# Patient Record
Sex: FEMALE | Race: WHITE | Employment: OTHER | URBAN - METROPOLITAN AREA
[De-identification: names, ages, dates, MRNs, and addresses within clinical notes are randomized per-mention and may not be internally consistent; named-entity substitution may affect disease eponyms.]

---

## 2017-01-19 DIAGNOSIS — I10 ESSENTIAL HYPERTENSION: ICD-10-CM

## 2017-01-19 DIAGNOSIS — I25.10 CORONARY ARTERY DISEASE INVOLVING NATIVE CORONARY ARTERY OF NATIVE HEART WITHOUT ANGINA PECTORIS: ICD-10-CM

## 2017-01-19 DIAGNOSIS — E03.4 HYPOTHYROIDISM DUE TO ACQUIRED ATROPHY OF THYROID: ICD-10-CM

## 2017-01-19 DIAGNOSIS — E78.00 PURE HYPERCHOLESTEROLEMIA: ICD-10-CM

## 2017-01-19 DIAGNOSIS — E11.9 TYPE 2 DIABETES MELLITUS WITHOUT COMPLICATION, WITHOUT LONG-TERM CURRENT USE OF INSULIN (HCC): ICD-10-CM

## 2017-01-20 RX ORDER — ATORVASTATIN CALCIUM 20 MG/1
20 TABLET, FILM COATED ORAL DAILY
Qty: 90 TABLET | Refills: 1 | Status: SHIPPED | OUTPATIENT
Start: 2017-01-20 | End: 2017-09-05 | Stop reason: SDUPTHER

## 2017-01-20 RX ORDER — METOPROLOL TARTRATE 50 MG/1
TABLET, FILM COATED ORAL
Qty: 180 TABLET | Refills: 1 | Status: SHIPPED | OUTPATIENT
Start: 2017-01-20 | End: 2017-05-08 | Stop reason: SDUPTHER

## 2017-01-20 RX ORDER — CLOPIDOGREL BISULFATE 75 MG/1
75 TABLET ORAL DAILY
Qty: 90 TABLET | Refills: 1 | Status: SHIPPED | OUTPATIENT
Start: 2017-01-20 | End: 2017-08-24 | Stop reason: SDUPTHER

## 2017-01-20 RX ORDER — HYDROCHLOROTHIAZIDE 25 MG/1
25 TABLET ORAL DAILY
Qty: 90 TABLET | Refills: 1 | Status: SHIPPED | OUTPATIENT
Start: 2017-01-20 | End: 2017-09-05 | Stop reason: SDUPTHER

## 2017-01-20 RX ORDER — METFORMIN HYDROCHLORIDE 500 MG/1
TABLET, EXTENDED RELEASE ORAL
Qty: 360 TABLET | Refills: 1 | Status: SHIPPED | OUTPATIENT
Start: 2017-01-20 | End: 2017-09-05 | Stop reason: SDUPTHER

## 2017-01-20 RX ORDER — AMLODIPINE BESYLATE 10 MG/1
10 TABLET ORAL DAILY
Qty: 90 TABLET | Refills: 1 | Status: SHIPPED | OUTPATIENT
Start: 2017-01-20 | End: 2017-08-14 | Stop reason: SDUPTHER

## 2017-01-25 RX ORDER — GLIMEPIRIDE 4 MG/1
4 TABLET ORAL DAILY
Qty: 90 TABLET | Refills: 1 | Status: SHIPPED | OUTPATIENT
Start: 2017-01-25 | End: 2017-05-08 | Stop reason: SDUPTHER

## 2017-02-02 ENCOUNTER — TELEPHONE (OUTPATIENT)
Dept: FAMILY MEDICINE CLINIC | Age: 72
End: 2017-02-02

## 2017-02-07 DIAGNOSIS — E03.4 HYPOTHYROIDISM DUE TO ACQUIRED ATROPHY OF THYROID: ICD-10-CM

## 2017-02-07 DIAGNOSIS — E11.42 DIABETIC PERIPHERAL NEUROPATHY (HCC): ICD-10-CM

## 2017-02-07 DIAGNOSIS — E78.00 PURE HYPERCHOLESTEROLEMIA: ICD-10-CM

## 2017-02-07 DIAGNOSIS — I15.9 SECONDARY HYPERTENSION: ICD-10-CM

## 2017-02-07 LAB
ALBUMIN SERPL-MCNC: 4.3 G/DL
ALP BLD-CCNC: 64 U/L
ALT SERPL-CCNC: 26 U/L
AST SERPL-CCNC: 22 U/L
BILIRUB SERPL-MCNC: 0.5 MG/DL (ref 0.1–1.4)
BUN BLDV-MCNC: 18 MG/DL
CALCIUM SERPL-MCNC: 9.2 MG/DL
CHLORIDE BLD-SCNC: 95 MMOL/L
CHOLESTEROL, TOTAL: 179 MG/DL
CHOLESTEROL/HDL RATIO: NORMAL
CO2: 23 MMOL/L
CREAT SERPL-MCNC: 0.88 MG/DL
GFR CALCULATED: NORMAL
GLUCOSE BLD-MCNC: 200 MG/DL
HCT VFR BLD CALC: 42.4 % (ref 36–46)
HDLC SERPL-MCNC: 52 MG/DL (ref 35–70)
HEMOGLOBIN: 14.1 G/DL (ref 12–16)
LDL CHOLESTEROL CALCULATED: 74 MG/DL (ref 0–160)
PLATELET # BLD: 303 K/ΜL
POTASSIUM SERPL-SCNC: 4.3 MMOL/L
SODIUM BLD-SCNC: 140 MMOL/L
T4 FREE: 1.35
TOTAL PROTEIN: 6.4
TRIGL SERPL-MCNC: 266 MG/DL
TSH SERPL DL<=0.05 MIU/L-ACNC: 4.64 UIU/ML
VLDLC SERPL CALC-MCNC: 53 MG/DL
WBC # BLD: 6.1 10^3/ML

## 2017-02-08 ENCOUNTER — OFFICE VISIT (OUTPATIENT)
Dept: FAMILY MEDICINE CLINIC | Age: 72
End: 2017-02-08

## 2017-02-08 VITALS
HEART RATE: 68 BPM | SYSTOLIC BLOOD PRESSURE: 114 MMHG | DIASTOLIC BLOOD PRESSURE: 66 MMHG | WEIGHT: 255.13 LBS | HEIGHT: 68 IN | RESPIRATION RATE: 14 BRPM | BODY MASS INDEX: 38.66 KG/M2

## 2017-02-08 DIAGNOSIS — E11.9 TYPE 2 DIABETES MELLITUS WITHOUT COMPLICATION, WITHOUT LONG-TERM CURRENT USE OF INSULIN (HCC): Primary | ICD-10-CM

## 2017-02-08 DIAGNOSIS — E11.42 DIABETIC POLYNEUROPATHY ASSOCIATED WITH TYPE 2 DIABETES MELLITUS (HCC): ICD-10-CM

## 2017-02-08 DIAGNOSIS — I71.9 AORTIC ANEURYSM OF UNSPECIFIED SITE WITHOUT MENTION OF RUPTURE: ICD-10-CM

## 2017-02-08 DIAGNOSIS — E78.00 PURE HYPERCHOLESTEROLEMIA: ICD-10-CM

## 2017-02-08 DIAGNOSIS — I15.9 SECONDARY HYPERTENSION: ICD-10-CM

## 2017-02-08 DIAGNOSIS — E03.4 HYPOTHYROIDISM DUE TO ACQUIRED ATROPHY OF THYROID: ICD-10-CM

## 2017-02-08 LAB
GLUCOSE BLD-MCNC: 203 MG/DL
HBA1C MFR BLD: 7.1 %

## 2017-02-08 PROCEDURE — 99213 OFFICE O/P EST LOW 20 MIN: CPT | Performed by: FAMILY MEDICINE

## 2017-02-08 PROCEDURE — 82962 GLUCOSE BLOOD TEST: CPT | Performed by: FAMILY MEDICINE

## 2017-02-08 PROCEDURE — 83036 HEMOGLOBIN GLYCOSYLATED A1C: CPT | Performed by: FAMILY MEDICINE

## 2017-02-08 RX ORDER — GABAPENTIN 300 MG/1
CAPSULE ORAL
Qty: 90 CAPSULE | Refills: 0 | Status: SHIPPED | OUTPATIENT
Start: 2017-02-08 | End: 2017-05-19 | Stop reason: SDUPTHER

## 2017-02-08 ASSESSMENT — ENCOUNTER SYMPTOMS
ORTHOPNEA: 0
CONSTIPATION: 0
NAUSEA: 0
ABDOMINAL PAIN: 0
SORE THROAT: 0
CHEST TIGHTNESS: 0
SHORTNESS OF BREATH: 0
COUGH: 0
EYE PAIN: 0
WHEEZING: 0

## 2017-02-08 ASSESSMENT — PATIENT HEALTH QUESTIONNAIRE - PHQ9
SUM OF ALL RESPONSES TO PHQ QUESTIONS 1-9: 0
SUM OF ALL RESPONSES TO PHQ9 QUESTIONS 1 & 2: 0
2. FEELING DOWN, DEPRESSED OR HOPELESS: 0
1. LITTLE INTEREST OR PLEASURE IN DOING THINGS: 0

## 2017-05-08 DIAGNOSIS — I10 ESSENTIAL HYPERTENSION: ICD-10-CM

## 2017-05-08 DIAGNOSIS — I25.10 CORONARY ARTERY DISEASE INVOLVING NATIVE CORONARY ARTERY OF NATIVE HEART WITHOUT ANGINA PECTORIS: ICD-10-CM

## 2017-05-09 RX ORDER — GLIMEPIRIDE 4 MG/1
4 TABLET ORAL DAILY
Qty: 90 TABLET | Refills: 1 | Status: SHIPPED | OUTPATIENT
Start: 2017-05-09 | End: 2018-03-22 | Stop reason: SDUPTHER

## 2017-05-09 RX ORDER — METOPROLOL TARTRATE 50 MG/1
TABLET, FILM COATED ORAL
Qty: 180 TABLET | Refills: 1 | Status: SHIPPED | OUTPATIENT
Start: 2017-05-09 | End: 2017-10-19 | Stop reason: DRUGHIGH

## 2017-05-10 ENCOUNTER — OFFICE VISIT (OUTPATIENT)
Dept: FAMILY MEDICINE CLINIC | Age: 72
End: 2017-05-10

## 2017-05-10 VITALS
HEART RATE: 60 BPM | WEIGHT: 253.38 LBS | DIASTOLIC BLOOD PRESSURE: 64 MMHG | SYSTOLIC BLOOD PRESSURE: 130 MMHG | HEIGHT: 68 IN | RESPIRATION RATE: 12 BRPM | BODY MASS INDEX: 38.4 KG/M2

## 2017-05-10 DIAGNOSIS — E11.9 TYPE 2 DIABETES MELLITUS WITHOUT COMPLICATION, WITHOUT LONG-TERM CURRENT USE OF INSULIN (HCC): Primary | ICD-10-CM

## 2017-05-10 DIAGNOSIS — I15.9 SECONDARY HYPERTENSION: ICD-10-CM

## 2017-05-10 DIAGNOSIS — E11.42 DIABETIC PERIPHERAL NEUROPATHY (HCC): ICD-10-CM

## 2017-05-10 DIAGNOSIS — I25.10 CORONARY ARTERY DISEASE INVOLVING NATIVE CORONARY ARTERY OF NATIVE HEART WITHOUT ANGINA PECTORIS: ICD-10-CM

## 2017-05-10 DIAGNOSIS — E78.00 PURE HYPERCHOLESTEROLEMIA: ICD-10-CM

## 2017-05-10 DIAGNOSIS — Z12.39 BREAST SCREENING: ICD-10-CM

## 2017-05-10 LAB
GLUCOSE BLD-MCNC: 187 MG/DL
HBA1C MFR BLD: 7.4 %

## 2017-05-10 PROCEDURE — 83036 HEMOGLOBIN GLYCOSYLATED A1C: CPT | Performed by: FAMILY MEDICINE

## 2017-05-10 PROCEDURE — 99213 OFFICE O/P EST LOW 20 MIN: CPT | Performed by: FAMILY MEDICINE

## 2017-05-10 PROCEDURE — 82962 GLUCOSE BLOOD TEST: CPT | Performed by: FAMILY MEDICINE

## 2017-05-10 ASSESSMENT — ENCOUNTER SYMPTOMS
NAUSEA: 0
ORTHOPNEA: 0
WHEEZING: 0
SORE THROAT: 0
ABDOMINAL PAIN: 0
CHEST TIGHTNESS: 0
SHORTNESS OF BREATH: 0
COUGH: 0
CONSTIPATION: 0
EYE PAIN: 0

## 2017-05-19 DIAGNOSIS — E11.42 DIABETIC PERIPHERAL NEUROPATHY (HCC): ICD-10-CM

## 2017-05-23 RX ORDER — GABAPENTIN 300 MG/1
CAPSULE ORAL
Qty: 90 CAPSULE | Refills: 0 | Status: SHIPPED | OUTPATIENT
Start: 2017-05-23 | End: 2017-08-30 | Stop reason: SDUPTHER

## 2017-07-28 DIAGNOSIS — E03.4 HYPOTHYROIDISM DUE TO ACQUIRED ATROPHY OF THYROID: ICD-10-CM

## 2017-08-01 RX ORDER — LEVOTHYROXINE SODIUM 0.15 MG/1
TABLET ORAL
Qty: 90 TABLET | Refills: 0 | Status: SHIPPED | OUTPATIENT
Start: 2017-08-01 | End: 2017-10-26 | Stop reason: SDUPTHER

## 2017-08-06 DIAGNOSIS — I10 ESSENTIAL HYPERTENSION: ICD-10-CM

## 2017-08-07 RX ORDER — RAMIPRIL 10 MG/1
CAPSULE ORAL
Qty: 180 CAPSULE | Refills: 0 | Status: SHIPPED | OUTPATIENT
Start: 2017-08-07 | End: 2017-11-08 | Stop reason: SDUPTHER

## 2017-08-14 DIAGNOSIS — I10 ESSENTIAL HYPERTENSION: ICD-10-CM

## 2017-08-15 ENCOUNTER — OFFICE VISIT (OUTPATIENT)
Dept: FAMILY MEDICINE CLINIC | Age: 72
End: 2017-08-15

## 2017-08-15 VITALS
DIASTOLIC BLOOD PRESSURE: 72 MMHG | HEART RATE: 82 BPM | BODY MASS INDEX: 38.25 KG/M2 | RESPIRATION RATE: 14 BRPM | SYSTOLIC BLOOD PRESSURE: 128 MMHG | WEIGHT: 252.38 LBS | HEIGHT: 68 IN

## 2017-08-15 DIAGNOSIS — S83.422A SPRAIN OF LATERAL COLLATERAL LIGAMENT OF LEFT KNEE, INITIAL ENCOUNTER: ICD-10-CM

## 2017-08-15 DIAGNOSIS — E11.42 DIABETIC PERIPHERAL NEUROPATHY (HCC): ICD-10-CM

## 2017-08-15 DIAGNOSIS — E11.9 TYPE 2 DIABETES MELLITUS WITHOUT COMPLICATION, WITHOUT LONG-TERM CURRENT USE OF INSULIN (HCC): Primary | ICD-10-CM

## 2017-08-15 DIAGNOSIS — E78.00 PURE HYPERCHOLESTEROLEMIA: ICD-10-CM

## 2017-08-15 DIAGNOSIS — I15.9 SECONDARY HYPERTENSION: ICD-10-CM

## 2017-08-15 LAB
GLUCOSE BLD-MCNC: 176 MG/DL
HBA1C MFR BLD: 7.8 %

## 2017-08-15 PROCEDURE — 99213 OFFICE O/P EST LOW 20 MIN: CPT | Performed by: FAMILY MEDICINE

## 2017-08-15 PROCEDURE — 82962 GLUCOSE BLOOD TEST: CPT | Performed by: FAMILY MEDICINE

## 2017-08-15 PROCEDURE — 83036 HEMOGLOBIN GLYCOSYLATED A1C: CPT | Performed by: FAMILY MEDICINE

## 2017-08-15 RX ORDER — AMLODIPINE BESYLATE 10 MG/1
10 TABLET ORAL DAILY
Qty: 90 TABLET | Refills: 1 | Status: SHIPPED | OUTPATIENT
Start: 2017-08-15 | End: 2018-03-22 | Stop reason: SDUPTHER

## 2017-08-15 ASSESSMENT — ENCOUNTER SYMPTOMS
EYE PAIN: 0
ORTHOPNEA: 0
CHEST TIGHTNESS: 0
SHORTNESS OF BREATH: 0
WHEEZING: 0
COUGH: 0
CONSTIPATION: 0
NAUSEA: 0
ABDOMINAL PAIN: 0
SORE THROAT: 0

## 2017-08-15 ASSESSMENT — PATIENT HEALTH QUESTIONNAIRE - PHQ9
2. FEELING DOWN, DEPRESSED OR HOPELESS: 0
SUM OF ALL RESPONSES TO PHQ9 QUESTIONS 1 & 2: 0
SUM OF ALL RESPONSES TO PHQ QUESTIONS 1-9: 0
1. LITTLE INTEREST OR PLEASURE IN DOING THINGS: 0

## 2017-08-24 ENCOUNTER — HOSPITAL ENCOUNTER (OUTPATIENT)
Dept: WOMENS IMAGING | Age: 72
Discharge: HOME | End: 2017-08-24
Payer: MEDICARE

## 2017-08-24 DIAGNOSIS — Z12.39 BREAST SCREENING: ICD-10-CM

## 2017-08-24 DIAGNOSIS — I25.10 CORONARY ARTERY DISEASE INVOLVING NATIVE CORONARY ARTERY OF NATIVE HEART WITHOUT ANGINA PECTORIS: ICD-10-CM

## 2017-08-24 PROCEDURE — G0202 SCR MAMMO BI INCL CAD: HCPCS

## 2017-08-25 RX ORDER — CLOPIDOGREL BISULFATE 75 MG/1
TABLET ORAL
Qty: 90 TABLET | Refills: 1 | Status: SHIPPED | OUTPATIENT
Start: 2017-08-25 | End: 2018-02-11 | Stop reason: SDUPTHER

## 2017-08-30 DIAGNOSIS — E11.42 DIABETIC PERIPHERAL NEUROPATHY (HCC): ICD-10-CM

## 2017-09-01 RX ORDER — GABAPENTIN 300 MG/1
CAPSULE ORAL
Qty: 90 CAPSULE | Refills: 1 | Status: SHIPPED | OUTPATIENT
Start: 2017-09-01 | End: 2018-03-14 | Stop reason: SDUPTHER

## 2017-09-05 DIAGNOSIS — I10 ESSENTIAL HYPERTENSION: ICD-10-CM

## 2017-09-05 DIAGNOSIS — E11.9 TYPE 2 DIABETES MELLITUS WITHOUT COMPLICATION, WITHOUT LONG-TERM CURRENT USE OF INSULIN (HCC): ICD-10-CM

## 2017-09-05 DIAGNOSIS — E03.4 HYPOTHYROIDISM DUE TO ACQUIRED ATROPHY OF THYROID: ICD-10-CM

## 2017-09-05 DIAGNOSIS — E78.00 PURE HYPERCHOLESTEROLEMIA: ICD-10-CM

## 2017-09-06 RX ORDER — HYDROCHLOROTHIAZIDE 25 MG/1
25 TABLET ORAL DAILY
Qty: 90 TABLET | Refills: 1 | Status: SHIPPED | OUTPATIENT
Start: 2017-09-06 | End: 2018-02-11 | Stop reason: SDUPTHER

## 2017-09-06 RX ORDER — ATORVASTATIN CALCIUM 20 MG/1
20 TABLET, FILM COATED ORAL DAILY
Qty: 90 TABLET | Refills: 1 | Status: SHIPPED | OUTPATIENT
Start: 2017-09-06 | End: 2018-02-11 | Stop reason: SDUPTHER

## 2017-09-06 RX ORDER — METFORMIN HYDROCHLORIDE 500 MG/1
TABLET, EXTENDED RELEASE ORAL
Qty: 360 TABLET | Refills: 1 | Status: ON HOLD | OUTPATIENT
Start: 2017-09-06 | End: 2017-10-22

## 2017-10-19 ENCOUNTER — OFFICE VISIT (OUTPATIENT)
Dept: FAMILY MEDICINE CLINIC | Age: 72
End: 2017-10-19

## 2017-10-19 ENCOUNTER — HOSPITAL ENCOUNTER (INPATIENT)
Age: 72
LOS: 2 days | Discharge: HOME | DRG: 242 | End: 2017-10-22
Attending: EMERGENCY MEDICINE | Admitting: FAMILY MEDICINE
Payer: MEDICARE

## 2017-10-19 ENCOUNTER — APPOINTMENT (OUTPATIENT)
Dept: CT IMAGING | Age: 72
DRG: 242 | End: 2017-10-19
Payer: MEDICARE

## 2017-10-19 ENCOUNTER — APPOINTMENT (OUTPATIENT)
Dept: GENERAL RADIOLOGY | Age: 72
DRG: 242 | End: 2017-10-19
Payer: MEDICARE

## 2017-10-19 VITALS
RESPIRATION RATE: 14 BRPM | SYSTOLIC BLOOD PRESSURE: 110 MMHG | HEART RATE: 48 BPM | DIASTOLIC BLOOD PRESSURE: 70 MMHG | WEIGHT: 254 LBS | HEIGHT: 68 IN | BODY MASS INDEX: 38.49 KG/M2

## 2017-10-19 DIAGNOSIS — E11.9 TYPE 2 DIABETES MELLITUS WITHOUT COMPLICATION, WITHOUT LONG-TERM CURRENT USE OF INSULIN (HCC): ICD-10-CM

## 2017-10-19 DIAGNOSIS — R00.1 BRADYCARDIA: Primary | ICD-10-CM

## 2017-10-19 DIAGNOSIS — I10 ESSENTIAL HYPERTENSION: ICD-10-CM

## 2017-10-19 DIAGNOSIS — I25.10 CORONARY ARTERY DISEASE INVOLVING NATIVE CORONARY ARTERY OF NATIVE HEART WITHOUT ANGINA PECTORIS: ICD-10-CM

## 2017-10-19 DIAGNOSIS — R06.09 DYSPNEA ON EXERTION: Primary | ICD-10-CM

## 2017-10-19 DIAGNOSIS — R00.1 BRADYCARDIA: ICD-10-CM

## 2017-10-19 DIAGNOSIS — I15.9 SECONDARY HYPERTENSION: ICD-10-CM

## 2017-10-19 LAB
ANION GAP SERPL CALCULATED.3IONS-SCNC: 18 MEQ/L (ref 8–16)
ANISOCYTOSIS: ABNORMAL
BASOPHILS # BLD: 1.2 %
BASOPHILS ABSOLUTE: 0.1 THOU/MM3 (ref 0–0.1)
BUN BLDV-MCNC: 25 MG/DL (ref 7–22)
CALCIUM SERPL-MCNC: 9.3 MG/DL (ref 8.5–10.5)
CHLORIDE BLD-SCNC: 102 MEQ/L (ref 98–111)
CO2: 24 MEQ/L (ref 23–33)
CREAT SERPL-MCNC: 1.1 MG/DL (ref 0.4–1.2)
EOSINOPHIL # BLD: 3.6 %
EOSINOPHILS ABSOLUTE: 0.3 THOU/MM3 (ref 0–0.4)
GFR SERPL CREATININE-BSD FRML MDRD: 49 ML/MIN/1.73M2
GLUCOSE BLD-MCNC: 140 MG/DL (ref 70–108)
HCT VFR BLD CALC: 40 % (ref 37–47)
HEMOGLOBIN: 13.6 GM/DL (ref 12–16)
HGB, POC: 14.2
LYMPHOCYTES # BLD: 20.9 %
LYMPHOCYTES ABSOLUTE: 2 THOU/MM3 (ref 1–4.8)
MCH RBC QN AUTO: 29.3 PG (ref 27–31)
MCHC RBC AUTO-ENTMCNC: 33.9 GM/DL (ref 33–37)
MCV RBC AUTO: 86.4 FL (ref 81–99)
MONOCYTES # BLD: 7.7 %
MONOCYTES ABSOLUTE: 0.7 THOU/MM3 (ref 0.4–1.3)
NUCLEATED RED BLOOD CELLS: 0 /100 WBC
OSMOLALITY CALCULATION: 293.5 MOSMOL/KG (ref 275–300)
PDW BLD-RTO: 14.7 % (ref 11.5–14.5)
PLATELET # BLD: 261 THOU/MM3 (ref 130–400)
PMV BLD AUTO: 9.3 MCM (ref 7.4–10.4)
POTASSIUM SERPL-SCNC: 4.5 MEQ/L (ref 3.5–5.2)
RBC # BLD: 4.64 MILL/MM3 (ref 4.2–5.4)
RBC # BLD: NORMAL 10*6/UL
SEG NEUTROPHILS: 66.6 %
SEGMENTED NEUTROPHILS ABSOLUTE COUNT: 6.3 THOU/MM3 (ref 1.8–7.7)
SODIUM BLD-SCNC: 144 MEQ/L (ref 135–145)
TROPONIN T: < 0.01 NG/ML
WBC # BLD: 9.4 THOU/MM3 (ref 4.8–10.8)

## 2017-10-19 PROCEDURE — B3201ZZ COMPUTERIZED TOMOGRAPHY (CT SCAN) OF THORACIC AORTA USING LOW OSMOLAR CONTRAST: ICD-10-PCS | Performed by: RADIOLOGY

## 2017-10-19 PROCEDURE — 36415 COLL VENOUS BLD VENIPUNCTURE: CPT

## 2017-10-19 PROCEDURE — 6360000004 HC RX CONTRAST MEDICATION: Performed by: EMERGENCY MEDICINE

## 2017-10-19 PROCEDURE — 71010 XR CHEST PORTABLE: CPT

## 2017-10-19 PROCEDURE — 71275 CT ANGIOGRAPHY CHEST: CPT

## 2017-10-19 PROCEDURE — B32T1ZZ COMPUTERIZED TOMOGRAPHY (CT SCAN) OF LEFT PULMONARY ARTERY USING LOW OSMOLAR CONTRAST: ICD-10-PCS | Performed by: RADIOLOGY

## 2017-10-19 PROCEDURE — B32S1ZZ COMPUTERIZED TOMOGRAPHY (CT SCAN) OF RIGHT PULMONARY ARTERY USING LOW OSMOLAR CONTRAST: ICD-10-PCS | Performed by: RADIOLOGY

## 2017-10-19 PROCEDURE — 85025 COMPLETE CBC W/AUTO DIFF WBC: CPT

## 2017-10-19 PROCEDURE — 85018 HEMOGLOBIN: CPT | Performed by: FAMILY MEDICINE

## 2017-10-19 PROCEDURE — 93000 ELECTROCARDIOGRAM COMPLETE: CPT | Performed by: FAMILY MEDICINE

## 2017-10-19 PROCEDURE — 80048 BASIC METABOLIC PNL TOTAL CA: CPT

## 2017-10-19 PROCEDURE — 84484 ASSAY OF TROPONIN QUANT: CPT

## 2017-10-19 PROCEDURE — 99285 EMERGENCY DEPT VISIT HI MDM: CPT

## 2017-10-19 PROCEDURE — 99214 OFFICE O/P EST MOD 30 MIN: CPT | Performed by: FAMILY MEDICINE

## 2017-10-19 RX ORDER — METOPROLOL TARTRATE 50 MG/1
TABLET, FILM COATED ORAL
Qty: 180 TABLET | Refills: 1 | Status: SHIPPED | OUTPATIENT
Start: 2017-10-19 | End: 2017-11-22 | Stop reason: ALTCHOICE

## 2017-10-19 RX ADMIN — IOPAMIDOL 80 ML: 755 INJECTION, SOLUTION INTRAVENOUS at 23:32

## 2017-10-19 ASSESSMENT — ENCOUNTER SYMPTOMS
HOARSE VOICE: 0
SHORTNESS OF BREATH: 1
ABDOMINAL PAIN: 0
EYE PAIN: 0
SORE THROAT: 0
WHEEZING: 0
NAUSEA: 0
CONSTIPATION: 0
SINUS PRESSURE: 1
COUGH: 1

## 2017-10-19 NOTE — PROGRESS NOTES
24 Hr Holter Monitor @ [address] [place] [address] on Tuesday 10-24-17 at 9:45am. Patient to arrive at 9:15am to second floor heart center. Eliezer Avina informed at appt today.

## 2017-10-19 NOTE — PROGRESS NOTES
ARTHROSCOPY  11/01; 3/02    right and left knee    KNEE ARTHROSCOPY Bilateral     bilat    THYROIDECTOMY, PARTIAL  1980's     Social History     Social History    Marital status:      Spouse name: N/A    Number of children: N/A    Years of education: N/A     Occupational History    Not on file. Social History Main Topics    Smoking status: Never Smoker    Smokeless tobacco: Never Used    Alcohol use No    Drug use: No    Sexual activity: No     Other Topics Concern    Not on file     Social History Narrative    No narrative on file     Family History   Problem Relation Age of Onset    Heart Disease Mother 80     bacterial infection at valve    Heart Disease Father     Heart Attack Father     Heart Disease Brother      CABG     Review of Systems   Constitutional: Negative for appetite change, fatigue and fever. HENT: Positive for sinus pressure. Negative for congestion, ear pain, hoarse voice, postnasal drip and sore throat. Eyes: Negative for pain and visual disturbance. Respiratory: Positive for cough and shortness of breath. Negative for wheezing. Cardiovascular: Negative for chest pain, palpitations and leg swelling. Gastrointestinal: Negative for abdominal pain, constipation and nausea. Genitourinary: Negative for dysuria and frequency. Musculoskeletal: Negative for arthralgias, joint swelling, neck pain and neck stiffness. Skin: Negative for rash. Neurological: Negative for dizziness, weakness, numbness and headaches. Hematological: Negative for adenopathy. Does not bruise/bleed easily. Psychiatric/Behavioral: Negative for behavioral problems and sleep disturbance. The patient is not nervous/anxious. /70 (Site: Right Arm, Position: Sitting, Cuff Size: Medium Adult)   Pulse (!) 48   Resp 14   Ht 5' 8\" (1.727 m)   Wt 254 lb (115.2 kg)   Breastfeeding?  No   BMI 38.62 kg/m²   Objective:   Physical Exam   Constitutional: She is oriented to person, place, and time. She appears well-developed and well-nourished. HENT:   Head: Normocephalic and atraumatic. Right Ear: External ear normal.   Left Ear: External ear normal.   Nose: Nose normal.   Mouth/Throat: Oropharynx is clear and moist.   Eyes: Conjunctivae and EOM are normal. Pupils are equal, round, and reactive to light. No scleral icterus. Neck: Normal range of motion. Neck supple. No JVD present. No thyromegaly present. Cardiovascular: Normal rate, regular rhythm, normal heart sounds and intact distal pulses. Pulmonary/Chest: Effort normal and breath sounds normal. She has no wheezes. She has no rales. Abdominal: Soft. Bowel sounds are normal. She exhibits no distension and no mass. There is no tenderness. Musculoskeletal: Normal range of motion. She exhibits no tenderness. Lymphadenopathy:     She has no cervical adenopathy. Neurological: She is alert and oriented to person, place, and time. She has normal reflexes. No cranial nerve deficit. Skin: Skin is warm and dry. No rash noted. Psychiatric: She has a normal mood and affect. Nursing note and vitals reviewed. ekg with  Sinus  Bradycardia no ST segment change or  qrs and  No  Pr  Changes   Assessment:      1. Dyspnea on exertion  EKG 12 Lead    POCT hemoglobin    CBC with Differential    TSH with Reflex    Basic Metabolic Panel   2. Secondary hypertension     3. Bradycardia  Holter Monitor 24 Hour   4. Essential hypertension  metoprolol tartrate (LOPRESSOR) 50 MG tablet   5.  Coronary artery disease involving native coronary artery of native heart without angina pectoris  metoprolol tartrate (LOPRESSOR) 50 MG tablet         Plan:      Current Outpatient Prescriptions   Medication Sig Dispense Refill    metoprolol tartrate (LOPRESSOR) 50 MG tablet TAKE 1/2 TABLET BY MOUTH TWICE DAILY 180 tablet 1    atorvastatin (LIPITOR) 20 MG tablet Take 1 tablet by mouth daily 90 tablet 1    metFORMIN (GLUCOPHAGE XR) 500 MG extended and decrease Lopressor to 25 mg b.i.d.  Get  Holter and  lab and see back in 5 days  If  Chest pain   To  Er  Or  call

## 2017-10-20 ENCOUNTER — APPOINTMENT (OUTPATIENT)
Dept: INTERVENTIONAL RADIOLOGY/VASCULAR | Age: 72
DRG: 242 | End: 2017-10-20
Payer: MEDICARE

## 2017-10-20 LAB
EKG ATRIAL RATE: 44 BPM
EKG P AXIS: 0 DEGREES
EKG P-R INTERVAL: 180 MS
EKG Q-T INTERVAL: 556 MS
EKG QRS DURATION: 104 MS
EKG QTC CALCULATION (BAZETT): 475 MS
EKG R AXIS: 19 DEGREES
EKG T AXIS: 62 DEGREES
EKG VENTRICULAR RATE: 44 BPM
GLUCOSE BLD-MCNC: 153 MG/DL (ref 70–108)
GLUCOSE BLD-MCNC: 246 MG/DL (ref 70–108)
LV EF: 63 %
LVEF MODALITY: NORMAL
PRO-BNP: 2151 PG/ML (ref 0–900)
TROPONIN T: < 0.01 NG/ML
TSH SERPL DL<=0.05 MIU/L-ACNC: 2.12 UIU/ML (ref 0.4–4.2)

## 2017-10-20 PROCEDURE — 6360000002 HC RX W HCPCS: Performed by: INTERNAL MEDICINE

## 2017-10-20 PROCEDURE — 93306 TTE W/DOPPLER COMPLETE: CPT

## 2017-10-20 PROCEDURE — 6370000000 HC RX 637 (ALT 250 FOR IP): Performed by: INTERNAL MEDICINE

## 2017-10-20 PROCEDURE — 82948 REAGENT STRIP/BLOOD GLUCOSE: CPT

## 2017-10-20 PROCEDURE — 1200000003 HC TELEMETRY R&B

## 2017-10-20 PROCEDURE — 6370000000 HC RX 637 (ALT 250 FOR IP): Performed by: FAMILY MEDICINE

## 2017-10-20 PROCEDURE — 93005 ELECTROCARDIOGRAM TRACING: CPT

## 2017-10-20 PROCEDURE — 2580000003 HC RX 258: Performed by: FAMILY MEDICINE

## 2017-10-20 PROCEDURE — 83880 ASSAY OF NATRIURETIC PEPTIDE: CPT

## 2017-10-20 PROCEDURE — 84484 ASSAY OF TROPONIN QUANT: CPT

## 2017-10-20 PROCEDURE — 99221 1ST HOSP IP/OBS SF/LOW 40: CPT | Performed by: INTERNAL MEDICINE

## 2017-10-20 PROCEDURE — 93880 EXTRACRANIAL BILAT STUDY: CPT

## 2017-10-20 PROCEDURE — 36415 COLL VENOUS BLD VENIPUNCTURE: CPT

## 2017-10-20 PROCEDURE — 2500000003 HC RX 250 WO HCPCS: Performed by: FAMILY MEDICINE

## 2017-10-20 PROCEDURE — 84443 ASSAY THYROID STIM HORMONE: CPT

## 2017-10-20 RX ORDER — SODIUM CHLORIDE 0.9 % (FLUSH) 0.9 %
10 SYRINGE (ML) INJECTION EVERY 12 HOURS SCHEDULED
Status: DISCONTINUED | OUTPATIENT
Start: 2017-10-20 | End: 2017-10-21 | Stop reason: SDUPTHER

## 2017-10-20 RX ORDER — RAMIPRIL 10 MG/1
10 CAPSULE ORAL DAILY
Status: DISCONTINUED | OUTPATIENT
Start: 2017-10-20 | End: 2017-10-20

## 2017-10-20 RX ORDER — POTASSIUM CHLORIDE 20MEQ/15ML
40 LIQUID (ML) ORAL PRN
Status: DISCONTINUED | OUTPATIENT
Start: 2017-10-20 | End: 2017-10-22 | Stop reason: HOSPADM

## 2017-10-20 RX ORDER — SODIUM CHLORIDE 0.9 % (FLUSH) 0.9 %
10 SYRINGE (ML) INJECTION PRN
Status: DISCONTINUED | OUTPATIENT
Start: 2017-10-20 | End: 2017-10-21 | Stop reason: SDUPTHER

## 2017-10-20 RX ORDER — RAMIPRIL 10 MG/1
10 CAPSULE ORAL ONCE
Status: COMPLETED | OUTPATIENT
Start: 2017-10-20 | End: 2017-10-20

## 2017-10-20 RX ORDER — HYDRALAZINE HYDROCHLORIDE 20 MG/ML
10 INJECTION INTRAMUSCULAR; INTRAVENOUS ONCE
Status: COMPLETED | OUTPATIENT
Start: 2017-10-20 | End: 2017-10-20

## 2017-10-20 RX ORDER — GABAPENTIN 300 MG/1
300 CAPSULE ORAL NIGHTLY
Status: DISCONTINUED | OUTPATIENT
Start: 2017-10-20 | End: 2017-10-22 | Stop reason: HOSPADM

## 2017-10-20 RX ORDER — ONDANSETRON 2 MG/ML
4 INJECTION INTRAMUSCULAR; INTRAVENOUS EVERY 6 HOURS PRN
Status: DISCONTINUED | OUTPATIENT
Start: 2017-10-20 | End: 2017-10-21

## 2017-10-20 RX ORDER — NICOTINE POLACRILEX 4 MG
15 LOZENGE BUCCAL PRN
Status: DISCONTINUED | OUTPATIENT
Start: 2017-10-20 | End: 2017-10-22 | Stop reason: HOSPADM

## 2017-10-20 RX ORDER — RAMIPRIL 10 MG/1
20 CAPSULE ORAL DAILY
Status: DISCONTINUED | OUTPATIENT
Start: 2017-10-21 | End: 2017-10-22 | Stop reason: HOSPADM

## 2017-10-20 RX ORDER — LEVOTHYROXINE SODIUM 0.12 MG/1
125 TABLET ORAL DAILY
Status: DISCONTINUED | OUTPATIENT
Start: 2017-10-20 | End: 2017-10-22 | Stop reason: HOSPADM

## 2017-10-20 RX ORDER — BUMETANIDE 0.25 MG/ML
1 INJECTION, SOLUTION INTRAMUSCULAR; INTRAVENOUS ONCE
Status: COMPLETED | OUTPATIENT
Start: 2017-10-20 | End: 2017-10-20

## 2017-10-20 RX ORDER — POTASSIUM CHLORIDE 20 MEQ/1
40 TABLET, EXTENDED RELEASE ORAL PRN
Status: DISCONTINUED | OUTPATIENT
Start: 2017-10-20 | End: 2017-10-22 | Stop reason: HOSPADM

## 2017-10-20 RX ORDER — AMLODIPINE BESYLATE 10 MG/1
10 TABLET ORAL DAILY
Status: DISCONTINUED | OUTPATIENT
Start: 2017-10-20 | End: 2017-10-22 | Stop reason: HOSPADM

## 2017-10-20 RX ORDER — DEXTROSE MONOHYDRATE 50 MG/ML
100 INJECTION, SOLUTION INTRAVENOUS PRN
Status: DISCONTINUED | OUTPATIENT
Start: 2017-10-20 | End: 2017-10-21

## 2017-10-20 RX ORDER — GLIMEPIRIDE 4 MG/1
4 TABLET ORAL DAILY
Status: DISCONTINUED | OUTPATIENT
Start: 2017-10-20 | End: 2017-10-22 | Stop reason: HOSPADM

## 2017-10-20 RX ORDER — HYDRALAZINE HYDROCHLORIDE 25 MG/1
25 TABLET, FILM COATED ORAL 3 TIMES DAILY
Status: DISCONTINUED | OUTPATIENT
Start: 2017-10-20 | End: 2017-10-22 | Stop reason: HOSPADM

## 2017-10-20 RX ORDER — ASPIRIN 325 MG
325 TABLET ORAL DAILY
Status: DISCONTINUED | OUTPATIENT
Start: 2017-10-20 | End: 2017-10-22 | Stop reason: HOSPADM

## 2017-10-20 RX ORDER — POTASSIUM CHLORIDE 7.45 MG/ML
10 INJECTION INTRAVENOUS PRN
Status: DISCONTINUED | OUTPATIENT
Start: 2017-10-20 | End: 2017-10-22 | Stop reason: HOSPADM

## 2017-10-20 RX ORDER — CLOPIDOGREL BISULFATE 75 MG/1
75 TABLET ORAL DAILY
Status: DISCONTINUED | OUTPATIENT
Start: 2017-10-20 | End: 2017-10-22 | Stop reason: HOSPADM

## 2017-10-20 RX ORDER — ACETAMINOPHEN 325 MG/1
650 TABLET ORAL EVERY 4 HOURS PRN
Status: DISCONTINUED | OUTPATIENT
Start: 2017-10-20 | End: 2017-10-21

## 2017-10-20 RX ORDER — HYDROCHLOROTHIAZIDE 25 MG/1
25 TABLET ORAL DAILY
Status: DISCONTINUED | OUTPATIENT
Start: 2017-10-20 | End: 2017-10-22 | Stop reason: HOSPADM

## 2017-10-20 RX ORDER — SODIUM CHLORIDE 9 MG/ML
INJECTION, SOLUTION INTRAVENOUS CONTINUOUS
Status: DISCONTINUED | OUTPATIENT
Start: 2017-10-20 | End: 2017-10-20

## 2017-10-20 RX ORDER — DEXTROSE MONOHYDRATE 25 G/50ML
12.5 INJECTION, SOLUTION INTRAVENOUS PRN
Status: DISCONTINUED | OUTPATIENT
Start: 2017-10-20 | End: 2017-10-22 | Stop reason: HOSPADM

## 2017-10-20 RX ORDER — ATORVASTATIN CALCIUM 20 MG/1
20 TABLET, FILM COATED ORAL NIGHTLY
Status: DISCONTINUED | OUTPATIENT
Start: 2017-10-20 | End: 2017-10-22 | Stop reason: HOSPADM

## 2017-10-20 RX ADMIN — BUMETANIDE 1 MG: 0.25 INJECTION, SOLUTION INTRAMUSCULAR; INTRAVENOUS at 10:32

## 2017-10-20 RX ADMIN — RAMIPRIL 10 MG: 10 CAPSULE ORAL at 08:27

## 2017-10-20 RX ADMIN — SODIUM CHLORIDE: 9 INJECTION, SOLUTION INTRAVENOUS at 03:52

## 2017-10-20 RX ADMIN — HYDRALAZINE HYDROCHLORIDE 10 MG: 20 INJECTION INTRAMUSCULAR; INTRAVENOUS at 19:53

## 2017-10-20 RX ADMIN — RAMIPRIL 10 MG: 10 CAPSULE ORAL at 03:52

## 2017-10-20 RX ADMIN — Medication 10 ML: at 19:58

## 2017-10-20 RX ADMIN — LEVOTHYROXINE SODIUM 125 MCG: 125 TABLET ORAL at 05:51

## 2017-10-20 RX ADMIN — Medication 2 UNITS: at 19:47

## 2017-10-20 RX ADMIN — GABAPENTIN 300 MG: 300 CAPSULE ORAL at 20:53

## 2017-10-20 RX ADMIN — HYDRALAZINE HYDROCHLORIDE 25 MG: 25 TABLET, FILM COATED ORAL at 20:53

## 2017-10-20 RX ADMIN — CLOPIDOGREL 75 MG: 75 TABLET, FILM COATED ORAL at 08:27

## 2017-10-20 RX ADMIN — ASPIRIN 325 MG: 325 TABLET, COATED ORAL at 08:27

## 2017-10-20 RX ADMIN — HYDRALAZINE HYDROCHLORIDE 25 MG: 25 TABLET, FILM COATED ORAL at 14:41

## 2017-10-20 RX ADMIN — AMLODIPINE BESYLATE 10 MG: 10 TABLET ORAL at 08:27

## 2017-10-20 RX ADMIN — GLIMEPIRIDE 4 MG: 4 TABLET ORAL at 17:40

## 2017-10-20 RX ADMIN — ATORVASTATIN CALCIUM 20 MG: 20 TABLET, FILM COATED ORAL at 19:45

## 2017-10-20 RX ADMIN — HYDROCHLOROTHIAZIDE 25 MG: 25 TABLET ORAL at 08:27

## 2017-10-20 NOTE — ED PROVIDER NOTES
Refills: 1    Associated Diagnoses: Essential hypertension      Coenzyme Q10 (CO Q 10) 100 MG CAPS Take 1 tablet by mouth daily      ramipril (ALTACE) 10 MG capsule TAKE ONE CAPSULE BY MOUTH TWICE DAILY  Qty: 180 capsule, Refills: 0    Associated Diagnoses: Essential hypertension      levothyroxine (SYNTHROID) 150 MCG tablet TAKE ONE TABLET BY MOUTH ONCE DAILY  Qty: 90 tablet, Refills: 0    Associated Diagnoses: Hypothyroidism due to acquired atrophy of thyroid      glimepiride (AMARYL) 4 MG tablet Take 1 tablet by mouth daily  Qty: 90 tablet, Refills: 1      Glucose Blood (JOSE E BREEZE 2 TEST) DISK Use one strip to check glucose twice daily, Dx: E11.9  Qty: 100 each, Refills: 5      aspirin 325 MG tablet Take 325 mg by mouth daily. Ascorbic Acid (VITAMIN C) 500 MG tablet Take 500 mg by mouth daily. Flaxseed, Linseed, (FLAX SEED OIL) 1000 MG CAPS Take  by mouth. ALLERGIES     is allergic to adhesive tape. FAMILY HISTORY     indicated that her mother is . She indicated that her father is . She indicated that her brother is alive. family history includes Heart Attack in her father; Heart Disease in her brother and father; Heart Disease (age of onset: 80) in her mother. SOCIAL HISTORY      reports that she has never smoked. She has never used smokeless tobacco. She reports that she does not drink alcohol or use drugs. PHYSICAL EXAM     INITIAL VITALS:  height is 5' 8\" (1.727 m) and weight is 252 lb 14.4 oz (114.7 kg). Her oral temperature is 97.3 °F (36.3 °C). Her blood pressure is 187/79 (abnormal) and her pulse is 42 (abnormal). Her respiration is 20 and oxygen saturation is 96%. Physical Exam   Constitutional:  well-developed and well-nourished. HENT: Head: Normocephalic, atraumatic, Bilateral external ears normal, Oropharynx mosit, No oral exudates, Nose normal.   Eyes: PERRL, EOMI, Conjunctiva normal, No discharge.  No scleral icterus  Neck: Normal range Abnormal; Notable for the following:     Pro-BNP 2151.0 (*)     All other components within normal limits   TROPONIN   OSMOLALITY   TSH WITHOUT REFLEX   POCT GLUCOSE       EMERGENCY DEPARTMENT COURSE:   Vitals:    Vitals:    10/20/17 0430 10/20/17 0816 10/20/17 1032 10/20/17 1206   BP: (!) 184/77 (!) 185/83 (!) 184/53 (!) 187/79   Pulse: (!) 44 (!) 42 (!) 43 (!) 42   Resp: 18 16 20   Temp: 97.5 °F (36.4 °C) 96.6 °F (35.9 °C)  97.3 °F (36.3 °C)   TempSrc: Oral Axillary  Oral   SpO2: 93% 92%  96%   Weight:       Height:             CRITICAL CARE:       CONSULTS:  None    PROCEDURES:  None    FINAL IMPRESSION      1. Bradycardia          DISPOSITION/PLAN   AdmittedPatient presenting with complaint of lightheadedness, shortness of breath. Patient has markedly bradycardia, pressure stable. Known history of aortic aneurysm, we'll evaluate. Aortic aneurysm stable on CTA. Patient has persistent bradycardia, blood pressure stable. Patient admitted to Dr. Marissa Guzman.       PATIENT REFERRED TO:  Orion Nunez MD  [address]  [phone]            DISCHARGE MEDICATIONS:  Current Discharge Medication List          (Please note that portions of this note were completed with a voice recognition program.  Efforts were made to edit the dictations but occasionally words are mis-transcribed.)    Paxton Salcedo, DO              Paxton Salcedo, DO  10/20/17 [address] [facility], DO  10/20/17 [facility]0

## 2017-10-20 NOTE — CONSULTS
metoprolol tartrate (LOPRESSOR) 50 MG tablet TAKE 1/2 TABLET BY MOUTH TWICE DAILY 10/19/17  Yes Tracee Lynn MD   atorvastatin (LIPITOR) 20 MG tablet Take 1 tablet by mouth daily 9/6/17  Yes Tracee Lynn MD   metFORMIN (GLUCOPHAGE XR) 500 MG extended release tablet 2  Tabs  At  Breakfast and at the evening  meal 9/6/17  Yes Tracee Lynn MD   hydrochlorothiazide (HYDRODIURIL) 25 MG tablet Take 1 tablet by mouth daily 9/6/17  Yes Tracee Lynn MD   gabapentin (NEURONTIN) 300 MG capsule TAKE ONE CAPSULE BY MOUTH ONCE DAILY IN THE EVENING 9/1/17  Yes Tracee Lynn MD   clopidogrel (PLAVIX) 75 MG tablet TAKE 1 TABLET DAILY 8/25/17  Yes Tracee Lynn MD   amLODIPine (NORVASC) 10 MG tablet Take 1 tablet by mouth daily 8/15/17  Yes Tracee Lynn MD   Coenzyme Q10 (CO Q 10) 100 MG CAPS Take 1 tablet by mouth daily   Yes Historical Provider, MD   ramipril (ALTACE) 10 MG capsule TAKE ONE CAPSULE BY MOUTH TWICE DAILY 8/7/17  Yes Claudio Degroot MD   levothyroxine (SYNTHROID) 150 MCG tablet TAKE ONE TABLET BY MOUTH ONCE DAILY 8/1/17  Yes Tracee Lynn MD   glimepiride (AMARYL) 4 MG tablet Take 1 tablet by mouth daily 5/9/17  Yes Tracee Lynn MD   Glucose Blood (JOSE E BREEZE 2 TEST) DISK Use one strip to check glucose twice daily, Dx: E11.9 2/21/17  Yes Tracee Lynn MD   aspirin 325 MG tablet Take 325 mg by mouth daily. Yes Historical Provider, MD   Ascorbic Acid (VITAMIN C) 500 MG tablet Take 500 mg by mouth daily. Yes Historical Provider, MD   Flaxseed, Linseed, (FLAX SEED OIL) 1000 MG CAPS Take  by mouth.      Yes Historical Provider, MD       Current Facility-Administered Medications   Medication Dose Route Frequency Provider Last Rate Last Dose    amLODIPine (NORVASC) tablet 10 mg  10 mg Oral Daily Claudio Degroot MD   10 mg at 10/20/17 0827    aspirin tablet 325 mg  325 mg Oral Daily Claudio Degroot MD   325 mg at 10/20/17 0827    atorvastatin Lab Results   Component Value Date    TSH 2.120 10/20/2017     BNP: No results found for: BNP    EKG: High degree AVB vs Mobitz type II 2nd degree AVB    Telemetry: 3rd degree AVB    Echo:      Left ventricle:  Size was normal.  Systolic function was normal. Ejection fraction was estimated in the range of 55 % to 65 %. There were no regional wall motion abnormalities. Left atrium:  The atrium was mildly dilated. Aortic valve: There was mild regurgitation. Prepared and signed by    Jocelin Torrez MD  Signed 19-Oct-2015 14:03:06    Stress test: 2015  -  The study was negative for ischemia. Lesleigh Brianne was image artifact, without diagnostic evidence for perfusion abnormality. -  Left ventricular systolic function was normal.  -  Clinical correlation is recommended. Assessment/Plan:  Shortness of breath/dizziness/Bradycardia  DM  HTN  HLD  Hypothyroidism      Telemetry  Get 2D Echocardiogram  Rule out MI with serial enzymes  Last dose of BB was yesterday morning  Continue Aspirin, Amlodipine  Hold any AV luisa blocking agents  Monitor electrolytes closely  Keep K>4, Mg>2  Will add hydralazine 25mg po TID for high BP  Will get EP evaluation for Pacemaker    Please do note hesitate to contact me for any further questions. Thank you for the opportunity to be involved in this patient's care.     Code Status: Prior    Electronically signed by Yesenia Cedillo MD on 10/20/2017 at 12:31 PM

## 2017-10-20 NOTE — CARE COORDINATION
10/20/17, 9:54 AM      Ian Hensley       Admitted from: ER, patient presented with shortness of breath and bradycardia. 10/19/2017/ 2132 Hospital day: 0   Location: [facility] Reason for admit: Bradycardia [R00.1] Status: Inpt. Admit order signed?: no, note on chart. PMH:  has a past medical history of Aortic aneurysm ([facility] [address] Arthritis; CAD (coronary artery disease); Colon polyps; Diabetic peripheral neuropathy ([facility] [address] Elbow fracture; Hyperlipidemia; Hypertension; Hypothyroidism; and Type II or unspecified type diabetes mellitus without mention of complication, not stated as uncontrolled. Procedure: N/A  Pertinent abnormal Imaging:CTA of chest and chest x-ray. Medications:  Scheduled Meds:   amLODIPine  10 mg Oral Daily    aspirin  325 mg Oral Daily    atorvastatin  20 mg Oral Nightly    clopidogrel  75 mg Oral Daily    gabapentin  300 mg Oral Nightly    glimepiride  4 mg Oral Daily    hydrochlorothiazide  25 mg Oral Daily    levothyroxine  125 mcg Oral Daily    [START ON 10/21/2017] ramipril  20 mg Oral Daily    bumetanide  1 mg Intravenous Once     Continuous Infusions:   sodium chloride 20 mL/hr at 10/20/17 0746      Pertinent Info/Orders/Treatment Plan:  Cardiology consult, IV Bumex x 1 dose, carotid ultrasound,   Diet: DIET CARB CONTROL;   DVT Prophylaxis: SCD's ordered  Smoking status:  reports that she has never smoked.  She has never used smokeless tobacco.   Influenza Vaccination Screening Completed: yes  Pneumonia Vaccination Screening Completed: yes  Core measures: VTE  PCP: Zeus Knight MD  Readmission:   No  Risk Score: 9.5  Discharge Planning  Current Residence:  Private Residence  Living Arrangements:  Spouse/Significant Other   Support Systems:  Spouse/Significant Other, Children  Current Services PTA:     Potential Assistance Needed:  N/A  Potential Assistance Purchasing Medications:  No  Does patient want to participate in local refill/ meds to beds program?  N/A  Type of Home Care Services:  None  Patient expects to be discharged to:  Home with   Expected Discharge date:  10/27/17  Follow Up Appointment: Best Day/ Time:    Discharge Plan: ***     Evaluation: {Yes/No-Ex:[id number]}

## 2017-10-20 NOTE — ED NOTES
Pt in bed resting A&O x 3, Resps easy. No concerns voiced at this time. Pt updated on plan of care. Side rails up x 2 call light in reach. Dr notified.        Maritza Guy RN  10/20/17 1080

## 2017-10-20 NOTE — H&P
(10%) oral solution 40 mEq, 40 mEq, Oral, PRN **OR** potassium chloride 10 mEq/100 mL IVPB (Peripheral Line), 10 mEq, Intravenous, PRN, Jaxson Muller MD    insulin lispro (HUMALOG) injection vial 0-6 Units, 0-6 Units, Subcutaneous, TID WC, Jaxson Muller MD    [START ON 10/21/2017] insulin lispro (HUMALOG) injection vial 0-3 Units, 0-3 Units, Subcutaneous, Nightly, Jaxson Muller MD    glucose (GLUTOSE) 40 % oral gel 15 g, 15 g, Oral, PRN, Jaxson Muller MD    dextrose 50 % solution 12.5 g, 12.5 g, Intravenous, PRN, Jaxson Muller MD    glucagon (rDNA) injection 1 mg, 1 mg, Intramuscular, PRN, Jaxson Muller MD    dextrose 5 % solution, 100 mL/hr, Intravenous, PRN, Jaxson Muller MD    hydrALAZINE (APRESOLINE) injection 10 mg, 10 mg, Intravenous, Once, Cathy Cruz MD  Prior to Admission medications    Medication Sig Start Date End Date Taking?  Authorizing Provider   metoprolol tartrate (LOPRESSOR) 50 MG tablet TAKE 1/2 TABLET BY MOUTH TWICE DAILY 10/19/17  Yes Jaxson Muller MD   atorvastatin (LIPITOR) 20 MG tablet Take 1 tablet by mouth daily 9/6/17  Yes Jaxson Muller MD   metFORMIN (GLUCOPHAGE XR) 500 MG extended release tablet 2  Tabs  At  Breakfast and at the evening  meal 9/6/17  Yes Jaxson Muller MD   hydrochlorothiazide (HYDRODIURIL) 25 MG tablet Take 1 tablet by mouth daily 9/6/17  Yes Jaxson Muller MD   gabapentin (NEURONTIN) 300 MG capsule TAKE ONE CAPSULE BY MOUTH ONCE DAILY IN THE EVENING 9/1/17  Yes Jaxson Muller MD   clopidogrel (PLAVIX) 75 MG tablet TAKE 1 TABLET DAILY 8/25/17  Yes Jaxson Muller MD   amLODIPine (NORVASC) 10 MG tablet Take 1 tablet by mouth daily 8/15/17  Yes Jaxson Muller MD   Coenzyme Q10 (CO Q 10) 100 MG CAPS Take 1 tablet by mouth daily   Yes Historical Provider, MD   ramipril (ALTACE) 10 MG capsule TAKE ONE CAPSULE BY MOUTH TWICE DAILY 8/7/17  Yes Jairo Dobbs MD   levothyroxine (SYNTHROID) 150 MCG tablet TAKE ONE TABLET BY MOUTH ONCE DAILY 8/1/17  Yes Danisha Murry MD   glimepiride (AMARYL) 4 MG tablet Take 1 tablet by mouth daily 5/9/17  Yes Danisha Murry MD   Glucose Blood (JOSE E BREEZE 2 TEST) DISK Use one strip to check glucose twice daily, Dx: E11.9 2/21/17  Yes Danisha Murry MD   aspirin 325 MG tablet Take 325 mg by mouth daily. Yes Historical Provider, MD   Ascorbic Acid (VITAMIN C) 500 MG tablet Take 500 mg by mouth daily. Yes Historical Provider, MD   Flaxseed, Linseed, (FLAX SEED OIL) 1000 MG CAPS Take  by mouth. Yes Historical Provider, MD     Additional information:       VITAL SIGNS   Vitals:    10/20/17 1800   BP: (!) 181/77   Pulse: 50   Resp:    Temp:    SpO2:        PHYSICAL:   Heart:  [x]Regular rate and rhythm  []Other:    Lungs:  [x]Clear    []Other:    Abdomen: [x]Soft    []Other:    Mental Status: [x]Alert & Oriented  []Other:      PLANNED PROCEDURE   []EARL  [x]Pacemaker/ICD []Cardioversion  []Cath  []PCI   []LOOP RECORDER INSERTION OR REMOVAL  []Peripheral angiography      SEDATION  Planned agent:[x]Midazolam []Meperidine [x]Sublimaze []Morphine  []Diazepam  []Other:     ASA Classification:  []1 []2 [x]3 []4 []5  Class 1: A normal healthy patient  Class 2: Pt with mild to moderate systemic disease  Class 3: Severe systemic disease or disturbance  Class 4: Severe systemic disorders that are already life threatening. Class 5: Moribund pt with little chances of survival, for more than 24 hours. Mallampati I Airway Classification:   []1 []2 [x]3 []4    [x]Pre-procedure diagnostic studies complete and results available. Comment:    [x]Previous sedation/anesthesia experiences assessed. Comment:    [x]The patient is an appropriate candidate to undergo the planned procedure sedation and anesthesia.  (Refer to nursing sedation/analgesia documentation record)  [x]Formulation and discussion of sedation/procedure plan, risks, and expectations with patient and/or responsible adult completed. [x]Patient examined immediately prior to the procedure. (Refer to nursing sedation/analgesia documentation record)  All the risks and limitations of pacemaker implantation and management were explained in details to the patient and family. Bleeding, immediate and long-term infection, cardiac artrhythmias, cardiac perforation, pneumothorax, vein thrombosis and possible occlusion, chronic pain and discomfort, lead dislodgement, and even death, were explained among other potential risks. The patient and family expressed full understanding and agreed to proceed.     Rylee Walters  Electronically signed 10/20/2017 at 6:56 PM

## 2017-10-21 ENCOUNTER — APPOINTMENT (OUTPATIENT)
Dept: CARDIAC CATH/INVASIVE PROCEDURES | Age: 72
DRG: 242 | End: 2017-10-21
Payer: MEDICARE

## 2017-10-21 ENCOUNTER — APPOINTMENT (OUTPATIENT)
Dept: GENERAL RADIOLOGY | Age: 72
DRG: 242 | End: 2017-10-21
Payer: MEDICARE

## 2017-10-21 PROBLEM — Z95.0 S/P CARDIAC PACEMAKER PROCEDURE: Status: ACTIVE | Noted: 2017-10-21

## 2017-10-21 PROBLEM — I49.5 SICK SINUS SYNDROME (HCC): Status: ACTIVE | Noted: 2017-10-21

## 2017-10-21 PROBLEM — I44.39 HIGH-GRADE ATRIOVENTRICULAR BLOCK: Status: ACTIVE | Noted: 2017-10-21

## 2017-10-21 LAB
ABO: NORMAL
ALBUMIN SERPL-MCNC: 3.5 G/DL (ref 3.5–5.1)
ALP BLD-CCNC: 52 U/L (ref 38–126)
ALT SERPL-CCNC: 19 U/L (ref 11–66)
ANION GAP SERPL CALCULATED.3IONS-SCNC: 16 MEQ/L (ref 8–16)
ANISOCYTOSIS: ABNORMAL
ANTIBODY SCREEN: NORMAL
APTT: 28.4 SECONDS (ref 22–38)
AST SERPL-CCNC: 15 U/L (ref 5–40)
BASOPHILS # BLD: 1.8 %
BASOPHILS ABSOLUTE: 0.1 THOU/MM3 (ref 0–0.1)
BILIRUB SERPL-MCNC: 0.8 MG/DL (ref 0.3–1.2)
BUN BLDV-MCNC: 19 MG/DL (ref 7–22)
CALCIUM SERPL-MCNC: 8.5 MG/DL (ref 8.5–10.5)
CHLORIDE BLD-SCNC: 100 MEQ/L (ref 98–111)
CO2: 25 MEQ/L (ref 23–33)
CREAT SERPL-MCNC: 0.9 MG/DL (ref 0.4–1.2)
EKG ATRIAL RATE: 43 BPM
EKG P AXIS: 17 DEGREES
EKG P-R INTERVAL: 192 MS
EKG Q-T INTERVAL: 586 MS
EKG QRS DURATION: 108 MS
EKG QTC CALCULATION (BAZETT): 495 MS
EKG R AXIS: 23 DEGREES
EKG T AXIS: 51 DEGREES
EKG VENTRICULAR RATE: 43 BPM
EOSINOPHIL # BLD: 6.4 %
EOSINOPHILS ABSOLUTE: 0.5 THOU/MM3 (ref 0–0.4)
GFR SERPL CREATININE-BSD FRML MDRD: 62 ML/MIN/1.73M2
GLUCOSE BLD-MCNC: 165 MG/DL (ref 70–108)
GLUCOSE BLD-MCNC: 166 MG/DL (ref 70–108)
GLUCOSE BLD-MCNC: 176 MG/DL (ref 70–108)
GLUCOSE BLD-MCNC: 186 MG/DL (ref 70–108)
GLUCOSE BLD-MCNC: 217 MG/DL (ref 70–108)
HCT VFR BLD CALC: 38.8 % (ref 37–47)
HEMOGLOBIN: 13.1 GM/DL (ref 12–16)
INR BLD: 1 (ref 0.85–1.13)
LYMPHOCYTES # BLD: 20.5 %
LYMPHOCYTES ABSOLUTE: 1.6 THOU/MM3 (ref 1–4.8)
MCH RBC QN AUTO: 29.2 PG (ref 27–31)
MCHC RBC AUTO-ENTMCNC: 33.8 GM/DL (ref 33–37)
MCV RBC AUTO: 86.4 FL (ref 81–99)
MONOCYTES # BLD: 10.1 %
MONOCYTES ABSOLUTE: 0.8 THOU/MM3 (ref 0.4–1.3)
NUCLEATED RED BLOOD CELLS: 0 /100 WBC
PDW BLD-RTO: 14.7 % (ref 11.5–14.5)
PLATELET # BLD: 235 THOU/MM3 (ref 130–400)
PMV BLD AUTO: 9.7 MCM (ref 7.4–10.4)
POTASSIUM SERPL-SCNC: 4 MEQ/L (ref 3.5–5.2)
RBC # BLD: 4.49 MILL/MM3 (ref 4.2–5.4)
RBC # BLD: NORMAL 10*6/UL
RH FACTOR: NORMAL
SEG NEUTROPHILS: 61.2 %
SEGMENTED NEUTROPHILS ABSOLUTE COUNT: 4.9 THOU/MM3 (ref 1.8–7.7)
SODIUM BLD-SCNC: 141 MEQ/L (ref 135–145)
TOTAL PROTEIN: 6.1 G/DL (ref 6.1–8)
WBC # BLD: 8 THOU/MM3 (ref 4.8–10.8)

## 2017-10-21 PROCEDURE — 6370000000 HC RX 637 (ALT 250 FOR IP): Performed by: FAMILY MEDICINE

## 2017-10-21 PROCEDURE — 85025 COMPLETE CBC W/AUTO DIFF WBC: CPT

## 2017-10-21 PROCEDURE — 85730 THROMBOPLASTIN TIME PARTIAL: CPT

## 2017-10-21 PROCEDURE — 1200000003 HC TELEMETRY R&B

## 2017-10-21 PROCEDURE — C1898 LEAD, PMKR, OTHER THAN TRANS: HCPCS

## 2017-10-21 PROCEDURE — 2720000010 HC SURG SUPPLY STERILE

## 2017-10-21 PROCEDURE — 2580000003 HC RX 258: Performed by: INTERNAL MEDICINE

## 2017-10-21 PROCEDURE — 36415 COLL VENOUS BLD VENIPUNCTURE: CPT

## 2017-10-21 PROCEDURE — 86850 RBC ANTIBODY SCREEN: CPT

## 2017-10-21 PROCEDURE — 2500000003 HC RX 250 WO HCPCS

## 2017-10-21 PROCEDURE — 02HK3JZ INSERTION OF PACEMAKER LEAD INTO RIGHT VENTRICLE, PERCUTANEOUS APPROACH: ICD-10-PCS | Performed by: INTERNAL MEDICINE

## 2017-10-21 PROCEDURE — 86901 BLOOD TYPING SEROLOGIC RH(D): CPT

## 2017-10-21 PROCEDURE — 86900 BLOOD TYPING SEROLOGIC ABO: CPT

## 2017-10-21 PROCEDURE — 71010 XR CHEST PORTABLE: CPT

## 2017-10-21 PROCEDURE — 6370000000 HC RX 637 (ALT 250 FOR IP): Performed by: INTERNAL MEDICINE

## 2017-10-21 PROCEDURE — C1894 INTRO/SHEATH, NON-LASER: HCPCS

## 2017-10-21 PROCEDURE — 82948 REAGENT STRIP/BLOOD GLUCOSE: CPT

## 2017-10-21 PROCEDURE — 6360000002 HC RX W HCPCS: Performed by: INTERNAL MEDICINE

## 2017-10-21 PROCEDURE — 0JH606Z INSERTION OF PACEMAKER, DUAL CHAMBER INTO CHEST SUBCUTANEOUS TISSUE AND FASCIA, OPEN APPROACH: ICD-10-PCS | Performed by: INTERNAL MEDICINE

## 2017-10-21 PROCEDURE — 93005 ELECTROCARDIOGRAM TRACING: CPT

## 2017-10-21 PROCEDURE — 33208 INSRT HEART PM ATRIAL & VENT: CPT | Performed by: INTERNAL MEDICINE

## 2017-10-21 PROCEDURE — 80053 COMPREHEN METABOLIC PANEL: CPT

## 2017-10-21 PROCEDURE — 6370000000 HC RX 637 (ALT 250 FOR IP)

## 2017-10-21 PROCEDURE — 85610 PROTHROMBIN TIME: CPT

## 2017-10-21 PROCEDURE — A6219 GAUZE <= 16 SQ IN W/BORDER: HCPCS

## 2017-10-21 PROCEDURE — C1785 PMKR, DUAL, RATE-RESP: HCPCS

## 2017-10-21 PROCEDURE — 6360000002 HC RX W HCPCS

## 2017-10-21 PROCEDURE — 02H63JZ INSERTION OF PACEMAKER LEAD INTO RIGHT ATRIUM, PERCUTANEOUS APPROACH: ICD-10-PCS | Performed by: INTERNAL MEDICINE

## 2017-10-21 PROCEDURE — A4565 SLINGS: HCPCS

## 2017-10-21 RX ORDER — ACETAMINOPHEN 325 MG/1
650 TABLET ORAL EVERY 4 HOURS PRN
Status: DISCONTINUED | OUTPATIENT
Start: 2017-10-21 | End: 2017-10-22 | Stop reason: HOSPADM

## 2017-10-21 RX ORDER — SODIUM CHLORIDE 0.9 % (FLUSH) 0.9 %
10 SYRINGE (ML) INJECTION PRN
Status: DISCONTINUED | OUTPATIENT
Start: 2017-10-21 | End: 2017-10-22 | Stop reason: HOSPADM

## 2017-10-21 RX ORDER — BACITRACIN 50000 [USP'U]/1
50000 INJECTION, POWDER, LYOPHILIZED, FOR SOLUTION INTRAMUSCULAR ONCE
Status: CANCELLED | OUTPATIENT
Start: 2017-10-21 | End: 2017-10-21

## 2017-10-21 RX ORDER — SODIUM CHLORIDE 0.9 % (FLUSH) 0.9 %
10 SYRINGE (ML) INJECTION EVERY 12 HOURS SCHEDULED
Status: DISCONTINUED | OUTPATIENT
Start: 2017-10-21 | End: 2017-10-21

## 2017-10-21 RX ORDER — DOCUSATE SODIUM 100 MG/1
100 CAPSULE, LIQUID FILLED ORAL 2 TIMES DAILY
Status: DISCONTINUED | OUTPATIENT
Start: 2017-10-21 | End: 2017-10-22 | Stop reason: HOSPADM

## 2017-10-21 RX ORDER — SODIUM CHLORIDE 0.9 % (FLUSH) 0.9 %
10 SYRINGE (ML) INJECTION EVERY 12 HOURS SCHEDULED
Status: DISCONTINUED | OUTPATIENT
Start: 2017-10-21 | End: 2017-10-22 | Stop reason: HOSPADM

## 2017-10-21 RX ORDER — ONDANSETRON 2 MG/ML
4 INJECTION INTRAMUSCULAR; INTRAVENOUS EVERY 6 HOURS PRN
Status: DISCONTINUED | OUTPATIENT
Start: 2017-10-21 | End: 2017-10-22 | Stop reason: HOSPADM

## 2017-10-21 RX ORDER — CHLORHEXIDINE GLUCONATE 4 G/100ML
SOLUTION TOPICAL ONCE
Status: COMPLETED | OUTPATIENT
Start: 2017-10-21 | End: 2017-10-21

## 2017-10-21 RX ORDER — BACITRACIN 50000 [USP'U]/1
50000 INJECTION, POWDER, LYOPHILIZED, FOR SOLUTION INTRAMUSCULAR ONCE
Status: DISCONTINUED | OUTPATIENT
Start: 2017-10-21 | End: 2017-10-21

## 2017-10-21 RX ORDER — SODIUM CHLORIDE 0.9 % (FLUSH) 0.9 %
10 SYRINGE (ML) INJECTION PRN
Status: DISCONTINUED | OUTPATIENT
Start: 2017-10-21 | End: 2017-10-21

## 2017-10-21 RX ORDER — CEPHALEXIN 500 MG/1
500 CAPSULE ORAL EVERY 8 HOURS SCHEDULED
Status: DISCONTINUED | OUTPATIENT
Start: 2017-10-22 | End: 2017-10-22 | Stop reason: HOSPADM

## 2017-10-21 RX ORDER — SODIUM CHLORIDE 9 MG/ML
INJECTION, SOLUTION INTRAVENOUS CONTINUOUS
Status: DISCONTINUED | OUTPATIENT
Start: 2017-10-21 | End: 2017-10-21

## 2017-10-21 RX ADMIN — AMLODIPINE BESYLATE 10 MG: 10 TABLET ORAL at 09:00

## 2017-10-21 RX ADMIN — GLIMEPIRIDE 4 MG: 4 TABLET ORAL at 09:00

## 2017-10-21 RX ADMIN — INSULIN LISPRO 1 UNITS: 100 INJECTION, SOLUTION INTRAVENOUS; SUBCUTANEOUS at 20:52

## 2017-10-21 RX ADMIN — Medication 10 ML: at 20:53

## 2017-10-21 RX ADMIN — CLOPIDOGREL 75 MG: 75 TABLET, FILM COATED ORAL at 09:00

## 2017-10-21 RX ADMIN — ASPIRIN 325 MG: 325 TABLET, COATED ORAL at 09:00

## 2017-10-21 RX ADMIN — SODIUM CHLORIDE: 9 INJECTION, SOLUTION INTRAVENOUS at 00:59

## 2017-10-21 RX ADMIN — DOCUSATE SODIUM 100 MG: 100 CAPSULE ORAL at 20:52

## 2017-10-21 RX ADMIN — LEVOTHYROXINE SODIUM 125 MCG: 125 TABLET ORAL at 06:24

## 2017-10-21 RX ADMIN — CHLORHEXIDINE GLUCONATE: 4 SOLUTION TOPICAL at 06:00

## 2017-10-21 RX ADMIN — Medication 1 UNITS: at 13:43

## 2017-10-21 RX ADMIN — ATORVASTATIN CALCIUM 20 MG: 20 TABLET, FILM COATED ORAL at 20:52

## 2017-10-21 RX ADMIN — Medication 1 UNITS: at 18:12

## 2017-10-21 RX ADMIN — CEFAZOLIN SODIUM 2 G: 2 SOLUTION INTRAVENOUS at 18:42

## 2017-10-21 RX ADMIN — HYDROCHLOROTHIAZIDE 25 MG: 25 TABLET ORAL at 09:00

## 2017-10-21 RX ADMIN — HYDRALAZINE HYDROCHLORIDE 25 MG: 25 TABLET, FILM COATED ORAL at 20:52

## 2017-10-21 RX ADMIN — HYDRALAZINE HYDROCHLORIDE 25 MG: 25 TABLET, FILM COATED ORAL at 09:00

## 2017-10-21 RX ADMIN — HYDRALAZINE HYDROCHLORIDE 25 MG: 25 TABLET, FILM COATED ORAL at 13:43

## 2017-10-21 RX ADMIN — RAMIPRIL 20 MG: 10 CAPSULE ORAL at 09:00

## 2017-10-21 RX ADMIN — SODIUM CHLORIDE: 9 INJECTION, SOLUTION INTRAVENOUS at 08:59

## 2017-10-21 RX ADMIN — GABAPENTIN 300 MG: 300 CAPSULE ORAL at 20:52

## 2017-10-21 NOTE — PLAN OF CARE
Problem: Falls - Risk of  Goal: Absence of falls  Outcome: Ongoing  Bed alarm on, call light in reach, hourly rounds completed. No falls this shift. Problem: Discharge Planning:  Goal: Participates in care planning  Participates in care planning  Outcome: Ongoing  Patient actively participates in care planning  Goal: Discharged to appropriate level of care  Discharged to appropriate level of care  Outcome: Ongoing  Plans to be discharged home with family. Problem: Serum Glucose Level - Abnormal:  Goal: Ability to maintain appropriate glucose levels will improve to within specified parameters  Ability to maintain appropriate glucose levels will improve to within specified parameters  Outcome: Ongoing  Increased blood sugars, covered by SSI    Problem: Tissue Perfusion - Cardiopulmonary, Altered:  Goal: Absence of angina  Absence of angina  Outcome: Ongoing  No angina this shift. Goal: Hemodynamic stability will improve  Hemodynamic stability will improve  Outcome: Ongoing  Vitals:    10/20/17 1545 10/20/17 1800 10/20/17 2015 10/20/17 2045   BP: (!) 172/74 (!) 181/77 (!) 196/83 (!) 180/60   Pulse: (!) 47 50 52    Resp: 18  18    Temp: 97.7 °F (36.5 °C)  98.2 °F (36.8 °C)    TempSrc: Oral  Oral    SpO2: 93%  95%    Weight:       Height:       Bradycardia    Comments: Care plan reviewed with patient. Patient verbalizes understanding of the plan of care and contributes to goal setting.

## 2017-10-22 ENCOUNTER — TELEPHONE (OUTPATIENT)
Dept: CARDIOLOGY CLINIC | Age: 72
End: 2017-10-22

## 2017-10-22 VITALS
RESPIRATION RATE: 18 BRPM | TEMPERATURE: 97.8 F | WEIGHT: 250.3 LBS | BODY MASS INDEX: 37.93 KG/M2 | HEIGHT: 68 IN | DIASTOLIC BLOOD PRESSURE: 78 MMHG | SYSTOLIC BLOOD PRESSURE: 135 MMHG | HEART RATE: 95 BPM | OXYGEN SATURATION: 95 %

## 2017-10-22 LAB
GLUCOSE BLD-MCNC: 172 MG/DL (ref 70–108)
GLUCOSE BLD-MCNC: 177 MG/DL (ref 70–108)

## 2017-10-22 PROCEDURE — 90686 IIV4 VACC NO PRSV 0.5 ML IM: CPT | Performed by: FAMILY MEDICINE

## 2017-10-22 PROCEDURE — G0008 ADMIN INFLUENZA VIRUS VAC: HCPCS | Performed by: FAMILY MEDICINE

## 2017-10-22 PROCEDURE — 6370000000 HC RX 637 (ALT 250 FOR IP): Performed by: FAMILY MEDICINE

## 2017-10-22 PROCEDURE — 82948 REAGENT STRIP/BLOOD GLUCOSE: CPT

## 2017-10-22 PROCEDURE — 6360000002 HC RX W HCPCS: Performed by: INTERNAL MEDICINE

## 2017-10-22 PROCEDURE — 6370000000 HC RX 637 (ALT 250 FOR IP): Performed by: INTERNAL MEDICINE

## 2017-10-22 PROCEDURE — 6360000002 HC RX W HCPCS: Performed by: FAMILY MEDICINE

## 2017-10-22 PROCEDURE — 2580000003 HC RX 258: Performed by: INTERNAL MEDICINE

## 2017-10-22 RX ORDER — ACETAMINOPHEN 325 MG/1
650 TABLET ORAL EVERY 4 HOURS PRN
Qty: 120 TABLET | Refills: 3 | COMMUNITY
Start: 2017-10-22 | End: 2019-04-08 | Stop reason: ALTCHOICE

## 2017-10-22 RX ORDER — CEPHALEXIN 500 MG/1
500 CAPSULE ORAL 3 TIMES DAILY
Qty: 21 CAPSULE | Refills: 0 | Status: SHIPPED | OUTPATIENT
Start: 2017-10-22 | End: 2017-10-22

## 2017-10-22 RX ORDER — METFORMIN HYDROCHLORIDE 500 MG/1
TABLET, EXTENDED RELEASE ORAL
Qty: 360 TABLET | Refills: 1 | Status: SHIPPED | OUTPATIENT
Start: 2017-10-22 | End: 2018-03-22

## 2017-10-22 RX ORDER — CEPHALEXIN 500 MG/1
500 CAPSULE ORAL 3 TIMES DAILY
Qty: 15 CAPSULE | Refills: 0 | OUTPATIENT
Start: 2017-10-22 | End: 2017-10-27

## 2017-10-22 RX ORDER — HYDRALAZINE HYDROCHLORIDE 25 MG/1
25 TABLET, FILM COATED ORAL 3 TIMES DAILY
Qty: 90 TABLET | Refills: 1 | Status: SHIPPED | OUTPATIENT
Start: 2017-10-22 | End: 2017-10-25 | Stop reason: ALTCHOICE

## 2017-10-22 RX ADMIN — HYDROCHLOROTHIAZIDE 25 MG: 25 TABLET ORAL at 08:25

## 2017-10-22 RX ADMIN — Medication 1 UNITS: at 13:31

## 2017-10-22 RX ADMIN — AMLODIPINE BESYLATE 10 MG: 10 TABLET ORAL at 08:25

## 2017-10-22 RX ADMIN — Medication 1 UNITS: at 08:28

## 2017-10-22 RX ADMIN — GLIMEPIRIDE 4 MG: 4 TABLET ORAL at 08:25

## 2017-10-22 RX ADMIN — HYDRALAZINE HYDROCHLORIDE 25 MG: 25 TABLET, FILM COATED ORAL at 13:31

## 2017-10-22 RX ADMIN — LEVOTHYROXINE SODIUM 125 MCG: 125 TABLET ORAL at 06:34

## 2017-10-22 RX ADMIN — HYDRALAZINE HYDROCHLORIDE 25 MG: 25 TABLET, FILM COATED ORAL at 08:25

## 2017-10-22 RX ADMIN — CLOPIDOGREL 75 MG: 75 TABLET, FILM COATED ORAL at 08:25

## 2017-10-22 RX ADMIN — Medication 10 ML: at 08:25

## 2017-10-22 RX ADMIN — ASPIRIN 325 MG: 325 TABLET, COATED ORAL at 08:25

## 2017-10-22 RX ADMIN — INFLUENZA A VIRUS A/SINGAPORE/GP1908/2015 IVR-180A (H1N1) ANTIGEN (PROPIOLACTONE INACTIVATED), INFLUENZA A VIRUS A/HONG KONG/4801/2014 X-263B (H3N2) ANTIGEN (PROPIOLACTONE INACTIVATED), INFLUENZA B VIRUS B/BRISBANE/46/2015 ANTIGEN (PROPIOLACTONE INACTIVATED), AND INFLUENZA B VIRUS B/PHUKET/3073/2013 BVR-1B ANTIGEN (PROPIOLACTONE INACTIVATED) 0.5 ML: 15; 15; 15; 15 INJECTION, SUSPENSION INTRAMUSCULAR at 08:26

## 2017-10-22 RX ADMIN — RAMIPRIL 20 MG: 10 CAPSULE ORAL at 08:25

## 2017-10-22 RX ADMIN — CEPHALEXIN 500 MG: 500 CAPSULE ORAL at 13:31

## 2017-10-22 RX ADMIN — CEFAZOLIN SODIUM 2 G: 2 SOLUTION INTRAVENOUS at 03:34

## 2017-10-22 NOTE — DISCHARGE INSTR - DIET
 Good nutrition is important when healing from an illness, injury, or surgery. Follow any nutrition recommendations given to you during your hospital stay.  If you were given an oral nutrition supplement while in the hospital, continue to take this supplement at home. You can take it with meals, in-between meals, and/or before bedtime. These supplements can be purchased at most local grocery stores, pharmacies, and chain super-stores.  If you have any questions about your diet or nutrition, call the hospital and ask for the dietitian. Healthy Eating habits help your heart health. Below are general guidlelines for heart healthy choices:    Eat fruits and vegetables. They are loaded with vitamins, minerals and fiber. Eat a variety every day. .   Eat a variety of whole grain products every day. They contain a lot of fiber and nutrients. Examples of whole grains include oats, whole wheat bread, and brown rice. Eat fish at least 2 times each week. Oily fish, which contain omega-3 fatty acids, are best for your heart. These fish include tuna, salmon, mackerel, lake trout, herring, and sardines. Limit saturated fat and cholesterol. To limit saturated fats and cholesterol, try to choose the following foods:   Lean meats and meat alternatives (chicken, fish, turkey, beans, and nuts)  Nonfat and low-fat dairy products (skim or 1% milk, low fat yogurt)  Unsaturated fats, like canola and olive oils instead of saturated fats, such as butter  Read food labels and limit the amount of trans fat you eat. Trans fat raises cholesterol. It is found in many processed foods made with shortening or with partially hydrogenated vegetable oils. These foods include cookies, crackers, chips, and many snack foods. Choose snacks with \"0\" grams of Trans fat. Choose healthy fats. Unsaturated fats, such as olive, canola and peanut oils, and nuts are part of a healthy diet.  But all fats are high in calories, so limit your serving sizes. Limit salt/sodium. Choose and prepare foods with little or no salt. Use pepper or Mrs. Dash for added flavor. Limit high sodium foods like canned soups, processed meats and cheeses, and salty snack foods. Limit beverages and food with added sugars (such as regular pop, sweetened iced tea, desserts and sweets). These foods are loaded with calories but provide very little nutrients. If you drink alcohol, drink in moderation. Limit alcohol intake to 2 drinks a day for men and 1 drink a day for women. Check with your Doctor first.      [place] are being placed on a diabetic carb counting diet. Eating healthy is the first step in controlling diabetes    Here's how to get started. ... Eat 3 meals a day. Eat your meals at the same time each day and do not skip meals. Eat about the same amount of food each day. Limit sugar and sweets. Eat less candy, desserts, pastries and jelly. Limit intake of regular pop, sugary beverages and fruit juice. Drink sugar free beverages such as diet pop, water, Crystal Light, and unsweetened tea instead. Use Equal or Sweet-n-Low in place of sugar. Lose weight if you are overweight. Even a small amount of weight loss may help improve your blood sugar control. To help lose weight, reduce your portion sizes. Control your intake of carbohydrates. Carbohydrate is the main  nutrient that affects blood sugar levels. All the carbohydrate you eat is turned  into sugar by your body. Therefore, it is important to control  the amount  of carbohydrate that you eat a day. You should eat about 60-75  grams of  carbohydrate at each meal.      Common sources of carbohydrates:     Eat more fiber. Fiber can help slow down the rise in blood sugar following a meal.  To get more fiber in your diet, eat at least 5 servings of fruits and vegetables a day, choose whole grain bread/cereal and eat more beans or legumes. Reduce your intake of high fat foods. Cutting back on your intake of high fat food can help reduce body weight and cholesterol levels. Reduce intake of fried food, mosqueda, sausage, luncheon meat, gravy, sour cream, cheese, egg yolks and margarine/butter. Limit your intake of alcohol. Drink alcohol only with permission of your doctor. Never drink alcohol on an empty stomach. Be more active. Regular exercise is an important part of your diabetes care as exercise can help lower your blood sugar levels. The type and amount of exercise that is right for you should be discussed with your doctor.

## 2017-10-22 NOTE — TELEPHONE ENCOUNTER
.Transition of Care visit scheduled. 10/25/2017  Patient is being discharged to home    [facility] re 10/30/17 if possible.

## 2017-10-22 NOTE — PROGRESS NOTES
6:55 PM Dr. Gerhardt Gunner in to round to see patient. Patient will have pacer placed tomorrow at 9:30am. Ok to give lovenox this evening.
7:25 AM Message left with Dr. Leeann Meléndez (on call for Dr. Saran Mackey) for consult. Waiting for response. 12:14 PM Dr. Leeann Meléndez at bedside. Notified him of patient's blood pressure elevation.
Assisted patient to and from bathroom. Problem focused assessment completed. Patient's blood pressure remains elevated, hydralazine administered. No other changes noted at this time. Patient denies any pain.
Discharge teaching and instructions for diagnosis/procedure of bradycardia completed with patient using teachback method. AVS reviewed. Printed prescriptions given to patient. Patient voiced understanding regarding prescriptions, follow up appointments, and care of self at home.  Discharged in a wheelchair to  home with support per family
Family Medicine Progress Notes/Coverage    Today's Date: 10/21/17  [id number]  Medical Record # [id number]  Account # [de-identified]      Ms. Zelaya admitted on 10/19/2017        Subjective / Interval History :     Still with sinus bradycardia  No chest pain no SOB  Reviewed notes, consults, laboratory and radiology results,    Objective:       Physical Exam:  Patient Vitals for the past 24 hrs:   BP Temp Temp src Pulse Resp SpO2 Weight   10/21/17 0850 (!) 191/80 97.7 °F (36.5 °C) Oral (!) 44 16 94 % -   10/21/17 0530 (!) 172/78 98.3 °F (36.8 °C) Oral (!) 45 16 93 % 250 lb 11.2 oz (113.7 kg)   10/20/17 2045 (!) 180/60 - - - - - -   10/20/17 2015 (!) 196/83 98.2 °F (36.8 °C) Oral 52 18 95 % -   10/20/17 1800 (!) 181/77 - - 50 - - -   10/20/17 1545 (!) 172/74 97.7 °F (36.5 °C) Oral (!) 47 18 93 % -   10/20/17 1206 (!) 187/79 97.3 °F (36.3 °C) Oral (!) 42 20 96 % -   10/20/17 1032 (!) 184/53 - - (!) 43 - - -       General Appearance:  Alert, cooperative, no distress, appears stated age   [de-identified]:  Neck:      Chest/Lungs:  Heart: CTA  Bradycardic with murmur   Abdomen:  Back: Soft non tender   Extremities:  Neurological Exam: No edema  WNL       Assessment:     Active Hospital Problems    Diagnosis Date Noted    High-grade atrioventricular block [I44.39] 10/21/2017     Priority: High    Sick sinus syndrome ([address] [I49.5] 10/21/2017     Priority: High    Diabetic peripheral neuropathy (HCC) [E11.42]      Priority: Medium    Hypothyroidism [E03.9]      Priority: Medium    Type 2 diabetes mellitus without complication (HCC) [O07.4]      Priority: Medium    Hypertension [I10]      Priority: Medium    Hyperlipidemia [E78.5]      Priority: Medium    CAD (coronary artery disease) [I25.10]      Priority: Medium       Plan:     Discussed plans with the nursing staff  Pacer maker placement
May discharge home in a.m. After device check for new implant. May shower in 7 days. Report bleeding, redness, swelling, or persistent pain. Follow up in the office in one week. Keep pressure dressing for 2 days, and sterile dressing (Medipore) until shower date. Take Keflex for 5 days.     Aurora Gr MD, [facility] [place]  10/21/2017  1:14 PM
Patient is lying in bed, semi-ward's and eating dinner. Family is at bedside. Call light is in reach. Patient voices no complaints at present.
Patient is lying in bed, semi-ward's. Patient is alert and oriented X3. Speech is clear and appropriate. PERRL. Mucous membranes pink and moist. Skin is pale, warm and dry. Skin turgor and capillary refill <3 seconds. Purposeful movement with good range of motion of all extremities. Patient reports chronic numbness in feet. Hand grasp is equal and strong. Pedal push and pull equal and strong. Radial and pedal pulses present and strong. Heart sounds are strong and regular and heart rhythm is bradycardic. +1 edema noted in lower extremities. Lungs are clear, diminished in all lobes. Respiratory pattern is regular, unlabored with normal depth of respiration. Chest expansion is symmetrical. Abdomen soft, rounded and non-tender. Bowel sounds present in all four quadrants. Patient voices no complaints at present.
Reported off to primary nurse Laura Ratliff RN. Informed her that patient is lying on her left side with eyes closed and call light in reach.
See  History  And  Physical      IMPRESSION     BRADYCARDIA  ashd  S/p  Stent      htn     hyperlipidemia     chf diastolic  Acute      niddm      PLAN   Monitor and stop  Lopressor and  See if  Correct  .  bumex  For  bnp  Elevation and ask  Cardio to see
Priority: Medium    Hypertension [I10]      Priority: Medium    Hyperlipidemia [E78.5]      Priority: Medium    CAD (coronary artery disease) [I25.10]      Priority: Medium       Plan:     Discussed plans with the nursing staff  Discharge today if okay with Dr Inderjit Bourgeois    Medications, Laboratories and Imaging results:    Scheduled Meds:   sodium chloride flush  10 mL Intravenous 2 times per day    docusate sodium  100 mg Oral BID    cephALEXin  500 mg Oral 3 times per day    amLODIPine  10 mg Oral Daily    aspirin  325 mg Oral Daily    atorvastatin  20 mg Oral Nightly    clopidogrel  75 mg Oral Daily    gabapentin  300 mg Oral Nightly    glimepiride  4 mg Oral Daily    hydrochlorothiazide  25 mg Oral Daily    levothyroxine  125 mcg Oral Daily    ramipril  20 mg Oral Daily    hydrALAZINE  25 mg Oral TID    insulin lispro  0-6 Units Subcutaneous TID WC    insulin lispro  0-3 Units Subcutaneous Nightly     Continuous Infusions:   PRN Meds:sodium chloride flush, acetaminophen, ondansetron, magnesium hydroxide, potassium chloride **OR** potassium chloride **OR** potassium chloride, glucose, dextrose, glucagon (rDNA)    Imaging:    Lab Review :    Lab Results   Component Value Date    WBC 8.0 10/21/2017    HGB 13.1 10/21/2017    HCT 38.8 10/21/2017    MCV 86.4 10/21/2017     10/21/2017     Lab Results   Component Value Date    CREATININE 0.9 10/21/2017    BUN 19 10/21/2017     10/21/2017    K 4.0 10/21/2017     10/21/2017    CO2 25 10/21/2017     No results found for: CKTOTAL, CKMB, CKMBINDEX, TROPONINI  Lab Results   Component Value Date    ALT 19 10/21/2017    AST 15 10/21/2017    ALKPHOS 52 10/21/2017    BILITOT 0.8 10/21/2017     No results found for: LIPASE      Electronically signed by Mecca Tamez MD on 10/22/17 at 10:05 Shivam Pulido M.D.

## 2017-10-22 NOTE — PLAN OF CARE
Problem: Falls - Risk of  Goal: Absence of falls  Outcome: Ongoing  Call light in reach, no falls this shift. Problem: Discharge Planning:  Goal: Participates in care planning  Participates in care planning   Outcome: Ongoing  Patient and family actively participate in care planning. Goal: Discharged to appropriate level of care  Discharged to appropriate level of care   Outcome: Ongoing  Plans home with family. Problem: Serum Glucose Level - Abnormal:  Goal: Ability to maintain appropriate glucose levels will improve to within specified parameters  Ability to maintain appropriate glucose levels will improve to within specified parameters   Outcome: Ongoing  BS high this evening, coverage with SSI. Accu check AC/HS    Problem: Tissue Perfusion - Cardiopulmonary, Altered:  Goal: Absence of angina  Absence of angina   Outcome: Ongoing  No angina at this time. Goal: Hemodynamic stability will improve  Hemodynamic stability will improve   Outcome: Ongoing  Vitals:    10/21/17 1430 10/21/17 1511 10/21/17 1615 10/21/17 2044   BP: 123/61 (!) 112/57 (!) 157/74 (!) 151/70   Pulse: 90 92 84 80   Resp:  18  18   Temp:  97.6 °F (36.4 °C)  97.9 °F (36.6 °C)   TempSrc:  Oral  Oral   SpO2: 95% 95% 93% 95%   Weight:       Height:         VSS    Comments: Care plan reviewed with patient. Patient verbalizes understanding of the plan of care and contributes to goal setting.

## 2017-10-22 NOTE — TELEPHONE ENCOUNTER
Correction:  10/25/17 appt with Jose Armendariz is for 1 year f/u    [address] f/u needs to be scheduled with Cezar Pena 10/31/17 if possible.   Current appt 2/28/17 and added to wait list.  hosp f/u d/c [facility] 10/22/17, pacemaker placed, risk 9.5

## 2017-10-23 NOTE — PROCEDURES
This sheath was used to introduce the right  ventricular lead. Once the lead was positioned in the right  ventricular apex and once adequate thresholds were obtained, the lead  was anchored in place using 2 stitches of 0 silk. The other wire was  used to introduce another 7-Faroese sheath. This sheath was used to  introduce the atrial lead. The lead was positioned in the right  atrial appendage and once adequate thresholds were obtained, lead was  anchored in place using 2 stitches of 0 silk. The pocket was then irrigated abundantly with antibiotics and saline  solution and hemostasis was excellent. The leads were connected to  the pulse generator and the latter was anchored in the pocket using 1  stitch of 0 silk. Following positioning of the leads, the patient was asked to take deep  breaths and to cough forcefully in order to ensure stability of the  leads. Also, I verified good current of injury after positioning the  leads in order to ensure stability of the lead. All counts were accurate. The skin was closed in 3 layers using 4-0  Vicryl and Steri-Strips were then used to close the skin. The device used in this setting was a Medtronic device, model number  [id number], serial number [id number]. The atrial lead was a Medtronic lead, model number [id number], serial  number [id number]. The ventricular lead was a Medtronic lead, model number [id number],  serial number [id number]. The P-waves were measured at 2.8 mV, pacing impedance was 644 ohms,  and pacing threshold was 1.2 volts at 0.5 msec. The R-waves were measured at 14 mV, pacing impedance was 896 ohms, and  pacing threshold was 0.5 volts at 0.5 msec. The device was programmed in a DD mode, with lower rate of 50 and  upper tracking rate of 130 beats per minute. The skin was closed in 3 layers used using 2-0 Vicryl and Steri-Strips  were applied at the end.     The patient tolerated procedure well and was transferred to room in  stable condition. CONCLUSION:  Successful dual-chamber pacemaker implantation using  Medtronic device as described above. Lidia Baron M.D.    D: 10/22/2017 23:13:57       T: 10/23/2017 1:22:03     [email or web address]  Job#: [id number]     Doc#: [id number]    CC:    Primary Care Physician. Dr. Patrick Eddy

## 2017-10-24 ENCOUNTER — OFFICE VISIT (OUTPATIENT)
Dept: FAMILY MEDICINE CLINIC | Age: 72
End: 2017-10-24

## 2017-10-24 VITALS
SYSTOLIC BLOOD PRESSURE: 112 MMHG | BODY MASS INDEX: 37.81 KG/M2 | HEIGHT: 68 IN | WEIGHT: 249.5 LBS | HEART RATE: 80 BPM | RESPIRATION RATE: 16 BRPM | DIASTOLIC BLOOD PRESSURE: 66 MMHG

## 2017-10-24 DIAGNOSIS — I15.9 SECONDARY HYPERTENSION: ICD-10-CM

## 2017-10-24 DIAGNOSIS — I25.10 CORONARY ARTERY DISEASE INVOLVING NATIVE CORONARY ARTERY OF NATIVE HEART WITHOUT ANGINA PECTORIS: ICD-10-CM

## 2017-10-24 DIAGNOSIS — I44.39 HIGH-GRADE ATRIOVENTRICULAR BLOCK: ICD-10-CM

## 2017-10-24 DIAGNOSIS — R91.1 PULMONARY NODULE, RIGHT: ICD-10-CM

## 2017-10-24 DIAGNOSIS — I49.5 SICK SINUS SYNDROME (HCC): Primary | ICD-10-CM

## 2017-10-24 PROCEDURE — 99496 TRANSJ CARE MGMT HIGH F2F 7D: CPT | Performed by: FAMILY MEDICINE

## 2017-10-24 ASSESSMENT — ENCOUNTER SYMPTOMS
EYE PAIN: 0
NAUSEA: 0
CONSTIPATION: 0
SORE THROAT: 0
COUGH: 0
SHORTNESS OF BREATH: 0
CHEST TIGHTNESS: 0
WHEEZING: 0
ABDOMINAL PAIN: 0

## 2017-10-24 NOTE — PROGRESS NOTES
PLACEMENT  2005    baki    HEEL SPUR SURGERY Bilateral     bilat with hardware    HYSTERECTOMY  1984    KNEE ARTHROSCOPY  11/01; 3/02    right and left knee    KNEE ARTHROSCOPY Bilateral     bilat    PACEMAKER PLACEMENT  10/2017    THYROIDECTOMY, PARTIAL  1980's       Family History   Problem Relation Age of Onset    Heart Disease Mother 80     bacterial infection at valve    Heart Disease Father     Heart Attack Father     Heart Disease Brother      CABG       /66 (Site: Right Arm, Position: Sitting, Cuff Size: Large Adult)   Pulse 80   Resp 16   Ht 5' 8\" (1.727 m)   Wt 249 lb 8 oz (113.2 kg)   BMI 37.94 kg/m²   Wt Readings from Last 3 Encounters:   10/24/17 249 lb 8 oz (113.2 kg)   10/22/17 250 lb 4.8 oz (113.5 kg)   10/19/17 254 lb (115.2 kg)       Review of Systems:     Review of Systems   Constitutional: Negative for appetite change, fatigue and fever. HENT: Negative for congestion, ear pain, postnasal drip and sore throat. Eyes: Negative for pain and visual disturbance. Respiratory: Negative for cough, chest tightness, shortness of breath and wheezing. Cardiovascular: Negative for chest pain, palpitations and leg swelling. Gastrointestinal: Negative for abdominal pain, constipation and nausea. Genitourinary: Negative for dysuria and frequency. Musculoskeletal: Negative for arthralgias, joint swelling, neck pain and neck stiffness. Skin: Negative for rash. Neurological: Negative for dizziness, weakness, numbness and headaches. Hematological: Negative for adenopathy. Does not bruise/bleed easily. Psychiatric/Behavioral: Negative for behavioral problems and sleep disturbance. The patient is not nervous/anxious. /66 (Site: Right Arm, Position: Sitting, Cuff Size: Large Adult)   Pulse 80   Resp 16   Ht 5' 8\" (1.727 m)   Wt 249 lb 8 oz (113.2 kg)   BMI 37.94 kg/m²     Exam:   Physical Exam   Constitutional: She is oriented to person, place, and time.  She appears well-developed and well-nourished. HENT:   Head: Normocephalic and atraumatic. Right Ear: External ear normal.   Left Ear: External ear normal.   Nose: Nose normal.   Mouth/Throat: Oropharynx is clear and moist.   Eyes: Conjunctivae and EOM are normal. Pupils are equal, round, and reactive to light. No scleral icterus. Neck: Normal range of motion. Neck supple. No JVD present. No thyromegaly present. Cardiovascular: Normal rate, regular rhythm, normal heart sounds and intact distal pulses. Pulmonary/Chest: Effort normal and breath sounds normal. She has no wheezes. She has no rales. Abdominal: Soft. Bowel sounds are normal. She exhibits no distension and no mass. There is no tenderness. Musculoskeletal: Normal range of motion. She exhibits no tenderness. Lymphadenopathy:     She has no cervical adenopathy. Neurological: She is alert and oriented to person, place, and time. She has normal reflexes. No cranial nerve deficit. Skin: Skin is warm and dry. No rash noted. Psychiatric: She has a normal mood and affect. Nursing note and vitals reviewed. Assessment:     .  1. Sick sinus syndrome (HCC)     2. Pulmonary nodule, right     3. High-grade atrioventricular block     4. Secondary hypertension     5. Coronary artery disease involving native coronary artery of native heart without angina pectoris         Plan:     Diagnostic Tests performed in hospital:  Cath and pacer  Requested/reviewed: Yes    Disease Education:  Coronary Artery Disease    Home Health Care :  None    Discussed with other providers:   cardio     Orders Placed:    none  Medications Prescribed:    ?  Resume  Lopressor and  Decrease the  hydralazine     No orders of the defined types were placed in this encounter.     Current Outpatient Prescriptions   Medication Sig Dispense Refill    acetaminophen (TYLENOL) 325 MG tablet Take 2 tablets by mouth every 4 hours as needed for Pain 120 tablet 3    hydrALAZINE (APRESOLINE) 25 MG tablet Take 1 tablet by mouth 3 times daily 90 tablet 1    metFORMIN (GLUCOPHAGE XR) 500 MG extended release tablet Resume 10/23/17 2  Tabs  At  Breakfast and at the evening  meal 360 tablet 1    cephALEXin (KEFLEX) 500 MG capsule Take 1 capsule by mouth 3 times daily for 5 days 15 capsule 0    atorvastatin (LIPITOR) 20 MG tablet Take 1 tablet by mouth daily 90 tablet 1    hydrochlorothiazide (HYDRODIURIL) 25 MG tablet Take 1 tablet by mouth daily 90 tablet 1    gabapentin (NEURONTIN) 300 MG capsule TAKE ONE CAPSULE BY MOUTH ONCE DAILY IN THE EVENING 90 capsule 1    clopidogrel (PLAVIX) 75 MG tablet TAKE 1 TABLET DAILY 90 tablet 1    amLODIPine (NORVASC) 10 MG tablet Take 1 tablet by mouth daily 90 tablet 1    Coenzyme Q10 (CO Q 10) 100 MG CAPS Take 1 tablet by mouth daily      ramipril (ALTACE) 10 MG capsule TAKE ONE CAPSULE BY MOUTH TWICE DAILY 180 capsule 0    levothyroxine (SYNTHROID) 150 MCG tablet TAKE ONE TABLET BY MOUTH ONCE DAILY 90 tablet 0    glimepiride (AMARYL) 4 MG tablet Take 1 tablet by mouth daily 90 tablet 1    Glucose Blood (JOSE E BREEZE 2 TEST) DISK Use one strip to check glucose twice daily, Dx: E11.9 100 each 5    aspirin 325 MG tablet Take 325 mg by mouth daily.  Ascorbic Acid (VITAMIN C) 500 MG tablet Take 500 mg by mouth daily.  Flaxseed, Linseed, (FLAX SEED OIL) 1000 MG CAPS Take  by mouth.  metoprolol tartrate (LOPRESSOR) 50 MG tablet TAKE 1/2 TABLET BY MOUTH TWICE DAILY 180 tablet 1     No current facility-administered medications for this visit. No Follow-up on file.

## 2017-10-25 ENCOUNTER — OFFICE VISIT (OUTPATIENT)
Dept: CARDIOLOGY CLINIC | Age: 72
End: 2017-10-25
Payer: MEDICARE

## 2017-10-25 VITALS
WEIGHT: 252.1 LBS | DIASTOLIC BLOOD PRESSURE: 77 MMHG | SYSTOLIC BLOOD PRESSURE: 139 MMHG | HEART RATE: 95 BPM | BODY MASS INDEX: 38.21 KG/M2 | HEIGHT: 68 IN

## 2017-10-25 DIAGNOSIS — I10 ESSENTIAL HYPERTENSION: Primary | ICD-10-CM

## 2017-10-25 DIAGNOSIS — E78.01 FAMILIAL HYPERCHOLESTEROLEMIA: ICD-10-CM

## 2017-10-25 DIAGNOSIS — I25.10 CORONARY ARTERY DISEASE INVOLVING NATIVE CORONARY ARTERY OF NATIVE HEART WITHOUT ANGINA PECTORIS: ICD-10-CM

## 2017-10-25 PROCEDURE — 3017F COLORECTAL CA SCREEN DOC REV: CPT | Performed by: NUCLEAR MEDICINE

## 2017-10-25 PROCEDURE — G8400 PT W/DXA NO RESULTS DOC: HCPCS | Performed by: NUCLEAR MEDICINE

## 2017-10-25 PROCEDURE — 1036F TOBACCO NON-USER: CPT | Performed by: NUCLEAR MEDICINE

## 2017-10-25 PROCEDURE — 4040F PNEUMOC VAC/ADMIN/RCVD: CPT | Performed by: NUCLEAR MEDICINE

## 2017-10-25 PROCEDURE — 1090F PRES/ABSN URINE INCON ASSESS: CPT | Performed by: NUCLEAR MEDICINE

## 2017-10-25 PROCEDURE — G8598 ASA/ANTIPLAT THER USED: HCPCS | Performed by: NUCLEAR MEDICINE

## 2017-10-25 PROCEDURE — 1111F DSCHRG MED/CURRENT MED MERGE: CPT | Performed by: NUCLEAR MEDICINE

## 2017-10-25 PROCEDURE — G8417 CALC BMI ABV UP PARAM F/U: HCPCS | Performed by: NUCLEAR MEDICINE

## 2017-10-25 PROCEDURE — 3014F SCREEN MAMMO DOC REV: CPT | Performed by: NUCLEAR MEDICINE

## 2017-10-25 PROCEDURE — G8427 DOCREV CUR MEDS BY ELIG CLIN: HCPCS | Performed by: NUCLEAR MEDICINE

## 2017-10-25 PROCEDURE — G8484 FLU IMMUNIZE NO ADMIN: HCPCS | Performed by: NUCLEAR MEDICINE

## 2017-10-25 PROCEDURE — 99213 OFFICE O/P EST LOW 20 MIN: CPT | Performed by: NUCLEAR MEDICINE

## 2017-10-25 PROCEDURE — 1123F ACP DISCUSS/DSCN MKR DOCD: CPT | Performed by: NUCLEAR MEDICINE

## 2017-10-25 RX ORDER — METOPROLOL TARTRATE 50 MG/1
50 TABLET, FILM COATED ORAL 2 TIMES DAILY
Qty: 180 TABLET | Refills: 3 | Status: SHIPPED | OUTPATIENT
Start: 2017-10-25 | End: 2018-03-22 | Stop reason: SDUPTHER

## 2017-10-25 NOTE — PROGRESS NOTES
 Heart Attack Father     Heart Disease Brother      CABG     Social History   Substance Use Topics    Smoking status: Never Smoker    Smokeless tobacco: Never Used    Alcohol use No      Current Outpatient Prescriptions   Medication Sig Dispense Refill    acetaminophen (TYLENOL) 325 MG tablet Take 2 tablets by mouth every 4 hours as needed for Pain 120 tablet 3    hydrALAZINE (APRESOLINE) 25 MG tablet Take 1 tablet by mouth 3 times daily 90 tablet 1    metFORMIN (GLUCOPHAGE XR) 500 MG extended release tablet Resume 10/23/17 2  Tabs  At  Breakfast and at the evening  meal 360 tablet 1    cephALEXin (KEFLEX) 500 MG capsule Take 1 capsule by mouth 3 times daily for 5 days 15 capsule 0    metoprolol tartrate (LOPRESSOR) 50 MG tablet TAKE 1/2 TABLET BY MOUTH TWICE DAILY 180 tablet 1    atorvastatin (LIPITOR) 20 MG tablet Take 1 tablet by mouth daily 90 tablet 1    hydrochlorothiazide (HYDRODIURIL) 25 MG tablet Take 1 tablet by mouth daily 90 tablet 1    gabapentin (NEURONTIN) 300 MG capsule TAKE ONE CAPSULE BY MOUTH ONCE DAILY IN THE EVENING 90 capsule 1    clopidogrel (PLAVIX) 75 MG tablet TAKE 1 TABLET DAILY 90 tablet 1    amLODIPine (NORVASC) 10 MG tablet Take 1 tablet by mouth daily 90 tablet 1    Coenzyme Q10 (CO Q 10) 100 MG CAPS Take 1 tablet by mouth daily      ramipril (ALTACE) 10 MG capsule TAKE ONE CAPSULE BY MOUTH TWICE DAILY 180 capsule 0    levothyroxine (SYNTHROID) 150 MCG tablet TAKE ONE TABLET BY MOUTH ONCE DAILY 90 tablet 0    glimepiride (AMARYL) 4 MG tablet Take 1 tablet by mouth daily 90 tablet 1    Glucose Blood (JOSE E BREEZE 2 TEST) DISK Use one strip to check glucose twice daily, Dx: E11.9 100 each 5    aspirin 325 MG tablet Take 325 mg by mouth daily.  Ascorbic Acid (VITAMIN C) 500 MG tablet Take 500 mg by mouth daily.  Flaxseed, Linseed, (FLAX SEED OIL) 1000 MG CAPS Take  by mouth. No current facility-administered medications for this visit. contact me regarding any further issues related to the patient care    Orders Placed:  No orders of the defined types were placed in this encounter. Medications Prescribed:  No orders of the defined types were placed in this encounter. Discussed use, benefit, and side effects of prescribed medications. All patient questions answered. Pt voiced understanding. Instructed to continue current medications, diet and exercise. Continue risk factor modification and medical management. Patient agreed with treatment plan. Follow up as directed.     Electronically signed by Jose Luis Gallardo MD on 10/25/2017 at 12:38 PM

## 2017-10-25 NOTE — PROGRESS NOTES
Pt here for a 1 yr fu, recently had a pacemaker put in by Lois Larios    Denies chest pain, dizziness, edema, and palpitations    C/o still having sob, but not as much as she did

## 2017-10-26 DIAGNOSIS — E03.4 HYPOTHYROIDISM DUE TO ACQUIRED ATROPHY OF THYROID: ICD-10-CM

## 2017-10-27 NOTE — TELEPHONE ENCOUNTER
Date of last visit:  10/24/2017  Date of next visit:  11/22/2017    Requested Prescriptions     Pending Prescriptions Disp Refills    levothyroxine (SYNTHROID) 150 MCG tablet [Pharmacy Med Name: LEVOTHYROXIN 150MCG TAB] 90 tablet 0     Sig: TAKE ONE TABLET BY MOUTH ONCE DAILY

## 2017-10-28 RX ORDER — LEVOTHYROXINE SODIUM 0.15 MG/1
TABLET ORAL
Qty: 90 TABLET | Refills: 0 | Status: SHIPPED | OUTPATIENT
Start: 2017-10-28 | End: 2018-03-22 | Stop reason: SDUPTHER

## 2017-10-30 ENCOUNTER — TELEPHONE (OUTPATIENT)
Dept: CARDIOLOGY CLINIC | Age: 72
End: 2017-10-30

## 2017-10-30 ENCOUNTER — NURSE ONLY (OUTPATIENT)
Dept: CARDIOLOGY CLINIC | Age: 72
End: 2017-10-30
Payer: MEDICARE

## 2017-10-30 DIAGNOSIS — Z95.0 S/P CARDIAC PACEMAKER PROCEDURE: Primary | ICD-10-CM

## 2017-10-30 DIAGNOSIS — E03.4 HYPOTHYROIDISM DUE TO ACQUIRED ATROPHY OF THYROID: ICD-10-CM

## 2017-10-30 PROCEDURE — 93280 PM DEVICE PROGR EVAL DUAL: CPT | Performed by: INTERNAL MEDICINE

## 2017-10-30 NOTE — PROGRESS NOTES
DR Edis Melchor PT  MEDTRONIC DUAL PACEMAKER INITIAL CHECK IN OFFICE  BATTERY 3.06 VOLTS REMAINING ON DEVICE  INDU 2.83  A PACED 2.1%  V PACED 93.6%  PWAVES 3.3  RV WAVES >20  ATRIAL IMPEDENCEE 437  RV IMPEDENCE 551  ATRIAL THRESHOLD 1.75 @ 0.4  VENT THRESHOLD 0.75 @ 0.40  DDDR   PT OFFERS NO C/O  REVIEWED HOME MONITOR C PT. DOWNLOAD SCHEDULE GIVEN ALONG C POST CARE INSTRUCTIONS. PACER INSERTION SITE WITH STERI STRIPS IN PLACE. NO DRAINAGE, NO ERYTHEMA OR EDEMA.    PHOTO OF SITE LOCATED UNDER THE MEDIA TAB   PT SCHEDULED IN 3 MTHS FOR CHRONIC VALUES

## 2017-10-30 NOTE — TELEPHONE ENCOUNTER
Discussed PAD questionnaire with patient   Has c/o pain in her knees with walking   Declined CATA screening

## 2017-10-31 RX ORDER — LEVOTHYROXINE SODIUM 0.15 MG/1
TABLET ORAL
Qty: 90 TABLET | Refills: 1 | Status: SHIPPED | OUTPATIENT
Start: 2017-10-31 | End: 2018-01-25 | Stop reason: SDUPTHER

## 2017-11-07 ENCOUNTER — TELEPHONE (OUTPATIENT)
Dept: FAMILY MEDICINE CLINIC | Age: 72
End: 2017-11-07

## 2017-11-08 DIAGNOSIS — I10 ESSENTIAL HYPERTENSION: ICD-10-CM

## 2017-11-08 NOTE — TELEPHONE ENCOUNTER
Date of last visit:  10/24/2017  Date of next visit:  11/22/2017    Requested Prescriptions     Pending Prescriptions Disp Refills    ramipril (ALTACE) 10 MG capsule [Pharmacy Med Name: RAMIPRIL 10MG       CAP] 180 capsule 0     Sig: TAKE ONE CAPSULE BY MOUTH TWICE DAILY

## 2017-11-09 RX ORDER — RAMIPRIL 10 MG/1
CAPSULE ORAL
Qty: 180 CAPSULE | Refills: 1 | Status: SHIPPED | OUTPATIENT
Start: 2017-11-09 | End: 2018-03-22 | Stop reason: SDUPTHER

## 2017-11-22 ENCOUNTER — OFFICE VISIT (OUTPATIENT)
Dept: FAMILY MEDICINE CLINIC | Age: 72
End: 2017-11-22

## 2017-11-22 VITALS
HEIGHT: 68 IN | DIASTOLIC BLOOD PRESSURE: 74 MMHG | SYSTOLIC BLOOD PRESSURE: 128 MMHG | RESPIRATION RATE: 14 BRPM | HEART RATE: 80 BPM | BODY MASS INDEX: 38.04 KG/M2 | WEIGHT: 251 LBS

## 2017-11-22 DIAGNOSIS — E11.9 TYPE 2 DIABETES MELLITUS WITHOUT COMPLICATION, WITHOUT LONG-TERM CURRENT USE OF INSULIN (HCC): Primary | ICD-10-CM

## 2017-11-22 DIAGNOSIS — I49.5 SICK SINUS SYNDROME (HCC): ICD-10-CM

## 2017-11-22 DIAGNOSIS — I15.9 SECONDARY HYPERTENSION: ICD-10-CM

## 2017-11-22 DIAGNOSIS — E78.00 PURE HYPERCHOLESTEROLEMIA: ICD-10-CM

## 2017-11-22 LAB
GLUCOSE BLD-MCNC: 192 MG/DL
HBA1C MFR BLD: 6.8 %

## 2017-11-22 PROCEDURE — 82962 GLUCOSE BLOOD TEST: CPT | Performed by: FAMILY MEDICINE

## 2017-11-22 PROCEDURE — 83036 HEMOGLOBIN GLYCOSYLATED A1C: CPT | Performed by: FAMILY MEDICINE

## 2017-11-22 PROCEDURE — 99213 OFFICE O/P EST LOW 20 MIN: CPT | Performed by: FAMILY MEDICINE

## 2017-11-22 ASSESSMENT — ENCOUNTER SYMPTOMS
SHORTNESS OF BREATH: 0
NAUSEA: 0
SORE THROAT: 0
CONSTIPATION: 0
ABDOMINAL PAIN: 0
EYE PAIN: 0
COUGH: 0
CHEST TIGHTNESS: 0
WHEEZING: 0

## 2017-11-22 NOTE — PROGRESS NOTES
Subjective:      Patient ID: Anabella Medrano is a 81 y.o. female. Bradycardia         Sick  Sinus  Syndrome  pacer      Diabetes   She presents for her follow-up diabetic visit. She has type 2 diabetes mellitus. Her disease course has been stable. There are no hypoglycemic associated symptoms. Pertinent negatives for hypoglycemia include no dizziness, headaches or nervousness/anxiousness. Pertinent negatives for diabetes include no chest pain, no fatigue and no weakness. There are no hypoglycemic complications. Symptoms are stable. There are no diabetic complications. She is compliant with treatment all of the time. Hypertension   Pertinent negatives include no chest pain, headaches, neck pain, palpitations or shortness of breath. Past Medical History:   Diagnosis Date    Aortic aneurysm (HCC)     4.5 cm aorta    Arthritis     CAD (coronary artery disease)     Colon polyps 3/01    Diabetic peripheral neuropathy ([address] 2014      feet    Elbow fracture     Hyperlipidemia     Hypertension     Hypothyroidism     Pulmonary nodule, right 10/2017      repeat  ct of  chest  4-2018    S/P cardiac pacemaker procedure: 10/21/2017: Medtronic Dual Chamber. 10/21/2017    10/21/2017: Medtronic Dual Chamber. Dr. Army Rodas Type II or unspecified type diabetes mellitus without mention of complication, not stated as uncontrolled      Review of Systems   Constitutional: Negative for appetite change, fatigue and fever. HENT: Negative for congestion, ear pain, postnasal drip and sore throat. Eyes: Negative for pain and visual disturbance. Respiratory: Negative for cough, chest tightness, shortness of breath and wheezing. Cardiovascular: Negative for chest pain, palpitations and leg swelling. Gastrointestinal: Negative for abdominal pain, constipation and nausea. Genitourinary: Negative for dysuria and frequency. Musculoskeletal: Negative for arthralgias, joint swelling, neck pain and neck stiffness. Range    Hemoglobin A1C 6.8 (A) %     Jose Muro received counseling on the following healthy behaviors: nutrition and exercise    Patient given educational materials on Diabetes    I have instructed Jose Muro to complete a self tracking handout on Blood Sugars  and instructed them to bring it with them to her next appointment. Discussed use, benefit, and side effects of prescribed medications. Barriers to medication compliance addressed. All patient questions answered. Pt voiced understanding.       see in  4 mths

## 2017-12-08 ENCOUNTER — TELEPHONE (OUTPATIENT)
Dept: FAMILY MEDICINE CLINIC | Age: 72
End: 2017-12-08

## 2018-01-25 ENCOUNTER — OFFICE VISIT (OUTPATIENT)
Dept: CARDIOLOGY CLINIC | Age: 73
End: 2018-01-25
Payer: MEDICARE

## 2018-01-25 VITALS
HEART RATE: 76 BPM | HEIGHT: 68 IN | SYSTOLIC BLOOD PRESSURE: 118 MMHG | WEIGHT: 251.6 LBS | BODY MASS INDEX: 38.13 KG/M2 | DIASTOLIC BLOOD PRESSURE: 64 MMHG

## 2018-01-25 DIAGNOSIS — Z95.0 PACEMAKER: ICD-10-CM

## 2018-01-25 DIAGNOSIS — E78.01 FAMILIAL HYPERCHOLESTEROLEMIA: ICD-10-CM

## 2018-01-25 DIAGNOSIS — I10 ESSENTIAL HYPERTENSION: ICD-10-CM

## 2018-01-25 DIAGNOSIS — I25.10 CORONARY ARTERY DISEASE INVOLVING NATIVE CORONARY ARTERY OF NATIVE HEART WITHOUT ANGINA PECTORIS: Primary | ICD-10-CM

## 2018-01-25 PROCEDURE — G8400 PT W/DXA NO RESULTS DOC: HCPCS | Performed by: NUCLEAR MEDICINE

## 2018-01-25 PROCEDURE — G8427 DOCREV CUR MEDS BY ELIG CLIN: HCPCS | Performed by: NUCLEAR MEDICINE

## 2018-01-25 PROCEDURE — 3014F SCREEN MAMMO DOC REV: CPT | Performed by: NUCLEAR MEDICINE

## 2018-01-25 PROCEDURE — 1036F TOBACCO NON-USER: CPT | Performed by: NUCLEAR MEDICINE

## 2018-01-25 PROCEDURE — G8598 ASA/ANTIPLAT THER USED: HCPCS | Performed by: NUCLEAR MEDICINE

## 2018-01-25 PROCEDURE — 4040F PNEUMOC VAC/ADMIN/RCVD: CPT | Performed by: NUCLEAR MEDICINE

## 2018-01-25 PROCEDURE — 1123F ACP DISCUSS/DSCN MKR DOCD: CPT | Performed by: NUCLEAR MEDICINE

## 2018-01-25 PROCEDURE — G8417 CALC BMI ABV UP PARAM F/U: HCPCS | Performed by: NUCLEAR MEDICINE

## 2018-01-25 PROCEDURE — 99213 OFFICE O/P EST LOW 20 MIN: CPT | Performed by: NUCLEAR MEDICINE

## 2018-01-25 PROCEDURE — 1090F PRES/ABSN URINE INCON ASSESS: CPT | Performed by: NUCLEAR MEDICINE

## 2018-01-25 PROCEDURE — 3017F COLORECTAL CA SCREEN DOC REV: CPT | Performed by: NUCLEAR MEDICINE

## 2018-01-25 PROCEDURE — G8484 FLU IMMUNIZE NO ADMIN: HCPCS | Performed by: NUCLEAR MEDICINE

## 2018-01-25 NOTE — PROGRESS NOTES
Patient here for 3 month follow up. Had Dual-chamber pacemaker implantation on 10-21-17. Patient denies SOB. Chest pain. Palpitations. SHAYLA. Patient wants to know what her limitations are due to pacemaker?

## 2018-01-25 NOTE — PROGRESS NOTES
[facility] PROFESSIONAL SERVS  [facility]  [address]  [address] [facility] [address]  Dept: [phone]  Dept Fax: [phone]  Loc: [phone]    Visit Date: 2018    Alison Barragan is a 81 y.o. female who presents today for:  Chief Complaint   Patient presents with    3 Month Follow-Up    Coronary Artery Disease    Hypertension    Hyperlipidemia     Had a pacer last year   Has known stent to LAD  BP is much better  No chest pain  some knee issues  Cardiac seems stable   No ER visits      HPI:  HPI  Past Medical History:   Diagnosis Date    Aortic aneurysm ([facility] [address]     4.5 cm aorta    Arthritis     CAD (coronary artery disease)     Colon polyps 3/01    Diabetic peripheral neuropathy ([facility] [address] 2014      feet    Elbow fracture     Hyperlipidemia     Hypertension     Hypothyroidism     Pulmonary nodule, right 10/2017      repeat  ct of  chest      S/P cardiac pacemaker procedure: 10/21/2017: Medtronic Dual Chamber. 10/21/2017    10/21/2017: Medtronic Dual Chamber.  Dr. Oswaldo Acevedo Type II or unspecified type diabetes mellitus without mention of complication, not stated as uncontrolled       Past Surgical History:   Procedure Laterality Date    BREAST BIOPSY Right 2016     benign   abrahan    BREAST SURGERY       SECTION  over 30 years ago     COLONOSCOPY  2012    colon polyps   [place]    CORONARY ANGIOPLASTY      baki    CORONARY ANGIOPLASTY WITH STENT PLACEMENT      baki    HEEL SPUR SURGERY Bilateral     bilat with hardware    HYSTERECTOMY  1984    KNEE ARTHROSCOPY  ; 3/02    right and left knee    KNEE ARTHROSCOPY Bilateral     bilat    PACEMAKER PLACEMENT  10/2017    THYROIDECTOMY, PARTIAL  1980's     Family History   Problem Relation Age of Onset    Heart Disease Mother 80     bacterial infection at valve    Heart Disease Father     Heart Attack Father     Heart Disease Brother      CABG     Social History   Substance Use Topics    Smoking status: Never Smoker    Smokeless tobacco: Never Used    Alcohol use No      Current Outpatient Prescriptions   Medication Sig Dispense Refill    ramipril (ALTACE) 10 MG capsule TAKE ONE CAPSULE BY MOUTH TWICE DAILY 180 capsule 1    levothyroxine (SYNTHROID) 150 MCG tablet TAKE ONE TABLET BY MOUTH ONCE DAILY 90 tablet 0    metoprolol tartrate (LOPRESSOR) 50 MG tablet Take 1 tablet by mouth 2 times daily 180 tablet 3    acetaminophen (TYLENOL) 325 MG tablet Take 2 tablets by mouth every 4 hours as needed for Pain 120 tablet 3    metFORMIN (GLUCOPHAGE XR) 500 MG extended release tablet Resume 10/23/17 2  Tabs  At  Breakfast and at the evening  meal 360 tablet 1    atorvastatin (LIPITOR) 20 MG tablet Take 1 tablet by mouth daily 90 tablet 1    hydrochlorothiazide (HYDRODIURIL) 25 MG tablet Take 1 tablet by mouth daily 90 tablet 1    gabapentin (NEURONTIN) 300 MG capsule TAKE ONE CAPSULE BY MOUTH ONCE DAILY IN THE EVENING 90 capsule 1    clopidogrel (PLAVIX) 75 MG tablet TAKE 1 TABLET DAILY 90 tablet 1    amLODIPine (NORVASC) 10 MG tablet Take 1 tablet by mouth daily 90 tablet 1    Coenzyme Q10 (CO Q 10) 100 MG CAPS Take 1 tablet by mouth daily      glimepiride (AMARYL) 4 MG tablet Take 1 tablet by mouth daily 90 tablet 1    Glucose Blood (JOSE E BREEZE 2 TEST) DISK Use one strip to check glucose twice daily, Dx: E11.9 100 each 5    aspirin 325 MG tablet Take 325 mg by mouth daily.  Ascorbic Acid (VITAMIN C) 500 MG tablet Take 500 mg by mouth daily.  Flaxseed, Linseed, (FLAX SEED OIL) 1000 MG CAPS Take  by mouth. No current facility-administered medications for this visit.       Allergies   Allergen Reactions    Adhesive Tape Rash     Health Maintenance   Topic Date Due    Zostavax vaccine  02/11/2007    DEXA (modify frequency per FRAX score)  02/11/2012    Diabetic retinal exam  03/12/2016    Diabetic foot exam  09/30/2016    Diabetic microalbuminuria test  08/05/2017

## 2018-02-01 ENCOUNTER — NURSE ONLY (OUTPATIENT)
Dept: CARDIOLOGY CLINIC | Age: 73
End: 2018-02-01
Payer: MEDICARE

## 2018-02-01 DIAGNOSIS — Z95.0 S/P CARDIAC PACEMAKER PROCEDURE: Primary | ICD-10-CM

## 2018-02-01 PROCEDURE — 93288 INTERROG EVL PM/LDLS PM IP: CPT | Performed by: INTERNAL MEDICINE

## 2018-02-01 NOTE — PROGRESS NOTES
Alex pt   Chronic values   At imped 380   14.3% atrial paced 98.6% vent paced, she's dependent today  P waves 1.6  No R waves today  At threshold 0.75 @ 04  Amplitude decreased to 2 for chronic values   RV 0.75 @ 0.4  Amplitude decreased to 2.25 chronic values     Given new carelink schedule.   Denies any questions

## 2018-02-11 DIAGNOSIS — E11.9 TYPE 2 DIABETES MELLITUS WITHOUT COMPLICATION, WITHOUT LONG-TERM CURRENT USE OF INSULIN (HCC): ICD-10-CM

## 2018-02-11 DIAGNOSIS — I10 ESSENTIAL HYPERTENSION: ICD-10-CM

## 2018-02-11 DIAGNOSIS — I25.10 CORONARY ARTERY DISEASE INVOLVING NATIVE CORONARY ARTERY OF NATIVE HEART WITHOUT ANGINA PECTORIS: ICD-10-CM

## 2018-02-11 DIAGNOSIS — E03.4 HYPOTHYROIDISM DUE TO ACQUIRED ATROPHY OF THYROID: ICD-10-CM

## 2018-02-11 DIAGNOSIS — E78.00 PURE HYPERCHOLESTEROLEMIA: ICD-10-CM

## 2018-02-13 RX ORDER — HYDROCHLOROTHIAZIDE 25 MG/1
TABLET ORAL
Qty: 90 TABLET | Refills: 1 | Status: SHIPPED | OUTPATIENT
Start: 2018-02-13 | End: 2018-08-21 | Stop reason: SDUPTHER

## 2018-02-13 RX ORDER — CLOPIDOGREL BISULFATE 75 MG/1
TABLET ORAL
Qty: 90 TABLET | Refills: 1 | Status: SHIPPED | OUTPATIENT
Start: 2018-02-13 | End: 2018-08-19 | Stop reason: SDUPTHER

## 2018-02-13 RX ORDER — ATORVASTATIN CALCIUM 20 MG/1
TABLET, FILM COATED ORAL
Qty: 90 TABLET | Refills: 1 | Status: SHIPPED | OUTPATIENT
Start: 2018-02-13 | End: 2018-08-21 | Stop reason: SDUPTHER

## 2018-02-13 RX ORDER — METFORMIN HYDROCHLORIDE 500 MG/1
TABLET, EXTENDED RELEASE ORAL
Qty: 360 TABLET | Refills: 1 | Status: SHIPPED | OUTPATIENT
Start: 2018-02-13 | End: 2018-08-21 | Stop reason: SDUPTHER

## 2018-03-14 DIAGNOSIS — E11.42 DIABETIC PERIPHERAL NEUROPATHY (HCC): ICD-10-CM

## 2018-03-16 RX ORDER — GABAPENTIN 300 MG/1
CAPSULE ORAL
Qty: 90 CAPSULE | Refills: 1 | Status: SHIPPED | OUTPATIENT
Start: 2018-03-16 | End: 2018-09-19 | Stop reason: SDUPTHER

## 2018-03-22 ENCOUNTER — OFFICE VISIT (OUTPATIENT)
Dept: FAMILY MEDICINE CLINIC | Age: 73
End: 2018-03-22

## 2018-03-22 VITALS
HEIGHT: 68 IN | RESPIRATION RATE: 14 BRPM | DIASTOLIC BLOOD PRESSURE: 64 MMHG | WEIGHT: 251.38 LBS | SYSTOLIC BLOOD PRESSURE: 122 MMHG | HEART RATE: 84 BPM | BODY MASS INDEX: 38.1 KG/M2

## 2018-03-22 DIAGNOSIS — E11.42 DIABETIC PERIPHERAL NEUROPATHY (HCC): ICD-10-CM

## 2018-03-22 DIAGNOSIS — E03.4 HYPOTHYROIDISM DUE TO ACQUIRED ATROPHY OF THYROID: ICD-10-CM

## 2018-03-22 DIAGNOSIS — E78.00 PURE HYPERCHOLESTEROLEMIA: ICD-10-CM

## 2018-03-22 DIAGNOSIS — I10 ESSENTIAL HYPERTENSION: ICD-10-CM

## 2018-03-22 DIAGNOSIS — I71.20 THORACIC AORTIC ANEURYSM WITHOUT RUPTURE: ICD-10-CM

## 2018-03-22 DIAGNOSIS — E11.9 TYPE 2 DIABETES MELLITUS WITHOUT COMPLICATION, WITHOUT LONG-TERM CURRENT USE OF INSULIN (HCC): Primary | ICD-10-CM

## 2018-03-22 LAB
GLUCOSE BLD-MCNC: 264 MG/DL
HBA1C MFR BLD: 7.5 %

## 2018-03-22 PROCEDURE — 83036 HEMOGLOBIN GLYCOSYLATED A1C: CPT | Performed by: FAMILY MEDICINE

## 2018-03-22 PROCEDURE — 99213 OFFICE O/P EST LOW 20 MIN: CPT | Performed by: FAMILY MEDICINE

## 2018-03-22 PROCEDURE — 82962 GLUCOSE BLOOD TEST: CPT | Performed by: FAMILY MEDICINE

## 2018-03-22 RX ORDER — METOPROLOL TARTRATE 50 MG/1
50 TABLET, FILM COATED ORAL 2 TIMES DAILY
Qty: 180 TABLET | Refills: 1 | Status: SHIPPED | OUTPATIENT
Start: 2018-03-22 | End: 2018-10-02 | Stop reason: SDUPTHER

## 2018-03-22 RX ORDER — GLIMEPIRIDE 4 MG/1
4 TABLET ORAL DAILY
Qty: 90 TABLET | Refills: 1 | Status: SHIPPED | OUTPATIENT
Start: 2018-03-22 | End: 2018-09-22 | Stop reason: SDUPTHER

## 2018-03-22 RX ORDER — RAMIPRIL 10 MG/1
CAPSULE ORAL
Qty: 180 CAPSULE | Refills: 1 | Status: SHIPPED | OUTPATIENT
Start: 2018-03-22 | End: 2018-08-17 | Stop reason: SDUPTHER

## 2018-03-22 RX ORDER — LEVOTHYROXINE SODIUM 0.15 MG/1
TABLET ORAL
Qty: 90 TABLET | Refills: 1 | Status: SHIPPED | OUTPATIENT
Start: 2018-03-22 | End: 2018-08-06 | Stop reason: SDUPTHER

## 2018-03-22 RX ORDER — AMLODIPINE BESYLATE 10 MG/1
10 TABLET ORAL DAILY
Qty: 90 TABLET | Refills: 1 | Status: SHIPPED | OUTPATIENT
Start: 2018-03-22 | End: 2018-10-02 | Stop reason: SDUPTHER

## 2018-03-22 ASSESSMENT — ENCOUNTER SYMPTOMS
SORE THROAT: 0
EYE PAIN: 0
SHORTNESS OF BREATH: 0
ABDOMINAL PAIN: 0
ORTHOPNEA: 0
NAUSEA: 0
CHEST TIGHTNESS: 0
CONSTIPATION: 0
WHEEZING: 0
COUGH: 0

## 2018-03-22 NOTE — PROGRESS NOTES
unspecified type diabetes mellitus without mention of complication, not stated as uncontrolled      Review of Systems   Constitutional: Negative for appetite change, fatigue and fever. HENT: Negative for congestion, ear pain, postnasal drip and sore throat. Eyes: Negative for pain and visual disturbance. Respiratory: Negative for cough, chest tightness, shortness of breath and wheezing. Cardiovascular: Negative for chest pain, palpitations, orthopnea and leg swelling. Gastrointestinal: Negative for abdominal pain, constipation and nausea. Genitourinary: Negative for dysuria and frequency. Musculoskeletal: Negative for arthralgias, joint swelling, neck pain and neck stiffness. Skin: Negative for rash. Neurological: Negative for dizziness, weakness, numbness and headaches. Hematological: Negative for adenopathy. Does not bruise/bleed easily. Psychiatric/Behavioral: Negative for behavioral problems and sleep disturbance. The patient is not nervous/anxious. /64 (Site: Right Arm, Position: Sitting, Cuff Size: Medium Adult)   Pulse 84   Resp 14   Ht 5' 8\" (1.727 m)   Wt 251 lb 6 oz (114 kg)   Breastfeeding? No   BMI 38.22 kg/m²   Objective:   Physical Exam   Constitutional: She is oriented to person, place, and time. She appears well-developed and well-nourished. HENT:   Head: Normocephalic and atraumatic. Right Ear: External ear normal.   Left Ear: External ear normal.   Nose: Nose normal.   Mouth/Throat: Oropharynx is clear and moist.   Eyes: Conjunctivae and EOM are normal. Pupils are equal, round, and reactive to light. No scleral icterus. Neck: Normal range of motion. Neck supple. No JVD present. No thyromegaly present. Cardiovascular: Normal rate, regular rhythm, normal heart sounds and intact distal pulses. Pulmonary/Chest: Effort normal and breath sounds normal. She has no wheezes. She has no rales. Abdominal: Soft.  Bowel sounds are normal. She exhibits no distension and no mass. There is no tenderness. Musculoskeletal: Normal range of motion. She exhibits no tenderness. Lymphadenopathy:     She has no cervical adenopathy. Neurological: She is alert and oriented to person, place, and time. She has normal reflexes. No cranial nerve deficit. Skin: Skin is warm and dry. No rash noted. Psychiatric: She has a normal mood and affect. Nursing note and vitals reviewed. Assessment:      1. Type 2 diabetes mellitus without complication, without long-term current use of insulin ([facility])  POCT Glucose    POCT glycosylated hemoglobin (Hb A1C)    glimepiride (AMARYL) 4 MG tablet   2. Essential hypertension  ramipril (ALTACE) 10 MG capsule    metoprolol tartrate (LOPRESSOR) 50 MG tablet    amLODIPine (NORVASC) 10 MG tablet   3. Hypothyroidism due to acquired atrophy of thyroid  levothyroxine (SYNTHROID) 150 MCG tablet    T4, Free    TSH without Reflex   4. Pure hypercholesterolemia  Lipid Panel   5. Thoracic aortic aneurysm without rupture ([facility] [address]     6.  Diabetic peripheral neuropathy (HCC)           Plan:      Current Outpatient Prescriptions   Medication Sig Dispense Refill    JOSE E BREEZE 2 TEST DISK USE ONE STRIP TWICE DAILY 100 each 5    ramipril (ALTACE) 10 MG capsule TAKE ONE CAPSULE BY MOUTH TWICE DAILY 180 capsule 1    levothyroxine (SYNTHROID) 150 MCG tablet TAKE ONE TABLET BY MOUTH ONCE DAILY 90 tablet 1    metoprolol tartrate (LOPRESSOR) 50 MG tablet Take 1 tablet by mouth 2 times daily 180 tablet 1    amLODIPine (NORVASC) 10 MG tablet Take 1 tablet by mouth daily 90 tablet 1    glimepiride (AMARYL) 4 MG tablet Take 1 tablet by mouth daily 90 tablet 1    gabapentin (NEURONTIN) 300 MG capsule TAKE ONE CAPSULE BY MOUTH IN THE EVENING 90 capsule 1    atorvastatin (LIPITOR) 20 MG tablet TAKE 1 TABLET DAILY 90 tablet 1    hydrochlorothiazide (HYDRODIURIL) 25 MG tablet TAKE 1 TABLET DAILY 90 tablet 1    metFORMIN (GLUCOPHAGE-XR) 500 MG extended release

## 2018-05-07 ENCOUNTER — PROCEDURE VISIT (OUTPATIENT)
Dept: CARDIOLOGY CLINIC | Age: 73
End: 2018-05-07
Payer: MEDICARE

## 2018-05-07 DIAGNOSIS — Z95.0 S/P CARDIAC PACEMAKER PROCEDURE: Primary | ICD-10-CM

## 2018-05-07 PROCEDURE — 93294 REM INTERROG EVL PM/LDLS PM: CPT | Performed by: INTERNAL MEDICINE

## 2018-05-07 PROCEDURE — 93296 REM INTERROG EVL PM/IDS: CPT | Performed by: INTERNAL MEDICINE

## 2018-06-05 LAB
CHOLESTEROL, TOTAL: NORMAL MG/DL
CHOLESTEROL/HDL RATIO: NORMAL
HDLC SERPL-MCNC: NORMAL MG/DL (ref 35–70)
LDL CHOLESTEROL CALCULATED: 70 MG/DL (ref 0–160)
TRIGL SERPL-MCNC: NORMAL MG/DL
VLDLC SERPL CALC-MCNC: NORMAL MG/DL

## 2018-06-06 DIAGNOSIS — E78.00 PURE HYPERCHOLESTEROLEMIA: ICD-10-CM

## 2018-06-06 DIAGNOSIS — E03.4 HYPOTHYROIDISM DUE TO ACQUIRED ATROPHY OF THYROID: ICD-10-CM

## 2018-06-11 ENCOUNTER — TELEPHONE (OUTPATIENT)
Dept: FAMILY MEDICINE CLINIC | Age: 73
End: 2018-06-11

## 2018-06-27 ENCOUNTER — OFFICE VISIT (OUTPATIENT)
Dept: FAMILY MEDICINE CLINIC | Age: 73
End: 2018-06-27

## 2018-06-27 VITALS
WEIGHT: 244.13 LBS | SYSTOLIC BLOOD PRESSURE: 120 MMHG | RESPIRATION RATE: 14 BRPM | HEART RATE: 64 BPM | BODY MASS INDEX: 37 KG/M2 | DIASTOLIC BLOOD PRESSURE: 72 MMHG | HEIGHT: 68 IN

## 2018-06-27 DIAGNOSIS — I15.9 SECONDARY HYPERTENSION: ICD-10-CM

## 2018-06-27 DIAGNOSIS — E11.9 TYPE 2 DIABETES MELLITUS WITHOUT COMPLICATION, WITHOUT LONG-TERM CURRENT USE OF INSULIN (HCC): Primary | ICD-10-CM

## 2018-06-27 DIAGNOSIS — E78.00 PURE HYPERCHOLESTEROLEMIA: ICD-10-CM

## 2018-06-27 DIAGNOSIS — I49.5 SICK SINUS SYNDROME (HCC): ICD-10-CM

## 2018-06-27 DIAGNOSIS — R91.1 PULMONARY NODULE, RIGHT: ICD-10-CM

## 2018-06-27 DIAGNOSIS — E11.42 DIABETIC PERIPHERAL NEUROPATHY (HCC): ICD-10-CM

## 2018-06-27 DIAGNOSIS — E03.4 HYPOTHYROIDISM DUE TO ACQUIRED ATROPHY OF THYROID: ICD-10-CM

## 2018-06-27 DIAGNOSIS — I71.20 THORACIC AORTIC ANEURYSM WITHOUT RUPTURE: ICD-10-CM

## 2018-06-27 LAB
CREATININE URINE POCT: ABNORMAL
GLUCOSE BLD-MCNC: 221 MG/DL
HBA1C MFR BLD: 7.7 %
MICROALBUMIN/CREAT 24H UR: 50 MG/G{CREAT}
MICROALBUMIN/CREAT UR-RTO: ABNORMAL

## 2018-06-27 PROCEDURE — 99213 OFFICE O/P EST LOW 20 MIN: CPT | Performed by: FAMILY MEDICINE

## 2018-06-27 PROCEDURE — 82962 GLUCOSE BLOOD TEST: CPT | Performed by: FAMILY MEDICINE

## 2018-06-27 PROCEDURE — 83036 HEMOGLOBIN GLYCOSYLATED A1C: CPT | Performed by: FAMILY MEDICINE

## 2018-06-27 PROCEDURE — 82044 UR ALBUMIN SEMIQUANTITATIVE: CPT | Performed by: FAMILY MEDICINE

## 2018-06-27 RX ORDER — AMOXICILLIN 500 MG
1 CAPSULE ORAL DAILY
COMMUNITY
End: 2019-01-25

## 2018-06-27 ASSESSMENT — ENCOUNTER SYMPTOMS
CHEST TIGHTNESS: 0
ABDOMINAL PAIN: 0
NAUSEA: 0
COUGH: 0
CONSTIPATION: 0
SHORTNESS OF BREATH: 0
WHEEZING: 0
SORE THROAT: 0
EYE PAIN: 0

## 2018-07-11 ENCOUNTER — HOSPITAL ENCOUNTER (OUTPATIENT)
Dept: CT IMAGING | Age: 73
Discharge: HOME | End: 2018-07-11
Payer: MEDICARE

## 2018-07-11 ENCOUNTER — HOSPITAL ENCOUNTER (OUTPATIENT)
Age: 73
Discharge: HOME | End: 2018-07-11
Payer: MEDICARE

## 2018-07-11 DIAGNOSIS — R91.1 PULMONARY NODULE, RIGHT: ICD-10-CM

## 2018-07-11 DIAGNOSIS — I15.9 SECONDARY HYPERTENSION: ICD-10-CM

## 2018-07-11 LAB
BUN BLDV-MCNC: 21 MG/DL (ref 7–22)
POC CREATININE WHOLE BLOOD: 1 MG/DL (ref 0.5–1.2)

## 2018-07-11 PROCEDURE — 6360000004 HC RX CONTRAST MEDICATION: Performed by: FAMILY MEDICINE

## 2018-07-11 PROCEDURE — 82565 ASSAY OF CREATININE: CPT

## 2018-07-11 PROCEDURE — 71260 CT THORAX DX C+: CPT

## 2018-07-11 PROCEDURE — 36415 COLL VENOUS BLD VENIPUNCTURE: CPT

## 2018-07-11 PROCEDURE — 84520 ASSAY OF UREA NITROGEN: CPT

## 2018-07-11 RX ADMIN — IOPAMIDOL 85 ML: 755 INJECTION, SOLUTION INTRAVENOUS at 10:24

## 2018-07-12 ENCOUNTER — TELEPHONE (OUTPATIENT)
Dept: FAMILY MEDICINE CLINIC | Age: 73
End: 2018-07-12

## 2018-08-06 ENCOUNTER — PROCEDURE VISIT (OUTPATIENT)
Dept: CARDIOLOGY CLINIC | Age: 73
End: 2018-08-06
Payer: MEDICARE

## 2018-08-06 DIAGNOSIS — Z95.0 S/P CARDIAC PACEMAKER PROCEDURE: Primary | ICD-10-CM

## 2018-08-06 DIAGNOSIS — E03.4 HYPOTHYROIDISM DUE TO ACQUIRED ATROPHY OF THYROID: ICD-10-CM

## 2018-08-06 PROCEDURE — 93296 REM INTERROG EVL PM/IDS: CPT | Performed by: INTERNAL MEDICINE

## 2018-08-06 PROCEDURE — 93294 REM INTERROG EVL PM/LDLS PM: CPT | Performed by: INTERNAL MEDICINE

## 2018-08-07 RX ORDER — LEVOTHYROXINE SODIUM 0.15 MG/1
TABLET ORAL
Qty: 90 TABLET | Refills: 1 | Status: SHIPPED | OUTPATIENT
Start: 2018-08-07 | End: 2019-02-11 | Stop reason: SDUPTHER

## 2018-08-17 DIAGNOSIS — I10 ESSENTIAL HYPERTENSION: ICD-10-CM

## 2018-08-17 NOTE — TELEPHONE ENCOUNTER
Pt called needing the Ramipril 10 mg one capsule twice daily, 90 days.   Benedict Noble it is less expensive to get it at [facility] [place]

## 2018-08-18 RX ORDER — RAMIPRIL 10 MG/1
CAPSULE ORAL
Qty: 180 CAPSULE | Refills: 1 | Status: SHIPPED | OUTPATIENT
Start: 2018-08-18 | End: 2019-02-21 | Stop reason: SDUPTHER

## 2018-08-19 DIAGNOSIS — I25.10 CORONARY ARTERY DISEASE INVOLVING NATIVE CORONARY ARTERY OF NATIVE HEART WITHOUT ANGINA PECTORIS: ICD-10-CM

## 2018-08-21 DIAGNOSIS — E03.4 HYPOTHYROIDISM DUE TO ACQUIRED ATROPHY OF THYROID: ICD-10-CM

## 2018-08-21 DIAGNOSIS — I10 ESSENTIAL HYPERTENSION: ICD-10-CM

## 2018-08-21 DIAGNOSIS — E78.00 PURE HYPERCHOLESTEROLEMIA: ICD-10-CM

## 2018-08-21 DIAGNOSIS — E11.9 TYPE 2 DIABETES MELLITUS WITHOUT COMPLICATION, WITHOUT LONG-TERM CURRENT USE OF INSULIN (HCC): ICD-10-CM

## 2018-08-21 RX ORDER — CLOPIDOGREL BISULFATE 75 MG/1
TABLET ORAL
Qty: 90 TABLET | Refills: 1 | Status: ON HOLD | OUTPATIENT
Start: 2018-08-21 | End: 2018-10-13 | Stop reason: HOSPADM

## 2018-08-21 NOTE — TELEPHONE ENCOUNTER
Date of last visit:  6/27/2018  Date of next visit:  10/2/2018    Requested Prescriptions     Pending Prescriptions Disp Refills    metFORMIN (GLUCOPHAGE-XR) 500 MG extended release tablet [Pharmacy Med Name: METFORMIN ER TAB 500MG GP] 360 tablet 1     Sig: TAKE 2 TABLETS AT [facility] AT THE EVENING MEAL    atorvastatin (LIPITOR) 20 MG tablet [Pharmacy Med Name: ATORVASTATIN TAB 20MG] 90 tablet 1     Sig: TAKE 1 TABLET DAILY    hydrochlorothiazide (HYDRODIURIL) 25 MG tablet [Pharmacy Med Name: HYDROCHLOROT TAB 25MG] 90 tablet 1     Sig: TAKE 1 TABLET DAILY

## 2018-08-22 RX ORDER — ATORVASTATIN CALCIUM 20 MG/1
TABLET, FILM COATED ORAL
Qty: 90 TABLET | Refills: 1 | Status: SHIPPED | OUTPATIENT
Start: 2018-08-22 | End: 2019-02-06 | Stop reason: SDUPTHER

## 2018-08-22 RX ORDER — METFORMIN HYDROCHLORIDE 500 MG/1
TABLET, EXTENDED RELEASE ORAL
Qty: 360 TABLET | Refills: 1 | Status: SHIPPED | OUTPATIENT
Start: 2018-08-22 | End: 2019-02-06 | Stop reason: SDUPTHER

## 2018-08-22 RX ORDER — HYDROCHLOROTHIAZIDE 25 MG/1
TABLET ORAL
Qty: 90 TABLET | Refills: 1 | Status: ON HOLD | OUTPATIENT
Start: 2018-08-22 | End: 2018-10-13 | Stop reason: HOSPADM

## 2018-09-19 DIAGNOSIS — E11.42 DIABETIC PERIPHERAL NEUROPATHY (HCC): ICD-10-CM

## 2018-09-20 RX ORDER — GABAPENTIN 300 MG/1
CAPSULE ORAL
Qty: 90 CAPSULE | Refills: 1 | Status: SHIPPED | OUTPATIENT
Start: 2018-09-20 | End: 2019-03-25 | Stop reason: SDUPTHER

## 2018-09-22 DIAGNOSIS — E11.9 TYPE 2 DIABETES MELLITUS WITHOUT COMPLICATION, WITHOUT LONG-TERM CURRENT USE OF INSULIN (HCC): ICD-10-CM

## 2018-09-25 RX ORDER — GLIMEPIRIDE 4 MG/1
TABLET ORAL
Qty: 90 TABLET | Refills: 1 | Status: SHIPPED | OUTPATIENT
Start: 2018-09-25 | End: 2019-03-18 | Stop reason: SDUPTHER

## 2018-10-02 ENCOUNTER — CARE COORDINATION (OUTPATIENT)
Dept: CASE MANAGEMENT | Age: 73
End: 2018-10-02

## 2018-10-02 ENCOUNTER — OFFICE VISIT (OUTPATIENT)
Dept: FAMILY MEDICINE CLINIC | Age: 73
End: 2018-10-02

## 2018-10-02 VITALS
HEIGHT: 68 IN | SYSTOLIC BLOOD PRESSURE: 122 MMHG | RESPIRATION RATE: 14 BRPM | BODY MASS INDEX: 37.28 KG/M2 | HEART RATE: 84 BPM | WEIGHT: 246 LBS | DIASTOLIC BLOOD PRESSURE: 60 MMHG

## 2018-10-02 DIAGNOSIS — E03.4 HYPOTHYROIDISM DUE TO ACQUIRED ATROPHY OF THYROID: ICD-10-CM

## 2018-10-02 DIAGNOSIS — E11.42 DIABETIC PERIPHERAL NEUROPATHY (HCC): ICD-10-CM

## 2018-10-02 DIAGNOSIS — I10 ESSENTIAL HYPERTENSION: ICD-10-CM

## 2018-10-02 DIAGNOSIS — I25.10 CORONARY ARTERY DISEASE INVOLVING NATIVE CORONARY ARTERY OF NATIVE HEART WITHOUT ANGINA PECTORIS: ICD-10-CM

## 2018-10-02 DIAGNOSIS — I49.5 SICK SINUS SYNDROME (HCC): ICD-10-CM

## 2018-10-02 DIAGNOSIS — Z23 NEED FOR INFLUENZA VACCINATION: ICD-10-CM

## 2018-10-02 DIAGNOSIS — E11.9 TYPE 2 DIABETES MELLITUS WITHOUT COMPLICATION, WITHOUT LONG-TERM CURRENT USE OF INSULIN (HCC): Primary | ICD-10-CM

## 2018-10-02 DIAGNOSIS — Z12.39 BREAST SCREENING: ICD-10-CM

## 2018-10-02 DIAGNOSIS — E78.00 PURE HYPERCHOLESTEROLEMIA: ICD-10-CM

## 2018-10-02 LAB
GLUCOSE BLD-MCNC: 208 MG/DL
HBA1C MFR BLD: 7.5 %

## 2018-10-02 PROCEDURE — 1101F PT FALLS ASSESS-DOCD LE1/YR: CPT | Performed by: FAMILY MEDICINE

## 2018-10-02 PROCEDURE — 99213 OFFICE O/P EST LOW 20 MIN: CPT | Performed by: FAMILY MEDICINE

## 2018-10-02 PROCEDURE — 83036 HEMOGLOBIN GLYCOSYLATED A1C: CPT | Performed by: FAMILY MEDICINE

## 2018-10-02 PROCEDURE — 82962 GLUCOSE BLOOD TEST: CPT | Performed by: FAMILY MEDICINE

## 2018-10-02 PROCEDURE — G0008 ADMIN INFLUENZA VIRUS VAC: HCPCS | Performed by: FAMILY MEDICINE

## 2018-10-02 PROCEDURE — 90688 IIV4 VACCINE SPLT 0.5 ML IM: CPT | Performed by: FAMILY MEDICINE

## 2018-10-02 RX ORDER — AMLODIPINE BESYLATE 10 MG/1
10 TABLET ORAL DAILY
Qty: 90 TABLET | Refills: 1 | Status: SHIPPED | OUTPATIENT
Start: 2018-10-02 | End: 2019-03-21 | Stop reason: SDUPTHER

## 2018-10-02 RX ORDER — METOPROLOL TARTRATE 50 MG/1
50 TABLET, FILM COATED ORAL 2 TIMES DAILY
Qty: 180 TABLET | Refills: 1 | Status: SHIPPED | OUTPATIENT
Start: 2018-10-02 | End: 2019-06-18 | Stop reason: SDUPTHER

## 2018-10-02 ASSESSMENT — ENCOUNTER SYMPTOMS
SORE THROAT: 0
ABDOMINAL PAIN: 0
CHEST TIGHTNESS: 0
COUGH: 0
CONSTIPATION: 0
WHEEZING: 0
EYE PAIN: 0
ORTHOPNEA: 0
SHORTNESS OF BREATH: 0
NAUSEA: 0

## 2018-10-02 ASSESSMENT — PATIENT HEALTH QUESTIONNAIRE - PHQ9
SUM OF ALL RESPONSES TO PHQ9 QUESTIONS 1 & 2: 0
SUM OF ALL RESPONSES TO PHQ QUESTIONS 1-9: 0
1. LITTLE INTEREST OR PLEASURE IN DOING THINGS: 0
2. FEELING DOWN, DEPRESSED OR HOPELESS: 0
SUM OF ALL RESPONSES TO PHQ QUESTIONS 1-9: 0

## 2018-10-02 NOTE — PROGRESS NOTES
Immunizations     Name Date Dose Route    Influenza, Estrella Guerra, 6 mo and older, IM (Flulaval) 10/2/2018 0.5 mL Intramuscular    Site: Deltoid- Left    Lot: [id number]    NDC: 62119-409-43

## 2018-10-08 ENCOUNTER — HOSPITAL ENCOUNTER (INPATIENT)
Age: 73
LOS: 5 days | Discharge: HOME | DRG: 246 | End: 2018-10-13
Attending: EMERGENCY MEDICINE | Admitting: FAMILY MEDICINE
Payer: MEDICARE

## 2018-10-08 ENCOUNTER — APPOINTMENT (OUTPATIENT)
Dept: GENERAL RADIOLOGY | Age: 73
DRG: 246 | End: 2018-10-08
Payer: MEDICARE

## 2018-10-08 ENCOUNTER — OFFICE VISIT (OUTPATIENT)
Dept: FAMILY MEDICINE CLINIC | Age: 73
End: 2018-10-08

## 2018-10-08 VITALS
RESPIRATION RATE: 18 BRPM | HEART RATE: 68 BPM | WEIGHT: 253 LBS | HEIGHT: 68 IN | SYSTOLIC BLOOD PRESSURE: 138 MMHG | BODY MASS INDEX: 38.34 KG/M2 | DIASTOLIC BLOOD PRESSURE: 70 MMHG

## 2018-10-08 DIAGNOSIS — R07.89 ATYPICAL CHEST PAIN: ICD-10-CM

## 2018-10-08 DIAGNOSIS — R06.02 SHORTNESS OF BREATH: ICD-10-CM

## 2018-10-08 DIAGNOSIS — I50.9 CONGESTIVE HEART FAILURE, UNSPECIFIED HF CHRONICITY, UNSPECIFIED HEART FAILURE TYPE (HCC): Primary | ICD-10-CM

## 2018-10-08 DIAGNOSIS — I10 HYPERTENSION, UNSPECIFIED TYPE: ICD-10-CM

## 2018-10-08 DIAGNOSIS — E03.4 HYPOTHYROIDISM DUE TO ACQUIRED ATROPHY OF THYROID: ICD-10-CM

## 2018-10-08 PROBLEM — I50.31 ACUTE DIASTOLIC CHF (CONGESTIVE HEART FAILURE) (HCC): Status: ACTIVE | Noted: 2018-10-08

## 2018-10-08 LAB
ANION GAP SERPL CALCULATED.3IONS-SCNC: 15 MEQ/L (ref 8–16)
BASOPHILS # BLD: 0.8 %
BASOPHILS ABSOLUTE: 0.1 THOU/MM3 (ref 0–0.1)
BUN BLDV-MCNC: 13 MG/DL (ref 7–22)
CALCIUM SERPL-MCNC: 9.3 MG/DL (ref 8.5–10.5)
CHLORIDE BLD-SCNC: 98 MEQ/L (ref 98–111)
CO2: 22 MEQ/L (ref 23–33)
CREAT SERPL-MCNC: 1 MG/DL (ref 0.4–1.2)
EKG ATRIAL RATE: 68 BPM
EKG Q-T INTERVAL: 460 MS
EKG QRS DURATION: 168 MS
EKG QTC CALCULATION (BAZETT): 506 MS
EKG R AXIS: 38 DEGREES
EKG T AXIS: 47 DEGREES
EKG VENTRICULAR RATE: 73 BPM
EOSINOPHIL # BLD: 4 %
EOSINOPHILS ABSOLUTE: 0.3 THOU/MM3 (ref 0–0.4)
ERYTHROCYTE [DISTWIDTH] IN BLOOD BY AUTOMATED COUNT: 14.2 % (ref 11.5–14.5)
ERYTHROCYTE [DISTWIDTH] IN BLOOD BY AUTOMATED COUNT: 44 FL (ref 35–45)
GFR SERPL CREATININE-BSD FRML MDRD: 55 ML/MIN/1.73M2
GLUCOSE BLD-MCNC: 128 MG/DL (ref 70–108)
GLUCOSE BLD-MCNC: 181 MG/DL (ref 70–108)
HCT VFR BLD CALC: 40.8 % (ref 37–47)
HEMOGLOBIN: 13.5 GM/DL (ref 12–16)
IMMATURE GRANS (ABS): 0.03 THOU/MM3 (ref 0–0.07)
IMMATURE GRANULOCYTES: 0.4 %
LYMPHOCYTES # BLD: 17.5 %
LYMPHOCYTES ABSOLUTE: 1.5 THOU/MM3 (ref 1–4.8)
MCH RBC QN AUTO: 28.2 PG (ref 26–33)
MCHC RBC AUTO-ENTMCNC: 33.1 GM/DL (ref 32.2–35.5)
MCV RBC AUTO: 85.4 FL (ref 81–99)
MONOCYTES # BLD: 7.1 %
MONOCYTES ABSOLUTE: 0.6 THOU/MM3 (ref 0.4–1.3)
NUCLEATED RED BLOOD CELLS: 0 /100 WBC
OSMOLALITY CALCULATION: 274.8 MOSMOL/KG (ref 275–300)
PLATELET # BLD: 328 THOU/MM3 (ref 130–400)
PMV BLD AUTO: 10.4 FL (ref 9.4–12.4)
POTASSIUM SERPL-SCNC: 4.5 MEQ/L (ref 3.5–5.2)
PRO-BNP: 1211 PG/ML (ref 0–900)
RBC # BLD: 4.78 MILL/MM3 (ref 4.2–5.4)
SEG NEUTROPHILS: 70.2 %
SEGMENTED NEUTROPHILS ABSOLUTE COUNT: 5.9 THOU/MM3 (ref 1.8–7.7)
SODIUM BLD-SCNC: 135 MEQ/L (ref 135–145)
TROPONIN T: < 0.01 NG/ML
TROPONIN T: < 0.01 NG/ML
TSH SERPL DL<=0.05 MIU/L-ACNC: 0.81 UIU/ML (ref 0.4–4.2)
WBC # BLD: 8.4 THOU/MM3 (ref 4.8–10.8)

## 2018-10-08 PROCEDURE — 1101F PT FALLS ASSESS-DOCD LE1/YR: CPT | Performed by: FAMILY MEDICINE

## 2018-10-08 PROCEDURE — 80048 BASIC METABOLIC PNL TOTAL CA: CPT

## 2018-10-08 PROCEDURE — 2709999900 HC NON-CHARGEABLE SUPPLY

## 2018-10-08 PROCEDURE — 6360000002 HC RX W HCPCS: Performed by: FAMILY MEDICINE

## 2018-10-08 PROCEDURE — 71045 X-RAY EXAM CHEST 1 VIEW: CPT

## 2018-10-08 PROCEDURE — 96374 THER/PROPH/DIAG INJ IV PUSH: CPT

## 2018-10-08 PROCEDURE — 84443 ASSAY THYROID STIM HORMONE: CPT

## 2018-10-08 PROCEDURE — 2140000000 HC CCU INTERMEDIATE R&B

## 2018-10-08 PROCEDURE — 85025 COMPLETE CBC W/AUTO DIFF WBC: CPT

## 2018-10-08 PROCEDURE — 6360000002 HC RX W HCPCS: Performed by: EMERGENCY MEDICINE

## 2018-10-08 PROCEDURE — 99214 OFFICE O/P EST MOD 30 MIN: CPT | Performed by: FAMILY MEDICINE

## 2018-10-08 PROCEDURE — 83880 ASSAY OF NATRIURETIC PEPTIDE: CPT

## 2018-10-08 PROCEDURE — 1200000003 HC TELEMETRY R&B

## 2018-10-08 PROCEDURE — 36415 COLL VENOUS BLD VENIPUNCTURE: CPT

## 2018-10-08 PROCEDURE — 99285 EMERGENCY DEPT VISIT HI MDM: CPT

## 2018-10-08 PROCEDURE — 2580000003 HC RX 258: Performed by: FAMILY MEDICINE

## 2018-10-08 PROCEDURE — 93000 ELECTROCARDIOGRAM COMPLETE: CPT | Performed by: FAMILY MEDICINE

## 2018-10-08 PROCEDURE — 6370000000 HC RX 637 (ALT 250 FOR IP): Performed by: FAMILY MEDICINE

## 2018-10-08 PROCEDURE — 82948 REAGENT STRIP/BLOOD GLUCOSE: CPT

## 2018-10-08 PROCEDURE — 84484 ASSAY OF TROPONIN QUANT: CPT

## 2018-10-08 PROCEDURE — 93005 ELECTROCARDIOGRAM TRACING: CPT | Performed by: EMERGENCY MEDICINE

## 2018-10-08 RX ORDER — CLOPIDOGREL BISULFATE 75 MG/1
75 TABLET ORAL DAILY
Status: DISCONTINUED | OUTPATIENT
Start: 2018-10-09 | End: 2018-10-12

## 2018-10-08 RX ORDER — GABAPENTIN 300 MG/1
300 CAPSULE ORAL NIGHTLY
Status: DISCONTINUED | OUTPATIENT
Start: 2018-10-08 | End: 2018-10-13 | Stop reason: HOSPADM

## 2018-10-08 RX ORDER — ONDANSETRON 2 MG/ML
4 INJECTION INTRAMUSCULAR; INTRAVENOUS EVERY 6 HOURS PRN
Status: DISCONTINUED | OUTPATIENT
Start: 2018-10-08 | End: 2018-10-13 | Stop reason: HOSPADM

## 2018-10-08 RX ORDER — POTASSIUM CHLORIDE 7.45 MG/ML
10 INJECTION INTRAVENOUS PRN
Status: DISCONTINUED | OUTPATIENT
Start: 2018-10-08 | End: 2018-10-13 | Stop reason: HOSPADM

## 2018-10-08 RX ORDER — RAMIPRIL 10 MG/1
10 CAPSULE ORAL 2 TIMES DAILY
Status: DISCONTINUED | OUTPATIENT
Start: 2018-10-08 | End: 2018-10-13 | Stop reason: HOSPADM

## 2018-10-08 RX ORDER — METFORMIN HYDROCHLORIDE 500 MG/1
500 TABLET, EXTENDED RELEASE ORAL 2 TIMES DAILY WITH MEALS
Status: DISCONTINUED | OUTPATIENT
Start: 2018-10-08 | End: 2018-10-08

## 2018-10-08 RX ORDER — METOPROLOL TARTRATE 50 MG/1
50 TABLET, FILM COATED ORAL 2 TIMES DAILY
Status: DISCONTINUED | OUTPATIENT
Start: 2018-10-08 | End: 2018-10-13 | Stop reason: HOSPADM

## 2018-10-08 RX ORDER — DEXTROSE MONOHYDRATE 25 G/50ML
12.5 INJECTION, SOLUTION INTRAVENOUS PRN
Status: DISCONTINUED | OUTPATIENT
Start: 2018-10-08 | End: 2018-10-13 | Stop reason: HOSPADM

## 2018-10-08 RX ORDER — POTASSIUM CHLORIDE 20 MEQ/1
40 TABLET, EXTENDED RELEASE ORAL PRN
Status: DISCONTINUED | OUTPATIENT
Start: 2018-10-08 | End: 2018-10-13 | Stop reason: HOSPADM

## 2018-10-08 RX ORDER — ACETAMINOPHEN 325 MG/1
650 TABLET ORAL EVERY 4 HOURS PRN
Status: DISCONTINUED | OUTPATIENT
Start: 2018-10-08 | End: 2018-10-13 | Stop reason: HOSPADM

## 2018-10-08 RX ORDER — POTASSIUM CHLORIDE 20MEQ/15ML
40 LIQUID (ML) ORAL PRN
Status: DISCONTINUED | OUTPATIENT
Start: 2018-10-08 | End: 2018-10-13 | Stop reason: HOSPADM

## 2018-10-08 RX ORDER — AMLODIPINE BESYLATE 10 MG/1
10 TABLET ORAL DAILY
Status: DISCONTINUED | OUTPATIENT
Start: 2018-10-09 | End: 2018-10-13 | Stop reason: HOSPADM

## 2018-10-08 RX ORDER — FUROSEMIDE 10 MG/ML
40 INJECTION INTRAMUSCULAR; INTRAVENOUS 2 TIMES DAILY
Status: DISCONTINUED | OUTPATIENT
Start: 2018-10-08 | End: 2018-10-09

## 2018-10-08 RX ORDER — DEXTROSE MONOHYDRATE 50 MG/ML
100 INJECTION, SOLUTION INTRAVENOUS PRN
Status: DISCONTINUED | OUTPATIENT
Start: 2018-10-08 | End: 2018-10-13 | Stop reason: HOSPADM

## 2018-10-08 RX ORDER — SODIUM CHLORIDE 0.9 % (FLUSH) 0.9 %
10 SYRINGE (ML) INJECTION PRN
Status: DISCONTINUED | OUTPATIENT
Start: 2018-10-08 | End: 2018-10-13 | Stop reason: HOSPADM

## 2018-10-08 RX ORDER — NICOTINE POLACRILEX 4 MG
15 LOZENGE BUCCAL PRN
Status: DISCONTINUED | OUTPATIENT
Start: 2018-10-08 | End: 2018-10-13 | Stop reason: HOSPADM

## 2018-10-08 RX ORDER — LEVOTHYROXINE SODIUM 0.15 MG/1
150 TABLET ORAL DAILY
Status: DISCONTINUED | OUTPATIENT
Start: 2018-10-09 | End: 2018-10-13 | Stop reason: HOSPADM

## 2018-10-08 RX ORDER — SODIUM CHLORIDE 0.9 % (FLUSH) 0.9 %
10 SYRINGE (ML) INJECTION EVERY 12 HOURS SCHEDULED
Status: DISCONTINUED | OUTPATIENT
Start: 2018-10-08 | End: 2018-10-13 | Stop reason: HOSPADM

## 2018-10-08 RX ORDER — ATORVASTATIN CALCIUM 20 MG/1
20 TABLET, FILM COATED ORAL NIGHTLY
Status: DISCONTINUED | OUTPATIENT
Start: 2018-10-08 | End: 2018-10-13 | Stop reason: HOSPADM

## 2018-10-08 RX ORDER — FUROSEMIDE 10 MG/ML
40 INJECTION INTRAMUSCULAR; INTRAVENOUS ONCE
Status: COMPLETED | OUTPATIENT
Start: 2018-10-08 | End: 2018-10-08

## 2018-10-08 RX ORDER — ASPIRIN 325 MG
325 TABLET ORAL DAILY
Status: DISCONTINUED | OUTPATIENT
Start: 2018-10-09 | End: 2018-10-13 | Stop reason: HOSPADM

## 2018-10-08 RX ORDER — GLIMEPIRIDE 4 MG/1
4 TABLET ORAL
Status: DISCONTINUED | OUTPATIENT
Start: 2018-10-09 | End: 2018-10-13 | Stop reason: HOSPADM

## 2018-10-08 RX ADMIN — Medication 10 ML: at 20:26

## 2018-10-08 RX ADMIN — ENOXAPARIN SODIUM 40 MG: 40 INJECTION SUBCUTANEOUS at 17:52

## 2018-10-08 RX ADMIN — FUROSEMIDE 40 MG: 10 INJECTION, SOLUTION INTRAMUSCULAR; INTRAVENOUS at 13:36

## 2018-10-08 RX ADMIN — FUROSEMIDE 40 MG: 10 INJECTION, SOLUTION INTRAMUSCULAR; INTRAVENOUS at 17:52

## 2018-10-08 RX ADMIN — RAMIPRIL 10 MG: 10 CAPSULE ORAL at 20:26

## 2018-10-08 RX ADMIN — ATORVASTATIN CALCIUM 20 MG: 20 TABLET, FILM COATED ORAL at 20:26

## 2018-10-08 RX ADMIN — GABAPENTIN 300 MG: 300 CAPSULE ORAL at 20:26

## 2018-10-08 RX ADMIN — METOPROLOL TARTRATE 50 MG: 50 TABLET, FILM COATED ORAL at 20:26

## 2018-10-08 RX ADMIN — METFORMIN HYDROCHLORIDE 500 MG: 500 TABLET, EXTENDED RELEASE ORAL at 17:52

## 2018-10-08 ASSESSMENT — ENCOUNTER SYMPTOMS
WHEEZING: 0
SHORTNESS OF BREATH: 1
CHEST TIGHTNESS: 1
COUGH: 1

## 2018-10-08 ASSESSMENT — PAIN SCALES - GENERAL
PAINLEVEL_OUTOF10: 0
PAINLEVEL_OUTOF10: 0

## 2018-10-08 NOTE — PROGRESS NOTES
Visit Date: 10/8/2018    Juan Singer is a 72 y.o. female who presents today for:  Chief Complaint   Patient presents with    Shortness of Breath    Edema       HPI:     HPI    has a current medication list which includes the following prescription(s): metoprolol tartrate, amlodipine, glimepiride, gabapentin, metformin, atorvastatin, hydrochlorothiazide, clopidogrel, ramipril, levothyroxine, fish oil, glucose blood, acetaminophen, aspirin, and vitamin c, and the following Facility-Administered Medications: acetaminophen, amlodipine, aspirin, atorvastatin, clopidogrel, gabapentin, glimepiride, levothyroxine, metformin, metoprolol tartrate, ramipril, sodium chloride flush, sodium chloride flush, magnesium hydroxide, ondansetron, enoxaparin, potassium chloride **OR** potassium chloride **OR** potassium chloride, magnesium sulfate 1 g in dextrose 5 % 100 mL IVPB, and furosemide. Allergies   Allergen Reactions    Adhesive Tape Rash       Social History   Substance Use Topics    Smoking status: Never Smoker    Smokeless tobacco: Never Used    Alcohol use No       Past Medical History:   Diagnosis Date    Aortic aneurysm (HCC)     4.5 cm aorta    Arthritis     CAD (coronary artery disease)     CHF NYHA class II, unspecified failure chronicity, unspecified type ([facility] [address] 10/8/2018    Colon polyps 3/01    Diabetic peripheral neuropathy ([facility] [address] 2014      feet    Elbow fracture     Hyperlipidemia     Hypertension     Hypothyroidism     Pulmonary nodule, right 10/2017      repeat  ct of  chest      S/P cardiac pacemaker procedure: 10/21/2017: Medtronic Dual Chamber. 10/21/2017    10/21/2017: Medtronic Dual Chamber.  Dr. Roula Modi Type II or unspecified type diabetes mellitus without mention of complication, not stated as uncontrolled        Past Surgical History:   Procedure Laterality Date    BREAST BIOPSY Right      benign   abrahan    BREAST SURGERY       SECTION  over 30 years ago    [facility] but she did not want to do that, she wanted her  to take her to ER and she was advised to go straight from here.

## 2018-10-09 ENCOUNTER — APPOINTMENT (OUTPATIENT)
Dept: GENERAL RADIOLOGY | Age: 73
DRG: 246 | End: 2018-10-09
Payer: MEDICARE

## 2018-10-09 LAB
ALBUMIN SERPL-MCNC: 3.8 G/DL (ref 3.5–5.1)
ALP BLD-CCNC: 56 U/L (ref 38–126)
ALT SERPL-CCNC: 15 U/L (ref 11–66)
ANION GAP SERPL CALCULATED.3IONS-SCNC: 14 MEQ/L (ref 8–16)
AST SERPL-CCNC: 13 U/L (ref 5–40)
BASOPHILS # BLD: 1 %
BASOPHILS ABSOLUTE: 0.1 THOU/MM3 (ref 0–0.1)
BILIRUB SERPL-MCNC: 0.6 MG/DL (ref 0.3–1.2)
BILIRUBIN DIRECT: < 0.2 MG/DL (ref 0–0.3)
BUN BLDV-MCNC: 15 MG/DL (ref 7–22)
CALCIUM SERPL-MCNC: 8.8 MG/DL (ref 8.5–10.5)
CHLORIDE BLD-SCNC: 100 MEQ/L (ref 98–111)
CO2: 28 MEQ/L (ref 23–33)
CREAT SERPL-MCNC: 1.1 MG/DL (ref 0.4–1.2)
EOSINOPHIL # BLD: 4.4 %
EOSINOPHILS ABSOLUTE: 0.3 THOU/MM3 (ref 0–0.4)
ERYTHROCYTE [DISTWIDTH] IN BLOOD BY AUTOMATED COUNT: 14.1 % (ref 11.5–14.5)
ERYTHROCYTE [DISTWIDTH] IN BLOOD BY AUTOMATED COUNT: 43.7 FL (ref 35–45)
GFR SERPL CREATININE-BSD FRML MDRD: 49 ML/MIN/1.73M2
GLUCOSE BLD-MCNC: 112 MG/DL (ref 70–108)
GLUCOSE BLD-MCNC: 146 MG/DL (ref 70–108)
GLUCOSE BLD-MCNC: 166 MG/DL (ref 70–108)
GLUCOSE BLD-MCNC: 199 MG/DL (ref 70–108)
GLUCOSE BLD-MCNC: 206 MG/DL (ref 70–108)
HCT VFR BLD CALC: 36.7 % (ref 37–47)
HEMOGLOBIN: 12.1 GM/DL (ref 12–16)
IMMATURE GRANS (ABS): 0.02 THOU/MM3 (ref 0–0.07)
IMMATURE GRANULOCYTES: 0.3 %
LV EF: 60 %
LVEF MODALITY: NORMAL
LYMPHOCYTES # BLD: 24.8 %
LYMPHOCYTES ABSOLUTE: 1.7 THOU/MM3 (ref 1–4.8)
MAGNESIUM: 1.3 MG/DL (ref 1.6–2.4)
MCH RBC QN AUTO: 28.6 PG (ref 26–33)
MCHC RBC AUTO-ENTMCNC: 33 GM/DL (ref 32.2–35.5)
MCV RBC AUTO: 86.8 FL (ref 81–99)
MONOCYTES # BLD: 10 %
MONOCYTES ABSOLUTE: 0.7 THOU/MM3 (ref 0.4–1.3)
NUCLEATED RED BLOOD CELLS: 0 /100 WBC
PLATELET # BLD: 263 THOU/MM3 (ref 130–400)
PMV BLD AUTO: 11 FL (ref 9.4–12.4)
POTASSIUM REFLEX MAGNESIUM: 4.3 MEQ/L (ref 3.5–5.2)
RBC # BLD: 4.23 MILL/MM3 (ref 4.2–5.4)
SEG NEUTROPHILS: 59.5 %
SEGMENTED NEUTROPHILS ABSOLUTE COUNT: 4.2 THOU/MM3 (ref 1.8–7.7)
SODIUM BLD-SCNC: 142 MEQ/L (ref 135–145)
TOTAL PROTEIN: 6.1 G/DL (ref 6.1–8)
TROPONIN T: < 0.01 NG/ML
WBC # BLD: 7 THOU/MM3 (ref 4.8–10.8)

## 2018-10-09 PROCEDURE — 36415 COLL VENOUS BLD VENIPUNCTURE: CPT

## 2018-10-09 PROCEDURE — 2709999900 HC NON-CHARGEABLE SUPPLY

## 2018-10-09 PROCEDURE — 71046 X-RAY EXAM CHEST 2 VIEWS: CPT

## 2018-10-09 PROCEDURE — 2140000000 HC CCU INTERMEDIATE R&B

## 2018-10-09 PROCEDURE — 1200000003 HC TELEMETRY R&B

## 2018-10-09 PROCEDURE — 6360000002 HC RX W HCPCS: Performed by: FAMILY MEDICINE

## 2018-10-09 PROCEDURE — 82948 REAGENT STRIP/BLOOD GLUCOSE: CPT

## 2018-10-09 PROCEDURE — 6360000002 HC RX W HCPCS

## 2018-10-09 PROCEDURE — 93306 TTE W/DOPPLER COMPLETE: CPT

## 2018-10-09 PROCEDURE — 80048 BASIC METABOLIC PNL TOTAL CA: CPT

## 2018-10-09 PROCEDURE — 83735 ASSAY OF MAGNESIUM: CPT

## 2018-10-09 PROCEDURE — 99222 1ST HOSP IP/OBS MODERATE 55: CPT | Performed by: INTERNAL MEDICINE

## 2018-10-09 PROCEDURE — 85025 COMPLETE CBC W/AUTO DIFF WBC: CPT

## 2018-10-09 PROCEDURE — 6370000000 HC RX 637 (ALT 250 FOR IP): Performed by: FAMILY MEDICINE

## 2018-10-09 PROCEDURE — 80076 HEPATIC FUNCTION PANEL: CPT

## 2018-10-09 PROCEDURE — 2580000003 HC RX 258: Performed by: FAMILY MEDICINE

## 2018-10-09 RX ORDER — MAGNESIUM SULFATE IN WATER 40 MG/ML
4 INJECTION, SOLUTION INTRAVENOUS ONCE
Status: COMPLETED | OUTPATIENT
Start: 2018-10-09 | End: 2018-10-09

## 2018-10-09 RX ORDER — FUROSEMIDE 10 MG/ML
20 INJECTION INTRAMUSCULAR; INTRAVENOUS DAILY
Status: DISCONTINUED | OUTPATIENT
Start: 2018-10-09 | End: 2018-10-13 | Stop reason: HOSPADM

## 2018-10-09 RX ORDER — FUROSEMIDE 10 MG/ML
40 INJECTION INTRAMUSCULAR; INTRAVENOUS DAILY
Status: DISCONTINUED | OUTPATIENT
Start: 2018-10-09 | End: 2018-10-09

## 2018-10-09 RX ADMIN — Medication 10 ML: at 20:23

## 2018-10-09 RX ADMIN — FUROSEMIDE 20 MG: 10 INJECTION, SOLUTION INTRAMUSCULAR; INTRAVENOUS at 09:12

## 2018-10-09 RX ADMIN — METOPROLOL TARTRATE 50 MG: 50 TABLET, FILM COATED ORAL at 09:12

## 2018-10-09 RX ADMIN — ATORVASTATIN CALCIUM 20 MG: 20 TABLET, FILM COATED ORAL at 20:24

## 2018-10-09 RX ADMIN — Medication 1 UNITS: at 11:52

## 2018-10-09 RX ADMIN — METOPROLOL TARTRATE 50 MG: 50 TABLET, FILM COATED ORAL at 20:24

## 2018-10-09 RX ADMIN — RAMIPRIL 10 MG: 10 CAPSULE ORAL at 20:24

## 2018-10-09 RX ADMIN — ASPIRIN 325 MG: 325 TABLET ORAL at 09:12

## 2018-10-09 RX ADMIN — Medication 1 UNITS: at 17:18

## 2018-10-09 RX ADMIN — Medication 10 ML: at 09:15

## 2018-10-09 RX ADMIN — Medication 2 UNITS: at 09:11

## 2018-10-09 RX ADMIN — MAGNESIUM SULFATE IN WATER 4 G: 40 INJECTION, SOLUTION INTRAVENOUS at 09:11

## 2018-10-09 RX ADMIN — CLOPIDOGREL BISULFATE 75 MG: 75 TABLET ORAL at 09:12

## 2018-10-09 RX ADMIN — LEVOTHYROXINE SODIUM 150 MCG: 150 TABLET ORAL at 04:58

## 2018-10-09 RX ADMIN — GABAPENTIN 300 MG: 300 CAPSULE ORAL at 20:24

## 2018-10-09 RX ADMIN — RAMIPRIL 10 MG: 10 CAPSULE ORAL at 12:18

## 2018-10-09 RX ADMIN — ENOXAPARIN SODIUM 40 MG: 40 INJECTION SUBCUTANEOUS at 17:18

## 2018-10-09 RX ADMIN — GLIMEPIRIDE 4 MG: 4 TABLET ORAL at 09:12

## 2018-10-09 RX ADMIN — AMLODIPINE BESYLATE 10 MG: 10 TABLET ORAL at 09:12

## 2018-10-09 ASSESSMENT — PAIN SCALES - GENERAL
PAINLEVEL_OUTOF10: 0

## 2018-10-09 ASSESSMENT — ENCOUNTER SYMPTOMS: SHORTNESS OF BREATH: 0

## 2018-10-09 NOTE — PLAN OF CARE
[employer] management tool for CHF Diagnosis given to the patient as an education reference. Patient verbalizes understanding that the care team will be referencing this tool throughout their hospital stay and again on discharge. Nurse notified of the reference tool being received by the patient.

## 2018-10-10 LAB
ANION GAP SERPL CALCULATED.3IONS-SCNC: 16 MEQ/L (ref 8–16)
BASOPHILS # BLD: 0.8 %
BASOPHILS ABSOLUTE: 0.1 THOU/MM3 (ref 0–0.1)
BUN BLDV-MCNC: 14 MG/DL (ref 7–22)
CALCIUM SERPL-MCNC: 8.7 MG/DL (ref 8.5–10.5)
CHLORIDE BLD-SCNC: 99 MEQ/L (ref 98–111)
CO2: 27 MEQ/L (ref 23–33)
CREAT SERPL-MCNC: 1 MG/DL (ref 0.4–1.2)
EOSINOPHIL # BLD: 4.7 %
EOSINOPHILS ABSOLUTE: 0.3 THOU/MM3 (ref 0–0.4)
ERYTHROCYTE [DISTWIDTH] IN BLOOD BY AUTOMATED COUNT: 14 % (ref 11.5–14.5)
ERYTHROCYTE [DISTWIDTH] IN BLOOD BY AUTOMATED COUNT: 43 FL (ref 35–45)
GFR SERPL CREATININE-BSD FRML MDRD: 55 ML/MIN/1.73M2
GLUCOSE BLD-MCNC: 167 MG/DL (ref 70–108)
GLUCOSE BLD-MCNC: 176 MG/DL (ref 70–108)
GLUCOSE BLD-MCNC: 205 MG/DL (ref 70–108)
HCT VFR BLD CALC: 37.1 % (ref 37–47)
HEMOGLOBIN: 12.4 GM/DL (ref 12–16)
IMMATURE GRANS (ABS): 0.02 THOU/MM3 (ref 0–0.07)
IMMATURE GRANULOCYTES: 0.3 %
LYMPHOCYTES # BLD: 22.6 %
LYMPHOCYTES ABSOLUTE: 1.6 THOU/MM3 (ref 1–4.8)
MAGNESIUM: 2 MG/DL (ref 1.6–2.4)
MCH RBC QN AUTO: 28.4 PG (ref 26–33)
MCHC RBC AUTO-ENTMCNC: 33.4 GM/DL (ref 32.2–35.5)
MCV RBC AUTO: 85.1 FL (ref 81–99)
MONOCYTES # BLD: 10.2 %
MONOCYTES ABSOLUTE: 0.7 THOU/MM3 (ref 0.4–1.3)
NUCLEATED RED BLOOD CELLS: 0 /100 WBC
PLATELET # BLD: 271 THOU/MM3 (ref 130–400)
PMV BLD AUTO: 10.5 FL (ref 9.4–12.4)
POTASSIUM SERPL-SCNC: 3.9 MEQ/L (ref 3.5–5.2)
RBC # BLD: 4.36 MILL/MM3 (ref 4.2–5.4)
SEG NEUTROPHILS: 61.4 %
SEGMENTED NEUTROPHILS ABSOLUTE COUNT: 4.4 THOU/MM3 (ref 1.8–7.7)
SODIUM BLD-SCNC: 142 MEQ/L (ref 135–145)
WBC # BLD: 7.2 THOU/MM3 (ref 4.8–10.8)

## 2018-10-10 PROCEDURE — 99232 SBSQ HOSP IP/OBS MODERATE 35: CPT | Performed by: NURSE PRACTITIONER

## 2018-10-10 PROCEDURE — 85025 COMPLETE CBC W/AUTO DIFF WBC: CPT

## 2018-10-10 PROCEDURE — 82948 REAGENT STRIP/BLOOD GLUCOSE: CPT

## 2018-10-10 PROCEDURE — 80048 BASIC METABOLIC PNL TOTAL CA: CPT

## 2018-10-10 PROCEDURE — 2140000000 HC CCU INTERMEDIATE R&B

## 2018-10-10 PROCEDURE — 2580000003 HC RX 258: Performed by: FAMILY MEDICINE

## 2018-10-10 PROCEDURE — 83735 ASSAY OF MAGNESIUM: CPT

## 2018-10-10 PROCEDURE — 6360000002 HC RX W HCPCS: Performed by: FAMILY MEDICINE

## 2018-10-10 PROCEDURE — 6370000000 HC RX 637 (ALT 250 FOR IP): Performed by: FAMILY MEDICINE

## 2018-10-10 PROCEDURE — 1200000003 HC TELEMETRY R&B

## 2018-10-10 PROCEDURE — 36415 COLL VENOUS BLD VENIPUNCTURE: CPT

## 2018-10-10 RX ADMIN — METOPROLOL TARTRATE 50 MG: 50 TABLET, FILM COATED ORAL at 09:51

## 2018-10-10 RX ADMIN — RAMIPRIL 10 MG: 10 CAPSULE ORAL at 20:41

## 2018-10-10 RX ADMIN — GABAPENTIN 300 MG: 300 CAPSULE ORAL at 20:42

## 2018-10-10 RX ADMIN — GLIMEPIRIDE 4 MG: 4 TABLET ORAL at 20:52

## 2018-10-10 RX ADMIN — ASPIRIN 325 MG: 325 TABLET ORAL at 09:51

## 2018-10-10 RX ADMIN — LEVOTHYROXINE SODIUM 150 MCG: 150 TABLET ORAL at 06:43

## 2018-10-10 RX ADMIN — CLOPIDOGREL BISULFATE 75 MG: 75 TABLET ORAL at 09:51

## 2018-10-10 RX ADMIN — Medication 10 ML: at 20:42

## 2018-10-10 RX ADMIN — FUROSEMIDE 20 MG: 10 INJECTION, SOLUTION INTRAMUSCULAR; INTRAVENOUS at 10:15

## 2018-10-10 RX ADMIN — ATORVASTATIN CALCIUM 20 MG: 20 TABLET, FILM COATED ORAL at 20:42

## 2018-10-10 RX ADMIN — METOPROLOL TARTRATE 50 MG: 50 TABLET, FILM COATED ORAL at 20:42

## 2018-10-10 RX ADMIN — RAMIPRIL 10 MG: 10 CAPSULE ORAL at 09:50

## 2018-10-10 RX ADMIN — Medication 10 ML: at 10:17

## 2018-10-10 RX ADMIN — Medication 1 UNITS: at 16:22

## 2018-10-10 RX ADMIN — ENOXAPARIN SODIUM 40 MG: 40 INJECTION SUBCUTANEOUS at 16:34

## 2018-10-10 RX ADMIN — AMLODIPINE BESYLATE 10 MG: 10 TABLET ORAL at 09:50

## 2018-10-10 ASSESSMENT — PAIN SCALES - GENERAL
PAINLEVEL_OUTOF10: 0

## 2018-10-10 NOTE — PROGRESS NOTES
Subcutaneous Q24H    insulin lispro  0-6 Units Subcutaneous TID WC    insulin lispro  0-3 Units Subcutaneous Nightly      dextrose         acetaminophen 650 mg Q4H PRN   sodium chloride flush 10 mL PRN   magnesium hydroxide 30 mL Daily PRN   ondansetron 4 mg Q6H PRN   potassium chloride 40 mEq PRN   Or     potassium chloride 40 mEq PRN   Or     potassium chloride 10 mEq PRN   magnesium sulfate 1 g PRN   glucose 15 g PRN   dextrose 12.5 g PRN   glucagon (rDNA) 1 mg PRN   dextrose 100 mL/hr PRN       Diagnostics:  EK-OCT-2018 11:30:56 [facility] ROUTINE RETRIEVAL  Ventricular-paced rhythm  Abnormal ECG  When compared with ECG of 21-OCT-2017 07:26,  Previous ECG has undetermined rhythm, needs review  Confirmed by ENZO Diamond ([id number]) on 10/9/2018 9:14:41 AM    Echo:    Electronically signed by Ira Frank MD (Interpreting   physician) on 10/09/2018 at 04:03 PM   ----------------------------------------------------------------      Findings      Mitral Valve   The mitral valve structure was normal with normal leaflet separation.   DOPPLER: The transmitral velocity was within the normal range with no   evidence for mitral stenosis. There was no evidence of mitral   regurgitation.      Aortic Valve   The aortic valve was trileaflet with normal thickness and cuspal   separation. DOPPLER: Transaortic velocity was within the normal range with   no evidence of aortic stenosis. There was mild aortic regurgitation.      Tricuspid Valve   The tricuspid valve structure was normal with normal leaflet separation.   DOPPLER: There was no evidence of tricuspid stenosis. There was no   evidence of tricuspid regurgitation.      Pulmonic Valve   The pulmonic valve leaflets exhibited normal thickness, no calcification,   and normal cuspal separation.  DOPPLER: The transpulmonic velocity was   within the normal range with no evidence for regurgitation.      Left Atrium   Left atrial size was mildly followed by 20% mid stenosis and 20-30% stenosis of the PDA of the RCA. At the end of the procedure, the sheath was taken out and hemostasis was  achieved by manual compression. FINAL DIAGNOSES:  1. Patent left main coronary artery. 2. Diffuse 80-90% stenosis at distal apical left anterior descending  which is slightly worse than before, but was present on a cath  three years ago. 3. 40% stenosis of the first obtuse marginal and with widely patent  stent of the first obtuse marginal.  4. Non-obstructive disease of the right coronary artery. 5. Normal left ventricular function with left ventricular hypertrophy. RECOMMENDATION:  1. I recommended continuing aggressive risk factor modification and  medical management. 2. The patient would be considered moderate to high risk for surgery. She is at increased risk of MI in case there is interruption of  the aspirin at the current time. 3. The patient needs monitored very carefully in the perioperative  period. Mio Montaño M.D.  D: 12/22/2009       Lab Data:    Cardiac Enzymes:  No results for input(s): CKTOTAL, CKMB, CKMBINDEX, TROPONINI in the last 72 hours.     CBC:   Lab Results   Component Value Date    WBC 7.2 10/10/2018    RBC 4.36 10/10/2018    HGB 12.4 10/10/2018    HCT 37.1 10/10/2018     10/10/2018       CMP:  Lab Results   Component Value Date     10/10/2018    K 3.9 10/10/2018    K 4.3 10/09/2018    CL 99 10/10/2018    CO2 27 10/10/2018    BUN 14 10/10/2018    CREATININE 1.0 10/10/2018    LABGLOM 55 10/10/2018    GLUCOSE 167 10/10/2018    CALCIUM 8.7 10/10/2018       Hepatic Function Panel:  Lab Results   Component Value Date    ALKPHOS 56 10/09/2018    ALT 15 10/09/2018    AST 13 10/09/2018    PROT 6.1 10/09/2018    BILITOT 0.6 10/09/2018    BILIDIR <0.2 10/09/2018    LABALBU 3.8 10/09/2018       Magnesium:    Lab Results   Component Value Date    MG 2.0 10/10/2018       PT/INR:    Lab Results   Component Value Date    INR 1.00

## 2018-10-11 ENCOUNTER — APPOINTMENT (OUTPATIENT)
Dept: NON INVASIVE DIAGNOSTICS | Age: 73
DRG: 246 | End: 2018-10-11
Payer: MEDICARE

## 2018-10-11 LAB
CHOLESTEROL, TOTAL: 138 MG/DL (ref 100–199)
GLUCOSE BLD-MCNC: 121 MG/DL (ref 70–108)
GLUCOSE BLD-MCNC: 156 MG/DL (ref 70–108)
GLUCOSE BLD-MCNC: 199 MG/DL (ref 70–108)
GLUCOSE BLD-MCNC: 259 MG/DL (ref 70–108)
HDLC SERPL-MCNC: 37 MG/DL
LDL CHOLESTEROL CALCULATED: 53 MG/DL
TRIGL SERPL-MCNC: 239 MG/DL (ref 0–199)

## 2018-10-11 PROCEDURE — 99232 SBSQ HOSP IP/OBS MODERATE 35: CPT | Performed by: PHYSICIAN ASSISTANT

## 2018-10-11 PROCEDURE — 6370000000 HC RX 637 (ALT 250 FOR IP): Performed by: FAMILY MEDICINE

## 2018-10-11 PROCEDURE — 82948 REAGENT STRIP/BLOOD GLUCOSE: CPT

## 2018-10-11 PROCEDURE — 36415 COLL VENOUS BLD VENIPUNCTURE: CPT

## 2018-10-11 PROCEDURE — 2580000003 HC RX 258: Performed by: FAMILY MEDICINE

## 2018-10-11 PROCEDURE — 80061 LIPID PANEL: CPT

## 2018-10-11 PROCEDURE — 2140000000 HC CCU INTERMEDIATE R&B

## 2018-10-11 PROCEDURE — 93017 CV STRESS TEST TRACING ONLY: CPT | Performed by: INTERNAL MEDICINE

## 2018-10-11 PROCEDURE — 93005 ELECTROCARDIOGRAM TRACING: CPT | Performed by: PHYSICIAN ASSISTANT

## 2018-10-11 PROCEDURE — 3430000000 HC RX DIAGNOSTIC RADIOPHARMACEUTICAL: Performed by: FAMILY MEDICINE

## 2018-10-11 PROCEDURE — 78452 HT MUSCLE IMAGE SPECT MULT: CPT

## 2018-10-11 PROCEDURE — 6360000002 HC RX W HCPCS: Performed by: FAMILY MEDICINE

## 2018-10-11 PROCEDURE — 2709999900 HC NON-CHARGEABLE SUPPLY

## 2018-10-11 PROCEDURE — 6360000002 HC RX W HCPCS: Performed by: INTERNAL MEDICINE

## 2018-10-11 PROCEDURE — 6360000002 HC RX W HCPCS

## 2018-10-11 PROCEDURE — A9500 TC99M SESTAMIBI: HCPCS | Performed by: FAMILY MEDICINE

## 2018-10-11 RX ADMIN — Medication 10 ML: at 20:40

## 2018-10-11 RX ADMIN — RAMIPRIL 10 MG: 10 CAPSULE ORAL at 10:36

## 2018-10-11 RX ADMIN — GLIMEPIRIDE 4 MG: 4 TABLET ORAL at 20:39

## 2018-10-11 RX ADMIN — LEVOTHYROXINE SODIUM 150 MCG: 150 TABLET ORAL at 05:59

## 2018-10-11 RX ADMIN — ENOXAPARIN SODIUM 105 MG: 120 INJECTION SUBCUTANEOUS at 17:10

## 2018-10-11 RX ADMIN — ASPIRIN 325 MG: 325 TABLET ORAL at 10:36

## 2018-10-11 RX ADMIN — Medication 3 UNITS: at 12:11

## 2018-10-11 RX ADMIN — Medication 10 ML: at 10:42

## 2018-10-11 RX ADMIN — CLOPIDOGREL BISULFATE 75 MG: 75 TABLET ORAL at 10:36

## 2018-10-11 RX ADMIN — RAMIPRIL 10 MG: 10 CAPSULE ORAL at 20:39

## 2018-10-11 RX ADMIN — METOPROLOL TARTRATE 50 MG: 50 TABLET, FILM COATED ORAL at 20:39

## 2018-10-11 RX ADMIN — ATORVASTATIN CALCIUM 20 MG: 20 TABLET, FILM COATED ORAL at 20:39

## 2018-10-11 RX ADMIN — AMLODIPINE BESYLATE 10 MG: 10 TABLET ORAL at 10:36

## 2018-10-11 RX ADMIN — Medication 33.6 MILLICURIE: at 08:10

## 2018-10-11 RX ADMIN — FUROSEMIDE 20 MG: 10 INJECTION, SOLUTION INTRAMUSCULAR; INTRAVENOUS at 10:36

## 2018-10-11 RX ADMIN — METOPROLOL TARTRATE 50 MG: 50 TABLET, FILM COATED ORAL at 10:36

## 2018-10-11 RX ADMIN — Medication 10.5 MILLICURIE: at 07:18

## 2018-10-11 RX ADMIN — GABAPENTIN 300 MG: 300 CAPSULE ORAL at 20:39

## 2018-10-11 NOTE — PLAN OF CARE
Problem: Falls - Risk of:  Goal: Will remain free from falls  Will remain free from falls   Outcome: Met This Shift  Patient up per self, gait steady. Goal: Absence of physical injury  Absence of physical injury   Outcome: Met This Shift      Problem: SAFETY  Goal: Free from accidental physical injury  Outcome: Ongoing  Patient up per self, gait steady. Problem: DAILY CARE  Goal: Daily care needs are met  Outcome: Met This Shift      Problem: SKIN INTEGRITY  Goal: Skin integrity is maintained or improved  Outcome: Met This Shift      Problem: KNOWLEDGE DEFICIT  Goal: Patient/S.O. demonstrates understanding of disease process, treatment plan, medications, and discharge instructions. Outcome: Met This Shift      Problem: DISCHARGE BARRIERS  Goal: Patient's continuum of care needs are met  Outcome: Met This Shift  Continue discharge planning. Cardiac cath tomorrow morning. Problem: OXYGENATION/RESPIRATORY FUNCTION  Goal: Patient will maintain patent airway  Outcome: Met This Shift    Goal: Patient will achieve/maintain normal respiratory rate/effort  Respiratory rate and effort will be within normal limits for the patient   Outcome: Met This Shift  Patient denies shortness of breath. Problem: HEMODYNAMIC STATUS  Goal: Patient has stable vital signs and fluid balance  Outcome: Ongoing  Continue to assess for leg edema. Problem: FLUID AND ELECTROLYTE IMBALANCE  Goal: Fluid and electrolyte balance are achieved/maintained  Outcome: Ongoing  Continue to assess labs. Comments: Care plan reviewed with patient. Patient  verbalize understanding of the plan of care and contribute to goal setting.

## 2018-10-11 NOTE — PROGRESS NOTES
Nutrition Education    Type and Reason for Visit: Initial, Consult (Pt. request)     Suggest add low sodium diet to current diet orders. CHF pt. Patient stated she had questions re: carb controlled diet. She wasn't sure how many carbs she should eat. Pt. Dx: CHF with HX of DM. She mentions her blood sugars at home tend to be 170-200 before breakfast, then they improve to ~119 before dinner at home. Pt. Mentions she snacks on chips, or other cab containing snacks at night. She is vague re: Portion sizes. I encouraged pt. To consume 60 grams CHO at meals & to write down what she eats especially at night to help improve blood sugars. I suspect she eats too much carb at bedtime which is affecting morning blood sugars. I reviewed carb controlled low sodium diet with her & provided handouts. Pt. declines me changing diet orders to low sodium carb controlled here. \" My CHF is better\". Advised pt. To stop eating processed lunch meat sandwiches. She mentions has been eating quite a bit of processed foods at home. Suggested limiting sodium intake to 500 milligrams or less per meal.    · Verbally reviewed following information with pt. On carb controlled low sodium diet. Suggested limit to 60 grams CHO/meal.   · Written educational materials provided. · Contact name and number provided. · Refer to Patient Education activity for more details.     Electronically signed by Louise Holly RD, LD on 10/11/18 at 10:39 AM    Contact Number: [phone]

## 2018-10-11 NOTE — PROGRESS NOTES
material, loss of pulse/vascular compromise requiring surgery, aneurysm/pseudoaneurysm formation, possible loss of a limb/hand/leg, death. Alternatives to and omission of the suggested procedure were discussed. The patient had no further questions and wished to proceed; the consent form was signed. Electronically signed by Khadra Rice PA-C on 10/11/2018 at 2:21 PM    Pacer interrogation done - discussed with [facility] - new paroxysmal afib for past 6 months, pt currently in afib  High chadsvasc score  Will start lovenox 1mg/kg sq bid  Hold tomorrow morning dose for LHC at 9am  Will need longterm [address]. Pt agrees for [address] and wants xarelto or eliquis and not coumadin.   She understands risk of bleed and accepts risk to reduce risk of embolic phenomenon  This was discussed with dr Eddie Rocha and pt and they agree with above plan

## 2018-10-11 NOTE — PROGRESS NOTES
Oral PRN Jagruti Garcia MD        Or    potassium chloride 10 mEq/100 mL IVPB (Peripheral Line)  10 mEq Intravenous PRN Jagruti Garcia MD        magnesium sulfate 1 g in dextrose 5 % 100 mL IVPB  1 g Intravenous PRN Jagruti Garcia MD        insulin lispro (HUMALOG) injection vial 0-6 Units  0-6 Units Subcutaneous TID WC Jagruti Garcia MD   3 Units at 10/11/18 1211    insulin lispro (HUMALOG) injection vial 0-3 Units  0-3 Units Subcutaneous Nightly Jagruti Garcia MD        glucose (GLUTOSE) 40 % oral gel 15 g  15 g Oral PRN Jagruti Garcia MD        dextrose 50 % solution 12.5 g  12.5 g Intravenous PRN Jagruti Garcia MD        glucagon (rDNA) injection 1 mg  1 mg Intramuscular PRN Jagruti Garcia MD        dextrose 5 % solution  100 mL/hr Intravenous PRN Jagruti Garcia MD         Allergies   Allergen Reactions    Adhesive Tape Rash     Active Problems:    CHF NYHA class II, unspecified failure chronicity, unspecified type (HCC)    Acute diastolic CHF (congestive heart failure) ([facility])  Resolved Problems:    * No resolved hospital problems. *    Blood pressure 121/70, pulse 62, temperature 97.5 °F (36.4 °C), temperature source Oral, resp. rate 16, height 5' 8\" (1.727 m), weight 240 lb 12.8 oz (109.2 kg), SpO2 92 %, not currently breastfeeding. Subjective:  Symptoms:  Stable. Diet:  Adequate intake. Activity level: Returning to normal.    Pain:  She reports no pain. Objective:  General Appearance:  Comfortable. Vital signs: (most recent): Blood pressure 121/70, pulse 62, temperature 97.5 °F (36.4 °C), temperature source Oral, resp. rate 16, height 5' 8\" (1.727 m), weight 240 lb 12.8 oz (109.2 kg), SpO2 92 %, not currently breastfeeding. Vital signs are normal.    Output: Producing urine. HEENT: Normal HEENT exam.    Lungs:  Normal effort and normal respiratory rate. Breath sounds clear to auscultation. Heart: Normal rate. Regular rhythm.   S1

## 2018-10-12 ENCOUNTER — APPOINTMENT (OUTPATIENT)
Dept: CARDIAC CATH/INVASIVE PROCEDURES | Age: 73
DRG: 246 | End: 2018-10-12
Payer: MEDICARE

## 2018-10-12 LAB
ABO: NORMAL
ACTIVATED CLOTTING TIME: 241 SECONDS (ref 1–150)
ANION GAP SERPL CALCULATED.3IONS-SCNC: 15 MEQ/L (ref 8–16)
ANTIBODY SCREEN: NORMAL
APTT: 37.8 SECONDS (ref 22–38)
BUN BLDV-MCNC: 14 MG/DL (ref 7–22)
CALCIUM SERPL-MCNC: 8.9 MG/DL (ref 8.5–10.5)
CHLORIDE BLD-SCNC: 100 MEQ/L (ref 98–111)
CO2: 26 MEQ/L (ref 23–33)
CREAT SERPL-MCNC: 0.8 MG/DL (ref 0.4–1.2)
EKG ATRIAL RATE: 67 BPM
EKG Q-T INTERVAL: 482 MS
EKG QRS DURATION: 178 MS
EKG QTC CALCULATION (BAZETT): 509 MS
EKG R AXIS: 35 DEGREES
EKG T AXIS: 74 DEGREES
EKG VENTRICULAR RATE: 67 BPM
ERYTHROCYTE [DISTWIDTH] IN BLOOD BY AUTOMATED COUNT: 14.1 % (ref 11.5–14.5)
ERYTHROCYTE [DISTWIDTH] IN BLOOD BY AUTOMATED COUNT: 44.4 FL (ref 35–45)
GFR SERPL CREATININE-BSD FRML MDRD: 71 ML/MIN/1.73M2
GLUCOSE BLD-MCNC: 155 MG/DL (ref 70–108)
GLUCOSE BLD-MCNC: 157 MG/DL (ref 70–108)
GLUCOSE BLD-MCNC: 175 MG/DL (ref 70–108)
GLUCOSE BLD-MCNC: 181 MG/DL (ref 70–108)
HCT VFR BLD CALC: 39.9 % (ref 37–47)
HEMOGLOBIN: 12.9 GM/DL (ref 12–16)
INR BLD: 1.1 (ref 0.85–1.13)
MCH RBC QN AUTO: 28.2 PG (ref 26–33)
MCHC RBC AUTO-ENTMCNC: 32.3 GM/DL (ref 32.2–35.5)
MCV RBC AUTO: 87.3 FL (ref 81–99)
PLATELET # BLD: 278 THOU/MM3 (ref 130–400)
PMV BLD AUTO: 10.7 FL (ref 9.4–12.4)
POTASSIUM REFLEX MAGNESIUM: 4 MEQ/L (ref 3.5–5.2)
PRO-BNP: 510.7 PG/ML (ref 0–900)
RBC # BLD: 4.57 MILL/MM3 (ref 4.2–5.4)
RH FACTOR: NORMAL
SODIUM BLD-SCNC: 141 MEQ/L (ref 135–145)
WBC # BLD: 7.4 THOU/MM3 (ref 4.8–10.8)

## 2018-10-12 PROCEDURE — 2140000000 HC CCU INTERMEDIATE R&B

## 2018-10-12 PROCEDURE — 027034Z DILATION OF CORONARY ARTERY, ONE ARTERY WITH DRUG-ELUTING INTRALUMINAL DEVICE, PERCUTANEOUS APPROACH: ICD-10-PCS | Performed by: INTERNAL MEDICINE

## 2018-10-12 PROCEDURE — 80048 BASIC METABOLIC PNL TOTAL CA: CPT

## 2018-10-12 PROCEDURE — 6360000002 HC RX W HCPCS

## 2018-10-12 PROCEDURE — 2580000003 HC RX 258: Performed by: PHYSICIAN ASSISTANT

## 2018-10-12 PROCEDURE — 93458 L HRT ARTERY/VENTRICLE ANGIO: CPT | Performed by: INTERNAL MEDICINE

## 2018-10-12 PROCEDURE — C1887 CATHETER, GUIDING: HCPCS

## 2018-10-12 PROCEDURE — 86901 BLOOD TYPING SEROLOGIC RH(D): CPT

## 2018-10-12 PROCEDURE — 85347 COAGULATION TIME ACTIVATED: CPT

## 2018-10-12 PROCEDURE — C1769 GUIDE WIRE: HCPCS

## 2018-10-12 PROCEDURE — C1894 INTRO/SHEATH, NON-LASER: HCPCS

## 2018-10-12 PROCEDURE — 2709999900 HC NON-CHARGEABLE SUPPLY

## 2018-10-12 PROCEDURE — 86850 RBC ANTIBODY SCREEN: CPT

## 2018-10-12 PROCEDURE — C1760 CLOSURE DEV, VASC: HCPCS

## 2018-10-12 PROCEDURE — 6370000000 HC RX 637 (ALT 250 FOR IP): Performed by: NURSE PRACTITIONER

## 2018-10-12 PROCEDURE — 6360000004 HC RX CONTRAST MEDICATION: Performed by: INTERNAL MEDICINE

## 2018-10-12 PROCEDURE — 85730 THROMBOPLASTIN TIME PARTIAL: CPT

## 2018-10-12 PROCEDURE — C1874 STENT, COATED/COV W/DEL SYS: HCPCS

## 2018-10-12 PROCEDURE — 6370000000 HC RX 637 (ALT 250 FOR IP): Performed by: FAMILY MEDICINE

## 2018-10-12 PROCEDURE — 6360000002 HC RX W HCPCS: Performed by: FAMILY MEDICINE

## 2018-10-12 PROCEDURE — 94760 N-INVAS EAR/PLS OXIMETRY 1: CPT

## 2018-10-12 PROCEDURE — C1725 CATH, TRANSLUMIN NON-LASER: HCPCS

## 2018-10-12 PROCEDURE — 92928 PRQ TCAT PLMT NTRAC ST 1 LES: CPT | Performed by: INTERNAL MEDICINE

## 2018-10-12 PROCEDURE — 36415 COLL VENOUS BLD VENIPUNCTURE: CPT

## 2018-10-12 PROCEDURE — B2111ZZ FLUOROSCOPY OF MULTIPLE CORONARY ARTERIES USING LOW OSMOLAR CONTRAST: ICD-10-PCS | Performed by: INTERNAL MEDICINE

## 2018-10-12 PROCEDURE — 86900 BLOOD TYPING SEROLOGIC ABO: CPT

## 2018-10-12 PROCEDURE — 6370000000 HC RX 637 (ALT 250 FOR IP)

## 2018-10-12 PROCEDURE — 83880 ASSAY OF NATRIURETIC PEPTIDE: CPT

## 2018-10-12 PROCEDURE — 85610 PROTHROMBIN TIME: CPT

## 2018-10-12 PROCEDURE — 2500000003 HC RX 250 WO HCPCS

## 2018-10-12 PROCEDURE — 6370000000 HC RX 637 (ALT 250 FOR IP): Performed by: INTERNAL MEDICINE

## 2018-10-12 PROCEDURE — 2580000003 HC RX 258: Performed by: FAMILY MEDICINE

## 2018-10-12 PROCEDURE — 85027 COMPLETE CBC AUTOMATED: CPT

## 2018-10-12 PROCEDURE — 93458 L HRT ARTERY/VENTRICLE ANGIO: CPT

## 2018-10-12 PROCEDURE — C9600 PERC DRUG-EL COR STENT SING: HCPCS

## 2018-10-12 PROCEDURE — 82948 REAGENT STRIP/BLOOD GLUCOSE: CPT

## 2018-10-12 RX ORDER — SODIUM CHLORIDE 0.9 % (FLUSH) 0.9 %
10 SYRINGE (ML) INJECTION EVERY 12 HOURS SCHEDULED
Status: DISCONTINUED | OUTPATIENT
Start: 2018-10-12 | End: 2018-10-12 | Stop reason: ALTCHOICE

## 2018-10-12 RX ORDER — SODIUM CHLORIDE 9 MG/ML
INJECTION, SOLUTION INTRAVENOUS CONTINUOUS
Status: DISCONTINUED | OUTPATIENT
Start: 2018-10-12 | End: 2018-10-13 | Stop reason: HOSPADM

## 2018-10-12 RX ORDER — PANTOPRAZOLE SODIUM 40 MG/1
40 TABLET, DELAYED RELEASE ORAL
Status: DISCONTINUED | OUTPATIENT
Start: 2018-10-12 | End: 2018-10-13 | Stop reason: HOSPADM

## 2018-10-12 RX ORDER — SODIUM CHLORIDE 0.9 % (FLUSH) 0.9 %
10 SYRINGE (ML) INJECTION PRN
Status: DISCONTINUED | OUTPATIENT
Start: 2018-10-12 | End: 2018-10-12 | Stop reason: ALTCHOICE

## 2018-10-12 RX ORDER — WARFARIN SODIUM 2.5 MG/1
2.5 TABLET ORAL
Status: DISCONTINUED | OUTPATIENT
Start: 2018-10-12 | End: 2018-10-12

## 2018-10-12 RX ADMIN — ASPIRIN 325 MG: 325 TABLET ORAL at 08:54

## 2018-10-12 RX ADMIN — Medication 10 ML: at 20:17

## 2018-10-12 RX ADMIN — GLIMEPIRIDE 4 MG: 4 TABLET ORAL at 20:25

## 2018-10-12 RX ADMIN — APIXABAN 5 MG: 5 TABLET, FILM COATED ORAL at 20:23

## 2018-10-12 RX ADMIN — IOPAMIDOL 130 ML: 755 INJECTION, SOLUTION INTRAVENOUS at 11:05

## 2018-10-12 RX ADMIN — LEVOTHYROXINE SODIUM 150 MCG: 150 TABLET ORAL at 05:00

## 2018-10-12 RX ADMIN — RAMIPRIL 10 MG: 10 CAPSULE ORAL at 20:17

## 2018-10-12 RX ADMIN — GABAPENTIN 300 MG: 300 CAPSULE ORAL at 20:17

## 2018-10-12 RX ADMIN — Medication 10 ML: at 05:12

## 2018-10-12 RX ADMIN — CLOPIDOGREL BISULFATE 75 MG: 75 TABLET ORAL at 08:54

## 2018-10-12 RX ADMIN — METOPROLOL TARTRATE 50 MG: 50 TABLET, FILM COATED ORAL at 20:17

## 2018-10-12 RX ADMIN — PANTOPRAZOLE SODIUM 40 MG: 40 TABLET, DELAYED RELEASE ORAL at 17:45

## 2018-10-12 RX ADMIN — TICAGRELOR 90 MG: 90 TABLET ORAL at 22:48

## 2018-10-12 RX ADMIN — ATORVASTATIN CALCIUM 20 MG: 20 TABLET, FILM COATED ORAL at 20:17

## 2018-10-12 RX ADMIN — AMLODIPINE BESYLATE 10 MG: 10 TABLET ORAL at 14:35

## 2018-10-12 RX ADMIN — INSULIN LISPRO 1 UNITS: 100 INJECTION, SOLUTION INTRAVENOUS; SUBCUTANEOUS at 20:21

## 2018-10-12 RX ADMIN — SODIUM CHLORIDE: 9 INJECTION, SOLUTION INTRAVENOUS at 05:12

## 2018-10-12 RX ADMIN — METOPROLOL TARTRATE 50 MG: 50 TABLET, FILM COATED ORAL at 08:54

## 2018-10-12 RX ADMIN — FUROSEMIDE 20 MG: 10 INJECTION, SOLUTION INTRAMUSCULAR; INTRAVENOUS at 14:35

## 2018-10-12 RX ADMIN — Medication 10 ML: at 09:00

## 2018-10-12 RX ADMIN — RAMIPRIL 10 MG: 10 CAPSULE ORAL at 14:35

## 2018-10-12 RX ADMIN — ACETAMINOPHEN 650 MG: 325 TABLET ORAL at 14:35

## 2018-10-12 ASSESSMENT — PAIN SCALES - GENERAL
PAINLEVEL_OUTOF10: 3
PAINLEVEL_OUTOF10: 0

## 2018-10-12 NOTE — PROGRESS NOTES
and normal respiratory rate. Breath sounds clear to auscultation. Heart: Normal rate. Regular rhythm. S1 normal and S2 normal.  No murmur. Abdomen: Abdomen is soft. Bowel sounds are normal.   There is no abdominal tenderness. Extremities: Normal range of motion. There is no venous stasis or local swelling. Neurological: Patient is alert and oriented to person, place and time. Normal strength. Patient has normal muscle tone and normal coordination. Skin:  Warm and dry. Assessment:    Condition: In stable condition. Improving. (Atherosclerotic heart disease. Undergoing cardiac catheter stress test was abnormal    Paroxymal atrial fibrillation. Pacemaker interpretation noted underlying atrial  Fib    Diastolic congestive heart failure clearing    Non-insulin-dependent diabetes stable    Hypercholesterol stable    Peripheral neuropathy no change). Plan:   Out of bed and up to chair. Consults: cardiology. Advance diet as tolerated. Administer medications as ordered. (Cardiac catheterization today. Pending results we'll need to decide on appropriate anticoagulants for not only her heart disease but underlying atrial fibrillation    Congestive heart failure has cleared will go home on just Lasix 20 mg a day. Pending catheter results. If stent placed will probably stay until a.m. If heart catheter is negative and medical treatment possibly home today).        Gita tSuart MD  10/12/2018

## 2018-10-12 NOTE — PRE SEDATION
questions and wished to proceed; the consent form was signed. MEDICAL HISTORY  [x]ASHD/ANGINA/MI/CHF   [x]Hypertension  []Diabetes  [x]Hyperlipidemia  []Smoking  []Family Hx of ASHD  [x]Additional information:       has a past medical history of Aortic aneurysm ([facility] [address] Arthritis; CAD (coronary artery disease); CHF NYHA class II, unspecified failure chronicity, unspecified type ([facility] [address] Colon polyps; Diabetic peripheral neuropathy ([facility] [address] Elbow fracture; Hyperlipidemia; Hypertension; Hypothyroidism; Pulmonary nodule, right; S/P cardiac pacemaker procedure: 10/21/2017: Medtronic Dual Chamber.; and Type II or unspecified type diabetes mellitus without mention of complication, not stated as uncontrolled. SURGICAL HISTORY   has a past surgical history that includes Thyroidectomy, partial (); Coronary angioplasty with stent (); Coronary angioplasty ();  section (over 30 years ago ); Knee arthroscopy (; 3/02); Hysterectomy (); Heel spur surgery (Bilateral); Knee arthroscopy (Bilateral); Colonoscopy (); Breast biopsy (Right, 2016); Breast surgery; and pacemaker placement (10/2017).   Additional information:       ALLERGIES   Allergies as of 10/08/2018 - Review Complete 10/08/2018   Allergen Reaction Noted    Adhesive tape Rash 2016     Additional information:       MEDICATIONS   Aspirin  [x] 81 mg  [] 325 mg  [] None  Antiplatelet drug therapy use last 5 days  []No [x]Yes  Coumadin Use Last 5 Days [x]No []Yes  Other anticoagulant use last 5 days  [x]No []Yes    Current Facility-Administered Medications:     0.9 % sodium chloride infusion, , Intravenous, Continuous, Sneha Chi PA-C, Last Rate: 75 mL/hr at 10/12/18 0008    furosemide (LASIX) injection 20 mg, 20 mg, Intravenous, Daily, Nemo Gonzalez MD, 20 mg at 10/11/18 1036    acetaminophen (TYLENOL) tablet 650 mg, 650 mg, Oral, Q4H PRN, Nemo Gonzalez MD    amLODIPine (NORVASC) tablet 10 mg, 10 mg, Oral, Daily, Moriah Oneil MD, 10 mg at 10/11/18 1036    aspirin tablet 325 mg, 325 mg, Oral, Daily, Moriah Oneil MD, 325 mg at 10/12/18 0854    atorvastatin (LIPITOR) tablet 20 mg, 20 mg, Oral, Nightly, Moriah Oneil MD, 20 mg at 10/11/18 2039    clopidogrel (PLAVIX) tablet 75 mg, 75 mg, Oral, Daily, Moriah Oneil MD, 75 mg at 10/12/18 0854    gabapentin (NEURONTIN) capsule 300 mg, 300 mg, Oral, Nightly, Moriah Oneil MD, 300 mg at 10/11/18 2039    glimepiride (AMARYL) tablet 4 mg, 4 mg, Oral, Daily with breakfast, Moriah Oneil MD, 4 mg at 10/11/18 2039    levothyroxine (SYNTHROID) tablet 150 mcg, 150 mcg, Oral, Daily, Moriah Oneil MD, 150 mcg at 10/12/18 0500    metoprolol tartrate (LOPRESSOR) tablet 50 mg, 50 mg, Oral, BID, Moriah Oneil MD, 50 mg at 10/12/18 0854    ramipril (ALTACE) capsule 10 mg, 10 mg, Oral, BID, Moriah Oneil MD, 10 mg at 10/11/18 2039    sodium chloride flush 0.9 % injection 10 mL, 10 mL, Intravenous, 2 times per day, Moriah Oneil MD, 10 mL at 10/12/18 0900    sodium chloride flush 0.9 % injection 10 mL, 10 mL, Intravenous, PRN, Moriah Oneil MD    magnesium hydroxide (MILK OF MAGNESIA) 400 MG/5ML suspension 30 mL, 30 mL, Oral, Daily PRN, Moriah Oneil MD    ondansetron [facility]) injection 4 mg, 4 mg, Intravenous, Q6H PRN, Moriah Oneil MD    potassium chloride (KLOR-CON M) extended release tablet 40 mEq, 40 mEq, Oral, PRN **OR** potassium chloride 20 MEQ/15ML (10%) oral solution 40 mEq, 40 mEq, Oral, PRN **OR** potassium chloride 10 mEq/100 mL IVPB (Peripheral Line), 10 mEq, Intravenous, PRN, Moriah Oneil MD    magnesium sulfate 1 g in dextrose 5 % 100 mL IVPB, 1 g, Intravenous, PRN, Moriah Oneil MD    insulin lispro (HUMALOG) injection vial 0-6 Units, 0-6 Units, Subcutaneous, TID WC, Moriah Oneil MD, 3 Units at 10/11/18 1211    insulin lispro (HUMALOG) injection vial 0-3 Units,

## 2018-10-13 VITALS
HEART RATE: 66 BPM | TEMPERATURE: 97.7 F | BODY MASS INDEX: 36.49 KG/M2 | DIASTOLIC BLOOD PRESSURE: 60 MMHG | OXYGEN SATURATION: 96 % | RESPIRATION RATE: 18 BRPM | WEIGHT: 240.8 LBS | HEIGHT: 68 IN | SYSTOLIC BLOOD PRESSURE: 124 MMHG

## 2018-10-13 LAB
GLUCOSE BLD-MCNC: 173 MG/DL (ref 70–108)
GLUCOSE BLD-MCNC: 290 MG/DL (ref 70–108)

## 2018-10-13 PROCEDURE — 2580000003 HC RX 258: Performed by: FAMILY MEDICINE

## 2018-10-13 PROCEDURE — 6370000000 HC RX 637 (ALT 250 FOR IP): Performed by: FAMILY MEDICINE

## 2018-10-13 PROCEDURE — 6370000000 HC RX 637 (ALT 250 FOR IP): Performed by: NURSE PRACTITIONER

## 2018-10-13 PROCEDURE — 6360000002 HC RX W HCPCS: Performed by: FAMILY MEDICINE

## 2018-10-13 PROCEDURE — 99231 SBSQ HOSP IP/OBS SF/LOW 25: CPT | Performed by: NURSE PRACTITIONER

## 2018-10-13 PROCEDURE — 82948 REAGENT STRIP/BLOOD GLUCOSE: CPT

## 2018-10-13 PROCEDURE — 6370000000 HC RX 637 (ALT 250 FOR IP): Performed by: INTERNAL MEDICINE

## 2018-10-13 RX ORDER — PANTOPRAZOLE SODIUM 40 MG/1
40 TABLET, DELAYED RELEASE ORAL
Qty: 30 TABLET | Refills: 3 | Status: SHIPPED | OUTPATIENT
Start: 2018-10-14 | End: 2019-02-05 | Stop reason: ALTCHOICE

## 2018-10-13 RX ORDER — FUROSEMIDE 20 MG/1
20 TABLET ORAL DAILY
Qty: 60 TABLET | Refills: 3 | Status: SHIPPED | OUTPATIENT
Start: 2018-10-13 | End: 2019-04-08 | Stop reason: SDUPTHER

## 2018-10-13 RX ORDER — ASPIRIN 81 MG/1
81 TABLET ORAL DAILY
Qty: 30 TABLET | Refills: 0 | COMMUNITY
Start: 2018-10-13 | End: 2018-10-13

## 2018-10-13 RX ORDER — NITROGLYCERIN 0.4 MG/1
0.4 TABLET SUBLINGUAL EVERY 5 MIN PRN
Qty: 25 TABLET | Refills: 1 | Status: SHIPPED | OUTPATIENT
Start: 2018-10-13

## 2018-10-13 RX ORDER — ASPIRIN 81 MG/1
81 TABLET ORAL DAILY
Qty: 30 TABLET | Refills: 0 | Status: SHIPPED | OUTPATIENT
Start: 2018-10-13 | End: 2019-01-25

## 2018-10-13 RX ORDER — CLOPIDOGREL BISULFATE 75 MG/1
75 TABLET ORAL DAILY
Qty: 30 TABLET | Refills: 3 | Status: ON HOLD
Start: 2018-10-13 | End: 2019-02-08

## 2018-10-13 RX ADMIN — LEVOTHYROXINE SODIUM 150 MCG: 150 TABLET ORAL at 07:08

## 2018-10-13 RX ADMIN — RAMIPRIL 10 MG: 10 CAPSULE ORAL at 09:26

## 2018-10-13 RX ADMIN — METOPROLOL TARTRATE 50 MG: 50 TABLET, FILM COATED ORAL at 09:26

## 2018-10-13 RX ADMIN — Medication 10 ML: at 09:27

## 2018-10-13 RX ADMIN — APIXABAN 5 MG: 5 TABLET, FILM COATED ORAL at 09:26

## 2018-10-13 RX ADMIN — FUROSEMIDE 20 MG: 10 INJECTION, SOLUTION INTRAMUSCULAR; INTRAVENOUS at 09:26

## 2018-10-13 RX ADMIN — TICAGRELOR 90 MG: 90 TABLET ORAL at 09:26

## 2018-10-13 RX ADMIN — PANTOPRAZOLE SODIUM 40 MG: 40 TABLET, DELAYED RELEASE ORAL at 07:14

## 2018-10-13 RX ADMIN — ASPIRIN 325 MG: 325 TABLET ORAL at 09:26

## 2018-10-13 RX ADMIN — Medication 1 UNITS: at 09:27

## 2018-10-13 RX ADMIN — AMLODIPINE BESYLATE 10 MG: 10 TABLET ORAL at 10:20

## 2018-10-13 ASSESSMENT — PAIN SCALES - GENERAL
PAINLEVEL_OUTOF10: 0
PAINLEVEL_OUTOF10: 0

## 2018-10-13 NOTE — PLAN OF CARE
Problem: Falls - Risk of:  Goal: Will remain free from falls  Will remain free from falls   Outcome: Ongoing  Assessment & interventions provided throughout shift. Bed locked & in low position, call light in reach, side-rails up x2, non-slip socks on when ambulating, bed alarm on, reminded patient to use call light to call for assistance. Goal: Absence of physical injury  Absence of physical injury   Outcome: Ongoing  Assessment & interventions provided throughout shift. Bed locked & in low position, call light in reach, side-rails up x2, non-slip socks on when ambulating, bed alarm on, reminded patient to use call light to call for assistance. Problem: SAFETY  Goal: Free from accidental physical injury  Outcome: Ongoing  Assessment & interventions provided throughout shift. Bed locked & in low position, call light in reach, side-rails up x2, non-slip socks on when ambulating, bed alarm on, reminded patient to use call light to call for assistance. Problem: DAILY CARE  Goal: Daily care needs are met  Outcome: Ongoing  Patient able to complete ADLs. Assistance provided as needed. Problem: SKIN INTEGRITY  Goal: Skin integrity is maintained or improved  Outcome: Ongoing  No open wounds noted. Right groin cath site is bruised but displays no hematoma, and no bleeding. Problem: KNOWLEDGE DEFICIT  Goal: Patient/S.O. demonstrates understanding of disease process, treatment plan, medications, and discharge instructions. Outcome: Ongoing  She has the cath folder that day shift gave her and we discussed right groin cath site precautions. Problem: DISCHARGE BARRIERS  Goal: Patient's continuum of care needs are met  Outcome: Ongoing  She is from home. Problem: OXYGENATION/RESPIRATORY FUNCTION  Goal: Patient will maintain patent airway  Outcome: Ongoing  Lung sounds, pulse ox, and breathing monitored throughout shift.   Patient able to demonstrate cough & deep breathing exercises to

## 2018-10-15 ENCOUNTER — TELEPHONE (OUTPATIENT)
Dept: FAMILY MEDICINE CLINIC | Age: 73
End: 2018-10-15

## 2018-10-15 ENCOUNTER — TELEPHONE (OUTPATIENT)
Dept: CARDIOLOGY CLINIC | Age: 73
End: 2018-10-15

## 2018-10-15 ENCOUNTER — TELEPHONE (OUTPATIENT)
Dept: PHARMACY | Facility: CLINIC | Age: 73
End: 2018-10-15

## 2018-10-15 DIAGNOSIS — I50.31 ACUTE DIASTOLIC CHF (CONGESTIVE HEART FAILURE) (HCC): Primary | ICD-10-CM

## 2018-10-15 PROCEDURE — 1111F DSCHRG MED/CURRENT MED MERGE: CPT

## 2018-10-15 NOTE — TELEPHONE ENCOUNTER
Patient called and had a stent on 10/12/2018, patient is on Plavix and Eliquis. Eliquis is new for patient due to A-Fib. She has been having nose bleeds. Advice?

## 2018-10-16 ASSESSMENT — ENCOUNTER SYMPTOMS
CONSTIPATION: 0
EYES NEGATIVE: 1
NAUSEA: 0
ABDOMINAL PAIN: 0
DIARRHEA: 0
VOMITING: 0

## 2018-10-16 NOTE — PROCEDURES
[address] [place] [address]                           CARDIAC CATHETERIZATION    PATIENT NAME: Andrae Christianson                     :         1945  MED REC NO:   [id number]                           ROOM:  ACCOUNT NO:   [de-identified]                           ADMIT DATE:  10/08/2018  PROVIDER:     Reba Latham MD    DATE OF PROCEDURE:  10/12/2018    INDICATION FOR PROCEDURE:  CCS class III/IV anginal equivalent  symptoms; dyspnea on exertion; heart failure presentation; positive  stress test.    PROCEDURE:  After informed consent was obtained from the patient, she  was brought to the cardiac catheterization laboratory and prepped in  sterile fashion. Right femoral artery was chosen as the primary point  of access. Preprocedure time-out was completed. After infiltration  of the right inguinal region with 2% lidocaine using micropuncture,  modified Seldinger technique and fluoroscopic guidance, I was able to  insert a 5-Libyan sheath into the right femoral artery. I then  performed diagnostic angiography using a 5-Libyan JR-4, 5-Libyan JL-4  catheters. CORONARY ANGIOGRAM:    LEFT MAIN:  Left main is large without any significant obstructive  disease. LAD:  The LAD has a proximal 40% stenosis followed by diffuse luminal  irregularities throughout the length of the distal LAD beyond the  diagonal branch. It is heavily diseased with a subtotal occlusion  distally. There is a large diagonal branch without any significant  obstructive disease. LCX:  The left circumflex has a proximal 90% stenosis prior to a large  patent stent in the OM branch. The AV groove has no significant  obstructive disease. It gives rise to a small OM2 branch. RCA:  The RCA has distal severe PLV and PDA disease. These vessels  are extremely small, approximately 2 mm in diameter. Diffusely  diseased. The RCA is dominant.   Distal RCA has

## 2018-10-18 ENCOUNTER — OFFICE VISIT (OUTPATIENT)
Dept: FAMILY MEDICINE CLINIC | Age: 73
End: 2018-10-18

## 2018-10-18 VITALS
SYSTOLIC BLOOD PRESSURE: 120 MMHG | WEIGHT: 241.6 LBS | HEART RATE: 80 BPM | HEIGHT: 68 IN | RESPIRATION RATE: 16 BRPM | DIASTOLIC BLOOD PRESSURE: 70 MMHG | BODY MASS INDEX: 36.62 KG/M2

## 2018-10-18 DIAGNOSIS — I25.10 CORONARY ARTERY DISEASE INVOLVING NATIVE CORONARY ARTERY OF NATIVE HEART WITHOUT ANGINA PECTORIS: ICD-10-CM

## 2018-10-18 DIAGNOSIS — E78.00 PURE HYPERCHOLESTEROLEMIA: ICD-10-CM

## 2018-10-18 DIAGNOSIS — I50.31 ACUTE DIASTOLIC CHF (CONGESTIVE HEART FAILURE) (HCC): Primary | ICD-10-CM

## 2018-10-18 DIAGNOSIS — I10 HYPERTENSION, UNSPECIFIED TYPE: ICD-10-CM

## 2018-10-18 DIAGNOSIS — E11.9 TYPE 2 DIABETES MELLITUS WITHOUT COMPLICATION, WITHOUT LONG-TERM CURRENT USE OF INSULIN (HCC): ICD-10-CM

## 2018-10-18 PROCEDURE — 99496 TRANSJ CARE MGMT HIGH F2F 7D: CPT | Performed by: FAMILY MEDICINE

## 2018-10-18 PROCEDURE — 1111F DSCHRG MED/CURRENT MED MERGE: CPT | Performed by: FAMILY MEDICINE

## 2018-10-18 ASSESSMENT — ENCOUNTER SYMPTOMS
CHEST TIGHTNESS: 0
NAUSEA: 0
CONSTIPATION: 0
WHEEZING: 0
COUGH: 0
EYE PAIN: 0
SORE THROAT: 0
SHORTNESS OF BREATH: 0
ABDOMINAL PAIN: 0

## 2018-11-01 ENCOUNTER — OFFICE VISIT (OUTPATIENT)
Dept: CARDIOLOGY CLINIC | Age: 73
End: 2018-11-01
Payer: MEDICARE

## 2018-11-01 VITALS
HEART RATE: 88 BPM | SYSTOLIC BLOOD PRESSURE: 110 MMHG | BODY MASS INDEX: 36.29 KG/M2 | WEIGHT: 245 LBS | HEIGHT: 69 IN | DIASTOLIC BLOOD PRESSURE: 68 MMHG

## 2018-11-01 DIAGNOSIS — I10 ESSENTIAL HYPERTENSION: ICD-10-CM

## 2018-11-01 DIAGNOSIS — Z95.0 CARDIAC PACEMAKER IN SITU: ICD-10-CM

## 2018-11-01 DIAGNOSIS — Z95.5 S/P DRUG ELUTING CORONARY STENT PLACEMENT: Primary | ICD-10-CM

## 2018-11-01 DIAGNOSIS — I25.10 CORONARY ARTERY DISEASE INVOLVING NATIVE CORONARY ARTERY OF NATIVE HEART WITHOUT ANGINA PECTORIS: ICD-10-CM

## 2018-11-01 DIAGNOSIS — I71.21 ASCENDING AORTIC ANEURYSM: ICD-10-CM

## 2018-11-01 DIAGNOSIS — E78.01 FAMILIAL HYPERCHOLESTEROLEMIA: ICD-10-CM

## 2018-11-01 DIAGNOSIS — I50.32 CHRONIC DIASTOLIC CHF (CONGESTIVE HEART FAILURE) (HCC): ICD-10-CM

## 2018-11-01 DIAGNOSIS — Z95.0 S/P CARDIAC PACEMAKER PROCEDURE: ICD-10-CM

## 2018-11-01 PROCEDURE — 1123F ACP DISCUSS/DSCN MKR DOCD: CPT | Performed by: NURSE PRACTITIONER

## 2018-11-01 PROCEDURE — G8482 FLU IMMUNIZE ORDER/ADMIN: HCPCS | Performed by: NURSE PRACTITIONER

## 2018-11-01 PROCEDURE — 1101F PT FALLS ASSESS-DOCD LE1/YR: CPT | Performed by: NURSE PRACTITIONER

## 2018-11-01 PROCEDURE — 1036F TOBACCO NON-USER: CPT | Performed by: NURSE PRACTITIONER

## 2018-11-01 PROCEDURE — G8598 ASA/ANTIPLAT THER USED: HCPCS | Performed by: NURSE PRACTITIONER

## 2018-11-01 PROCEDURE — G8427 DOCREV CUR MEDS BY ELIG CLIN: HCPCS | Performed by: NURSE PRACTITIONER

## 2018-11-01 PROCEDURE — 1090F PRES/ABSN URINE INCON ASSESS: CPT | Performed by: NURSE PRACTITIONER

## 2018-11-01 PROCEDURE — 1111F DSCHRG MED/CURRENT MED MERGE: CPT | Performed by: NURSE PRACTITIONER

## 2018-11-01 PROCEDURE — 3017F COLORECTAL CA SCREEN DOC REV: CPT | Performed by: NURSE PRACTITIONER

## 2018-11-01 PROCEDURE — G8400 PT W/DXA NO RESULTS DOC: HCPCS | Performed by: NURSE PRACTITIONER

## 2018-11-01 PROCEDURE — G8417 CALC BMI ABV UP PARAM F/U: HCPCS | Performed by: NURSE PRACTITIONER

## 2018-11-01 PROCEDURE — 99213 OFFICE O/P EST LOW 20 MIN: CPT | Performed by: NURSE PRACTITIONER

## 2018-11-01 PROCEDURE — 4040F PNEUMOC VAC/ADMIN/RCVD: CPT | Performed by: NURSE PRACTITIONER

## 2018-11-06 ENCOUNTER — PROCEDURE VISIT (OUTPATIENT)
Dept: CARDIOLOGY CLINIC | Age: 73
End: 2018-11-06

## 2018-11-06 DIAGNOSIS — Z95.0 S/P CARDIAC PACEMAKER PROCEDURE: Primary | ICD-10-CM

## 2018-11-16 NOTE — DISCHARGE SUMMARY
being  present. ILLNESSES:  Non-insulin-dependent diabetes, diabetic peripheral  neuropathy, pacemaker secondary to bradycardia, atherosclerotic heart  disease, colon polyps, history of prior 4.5 cm aortic aneurysm in the  past, hypertension, hyperlipidemia, hypothyroidism, remote pulmonary  right nodule that has been benign. PHYSICAL EXAMINATION:  Notes:  VITAL SIGNS:  Temperature was 98.7, pulse 54, respiratory rate 16, blood  pressure 136/63, pulse ox 95%. HEENT:  Within normal limits. NECK:  Supple without adenopathy or increased thyroid. LUNGS:  Clear, but decreased in the bases, lower one third lung field  being present. No distinct rales being present at this time. CARDIAC:  Regular rate and rhythm. Normal S1 and S2.  Grade 1/6  systolic murmur. No carotid bruits. Peripheral pulses are 2+. ABDOMEN:  Soft. Positive bowel sounds. Nontender. No  hepatosplenomegaly. No masses. EXTREMITIES:  Full range of motion. No suprapubic tenderness. NEURO:  Alert and oriented x3. Gross motor and sensory being present. HOSPITAL COURSE:  The patient was admitted. Her sodium was 135,  potassium 4.5, chloride 98, CO2 22, BUN 13, creatinine 1.0, BNP was  elevated at 1211, hemoglobin was 13.5, hematocrit was 40.8. Chest x-ray  noted fullness with the hilum being present, congestive heart failure. EKG had a paced rhythm. No abnormalities being present. Because of the  above, the patient was started with some IV diuresis being present. Echocardiogram was obtained, which noted left ventricle normal size. Ejection fraction 60% with no abnormalities being present. She has had  appropriate diuresis being present. Cardiology saw the patient in  consultation on 10/09/2018. They felt that the patient was overall  improved with one of the repeat echo. The patient did show steady  improvement; however, the patient still had some intermittent shortness  of breath being present.   It was felt that the

## 2018-12-18 ENCOUNTER — OFFICE VISIT (OUTPATIENT)
Dept: FAMILY MEDICINE CLINIC | Age: 73
End: 2018-12-18

## 2018-12-18 VITALS
WEIGHT: 247.13 LBS | RESPIRATION RATE: 14 BRPM | HEART RATE: 84 BPM | SYSTOLIC BLOOD PRESSURE: 116 MMHG | HEIGHT: 69 IN | BODY MASS INDEX: 36.6 KG/M2 | DIASTOLIC BLOOD PRESSURE: 74 MMHG

## 2018-12-18 DIAGNOSIS — I10 HYPERTENSION, UNSPECIFIED TYPE: ICD-10-CM

## 2018-12-18 DIAGNOSIS — I50.31 ACUTE DIASTOLIC CHF (CONGESTIVE HEART FAILURE) (HCC): ICD-10-CM

## 2018-12-18 DIAGNOSIS — E11.9 TYPE 2 DIABETES MELLITUS WITHOUT COMPLICATION, WITHOUT LONG-TERM CURRENT USE OF INSULIN (HCC): ICD-10-CM

## 2018-12-18 DIAGNOSIS — I50.32 CHRONIC DIASTOLIC CONGESTIVE HEART FAILURE (HCC): Primary | ICD-10-CM

## 2018-12-18 DIAGNOSIS — E78.00 PURE HYPERCHOLESTEROLEMIA: ICD-10-CM

## 2018-12-18 DIAGNOSIS — Z95.0 S/P CARDIAC PACEMAKER PROCEDURE: ICD-10-CM

## 2018-12-18 DIAGNOSIS — E11.42 DIABETIC PERIPHERAL NEUROPATHY (HCC): ICD-10-CM

## 2018-12-18 PROCEDURE — 99213 OFFICE O/P EST LOW 20 MIN: CPT | Performed by: FAMILY MEDICINE

## 2018-12-18 PROCEDURE — 1101F PT FALLS ASSESS-DOCD LE1/YR: CPT | Performed by: FAMILY MEDICINE

## 2018-12-18 ASSESSMENT — ENCOUNTER SYMPTOMS
EYE PAIN: 0
SORE THROAT: 0
CONSTIPATION: 0
NAUSEA: 0
COUGH: 0
SHORTNESS OF BREATH: 0
ABDOMINAL PAIN: 0
CHEST TIGHTNESS: 0
WHEEZING: 0

## 2018-12-18 NOTE — PROGRESS NOTES
ct of  chest  4-2018    S/P cardiac pacemaker procedure: 10/21/2017: Medtronic Dual Chamber. 10/21/2017    10/21/2017: Medtronic Dual Chamber. Dr. Tab Avitia Type II or unspecified type diabetes mellitus without mention of complication, not stated as uncontrolled      Review of Systems   Constitutional: Negative for appetite change, fatigue and fever. HENT: Negative for congestion, ear pain, postnasal drip and sore throat. Eyes: Negative for pain and visual disturbance. Respiratory: Negative for cough, chest tightness, shortness of breath and wheezing. Cardiovascular: Negative for chest pain, palpitations and leg swelling. Gastrointestinal: Negative for abdominal pain, constipation and nausea. Genitourinary: Negative for dysuria and frequency. Musculoskeletal: Negative for arthralgias, joint swelling, neck pain and neck stiffness. Skin: Negative for rash. Neurological: Negative for dizziness, weakness, numbness and headaches. Hematological: Negative for adenopathy. Does not bruise/bleed easily. Psychiatric/Behavioral: Negative for behavioral problems and sleep disturbance. The patient is not nervous/anxious. /74 (Site: Right Upper Arm, Position: Sitting, Cuff Size: Medium Adult)   Pulse 84   Resp 14   Ht 5' 9\" (1.753 m)   Wt 247 lb 2 oz (112.1 kg)   Breastfeeding? No   BMI 36.49 kg/m²   Objective:   Physical Exam   Constitutional: She is oriented to person, place, and time. She appears well-developed and well-nourished. HENT:   Head: Normocephalic and atraumatic. Right Ear: External ear normal.   Left Ear: External ear normal.   Nose: Nose normal.   Mouth/Throat: Oropharynx is clear and moist.   Eyes: Pupils are equal, round, and reactive to light. Conjunctivae and EOM are normal. No scleral icterus. Neck: Normal range of motion. Neck supple. No JVD present. No thyromegaly present.    Cardiovascular: Normal rate, regular rhythm, normal heart sounds and intact distal TAKE 1 TABLET DAILY 90 tablet 1    gabapentin (NEURONTIN) 300 MG capsule TAKE ONE CAPSULE BY MOUTH IN THE EVENING. 90 capsule 1    metFORMIN (GLUCOPHAGE-XR) 500 MG extended release tablet TAKE 2 TABLETS AT [facility] AT THE EVENING MEAL 360 tablet 1    atorvastatin (LIPITOR) 20 MG tablet TAKE 1 TABLET DAILY 90 tablet 1    ramipril (ALTACE) 10 MG capsule TAKE ONE CAPSULE BY MOUTH TWICE DAILY 180 capsule 1    levothyroxine (SYNTHROID) 150 MCG tablet TAKE ONE TABLET BY MOUTH ONCE DAILY 90 tablet 1    Omega-3 Fatty Acids (FISH OIL) 1200 MG CAPS Take 1 capsule by mouth daily      Glucose Blood (JOSE E BREEZE 2 TEST) DISK USE ONE STRIP TWICE DAILY, DX: E11.9 200 each 5    acetaminophen (TYLENOL) 325 MG tablet Take 2 tablets by mouth every 4 hours as needed for Pain 120 tablet 3    Ascorbic Acid (VITAMIN C) 500 MG tablet Take 1,000 mg by mouth daily        No current facility-administered medications for this visit. No orders of the defined types were placed in this encounter. No results found for this visit on 12/18/18. Friday josh received counseling on the following healthy behaviors: nutrition and exercise    Patient given educational materials on Diabetes and Hyperlipidemia    I have instructed Friday josh to complete a self tracking handout on Blood Sugars  and Blood Pressures  and instructed them to bring it with them to her next appointment. Discussed use, benefit, and side effects of prescribed medications. Barriers to medication compliance addressed. All patient questions answered. Pt voiced understanding.           stable and  See  Cardio  In   Jan and see in  feb  Margarita Cruz MD

## 2018-12-19 ENCOUNTER — TELEPHONE (OUTPATIENT)
Dept: FAMILY MEDICINE CLINIC | Age: 73
End: 2018-12-19

## 2018-12-19 DIAGNOSIS — E83.42 HYPOMAGNESEMIA: Primary | ICD-10-CM

## 2018-12-19 NOTE — TELEPHONE ENCOUNTER
Chris Doe informed by Phone.  Lab order faxed to [facility] [address] per request

## 2018-12-28 ENCOUNTER — HOSPITAL ENCOUNTER (OUTPATIENT)
Dept: WOMENS IMAGING | Age: 73
Discharge: HOME | End: 2018-12-28
Payer: MEDICARE

## 2018-12-28 DIAGNOSIS — Z12.39 BREAST SCREENING: ICD-10-CM

## 2018-12-28 PROCEDURE — 77067 SCR MAMMO BI INCL CAD: CPT

## 2019-01-25 ENCOUNTER — OFFICE VISIT (OUTPATIENT)
Dept: CARDIOLOGY CLINIC | Age: 74
End: 2019-01-25
Payer: MEDICARE

## 2019-01-25 ENCOUNTER — NURSE ONLY (OUTPATIENT)
Dept: CARDIOLOGY CLINIC | Age: 74
End: 2019-01-25
Payer: MEDICARE

## 2019-01-25 VITALS
BODY MASS INDEX: 37.89 KG/M2 | HEART RATE: 67 BPM | WEIGHT: 250 LBS | HEIGHT: 68 IN | DIASTOLIC BLOOD PRESSURE: 78 MMHG | SYSTOLIC BLOOD PRESSURE: 138 MMHG

## 2019-01-25 DIAGNOSIS — I48.20 ATRIAL FIBRILLATION, CHRONIC (HCC): ICD-10-CM

## 2019-01-25 DIAGNOSIS — I49.5 SICK SINUS SYNDROME (HCC): ICD-10-CM

## 2019-01-25 DIAGNOSIS — R93.1 ABNORMAL ECHOCARDIOGRAM: ICD-10-CM

## 2019-01-25 DIAGNOSIS — I10 HYPERTENSION, UNSPECIFIED TYPE: ICD-10-CM

## 2019-01-25 DIAGNOSIS — I25.10 CORONARY ARTERY DISEASE INVOLVING NATIVE CORONARY ARTERY OF NATIVE HEART WITHOUT ANGINA PECTORIS: ICD-10-CM

## 2019-01-25 DIAGNOSIS — I48.20 ATRIAL FIBRILLATION, CHRONIC (HCC): Primary | ICD-10-CM

## 2019-01-25 DIAGNOSIS — Z95.0 S/P CARDIAC PACEMAKER PROCEDURE: Primary | ICD-10-CM

## 2019-01-25 PROCEDURE — G8400 PT W/DXA NO RESULTS DOC: HCPCS | Performed by: NUCLEAR MEDICINE

## 2019-01-25 PROCEDURE — 3017F COLORECTAL CA SCREEN DOC REV: CPT | Performed by: NUCLEAR MEDICINE

## 2019-01-25 PROCEDURE — G8598 ASA/ANTIPLAT THER USED: HCPCS | Performed by: NUCLEAR MEDICINE

## 2019-01-25 PROCEDURE — G8482 FLU IMMUNIZE ORDER/ADMIN: HCPCS | Performed by: NUCLEAR MEDICINE

## 2019-01-25 PROCEDURE — 1036F TOBACCO NON-USER: CPT | Performed by: NUCLEAR MEDICINE

## 2019-01-25 PROCEDURE — 4040F PNEUMOC VAC/ADMIN/RCVD: CPT | Performed by: NUCLEAR MEDICINE

## 2019-01-25 PROCEDURE — G8428 CUR MEDS NOT DOCUMENT: HCPCS | Performed by: NUCLEAR MEDICINE

## 2019-01-25 PROCEDURE — 1123F ACP DISCUSS/DSCN MKR DOCD: CPT | Performed by: NUCLEAR MEDICINE

## 2019-01-25 PROCEDURE — G8417 CALC BMI ABV UP PARAM F/U: HCPCS | Performed by: NUCLEAR MEDICINE

## 2019-01-25 PROCEDURE — 93000 ELECTROCARDIOGRAM COMPLETE: CPT | Performed by: NUCLEAR MEDICINE

## 2019-01-25 PROCEDURE — 93280 PM DEVICE PROGR EVAL DUAL: CPT | Performed by: INTERNAL MEDICINE

## 2019-01-25 PROCEDURE — 99214 OFFICE O/P EST MOD 30 MIN: CPT | Performed by: NUCLEAR MEDICINE

## 2019-01-25 PROCEDURE — 1101F PT FALLS ASSESS-DOCD LE1/YR: CPT | Performed by: NUCLEAR MEDICINE

## 2019-01-25 PROCEDURE — 1090F PRES/ABSN URINE INCON ASSESS: CPT | Performed by: NUCLEAR MEDICINE

## 2019-01-25 RX ORDER — AMIODARONE HYDROCHLORIDE 200 MG/1
TABLET ORAL
Qty: 35 TABLET | Refills: 11 | Status: SHIPPED | OUTPATIENT
Start: 2019-01-25 | End: 2019-04-08 | Stop reason: SDUPTHER

## 2019-02-06 DIAGNOSIS — E78.00 PURE HYPERCHOLESTEROLEMIA: ICD-10-CM

## 2019-02-06 DIAGNOSIS — I25.10 CORONARY ARTERY DISEASE INVOLVING NATIVE CORONARY ARTERY OF NATIVE HEART WITHOUT ANGINA PECTORIS: ICD-10-CM

## 2019-02-06 DIAGNOSIS — E11.9 TYPE 2 DIABETES MELLITUS WITHOUT COMPLICATION, WITHOUT LONG-TERM CURRENT USE OF INSULIN (HCC): ICD-10-CM

## 2019-02-07 RX ORDER — METFORMIN HYDROCHLORIDE 500 MG/1
TABLET, EXTENDED RELEASE ORAL
Qty: 360 TABLET | Refills: 1 | Status: SHIPPED | OUTPATIENT
Start: 2019-02-07 | End: 2019-08-05 | Stop reason: SDUPTHER

## 2019-02-07 RX ORDER — CLOPIDOGREL BISULFATE 75 MG/1
TABLET ORAL
Qty: 90 TABLET | Refills: 1 | Status: SHIPPED | OUTPATIENT
Start: 2019-02-07 | End: 2019-08-05 | Stop reason: SDUPTHER

## 2019-02-07 RX ORDER — ATORVASTATIN CALCIUM 20 MG/1
TABLET, FILM COATED ORAL
Qty: 90 TABLET | Refills: 1 | Status: SHIPPED | OUTPATIENT
Start: 2019-02-07 | End: 2019-08-05 | Stop reason: SDUPTHER

## 2019-02-08 ENCOUNTER — HOSPITAL ENCOUNTER (OUTPATIENT)
Dept: INPATIENT UNIT | Age: 74
Discharge: HOME | End: 2019-02-08
Attending: NUCLEAR MEDICINE | Admitting: NUCLEAR MEDICINE
Payer: MEDICARE

## 2019-02-08 ENCOUNTER — APPOINTMENT (OUTPATIENT)
Dept: CARDIAC CATH/INVASIVE PROCEDURES | Age: 74
End: 2019-02-08
Attending: NUCLEAR MEDICINE
Payer: MEDICARE

## 2019-02-08 VITALS
OXYGEN SATURATION: 97 % | SYSTOLIC BLOOD PRESSURE: 138 MMHG | HEART RATE: 77 BPM | HEIGHT: 68 IN | RESPIRATION RATE: 22 BRPM | TEMPERATURE: 97.6 F | BODY MASS INDEX: 37.89 KG/M2 | DIASTOLIC BLOOD PRESSURE: 72 MMHG | WEIGHT: 250 LBS

## 2019-02-08 LAB
ANION GAP SERPL CALCULATED.3IONS-SCNC: 17 MEQ/L (ref 8–16)
BUN BLDV-MCNC: 16 MG/DL (ref 7–22)
CALCIUM SERPL-MCNC: 9.1 MG/DL (ref 8.5–10.5)
CHLORIDE BLD-SCNC: 101 MEQ/L (ref 98–111)
CO2: 23 MEQ/L (ref 23–33)
CREAT SERPL-MCNC: 0.9 MG/DL (ref 0.4–1.2)
DIGOXIN LEVEL: < 0.3 NG/ML (ref 0.5–2)
EKG ATRIAL RATE: 59 BPM
EKG Q-T INTERVAL: 492 MS
EKG QRS DURATION: 164 MS
EKG QTC CALCULATION (BAZETT): 515 MS
EKG R AXIS: 39 DEGREES
EKG T AXIS: 53 DEGREES
EKG VENTRICULAR RATE: 66 BPM
ERYTHROCYTE [DISTWIDTH] IN BLOOD BY AUTOMATED COUNT: 14.2 % (ref 11.5–14.5)
ERYTHROCYTE [DISTWIDTH] IN BLOOD BY AUTOMATED COUNT: 43.8 FL (ref 35–45)
GFR SERPL CREATININE-BSD FRML MDRD: 62 ML/MIN/1.73M2
GLUCOSE BLD-MCNC: 170 MG/DL (ref 70–108)
HCT VFR BLD CALC: 41.3 % (ref 37–47)
HEMOGLOBIN: 13.4 GM/DL (ref 12–16)
INR BLD: 1.41 (ref 0.85–1.13)
LV EF: 55 %
LVEF MODALITY: NORMAL
MCH RBC QN AUTO: 27.5 PG (ref 26–33)
MCHC RBC AUTO-ENTMCNC: 32.4 GM/DL (ref 32.2–35.5)
MCV RBC AUTO: 84.8 FL (ref 81–99)
PLATELET # BLD: 310 THOU/MM3 (ref 130–400)
PMV BLD AUTO: 10.8 FL (ref 9.4–12.4)
POTASSIUM SERPL-SCNC: 4.3 MEQ/L (ref 3.5–5.2)
RBC # BLD: 4.87 MILL/MM3 (ref 4.2–5.4)
SODIUM BLD-SCNC: 141 MEQ/L (ref 135–145)
WBC # BLD: 7.5 THOU/MM3 (ref 4.8–10.8)

## 2019-02-08 PROCEDURE — 93312 ECHO TRANSESOPHAGEAL: CPT | Performed by: NUCLEAR MEDICINE

## 2019-02-08 PROCEDURE — 92960 CARDIOVERSION ELECTRIC EXT: CPT | Performed by: NUCLEAR MEDICINE

## 2019-02-08 PROCEDURE — 93325 DOPPLER ECHO COLOR FLOW MAPG: CPT | Performed by: NUCLEAR MEDICINE

## 2019-02-08 PROCEDURE — 2500000003 HC RX 250 WO HCPCS

## 2019-02-08 PROCEDURE — 6360000002 HC RX W HCPCS

## 2019-02-08 PROCEDURE — 93303 ECHO TRANSTHORACIC: CPT

## 2019-02-08 PROCEDURE — 93320 DOPPLER ECHO COMPLETE: CPT | Performed by: NUCLEAR MEDICINE

## 2019-02-08 PROCEDURE — 6370000000 HC RX 637 (ALT 250 FOR IP)

## 2019-02-08 PROCEDURE — 93325 DOPPLER ECHO COLOR FLOW MAPG: CPT

## 2019-02-08 PROCEDURE — 2709999900 HC NON-CHARGEABLE SUPPLY

## 2019-02-08 PROCEDURE — 93320 DOPPLER ECHO COMPLETE: CPT

## 2019-02-08 PROCEDURE — 2580000003 HC RX 258: Performed by: NUCLEAR MEDICINE

## 2019-02-08 PROCEDURE — 85027 COMPLETE CBC AUTOMATED: CPT

## 2019-02-08 PROCEDURE — 80162 ASSAY OF DIGOXIN TOTAL: CPT

## 2019-02-08 PROCEDURE — 85610 PROTHROMBIN TIME: CPT

## 2019-02-08 PROCEDURE — 93312 ECHO TRANSESOPHAGEAL: CPT

## 2019-02-08 PROCEDURE — 80048 BASIC METABOLIC PNL TOTAL CA: CPT

## 2019-02-08 PROCEDURE — 93005 ELECTROCARDIOGRAM TRACING: CPT | Performed by: NUCLEAR MEDICINE

## 2019-02-08 PROCEDURE — 36415 COLL VENOUS BLD VENIPUNCTURE: CPT

## 2019-02-08 RX ORDER — SODIUM CHLORIDE 9 MG/ML
INJECTION, SOLUTION INTRAVENOUS CONTINUOUS
Status: DISCONTINUED | OUTPATIENT
Start: 2019-02-08 | End: 2019-02-08 | Stop reason: HOSPADM

## 2019-02-08 RX ADMIN — SODIUM CHLORIDE: 9 INJECTION, SOLUTION INTRAVENOUS at 13:48

## 2019-02-11 DIAGNOSIS — E03.4 HYPOTHYROIDISM DUE TO ACQUIRED ATROPHY OF THYROID: ICD-10-CM

## 2019-02-12 RX ORDER — LEVOTHYROXINE SODIUM 0.15 MG/1
TABLET ORAL
Qty: 90 TABLET | Refills: 1 | Status: SHIPPED | OUTPATIENT
Start: 2019-02-12 | End: 2019-08-12 | Stop reason: SDUPTHER

## 2019-02-19 ENCOUNTER — OFFICE VISIT (OUTPATIENT)
Dept: FAMILY MEDICINE CLINIC | Age: 74
End: 2019-02-19

## 2019-02-19 VITALS
BODY MASS INDEX: 38.12 KG/M2 | HEART RATE: 84 BPM | HEIGHT: 68 IN | WEIGHT: 251.5 LBS | DIASTOLIC BLOOD PRESSURE: 74 MMHG | SYSTOLIC BLOOD PRESSURE: 124 MMHG | RESPIRATION RATE: 14 BRPM

## 2019-02-19 DIAGNOSIS — I49.5 SICK SINUS SYNDROME (HCC): ICD-10-CM

## 2019-02-19 DIAGNOSIS — I50.32 CHRONIC DIASTOLIC CONGESTIVE HEART FAILURE (HCC): ICD-10-CM

## 2019-02-19 DIAGNOSIS — E03.4 HYPOTHYROIDISM DUE TO ACQUIRED ATROPHY OF THYROID: ICD-10-CM

## 2019-02-19 DIAGNOSIS — E11.9 TYPE 2 DIABETES MELLITUS WITHOUT COMPLICATION, WITHOUT LONG-TERM CURRENT USE OF INSULIN (HCC): Primary | ICD-10-CM

## 2019-02-19 DIAGNOSIS — E11.42 DIABETIC PERIPHERAL NEUROPATHY (HCC): ICD-10-CM

## 2019-02-19 DIAGNOSIS — I48.0 PAROXYSMAL ATRIAL FIBRILLATION (HCC): ICD-10-CM

## 2019-02-19 DIAGNOSIS — I10 HYPERTENSION, UNSPECIFIED TYPE: ICD-10-CM

## 2019-02-19 PROBLEM — I48.20 ATRIAL FIBRILLATION, CHRONIC (HCC): Status: RESOLVED | Noted: 2019-01-25 | Resolved: 2019-02-19

## 2019-02-19 LAB
GLUCOSE BLD-MCNC: 193 MG/DL
HBA1C MFR BLD: 8 %

## 2019-02-19 PROCEDURE — 83036 HEMOGLOBIN GLYCOSYLATED A1C: CPT | Performed by: FAMILY MEDICINE

## 2019-02-19 PROCEDURE — 1101F PT FALLS ASSESS-DOCD LE1/YR: CPT | Performed by: FAMILY MEDICINE

## 2019-02-19 PROCEDURE — G8427 DOCREV CUR MEDS BY ELIG CLIN: HCPCS | Performed by: FAMILY MEDICINE

## 2019-02-19 PROCEDURE — 99213 OFFICE O/P EST LOW 20 MIN: CPT | Performed by: FAMILY MEDICINE

## 2019-02-19 PROCEDURE — 1090F PRES/ABSN URINE INCON ASSESS: CPT | Performed by: FAMILY MEDICINE

## 2019-02-19 PROCEDURE — 1036F TOBACCO NON-USER: CPT | Performed by: FAMILY MEDICINE

## 2019-02-19 PROCEDURE — G8400 PT W/DXA NO RESULTS DOC: HCPCS | Performed by: FAMILY MEDICINE

## 2019-02-19 PROCEDURE — 82962 GLUCOSE BLOOD TEST: CPT | Performed by: FAMILY MEDICINE

## 2019-02-19 PROCEDURE — 1123F ACP DISCUSS/DSCN MKR DOCD: CPT | Performed by: FAMILY MEDICINE

## 2019-02-19 PROCEDURE — 3017F COLORECTAL CA SCREEN DOC REV: CPT | Performed by: FAMILY MEDICINE

## 2019-02-19 PROCEDURE — G8417 CALC BMI ABV UP PARAM F/U: HCPCS | Performed by: FAMILY MEDICINE

## 2019-02-19 PROCEDURE — G8598 ASA/ANTIPLAT THER USED: HCPCS | Performed by: FAMILY MEDICINE

## 2019-02-19 PROCEDURE — 4040F PNEUMOC VAC/ADMIN/RCVD: CPT | Performed by: FAMILY MEDICINE

## 2019-02-19 ASSESSMENT — ENCOUNTER SYMPTOMS
ABDOMINAL PAIN: 0
CHEST TIGHTNESS: 0
WHEEZING: 0
SHORTNESS OF BREATH: 0
COUGH: 0
CONSTIPATION: 0
EYE PAIN: 0
NAUSEA: 0
SORE THROAT: 0
ORTHOPNEA: 0

## 2019-02-19 ASSESSMENT — PATIENT HEALTH QUESTIONNAIRE - PHQ9
SUM OF ALL RESPONSES TO PHQ9 QUESTIONS 1 & 2: 0
SUM OF ALL RESPONSES TO PHQ QUESTIONS 1-9: 0
SUM OF ALL RESPONSES TO PHQ QUESTIONS 1-9: 0
2. FEELING DOWN, DEPRESSED OR HOPELESS: 0
1. LITTLE INTEREST OR PLEASURE IN DOING THINGS: 0

## 2019-02-21 DIAGNOSIS — I10 ESSENTIAL HYPERTENSION: ICD-10-CM

## 2019-02-22 RX ORDER — RAMIPRIL 10 MG/1
CAPSULE ORAL
Qty: 180 CAPSULE | Refills: 1 | Status: SHIPPED | OUTPATIENT
Start: 2019-02-22 | End: 2019-08-27 | Stop reason: SDUPTHER

## 2019-03-18 DIAGNOSIS — E11.9 TYPE 2 DIABETES MELLITUS WITHOUT COMPLICATION, WITHOUT LONG-TERM CURRENT USE OF INSULIN (HCC): ICD-10-CM

## 2019-03-19 RX ORDER — GLIMEPIRIDE 4 MG/1
TABLET ORAL
Qty: 90 TABLET | Refills: 1 | Status: SHIPPED | OUTPATIENT
Start: 2019-03-19 | End: 2019-08-28 | Stop reason: SDUPTHER

## 2019-03-21 DIAGNOSIS — I10 ESSENTIAL HYPERTENSION: ICD-10-CM

## 2019-03-21 RX ORDER — AMLODIPINE BESYLATE 10 MG/1
TABLET ORAL
Qty: 90 TABLET | Refills: 0 | Status: SHIPPED | OUTPATIENT
Start: 2019-03-21 | End: 2019-08-21

## 2019-03-25 DIAGNOSIS — E11.42 DIABETIC PERIPHERAL NEUROPATHY (HCC): ICD-10-CM

## 2019-03-26 RX ORDER — GABAPENTIN 300 MG/1
CAPSULE ORAL
Qty: 90 CAPSULE | Refills: 1 | Status: SHIPPED | OUTPATIENT
Start: 2019-03-26 | End: 2019-09-28 | Stop reason: SDUPTHER

## 2019-04-08 ENCOUNTER — OFFICE VISIT (OUTPATIENT)
Dept: CARDIOLOGY CLINIC | Age: 74
End: 2019-04-08
Payer: MEDICARE

## 2019-04-08 VITALS
WEIGHT: 253 LBS | HEART RATE: 72 BPM | BODY MASS INDEX: 39.71 KG/M2 | HEIGHT: 67 IN | SYSTOLIC BLOOD PRESSURE: 110 MMHG | DIASTOLIC BLOOD PRESSURE: 66 MMHG

## 2019-04-08 DIAGNOSIS — I48.0 PAF (PAROXYSMAL ATRIAL FIBRILLATION) (HCC): Primary | ICD-10-CM

## 2019-04-08 DIAGNOSIS — I10 ESSENTIAL HYPERTENSION: ICD-10-CM

## 2019-04-08 DIAGNOSIS — Z95.0 S/P CARDIAC PACEMAKER PROCEDURE: ICD-10-CM

## 2019-04-08 PROCEDURE — 99213 OFFICE O/P EST LOW 20 MIN: CPT | Performed by: PHYSICIAN ASSISTANT

## 2019-04-08 PROCEDURE — 4040F PNEUMOC VAC/ADMIN/RCVD: CPT | Performed by: PHYSICIAN ASSISTANT

## 2019-04-08 PROCEDURE — 1123F ACP DISCUSS/DSCN MKR DOCD: CPT | Performed by: PHYSICIAN ASSISTANT

## 2019-04-08 PROCEDURE — 3017F COLORECTAL CA SCREEN DOC REV: CPT | Performed by: PHYSICIAN ASSISTANT

## 2019-04-08 PROCEDURE — G8598 ASA/ANTIPLAT THER USED: HCPCS | Performed by: PHYSICIAN ASSISTANT

## 2019-04-08 PROCEDURE — 1090F PRES/ABSN URINE INCON ASSESS: CPT | Performed by: PHYSICIAN ASSISTANT

## 2019-04-08 PROCEDURE — G8400 PT W/DXA NO RESULTS DOC: HCPCS | Performed by: PHYSICIAN ASSISTANT

## 2019-04-08 PROCEDURE — 1036F TOBACCO NON-USER: CPT | Performed by: PHYSICIAN ASSISTANT

## 2019-04-08 PROCEDURE — G8417 CALC BMI ABV UP PARAM F/U: HCPCS | Performed by: PHYSICIAN ASSISTANT

## 2019-04-08 PROCEDURE — G8427 DOCREV CUR MEDS BY ELIG CLIN: HCPCS | Performed by: PHYSICIAN ASSISTANT

## 2019-04-08 RX ORDER — AMIODARONE HYDROCHLORIDE 200 MG/1
TABLET ORAL
Qty: 35 TABLET | Refills: 11 | Status: SHIPPED | OUTPATIENT
Start: 2019-04-08 | End: 2020-04-20

## 2019-04-08 RX ORDER — FUROSEMIDE 20 MG/1
20 TABLET ORAL DAILY
Qty: 60 TABLET | Refills: 3 | Status: SHIPPED | OUTPATIENT
Start: 2019-04-08 | End: 2019-12-04 | Stop reason: SDUPTHER

## 2019-04-08 RX ORDER — PANTOPRAZOLE SODIUM 40 MG/1
40 GRANULE, DELAYED RELEASE ORAL
COMMUNITY
End: 2020-05-08 | Stop reason: ALTCHOICE

## 2019-04-08 NOTE — PROGRESS NOTES
Patient here for fu rinku and cardio version. Patient complains of SHAYLA. Patient denies SOB, chest pain, dizziness, lightheadedness, and palpations. [facility]  [facility] [address]   [address] [place] [address] [address] [address]   Dept: [phone]   Dept Fax: [phone]: [phone]      Chief Complaint   Patient presents with    Follow-Up from Hospital     post rinku and cardio version     Age and presents for a follow-up appointment after recent RINKU/cardioversion. As far as the patient is aware she is remaining in normal sinus rhythm. She has some occasional lower extremity edema. She denies any shortness of breath chest pain or palpitations. Cardiologist:  Dr. Jeimy Crowe:   No fever, no chills, No fatigue or weight loss  Pulmonary:    No dyspnea, no wheezing  Cardiac:    Denies recent chest pain   GI:     No nausea or vomiting, no abdominal pain  Neuro:    No dizziness or light headedness  Musculoskeletal:  No recent active issues  Extremities:   Mild edema, good peripheral pulses      Past Medical History:   Diagnosis Date    Aortic aneurysm (HCC)     4.5 cm aorta    Arthritis     Atrial fibrillation, chronic ([address] 1/25/2019    CAD (coronary artery disease)     CHF NYHA class II, unspecified failure chronicity, unspecified type ([address] 10/8/2018    Colon polyps 3/01    Diabetic peripheral neuropathy ([address] 2014      feet    Elbow fracture     Hyperlipidemia     Hypertension     Hypothyroidism     Pulmonary nodule, right 10/2017      repeat  ct of  chest  4-2018    S/P cardiac pacemaker procedure: 10/21/2017: Medtronic Dual Chamber. 10/21/2017    10/21/2017: Medtronic Dual Chamber.  Dr. Misha Sahu Type II or unspecified type diabetes mellitus without mention of complication, not stated as uncontrolled        Allergies   Allergen Reactions    Adhesive Tape Rash       Current Outpatient Medications   Medication Sig Dispense Refill    pantoprazole sodium (PROTONIX) 40 MG PACK packet Take 40 mg by mouth every morning (before breakfast)      amiodarone (CORDARONE) 200 MG tablet Take 200 mg BID for 1 week then daily after 35 tablet 11    furosemide (LASIX) 20 MG tablet Take 1 tablet by mouth daily 60 tablet 3    apixaban (ELIQUIS) 5 MG TABS tablet Take 1 tablet by mouth 2 times daily 60 tablet 3    gabapentin (NEURONTIN) 300 MG capsule TAKE 1 CAPSULE BY MOUTH ONCE DAILY IN THE EVENING 90 capsule 1    amLODIPine (NORVASC) 10 MG tablet TAKE 1 TABLET DAILY 90 tablet 0    glimepiride (AMARYL) 4 MG tablet TAKE 1 TABLET DAILY 90 tablet 1    ramipril (ALTACE) 10 MG capsule TAKE 1 CAPSULE BY MOUTH TWICE DAILY 180 capsule 1    levothyroxine (SYNTHROID) 150 MCG tablet TAKE ONE TABLET BY MOUTH ONCE DAILY 90 tablet 1    metFORMIN (GLUCOPHAGE-XR) 500 MG extended release tablet TAKE 2 TABLETS AT [facility] AT THE EVENING MEAL 360 tablet 1    atorvastatin (LIPITOR) 20 MG tablet TAKE 1 TABLET DAILY 90 tablet 1    clopidogrel (PLAVIX) 75 MG tablet TAKE 1 TABLET DAILY 90 tablet 1    Flaxseed, Linseed, (FLAX SEEDS PO) Take by mouth      Magnesium 400 MG CAPS One  Capsul  Daily 60 capsule 2    nitroGLYCERIN (NITROSTAT) 0.4 MG SL tablet Place 1 tablet under the tongue every 5 minutes as needed for Chest pain up to max of 3 total doses. If no relief after 1 dose, call 911. 25 tablet 1    metoprolol tartrate (LOPRESSOR) 50 MG tablet Take 1 tablet by mouth 2 times daily 180 tablet 1    Glucose Blood (JOSE E BREEZE 2 TEST) DISK USE ONE STRIP TWICE DAILY, DX: E11.9 200 each 5    Ascorbic Acid (VITAMIN C) 500 MG tablet Take 1,000 mg by mouth daily        No current facility-administered medications for this visit.         Social History     Socioeconomic History    Marital status:      Spouse name: Not on file    Number of children: Not on file    Years of education: Not on file    Highest education level: Not on file   Occupational History    Not on file   Social Needs    Financial resource strain: Not on file    Food insecurity:     Worry: Not on file     Inability: Not on file    Transportation needs:     Medical: Not on file     Non-medical: Not on file   Tobacco Use    Smoking status: Never Smoker    Smokeless tobacco: Never Used   Substance and Sexual Activity    Alcohol use: No    Drug use: No    Sexual activity: Never   Lifestyle    Physical activity:     Days per week: Not on file     Minutes per session: Not on file    Stress: Not on file   Relationships    Social connections:     Talks on phone: Not on file     Gets together: Not on file     Attends Christianity service: Not on file     Active member of club or organization: Not on file     Attends meetings of clubs or organizations: Not on file     Relationship status: Not on file    Intimate partner violence:     Fear of current or ex partner: Not on file     Emotionally abused: Not on file     Physically abused: Not on file     Forced sexual activity: Not on file   Other Topics Concern    Not on file   Social History Narrative    Not on file       Family History   Problem Relation Age of Onset    Heart Disease Mother 80        bacterial infection at valve    Heart Disease Father     Heart Attack Father     Heart Disease Brother         CABG       Blood pressure 110/66, pulse 72, height 5' 7\" (1.702 m), weight 253 lb (114.8 kg), not currently breastfeeding. General:   Well developed, well nourished  Lungs:   Clear to auscultation  Heart:    Normal S1 S2, No murmur, rubs, or gallops  Abdomen:   Soft, non tender, no organomegalies, positive bowel sounds  Extremities:   No edema, no cyanosis, good peripheral pulses  Neurological:   Awake, alert, oriented.  No obvious focal deficits  Musculoskeletal:  No obvious deformities    EKG:     EARL 2/8/2019  Conclusions      Summary   Ejection fraction is visually estimated at 55%.   Overall left ventricular function is normal.   Moderately

## 2019-05-09 ENCOUNTER — TELEPHONE (OUTPATIENT)
Dept: CARDIOLOGY CLINIC | Age: 74
End: 2019-05-09

## 2019-05-09 ENCOUNTER — PROCEDURE VISIT (OUTPATIENT)
Dept: CARDIOLOGY CLINIC | Age: 74
End: 2019-05-09
Payer: MEDICARE

## 2019-05-09 DIAGNOSIS — Z95.0 S/P CARDIAC PACEMAKER PROCEDURE: Primary | ICD-10-CM

## 2019-05-09 PROCEDURE — 93294 REM INTERROG EVL PM/LDLS PM: CPT | Performed by: INTERNAL MEDICINE

## 2019-05-09 PROCEDURE — 93296 REM INTERROG EVL PM/IDS: CPT | Performed by: INTERNAL MEDICINE

## 2019-05-18 ENCOUNTER — APPOINTMENT (OUTPATIENT)
Dept: GENERAL RADIOLOGY | Age: 74
End: 2019-05-18
Payer: MEDICARE

## 2019-05-18 ENCOUNTER — HOSPITAL ENCOUNTER (EMERGENCY)
Age: 74
Discharge: HOME | End: 2019-05-18
Attending: FAMILY MEDICINE
Payer: MEDICARE

## 2019-05-18 VITALS
SYSTOLIC BLOOD PRESSURE: 154 MMHG | RESPIRATION RATE: 18 BRPM | BODY MASS INDEX: 41.59 KG/M2 | WEIGHT: 265 LBS | DIASTOLIC BLOOD PRESSURE: 80 MMHG | TEMPERATURE: 98.1 F | HEART RATE: 77 BPM | HEIGHT: 67 IN | OXYGEN SATURATION: 96 %

## 2019-05-18 DIAGNOSIS — M17.0 BILATERAL PRIMARY OSTEOARTHRITIS OF KNEE: ICD-10-CM

## 2019-05-18 DIAGNOSIS — R07.9 CHEST PAIN, UNSPECIFIED TYPE: Primary | ICD-10-CM

## 2019-05-18 LAB
ANION GAP SERPL CALCULATED.3IONS-SCNC: 15 MEQ/L (ref 8–16)
BASOPHILS # BLD: 0.9 %
BASOPHILS ABSOLUTE: 0.1 THOU/MM3 (ref 0–0.1)
BUN BLDV-MCNC: 20 MG/DL (ref 7–22)
CALCIUM SERPL-MCNC: 9 MG/DL (ref 8.5–10.5)
CHLORIDE BLD-SCNC: 96 MEQ/L (ref 98–111)
CO2: 27 MEQ/L (ref 23–33)
CREAT SERPL-MCNC: 1 MG/DL (ref 0.4–1.2)
EKG ATRIAL RATE: 375 BPM
EKG P-R INTERVAL: 208 MS
EKG Q-T INTERVAL: 452 MS
EKG QRS DURATION: 148 MS
EKG QTC CALCULATION (BAZETT): 494 MS
EKG R AXIS: 34 DEGREES
EKG T AXIS: 87 DEGREES
EKG VENTRICULAR RATE: 72 BPM
EOSINOPHIL # BLD: 3.6 %
EOSINOPHILS ABSOLUTE: 0.2 THOU/MM3 (ref 0–0.4)
ERYTHROCYTE [DISTWIDTH] IN BLOOD BY AUTOMATED COUNT: 14.6 % (ref 11.5–14.5)
ERYTHROCYTE [DISTWIDTH] IN BLOOD BY AUTOMATED COUNT: 45.7 FL (ref 35–45)
GFR SERPL CREATININE-BSD FRML MDRD: 54 ML/MIN/1.73M2
GLUCOSE BLD-MCNC: 151 MG/DL (ref 70–108)
HCT VFR BLD CALC: 39.2 % (ref 37–47)
HEMOGLOBIN: 13.1 GM/DL (ref 12–16)
IMMATURE GRANS (ABS): 0.03 THOU/MM3 (ref 0–0.07)
IMMATURE GRANULOCYTES: 0.4 %
LYMPHOCYTES # BLD: 22.6 %
LYMPHOCYTES ABSOLUTE: 1.6 THOU/MM3 (ref 1–4.8)
MCH RBC QN AUTO: 28.9 PG (ref 26–33)
MCHC RBC AUTO-ENTMCNC: 33.4 GM/DL (ref 32.2–35.5)
MCV RBC AUTO: 86.5 FL (ref 81–99)
MONOCYTES # BLD: 9.1 %
MONOCYTES ABSOLUTE: 0.6 THOU/MM3 (ref 0.4–1.3)
NUCLEATED RED BLOOD CELLS: 0 /100 WBC
OSMOLALITY CALCULATION: 281.2 MOSMOL/KG (ref 275–300)
PLATELET # BLD: 323 THOU/MM3 (ref 130–400)
PMV BLD AUTO: 10.5 FL (ref 9.4–12.4)
POTASSIUM SERPL-SCNC: 4 MEQ/L (ref 3.5–5.2)
RBC # BLD: 4.53 MILL/MM3 (ref 4.2–5.4)
SEG NEUTROPHILS: 63.4 %
SEGMENTED NEUTROPHILS ABSOLUTE COUNT: 4.4 THOU/MM3 (ref 1.8–7.7)
SODIUM BLD-SCNC: 138 MEQ/L (ref 135–145)
TROPONIN T: < 0.01 NG/ML
WBC # BLD: 6.9 THOU/MM3 (ref 4.8–10.8)

## 2019-05-18 PROCEDURE — 2709999900 HC NON-CHARGEABLE SUPPLY

## 2019-05-18 PROCEDURE — 85025 COMPLETE CBC W/AUTO DIFF WBC: CPT

## 2019-05-18 PROCEDURE — 73564 X-RAY EXAM KNEE 4 OR MORE: CPT

## 2019-05-18 PROCEDURE — 93005 ELECTROCARDIOGRAM TRACING: CPT | Performed by: FAMILY MEDICINE

## 2019-05-18 PROCEDURE — 80048 BASIC METABOLIC PNL TOTAL CA: CPT

## 2019-05-18 PROCEDURE — 84484 ASSAY OF TROPONIN QUANT: CPT

## 2019-05-18 PROCEDURE — 99285 EMERGENCY DEPT VISIT HI MDM: CPT

## 2019-05-18 PROCEDURE — 36415 COLL VENOUS BLD VENIPUNCTURE: CPT

## 2019-05-18 PROCEDURE — 71046 X-RAY EXAM CHEST 2 VIEWS: CPT

## 2019-05-18 ASSESSMENT — ENCOUNTER SYMPTOMS
ABDOMINAL PAIN: 0
SHORTNESS OF BREATH: 0

## 2019-05-18 NOTE — ED NOTES
Pt reports to ED with CC of intermittent CP today. Pt denies any CP at this time. Pt states she has CHF and has had some edema in her extremities this week and was going to see her PCP next week but the intermittent pain in her mid sternal area today prompted her to come in for further evaluation. EKG, VS, and assessment completed on arrival. Will continue to monitor.       Wang Darden RN  05/18/19 [id number]

## 2019-05-18 NOTE — ED PROVIDER NOTES
Misael Stroud 13 COMPLAINT       Chief Complaint   Patient presents with    Chest Pain       Nurses Notes reviewed and I agree except as noted in the HPI. HISTORY OF PRESENT ILLNESS    Lg Sutton is a 69 y.o. female with a past medical history of aortic aneurysm, Atrial Fibrillation, CAD, CHF, hyperlipidemia, hypertension, Pacemaker, and type 2 DM who presents to the Emergency Department from home for the evaluation of chest pain. The patient states she had right sided chest pain 45 minutes ago but none currently. She states her pain was sharp but it didn't go anywhere. Patient states she has worsening leg swelling, bilateral knee pain (right more than the left) and wheezing. Patient states she has been having problems with her knees for over a week. She states she fell 2-3 days ago due to her right knee giving out on her. Patient denies abdominal pain, shortness of breath, cough, nausea or vomiting. She states she is unable to bear weight on her knee. Patient walks with a cane at home. Patient has a known aortic aneurysm but is unsure exactly where. She follows Dr. Adilene Coker with Cardiology. She has a pacemaker and 3 stents placed. No further complaints at the time of the initial encounter. Location/Symptom: right sided chest pain  Timing/Onset: 45 minutes ago but none now  Context/Setting: has a Pacemaker and 3 stents   Quality: sharp  Duration: intermittent  Modifying Factors: denies  Severity: no pain currently     The HPI was provided by the patient. REVIEW OF SYSTEMS     Review of Systems   Constitutional: Negative for chills and fever. HENT: Negative for congestion. Respiratory: Negative for shortness of breath. Cardiovascular: Positive for chest pain and leg swelling. Right parasternal chest pain, intermittent, sharp stabbing, brief, recurrent, nonexertional   Gastrointestinal: Negative for abdominal pain.    Musculoskeletal: CAPSULE    TAKE 1 CAPSULE BY MOUTH ONCE DAILY IN THE EVENING    GLIMEPIRIDE (AMARYL) 4 MG TABLET    TAKE 1 TABLET DAILY    GLUCOSE BLOOD (JOSE E BREEZE 2 TEST) DISK    USE ONE STRIP TWICE DAILY, DX: E11.9    LEVOTHYROXINE (SYNTHROID) 150 MCG TABLET    TAKE ONE TABLET BY MOUTH ONCE DAILY    MAGNESIUM 400 MG CAPS    One  Capsul  Daily    METFORMIN (GLUCOPHAGE-XR) 500 MG EXTENDED RELEASE TABLET    TAKE 2 TABLETS AT [facility] AT THE EVENING MEAL    METOPROLOL TARTRATE (LOPRESSOR) 50 MG TABLET    Take 1 tablet by mouth 2 times daily    NITROGLYCERIN (NITROSTAT) 0.4 MG SL TABLET    Place 1 tablet under the tongue every 5 minutes as needed for Chest pain up to max of 3 total doses. If no relief after 1 dose, call 911. PANTOPRAZOLE SODIUM (PROTONIX) 40 MG PACK PACKET    Take 40 mg by mouth every morning (before breakfast)    RAMIPRIL (ALTACE) 10 MG CAPSULE    TAKE 1 CAPSULE BY MOUTH TWICE DAILY       ALLERGIES     is allergic to adhesive tape. FAMILY HISTORY     indicated that her mother is . She indicated that her father is . She indicated that her brother is alive. family history includes Heart Attack in her father; Heart Disease in her brother and father; Heart Disease (age of onset: 80) in her mother. SOCIAL HISTORY      reports that she has never smoked. She has never used smokeless tobacco. She reports that she does not drink alcohol or use drugs. PHYSICAL EXAM     INITIAL VITALS:  height is 5' 7\" (1.702 m) and weight is 265 lb (120.2 kg). Her oral temperature is 98.1 °F (36.7 °C). Her blood pressure is 166/81 (abnormal) and her pulse is 76. Her respiration is 18 and oxygen saturation is 95%. Physical Exam   Constitutional: She is oriented to person, place, and time. She appears well-developed and well-nourished. HENT:   Head: Normocephalic and atraumatic. Eyes: Pupils are equal, round, and reactive to light. EOM are normal. No scleral icterus. Neck: Normal range of motion. 54 (*)     All other components within normal limits   TROPONIN   ANION GAP   OSMOLALITY       EMERGENCYDEPARTMENT COURSE:   Vitals:    Vitals:    05/18/19 1930   BP: (!) 166/81   Pulse: 76   Resp: 18   Temp: 98.1 °F (36.7 °C)   TempSrc: Oral   SpO2: 95%   Weight: 265 lb (120.2 kg)   Height: 5' 7\" (1.702 m)       7:53 PM: The patient was seen and evaluated. Nursing notes reviewed    EKG shows no abnormal ST or T waves    Troponin is normal    CBC, electrolytes, renal function normal    Pain appears noncardiac    May use Tylenol for pain    Given crutches for her knee pain arthritis related    Can try Ace wrap for comfort        CRITICAL CARE:   None    CONSULTS:  none    PROCEDURES:  None     FINAL IMPRESSION      1. Chest pain, unspecified type    2. Bilateral primary osteoarthritis of knee          DISPOSITION/PLAN     Follow-through primary care next week as scheduled    Her regarding the parasternal chest pain being musculoskeletal    PATIENT REFERRED TO:  Cha Livingston MD  [address]  [phone]      Next week as scheduled      DISCHARGE MEDICATIONS:  New Prescriptions    No medications on file       (Please note that portions of this note were completed with a voice recognition program.  Efforts were made to edit the dictations but occasionally words aremis-transcribed.)    Scribe:  Germaine Segura 5/18/19 7:53 PM Scribing for and in the presence of Angela Guillory MD.    Signed by: Clarence Duarte, 05/18/19 8:34 PM    Provider:  I personally performed theservices described in the documentation, reviewed and edited the documentation which was dictated to the scribe in my presence, and it accurately records my words and actions.     Angela Guillory MD 5/18/19 8:34 PM        Angela Guillory MD  05/18/19 2038

## 2019-05-19 PROCEDURE — 93010 ELECTROCARDIOGRAM REPORT: CPT | Performed by: INTERNAL MEDICINE

## 2019-05-21 ENCOUNTER — OFFICE VISIT (OUTPATIENT)
Dept: FAMILY MEDICINE CLINIC | Age: 74
End: 2019-05-21

## 2019-05-21 VITALS
RESPIRATION RATE: 14 BRPM | SYSTOLIC BLOOD PRESSURE: 126 MMHG | HEIGHT: 67 IN | DIASTOLIC BLOOD PRESSURE: 74 MMHG | BODY MASS INDEX: 39.87 KG/M2 | WEIGHT: 254 LBS | HEART RATE: 80 BPM

## 2019-05-21 DIAGNOSIS — I25.10 CORONARY ARTERY DISEASE INVOLVING NATIVE CORONARY ARTERY OF NATIVE HEART WITHOUT ANGINA PECTORIS: ICD-10-CM

## 2019-05-21 DIAGNOSIS — E03.4 HYPOTHYROIDISM DUE TO ACQUIRED ATROPHY OF THYROID: ICD-10-CM

## 2019-05-21 DIAGNOSIS — I48.0 PAROXYSMAL ATRIAL FIBRILLATION (HCC): ICD-10-CM

## 2019-05-21 DIAGNOSIS — I50.32 CHRONIC DIASTOLIC CONGESTIVE HEART FAILURE (HCC): ICD-10-CM

## 2019-05-21 DIAGNOSIS — E11.9 TYPE 2 DIABETES MELLITUS WITHOUT COMPLICATION, WITHOUT LONG-TERM CURRENT USE OF INSULIN (HCC): Primary | ICD-10-CM

## 2019-05-21 DIAGNOSIS — E78.00 PURE HYPERCHOLESTEROLEMIA: ICD-10-CM

## 2019-05-21 DIAGNOSIS — E11.42 DIABETIC PERIPHERAL NEUROPATHY (HCC): ICD-10-CM

## 2019-05-21 DIAGNOSIS — I10 ESSENTIAL HYPERTENSION: ICD-10-CM

## 2019-05-21 DIAGNOSIS — I49.5 SICK SINUS SYNDROME (HCC): ICD-10-CM

## 2019-05-21 DIAGNOSIS — M17.0 PRIMARY OSTEOARTHRITIS OF BOTH KNEES: ICD-10-CM

## 2019-05-21 LAB
CHP ED QC CHECK: ABNORMAL
GLUCOSE BLD-MCNC: 152 MG/DL
HBA1C MFR BLD: 6.7 %

## 2019-05-21 PROCEDURE — G8427 DOCREV CUR MEDS BY ELIG CLIN: HCPCS | Performed by: FAMILY MEDICINE

## 2019-05-21 PROCEDURE — 99213 OFFICE O/P EST LOW 20 MIN: CPT | Performed by: FAMILY MEDICINE

## 2019-05-21 PROCEDURE — 1123F ACP DISCUSS/DSCN MKR DOCD: CPT | Performed by: FAMILY MEDICINE

## 2019-05-21 PROCEDURE — 82962 GLUCOSE BLOOD TEST: CPT | Performed by: FAMILY MEDICINE

## 2019-05-21 PROCEDURE — G8400 PT W/DXA NO RESULTS DOC: HCPCS | Performed by: FAMILY MEDICINE

## 2019-05-21 PROCEDURE — 3017F COLORECTAL CA SCREEN DOC REV: CPT | Performed by: FAMILY MEDICINE

## 2019-05-21 PROCEDURE — 1036F TOBACCO NON-USER: CPT | Performed by: FAMILY MEDICINE

## 2019-05-21 PROCEDURE — 83036 HEMOGLOBIN GLYCOSYLATED A1C: CPT | Performed by: FAMILY MEDICINE

## 2019-05-21 PROCEDURE — G8417 CALC BMI ABV UP PARAM F/U: HCPCS | Performed by: FAMILY MEDICINE

## 2019-05-21 PROCEDURE — 4040F PNEUMOC VAC/ADMIN/RCVD: CPT | Performed by: FAMILY MEDICINE

## 2019-05-21 PROCEDURE — 1090F PRES/ABSN URINE INCON ASSESS: CPT | Performed by: FAMILY MEDICINE

## 2019-05-21 PROCEDURE — G8598 ASA/ANTIPLAT THER USED: HCPCS | Performed by: FAMILY MEDICINE

## 2019-05-21 ASSESSMENT — ENCOUNTER SYMPTOMS
COUGH: 0
EYE PAIN: 0
WHEEZING: 0
CONSTIPATION: 0
NAUSEA: 0
SORE THROAT: 0
SHORTNESS OF BREATH: 0
CHEST TIGHTNESS: 0
ABDOMINAL PAIN: 0

## 2019-05-21 NOTE — PROGRESS NOTES
Soft. Bowel sounds are normal. She exhibits no distension and no mass. There is no tenderness. Musculoskeletal: Normal range of motion. She exhibits edema. She exhibits no tenderness. 2 to 3 + Edema  Of the  Legs    Lymphadenopathy:     She has no cervical adenopathy. Neurological: She is alert and oriented to person, place, and time. She has normal reflexes. No cranial nerve deficit. Skin: Skin is warm and dry. No rash noted. Psychiatric: She has a normal mood and affect. Nursing note and vitals reviewed. Assessment:       Diagnosis Orders   1. Type 2 diabetes mellitus without complication, without long-term current use of insulin ([facility])  POCT Glucose    POCT glycosylated hemoglobin (Hb A1C)   2. Sick sinus syndrome (HCC)     3. Pure hypercholesterolemia     4. Essential hypertension     5. Coronary artery disease involving native coronary artery of native heart without angina pectoris     6. Hypothyroidism due to acquired atrophy of thyroid     7. Diabetic peripheral neuropathy ([facility] [address]     8. Chronic diastolic congestive heart failure (HCC)     9. Paroxysmal atrial fibrillation ([facility] [address]     10.  Primary osteoarthritis of both knees  Willy Valdes MD, Orthopedic Surgery, [address] [facility]         Plan:      Current Outpatient Medications   Medication Sig Dispense Refill    pantoprazole sodium (PROTONIX) 40 MG PACK packet Take 40 mg by mouth every morning (before breakfast)      amiodarone (CORDARONE) 200 MG tablet Take 200 mg BID for 1 week then daily after 35 tablet 11    furosemide (LASIX) 20 MG tablet Take 1 tablet by mouth daily 60 tablet 3    apixaban (ELIQUIS) 5 MG TABS tablet Take 1 tablet by mouth 2 times daily 60 tablet 3    gabapentin (NEURONTIN) 300 MG capsule TAKE 1 CAPSULE BY MOUTH ONCE DAILY IN THE EVENING 90 capsule 1    amLODIPine (NORVASC) 10 MG tablet TAKE 1 TABLET DAILY 90 tablet 0    glimepiride (AMARYL) 4 MG tablet TAKE 1 TABLET DAILY 90 tablet 1    ramipril (ALTACE) 10 MG capsule TAKE 1 CAPSULE BY MOUTH TWICE DAILY 180 capsule 1    levothyroxine (SYNTHROID) 150 MCG tablet TAKE ONE TABLET BY MOUTH ONCE DAILY 90 tablet 1    metFORMIN (GLUCOPHAGE-XR) 500 MG extended release tablet TAKE 2 TABLETS AT [facility] AT THE EVENING MEAL 360 tablet 1    atorvastatin (LIPITOR) 20 MG tablet TAKE 1 TABLET DAILY 90 tablet 1    clopidogrel (PLAVIX) 75 MG tablet TAKE 1 TABLET DAILY 90 tablet 1    Flaxseed, Linseed, (FLAX SEEDS PO) Take by mouth      Magnesium 400 MG CAPS One  Capsul  Daily 60 capsule 2    nitroGLYCERIN (NITROSTAT) 0.4 MG SL tablet Place 1 tablet under the tongue every 5 minutes as needed for Chest pain up to max of 3 total doses. If no relief after 1 dose, call 911. 25 tablet 1    metoprolol tartrate (LOPRESSOR) 50 MG tablet Take 1 tablet by mouth 2 times daily 180 tablet 1    Glucose Blood (JOSE E BREEZE 2 TEST) DISK USE ONE STRIP TWICE DAILY, DX: E11.9 200 each 5    Ascorbic Acid (VITAMIN C) 500 MG tablet Take 1,000 mg by mouth daily        No current facility-administered medications for this visit. Orders Placed This Encounter   Procedures   Annabella Fleming MD, Orthopedic Surgery, [facility] [place] [place]     Referral Priority:   Routine     Referral Type:   Eval and Treat     Referral Reason:   Specialty Services Required     Referred to Provider:   Alem Willis MD     Requested Specialty:   Orthopedic Surgery     Number of Visits Requested:   1    POCT Glucose    POCT glycosylated hemoglobin (Hb A1C)     Results for orders placed or performed in visit on 05/21/19   POCT Glucose   Result Value Ref Range    Glucose 152 (A) mg/dL    QC OK?      POCT glycosylated hemoglobin (Hb A1C)   Result Value Ref Range    Hemoglobin A1C 6.7 (A) %     Allayne Bosworth received counseling on the following healthy behaviors: nutrition and exercise    Patient given educational materials on Diabetes    I have instructed Allayne Bosworth to complete a self tracking handout on Blood Sugars  and Blood Pressures  and instructed them to bring it with them to her next appointment. Discussed use, benefit, and side effects of prescribed medications. Barriers to medication compliance addressed. All patient questions answered. Pt voiced understanding.       see  Ortho and   See  3  mths        Shin Garza MD

## 2019-06-15 DIAGNOSIS — I10 ESSENTIAL HYPERTENSION: ICD-10-CM

## 2019-06-17 DIAGNOSIS — I10 ESSENTIAL HYPERTENSION: ICD-10-CM

## 2019-06-17 NOTE — TELEPHONE ENCOUNTER
Date of last visit:  5/21/2019  Date of next visit:  8/21/2019    Requested Prescriptions     Pending Prescriptions Disp Refills    metoprolol tartrate (LOPRESSOR) 50 MG tablet 180 tablet 1     Sig: Take 1 tablet by mouth 2 times daily

## 2019-06-17 NOTE — TELEPHONE ENCOUNTER
Pt called requesting 90 day supply of Metoprolol 50 mg one tablet 2 times daily. Has enough for five more days. Please do as soon as possible.     [facility]

## 2019-06-18 RX ORDER — AMLODIPINE BESYLATE 10 MG/1
TABLET ORAL
Qty: 90 TABLET | Refills: 0 | Status: SHIPPED | OUTPATIENT
Start: 2019-06-18 | End: 2019-08-28 | Stop reason: SDUPTHER

## 2019-06-18 RX ORDER — METOPROLOL TARTRATE 50 MG/1
50 TABLET, FILM COATED ORAL 2 TIMES DAILY
Qty: 180 TABLET | Refills: 1 | Status: SHIPPED | OUTPATIENT
Start: 2019-06-18 | End: 2019-12-04 | Stop reason: SDUPTHER

## 2019-07-26 ENCOUNTER — OFFICE VISIT (OUTPATIENT)
Dept: CARDIOLOGY CLINIC | Age: 74
End: 2019-07-26
Payer: MEDICARE

## 2019-07-26 VITALS
BODY MASS INDEX: 38.83 KG/M2 | DIASTOLIC BLOOD PRESSURE: 62 MMHG | SYSTOLIC BLOOD PRESSURE: 120 MMHG | HEART RATE: 84 BPM | WEIGHT: 256.2 LBS | HEIGHT: 68 IN

## 2019-07-26 DIAGNOSIS — I25.10 CORONARY ARTERY DISEASE INVOLVING NATIVE CORONARY ARTERY OF NATIVE HEART WITHOUT ANGINA PECTORIS: ICD-10-CM

## 2019-07-26 DIAGNOSIS — I48.0 PAROXYSMAL ATRIAL FIBRILLATION (HCC): Primary | ICD-10-CM

## 2019-07-26 DIAGNOSIS — I10 ESSENTIAL HYPERTENSION: ICD-10-CM

## 2019-07-26 DIAGNOSIS — E78.01 FAMILIAL HYPERCHOLESTEROLEMIA: ICD-10-CM

## 2019-07-26 PROCEDURE — G8427 DOCREV CUR MEDS BY ELIG CLIN: HCPCS | Performed by: NUCLEAR MEDICINE

## 2019-07-26 PROCEDURE — 1123F ACP DISCUSS/DSCN MKR DOCD: CPT | Performed by: NUCLEAR MEDICINE

## 2019-07-26 PROCEDURE — G8400 PT W/DXA NO RESULTS DOC: HCPCS | Performed by: NUCLEAR MEDICINE

## 2019-07-26 PROCEDURE — G8417 CALC BMI ABV UP PARAM F/U: HCPCS | Performed by: NUCLEAR MEDICINE

## 2019-07-26 PROCEDURE — 1090F PRES/ABSN URINE INCON ASSESS: CPT | Performed by: NUCLEAR MEDICINE

## 2019-07-26 PROCEDURE — 3017F COLORECTAL CA SCREEN DOC REV: CPT | Performed by: NUCLEAR MEDICINE

## 2019-07-26 PROCEDURE — 1036F TOBACCO NON-USER: CPT | Performed by: NUCLEAR MEDICINE

## 2019-07-26 PROCEDURE — 99213 OFFICE O/P EST LOW 20 MIN: CPT | Performed by: NUCLEAR MEDICINE

## 2019-07-26 PROCEDURE — G8598 ASA/ANTIPLAT THER USED: HCPCS | Performed by: NUCLEAR MEDICINE

## 2019-07-26 PROCEDURE — 4040F PNEUMOC VAC/ADMIN/RCVD: CPT | Performed by: NUCLEAR MEDICINE

## 2019-07-26 NOTE — PROGRESS NOTES
THYROIDECTOMY, PARTIAL  1980's     Family History   Problem Relation Age of Onset    Heart Disease Mother 80        bacterial infection at valve    Heart Disease Father     Heart Attack Father     Heart Disease Brother         CABG     Social History     Tobacco Use    Smoking status: Never Smoker    Smokeless tobacco: Never Used   Substance Use Topics    Alcohol use: No      Current Outpatient Medications   Medication Sig Dispense Refill    amLODIPine (NORVASC) 10 MG tablet TAKE 1 TABLET DAILY 90 tablet 0    metoprolol tartrate (LOPRESSOR) 50 MG tablet Take 1 tablet by mouth 2 times daily 180 tablet 1    pantoprazole sodium (PROTONIX) 40 MG PACK packet Take 40 mg by mouth every morning (before breakfast)      amiodarone (CORDARONE) 200 MG tablet Take 200 mg BID for 1 week then daily after 35 tablet 11    furosemide (LASIX) 20 MG tablet Take 1 tablet by mouth daily 60 tablet 3    apixaban (ELIQUIS) 5 MG TABS tablet Take 1 tablet by mouth 2 times daily 60 tablet 3    gabapentin (NEURONTIN) 300 MG capsule TAKE 1 CAPSULE BY MOUTH ONCE DAILY IN THE EVENING 90 capsule 1    amLODIPine (NORVASC) 10 MG tablet TAKE 1 TABLET DAILY 90 tablet 0    glimepiride (AMARYL) 4 MG tablet TAKE 1 TABLET DAILY 90 tablet 1    ramipril (ALTACE) 10 MG capsule TAKE 1 CAPSULE BY MOUTH TWICE DAILY 180 capsule 1    levothyroxine (SYNTHROID) 150 MCG tablet TAKE ONE TABLET BY MOUTH ONCE DAILY 90 tablet 1    metFORMIN (GLUCOPHAGE-XR) 500 MG extended release tablet TAKE 2 TABLETS AT [facility] AT THE EVENING MEAL 360 tablet 1    atorvastatin (LIPITOR) 20 MG tablet TAKE 1 TABLET DAILY 90 tablet 1    clopidogrel (PLAVIX) 75 MG tablet TAKE 1 TABLET DAILY 90 tablet 1    Flaxseed, Linseed, (FLAX SEEDS PO) Take by mouth      Magnesium 400 MG CAPS One  Capsul  Daily 60 capsule 2    nitroGLYCERIN (NITROSTAT) 0.4 MG SL tablet Place 1 tablet under the tongue every 5 minutes as needed for Chest pain up to max of 3 total doses.  If no relief after 1 dose, call 911. 25 tablet 1    Glucose Blood (JOSE E BREEZE 2 TEST) DISK USE ONE STRIP TWICE DAILY, DX: E11.9 200 each 5    Ascorbic Acid (VITAMIN C) 500 MG tablet Take 1,000 mg by mouth daily        No current facility-administered medications for this visit. Allergies   Allergen Reactions    Adhesive Tape Rash     Health Maintenance   Topic Date Due    Shingles Vaccine (1 of 2) 02/11/1997    DEXA (modify frequency per FRAX score)  02/11/2012    Diabetic retinal exam  03/12/2016    Diabetic foot exam  09/30/2016    Annual Wellness Visit (AWV)  08/05/2017    Diabetic microalbuminuria test  06/27/2019    Flu vaccine (1) 09/01/2019    TSH testing  10/08/2019    Lipid screen  10/11/2019    Potassium monitoring  05/18/2020    Creatinine monitoring  05/18/2020    A1C test (Diabetic or Prediabetic)  05/21/2020    Breast cancer screen  12/28/2020    Colon cancer screen colonoscopy  02/07/2023    DTaP/Tdap/Td vaccine (2 - Td) 04/28/2026    Pneumococcal 65+ years Vaccine  Completed    Hepatitis C screen  Completed       Subjective:  Review of Systems  General:   No fever, no chills, No fatigue or weight loss  Pulmonary:    No dyspnea, no wheezing  Cardiac:    Denies recent chest pain,   GI:     No nausea or vomiting, no abdominal pain  Neuro:    No dizziness or light headedness,   Musculoskeletal:  No recent active issues  Extremities:   No edema, good peripheral pulses      Objective:  Physical Exam  /62   Pulse 84   Ht 5' 8\" (1.727 m)   Wt 256 lb 3.2 oz (116.2 kg)   BMI 38.96 kg/m²   General:   Well developed, well nourished  Lungs:   Clear to auscultation  Heart:    Normal S1 S2, Slight murmur. no rubs, no gallops  Abdomen:   Soft, non tender, no organomegalies, positive bowel sounds  Extremities:   No edema, no cyanosis, good peripheral pulses  Neurological:   Awake, alert, oriented.  No obvious focal deficits  Musculoskelatal:  No obvious deformities    Assessment: Diagnosis Orders   1. Paroxysmal atrial fibrillation (HCC)     2. Essential hypertension     3. Familial hypercholesterolemia     4. Coronary artery disease involving native coronary artery of native heart without angina pectoris     cardiac fair for now     Plan:  No follow-ups on file. As above  Continue risk factor modification and medical management  Thank you for allowing me to participate in the care of your patient. Please don't hesitate to contact me regarding any further issues related to the patient care    Orders Placed:  No orders of the defined types were placed in this encounter. Medications Prescribed:  No orders of the defined types were placed in this encounter. Discussed use, benefit, and side effects of prescribed medications. All patient questions answered. Pt voicedunderstanding. Instructed to continue current medications, diet and exercise. Continue risk factor modification and medical management. Patient agreed with treatment plan. Follow up as directed.     Electronically signedby Patrick Layne MD on 7/26/2019 at 11:36 AM

## 2019-08-05 DIAGNOSIS — E11.9 TYPE 2 DIABETES MELLITUS WITHOUT COMPLICATION, WITHOUT LONG-TERM CURRENT USE OF INSULIN (HCC): ICD-10-CM

## 2019-08-05 DIAGNOSIS — I25.10 CORONARY ARTERY DISEASE INVOLVING NATIVE CORONARY ARTERY OF NATIVE HEART WITHOUT ANGINA PECTORIS: ICD-10-CM

## 2019-08-05 DIAGNOSIS — E78.00 PURE HYPERCHOLESTEROLEMIA: ICD-10-CM

## 2019-08-05 NOTE — TELEPHONE ENCOUNTER
Date of last visit:  5/21/2019  Date of next visit:  8/21/2019    Requested Prescriptions     Pending Prescriptions Disp Refills    atorvastatin (LIPITOR) 20 MG tablet [Pharmacy Med Name: ATORVASTATIN TAB 20MG] 90 tablet 1     Sig: TAKE 1 TABLET DAILY    clopidogrel (PLAVIX) 75 MG tablet [Pharmacy Med Name: CLOPIDOGREL  TAB 75MG] 90 tablet 1     Sig: TAKE 1 TABLET DAILY    metFORMIN (GLUCOPHAGE-XR) 500 MG extended release tablet [Pharmacy Med Name: METFORMIN ER TAB 500MG GP] 360 tablet 1     Sig: TAKE 2 TABLETS AT [facility] AT THE EVENING MEAL

## 2019-08-06 ENCOUNTER — PROCEDURE VISIT (OUTPATIENT)
Dept: CARDIOLOGY CLINIC | Age: 74
End: 2019-08-06

## 2019-08-06 DIAGNOSIS — Z95.0 CARDIAC PACEMAKER IN SITU: Primary | ICD-10-CM

## 2019-08-06 RX ORDER — METFORMIN HYDROCHLORIDE 500 MG/1
TABLET, EXTENDED RELEASE ORAL
Qty: 360 TABLET | Refills: 1 | Status: SHIPPED | OUTPATIENT
Start: 2019-08-06 | End: 2020-01-29 | Stop reason: SDUPTHER

## 2019-08-06 RX ORDER — CLOPIDOGREL BISULFATE 75 MG/1
TABLET ORAL
Qty: 90 TABLET | Refills: 1 | Status: SHIPPED | OUTPATIENT
Start: 2019-08-06 | End: 2020-01-29 | Stop reason: SDUPTHER

## 2019-08-06 RX ORDER — ATORVASTATIN CALCIUM 20 MG/1
TABLET, FILM COATED ORAL
Qty: 90 TABLET | Refills: 1 | Status: SHIPPED | OUTPATIENT
Start: 2019-08-06 | End: 2019-12-30 | Stop reason: SDUPTHER

## 2019-08-12 DIAGNOSIS — E03.4 HYPOTHYROIDISM DUE TO ACQUIRED ATROPHY OF THYROID: ICD-10-CM

## 2019-08-13 RX ORDER — LEVOTHYROXINE SODIUM 0.15 MG/1
TABLET ORAL
Qty: 90 TABLET | Refills: 1 | Status: SHIPPED | OUTPATIENT
Start: 2019-08-13 | End: 2020-02-17

## 2019-08-16 ENCOUNTER — TELEPHONE (OUTPATIENT)
Dept: FAMILY MEDICINE CLINIC | Age: 74
End: 2019-08-16

## 2019-08-21 ENCOUNTER — OFFICE VISIT (OUTPATIENT)
Dept: FAMILY MEDICINE CLINIC | Age: 74
End: 2019-08-21

## 2019-08-21 VITALS
RESPIRATION RATE: 16 BRPM | WEIGHT: 258.25 LBS | BODY MASS INDEX: 39.14 KG/M2 | SYSTOLIC BLOOD PRESSURE: 122 MMHG | DIASTOLIC BLOOD PRESSURE: 70 MMHG | HEIGHT: 68 IN | HEART RATE: 84 BPM

## 2019-08-21 DIAGNOSIS — I71.20 THORACIC AORTIC ANEURYSM WITHOUT RUPTURE: ICD-10-CM

## 2019-08-21 DIAGNOSIS — E11.42 DIABETIC PERIPHERAL NEUROPATHY (HCC): ICD-10-CM

## 2019-08-21 DIAGNOSIS — I25.10 CORONARY ARTERY DISEASE INVOLVING NATIVE CORONARY ARTERY OF NATIVE HEART WITHOUT ANGINA PECTORIS: ICD-10-CM

## 2019-08-21 DIAGNOSIS — Z95.0 S/P CARDIAC PACEMAKER PROCEDURE: ICD-10-CM

## 2019-08-21 DIAGNOSIS — I49.5 SICK SINUS SYNDROME (HCC): ICD-10-CM

## 2019-08-21 DIAGNOSIS — I50.32 CHRONIC DIASTOLIC CONGESTIVE HEART FAILURE (HCC): ICD-10-CM

## 2019-08-21 DIAGNOSIS — I10 ESSENTIAL HYPERTENSION: ICD-10-CM

## 2019-08-21 DIAGNOSIS — E03.4 HYPOTHYROIDISM DUE TO ACQUIRED ATROPHY OF THYROID: ICD-10-CM

## 2019-08-21 DIAGNOSIS — E11.9 TYPE 2 DIABETES MELLITUS WITHOUT COMPLICATION, WITHOUT LONG-TERM CURRENT USE OF INSULIN (HCC): Primary | ICD-10-CM

## 2019-08-21 DIAGNOSIS — I48.0 PAROXYSMAL ATRIAL FIBRILLATION (HCC): ICD-10-CM

## 2019-08-21 LAB
CREATININE URINE POCT: NORMAL
MICROALBUMIN/CREAT 24H UR: NEGATIVE MG/G{CREAT}
MICROALBUMIN/CREAT UR-RTO: NORMAL

## 2019-08-21 PROCEDURE — 82044 UR ALBUMIN SEMIQUANTITATIVE: CPT | Performed by: FAMILY MEDICINE

## 2019-08-21 PROCEDURE — 99213 OFFICE O/P EST LOW 20 MIN: CPT | Performed by: FAMILY MEDICINE

## 2019-08-21 PROCEDURE — 1036F TOBACCO NON-USER: CPT | Performed by: FAMILY MEDICINE

## 2019-08-21 PROCEDURE — G8400 PT W/DXA NO RESULTS DOC: HCPCS | Performed by: FAMILY MEDICINE

## 2019-08-21 PROCEDURE — 3017F COLORECTAL CA SCREEN DOC REV: CPT | Performed by: FAMILY MEDICINE

## 2019-08-21 PROCEDURE — 1123F ACP DISCUSS/DSCN MKR DOCD: CPT | Performed by: FAMILY MEDICINE

## 2019-08-21 PROCEDURE — 1090F PRES/ABSN URINE INCON ASSESS: CPT | Performed by: FAMILY MEDICINE

## 2019-08-21 PROCEDURE — G8598 ASA/ANTIPLAT THER USED: HCPCS | Performed by: FAMILY MEDICINE

## 2019-08-21 PROCEDURE — G8417 CALC BMI ABV UP PARAM F/U: HCPCS | Performed by: FAMILY MEDICINE

## 2019-08-21 PROCEDURE — G8427 DOCREV CUR MEDS BY ELIG CLIN: HCPCS | Performed by: FAMILY MEDICINE

## 2019-08-21 PROCEDURE — 4040F PNEUMOC VAC/ADMIN/RCVD: CPT | Performed by: FAMILY MEDICINE

## 2019-08-21 ASSESSMENT — ENCOUNTER SYMPTOMS
EYE PAIN: 0
ABDOMINAL PAIN: 0
NAUSEA: 0
SHORTNESS OF BREATH: 0
SORE THROAT: 0
COUGH: 0
CHEST TIGHTNESS: 0
CONSTIPATION: 0
WHEEZING: 0

## 2019-08-21 NOTE — PROGRESS NOTES
fever.   HENT: Negative for congestion, ear pain, postnasal drip and sore throat. Eyes: Negative for pain and visual disturbance. Respiratory: Negative for cough, chest tightness, shortness of breath and wheezing. Cardiovascular: Negative for palpitations and leg swelling. Chest pain:  and  pacer    Gastrointestinal: Negative for abdominal pain, constipation and nausea. Genitourinary: Negative for dysuria and frequency. Musculoskeletal: Negative for arthralgias, joint swelling, neck pain and neck stiffness. Skin: Negative for rash. Neurological: Negative for dizziness, weakness, numbness and headaches. Hematological: Negative for adenopathy. Does not bruise/bleed easily. Psychiatric/Behavioral: Negative for behavioral problems and sleep disturbance. The patient is not nervous/anxious. /70 (Site: Right Upper Arm, Position: Sitting, Cuff Size: Medium Adult)   Pulse 84   Resp 16   Ht 5' 8\" (1.727 m)   Wt 258 lb 4 oz (117.1 kg)   BMI 39.27 kg/m²   Objective:   Physical Exam   Constitutional: She is oriented to person, place, and time. She appears well-developed and well-nourished. HENT:   Head: Normocephalic and atraumatic. Right Ear: External ear normal.   Left Ear: External ear normal.   Nose: Nose normal.   Mouth/Throat: Oropharynx is clear and moist.   Eyes: Pupils are equal, round, and reactive to light. Conjunctivae and EOM are normal. No scleral icterus. Neck: Normal range of motion. Neck supple. No JVD present. No thyromegaly present. Cardiovascular: Normal rate, regular rhythm, normal heart sounds and intact distal pulses. Pulmonary/Chest: Effort normal and breath sounds normal. She has no wheezes. She has no rales. Abdominal: Soft. Bowel sounds are normal. She exhibits no distension and no mass. There is no tenderness. Musculoskeletal: Normal range of motion. She exhibits edema. She exhibits no tenderness.      Ankle  feet    2  To  3  +    Edema

## 2019-08-27 DIAGNOSIS — I10 ESSENTIAL HYPERTENSION: ICD-10-CM

## 2019-08-28 DIAGNOSIS — E11.9 TYPE 2 DIABETES MELLITUS WITHOUT COMPLICATION, WITHOUT LONG-TERM CURRENT USE OF INSULIN (HCC): ICD-10-CM

## 2019-08-28 DIAGNOSIS — I10 ESSENTIAL HYPERTENSION: ICD-10-CM

## 2019-08-28 RX ORDER — RAMIPRIL 10 MG/1
CAPSULE ORAL
Qty: 180 CAPSULE | Refills: 1 | Status: SHIPPED | OUTPATIENT
Start: 2019-08-28 | End: 2020-02-25

## 2019-08-28 NOTE — TELEPHONE ENCOUNTER
Pt called requesting 90 day supply of following:    Amlodipine 10 mg one tablet daily    Glimepiride 4 mg one tablet daily    [facility]

## 2019-08-29 RX ORDER — GLIMEPIRIDE 4 MG/1
TABLET ORAL
Qty: 90 TABLET | Refills: 1 | Status: SHIPPED | OUTPATIENT
Start: 2019-08-29 | End: 2020-03-31 | Stop reason: SDUPTHER

## 2019-08-29 RX ORDER — AMLODIPINE BESYLATE 10 MG/1
TABLET ORAL
Qty: 90 TABLET | Refills: 1 | Status: SHIPPED | OUTPATIENT
Start: 2019-08-29 | End: 2020-04-27 | Stop reason: SDUPTHER

## 2019-09-28 DIAGNOSIS — E11.42 DIABETIC PERIPHERAL NEUROPATHY (HCC): ICD-10-CM

## 2019-10-01 RX ORDER — GABAPENTIN 300 MG/1
CAPSULE ORAL
Qty: 90 CAPSULE | Refills: 1 | Status: SHIPPED | OUTPATIENT
Start: 2019-10-01 | End: 2020-03-24

## 2019-11-05 ENCOUNTER — NURSE ONLY (OUTPATIENT)
Dept: FAMILY MEDICINE CLINIC | Age: 74
End: 2019-11-05

## 2019-11-05 DIAGNOSIS — Z23 NEED FOR INFLUENZA VACCINATION: Primary | ICD-10-CM

## 2019-11-05 PROCEDURE — 90756 CCIIV4 VACC ABX FREE IM: CPT | Performed by: FAMILY MEDICINE

## 2019-11-05 PROCEDURE — G0008 ADMIN INFLUENZA VIRUS VAC: HCPCS | Performed by: FAMILY MEDICINE

## 2019-11-11 ENCOUNTER — TELEPHONE (OUTPATIENT)
Dept: FAMILY MEDICINE CLINIC | Age: 74
End: 2019-11-11

## 2019-11-12 ENCOUNTER — PROCEDURE VISIT (OUTPATIENT)
Dept: CARDIOLOGY CLINIC | Age: 74
End: 2019-11-12
Payer: MEDICARE

## 2019-11-12 DIAGNOSIS — Z95.0 CARDIAC PACEMAKER IN SITU: Primary | ICD-10-CM

## 2019-11-12 PROCEDURE — 93296 REM INTERROG EVL PM/IDS: CPT | Performed by: INTERNAL MEDICINE

## 2019-11-12 PROCEDURE — 93294 REM INTERROG EVL PM/LDLS PM: CPT | Performed by: INTERNAL MEDICINE

## 2019-12-04 ENCOUNTER — OFFICE VISIT (OUTPATIENT)
Dept: FAMILY MEDICINE CLINIC | Age: 74
End: 2019-12-04

## 2019-12-04 VITALS
SYSTOLIC BLOOD PRESSURE: 132 MMHG | RESPIRATION RATE: 12 BRPM | DIASTOLIC BLOOD PRESSURE: 68 MMHG | HEIGHT: 68 IN | WEIGHT: 257.38 LBS | HEART RATE: 64 BPM | BODY MASS INDEX: 39.01 KG/M2

## 2019-12-04 DIAGNOSIS — E11.9 TYPE 2 DIABETES MELLITUS WITHOUT COMPLICATION, WITHOUT LONG-TERM CURRENT USE OF INSULIN (HCC): Primary | ICD-10-CM

## 2019-12-04 DIAGNOSIS — I10 ESSENTIAL HYPERTENSION: ICD-10-CM

## 2019-12-04 DIAGNOSIS — I25.10 CORONARY ARTERY DISEASE INVOLVING NATIVE CORONARY ARTERY OF NATIVE HEART WITHOUT ANGINA PECTORIS: ICD-10-CM

## 2019-12-04 DIAGNOSIS — I48.0 PAROXYSMAL ATRIAL FIBRILLATION (HCC): ICD-10-CM

## 2019-12-04 DIAGNOSIS — I44.39 HIGH-GRADE ATRIOVENTRICULAR BLOCK: ICD-10-CM

## 2019-12-04 DIAGNOSIS — E03.4 HYPOTHYROIDISM DUE TO ACQUIRED ATROPHY OF THYROID: ICD-10-CM

## 2019-12-04 DIAGNOSIS — I50.32 CHRONIC DIASTOLIC CONGESTIVE HEART FAILURE (HCC): ICD-10-CM

## 2019-12-04 DIAGNOSIS — E11.42 DIABETIC PERIPHERAL NEUROPATHY (HCC): ICD-10-CM

## 2019-12-04 DIAGNOSIS — M25.561 ACUTE PAIN OF RIGHT KNEE: ICD-10-CM

## 2019-12-04 DIAGNOSIS — Z95.0 S/P CARDIAC PACEMAKER PROCEDURE: ICD-10-CM

## 2019-12-04 LAB
CHP ED QC CHECK: ABNORMAL
GLUCOSE BLD-MCNC: 185 MG/DL
HBA1C MFR BLD: 6.9 %

## 2019-12-04 PROCEDURE — 1090F PRES/ABSN URINE INCON ASSESS: CPT | Performed by: FAMILY MEDICINE

## 2019-12-04 PROCEDURE — G8400 PT W/DXA NO RESULTS DOC: HCPCS | Performed by: FAMILY MEDICINE

## 2019-12-04 PROCEDURE — 1036F TOBACCO NON-USER: CPT | Performed by: FAMILY MEDICINE

## 2019-12-04 PROCEDURE — G8427 DOCREV CUR MEDS BY ELIG CLIN: HCPCS | Performed by: FAMILY MEDICINE

## 2019-12-04 PROCEDURE — 4040F PNEUMOC VAC/ADMIN/RCVD: CPT | Performed by: FAMILY MEDICINE

## 2019-12-04 PROCEDURE — 1123F ACP DISCUSS/DSCN MKR DOCD: CPT | Performed by: FAMILY MEDICINE

## 2019-12-04 PROCEDURE — G8417 CALC BMI ABV UP PARAM F/U: HCPCS | Performed by: FAMILY MEDICINE

## 2019-12-04 PROCEDURE — 82962 GLUCOSE BLOOD TEST: CPT | Performed by: FAMILY MEDICINE

## 2019-12-04 PROCEDURE — 3017F COLORECTAL CA SCREEN DOC REV: CPT | Performed by: FAMILY MEDICINE

## 2019-12-04 PROCEDURE — 99213 OFFICE O/P EST LOW 20 MIN: CPT | Performed by: FAMILY MEDICINE

## 2019-12-04 PROCEDURE — 83036 HEMOGLOBIN GLYCOSYLATED A1C: CPT | Performed by: FAMILY MEDICINE

## 2019-12-04 PROCEDURE — G8598 ASA/ANTIPLAT THER USED: HCPCS | Performed by: FAMILY MEDICINE

## 2019-12-04 RX ORDER — FUROSEMIDE 20 MG/1
20 TABLET ORAL DAILY
Qty: 90 TABLET | Refills: 3 | Status: SHIPPED | OUTPATIENT
Start: 2019-12-04 | End: 2020-01-31

## 2019-12-04 ASSESSMENT — ENCOUNTER SYMPTOMS
ABDOMINAL PAIN: 0
CONSTIPATION: 0
WHEEZING: 0
SHORTNESS OF BREATH: 0
NAUSEA: 0
CHEST TIGHTNESS: 0
EYE PAIN: 0
COUGH: 0
SORE THROAT: 0
ORTHOPNEA: 0

## 2019-12-05 RX ORDER — METOPROLOL TARTRATE 50 MG/1
TABLET, FILM COATED ORAL
Qty: 180 TABLET | Refills: 1 | Status: SHIPPED | OUTPATIENT
Start: 2019-12-05 | End: 2020-05-08 | Stop reason: SDUPTHER

## 2019-12-30 NOTE — TELEPHONE ENCOUNTER
The pharmacy is  requesting a refill of the below medication which has been pended for you:     Requested Prescriptions     Pending Prescriptions Disp Refills    hydrochlorothiazide (HYDRODIURIL) 25 MG tablet [Pharmacy Med Name: HYDROCHLOROT TAB 25MG] 90 tablet 1     Sig: TAKE 1 TABLET DAILY       Last Appointment Date: 12/4/2019  Next Appointment Date: 3/11/2020    Allergies   Allergen Reactions    Adhesive Tape Rash

## 2020-01-02 RX ORDER — ATORVASTATIN CALCIUM 20 MG/1
TABLET, FILM COATED ORAL
Qty: 90 TABLET | Refills: 0 | Status: SHIPPED | OUTPATIENT
Start: 2020-01-02 | End: 2020-04-15 | Stop reason: SDUPTHER

## 2020-01-02 RX ORDER — HYDROCHLOROTHIAZIDE 25 MG/1
TABLET ORAL
Qty: 90 TABLET | Refills: 0 | Status: SHIPPED | OUTPATIENT
Start: 2020-01-02 | End: 2020-01-13 | Stop reason: ALTCHOICE

## 2020-01-02 RX ORDER — HYDROCHLOROTHIAZIDE 25 MG/1
25 TABLET ORAL DAILY
Qty: 90 TABLET | Refills: 0 | Status: SHIPPED | OUTPATIENT
Start: 2020-01-02 | End: 2020-05-08 | Stop reason: ALTCHOICE

## 2020-01-07 ENCOUNTER — TELEPHONE (OUTPATIENT)
Dept: FAMILY MEDICINE CLINIC | Age: 75
End: 2020-01-07

## 2020-01-07 NOTE — TELEPHONE ENCOUNTER
Pt called asking if there would be any ELIQUIS 5mg samples.     Chely Jacome may be reached at [phone]

## 2020-01-09 ENCOUNTER — TELEPHONE (OUTPATIENT)
Dept: FAMILY MEDICINE CLINIC | Age: 75
End: 2020-01-09

## 2020-01-09 NOTE — TELEPHONE ENCOUNTER
Pt called stating asking if she was suppose to be taking hydrochlorothiazide. She was informed that she called on 01/02/2020 for a refill but that was because her spouse told her that she was low on it. She thought it was discontinued in 2018. She is wanting to confirm if she was to discontinue or to continue taking it.      Trina Bridges may be reached at [phone]

## 2020-01-21 ENCOUNTER — TELEPHONE (OUTPATIENT)
Dept: FAMILY MEDICINE CLINIC | Age: 75
End: 2020-01-21

## 2020-01-29 NOTE — TELEPHONE ENCOUNTER
Xu Otoole called and said that Tang Lopez called her regarding questions on the Metformin and the Plavix. She said that they will not refill until the Clarification is made.

## 2020-01-30 RX ORDER — METFORMIN HYDROCHLORIDE 500 MG/1
TABLET, EXTENDED RELEASE ORAL
Qty: 360 TABLET | Refills: 1 | Status: SHIPPED | OUTPATIENT
Start: 2020-01-30 | End: 2020-05-08 | Stop reason: SDUPTHER

## 2020-01-30 RX ORDER — CLOPIDOGREL BISULFATE 75 MG/1
TABLET ORAL
Qty: 90 TABLET | Refills: 1 | Status: SHIPPED | OUTPATIENT
Start: 2020-01-30 | End: 2020-05-08 | Stop reason: SDUPTHER

## 2020-01-31 ENCOUNTER — TELEPHONE (OUTPATIENT)
Dept: CARDIOLOGY CLINIC | Age: 75
End: 2020-01-31

## 2020-01-31 ENCOUNTER — NURSE ONLY (OUTPATIENT)
Dept: CARDIOLOGY CLINIC | Age: 75
End: 2020-01-31
Payer: MEDICARE

## 2020-01-31 PROCEDURE — 93280 PM DEVICE PROGR EVAL DUAL: CPT | Performed by: INTERNAL MEDICINE

## 2020-01-31 RX ORDER — BUMETANIDE 2 MG/1
2 TABLET ORAL DAILY
Qty: 30 TABLET | Refills: 3 | Status: SHIPPED | OUTPATIENT
Start: 2020-01-31 | End: 2020-05-08 | Stop reason: SDUPTHER

## 2020-01-31 RX ORDER — POTASSIUM CHLORIDE 20 MEQ/1
20 TABLET, EXTENDED RELEASE ORAL DAILY
Qty: 30 TABLET | Refills: 3 | Status: SHIPPED | OUTPATIENT
Start: 2020-01-31 | End: 2020-05-08 | Stop reason: SDUPTHER

## 2020-01-31 NOTE — PROGRESS NOTES
medStrutta dual pacer     . Yamila Levels Battery longevity:6 years on device    Atrial impedance 361  RV impedance 475    P wave sensing 0.9  R wave sensing 20 although mostly dependent today, no R waves     98.1 % atrial paced  100 % RV paced     Atrial threshold 0.5V  at 0.4ms  RV threshold 0.75V at 0.4ms  4 mode switches all under 1 minute, no strips to verify

## 2020-01-31 NOTE — PATIENT INSTRUCTIONS
STOP lasix   Start bumex 2mg daily   Start [id number] daily  Have labwork drawn in 10 days (fasting)  Call office on Monday with update UNLESS SOB worses then go to ED

## 2020-02-04 ENCOUNTER — TELEPHONE (OUTPATIENT)
Dept: FAMILY MEDICINE CLINIC | Age: 75
End: 2020-02-04

## 2020-02-18 RX ORDER — LEVOTHYROXINE SODIUM 0.15 MG/1
TABLET ORAL
Qty: 90 TABLET | Refills: 1 | Status: SHIPPED | OUTPATIENT
Start: 2020-02-18 | End: 2020-07-30

## 2020-02-20 LAB
BUN BLDV-MCNC: 19 MG/DL
CALCIUM SERPL-MCNC: 8.9 MG/DL
CHLORIDE BLD-SCNC: 96 MMOL/L
CO2: 28 MMOL/L
CREAT SERPL-MCNC: 1.55 MG/DL
GFR CALCULATED: 33
GLUCOSE BLD-MCNC: 186 MG/DL
POTASSIUM SERPL-SCNC: 4.8 MMOL/L
SODIUM BLD-SCNC: 142 MMOL/L

## 2020-02-25 ENCOUNTER — APPOINTMENT (OUTPATIENT)
Dept: GENERAL RADIOLOGY | Age: 75
End: 2020-02-25
Payer: MEDICARE

## 2020-02-25 ENCOUNTER — HOSPITAL ENCOUNTER (EMERGENCY)
Age: 75
Discharge: HOME | End: 2020-02-25
Attending: EMERGENCY MEDICINE
Payer: MEDICARE

## 2020-02-25 VITALS
TEMPERATURE: 97.8 F | HEART RATE: 68 BPM | DIASTOLIC BLOOD PRESSURE: 74 MMHG | HEIGHT: 67 IN | WEIGHT: 254 LBS | OXYGEN SATURATION: 96 % | RESPIRATION RATE: 18 BRPM | SYSTOLIC BLOOD PRESSURE: 150 MMHG | BODY MASS INDEX: 39.87 KG/M2

## 2020-02-25 PROCEDURE — 73610 X-RAY EXAM OF ANKLE: CPT

## 2020-02-25 PROCEDURE — 99283 EMERGENCY DEPT VISIT LOW MDM: CPT

## 2020-02-25 PROCEDURE — 73564 X-RAY EXAM KNEE 4 OR MORE: CPT

## 2020-02-25 ASSESSMENT — ENCOUNTER SYMPTOMS
NAUSEA: 0
ABDOMINAL PAIN: 0
VOMITING: 0
SHORTNESS OF BREATH: 0

## 2020-02-25 ASSESSMENT — PAIN DESCRIPTION - LOCATION: LOCATION: LEG;KNEE;ANKLE

## 2020-02-25 ASSESSMENT — PAIN SCALES - GENERAL: PAINLEVEL_OUTOF10: 1

## 2020-02-25 ASSESSMENT — PAIN DESCRIPTION - ORIENTATION: ORIENTATION: RIGHT

## 2020-02-25 ASSESSMENT — PAIN DESCRIPTION - DESCRIPTORS: DESCRIPTORS: DULL

## 2020-02-25 ASSESSMENT — PAIN DESCRIPTION - PAIN TYPE: TYPE: ACUTE PAIN

## 2020-02-25 NOTE — TELEPHONE ENCOUNTER
Date of last visit:  12/4/2019  Date of next visit:  3/11/2020    Requested Prescriptions     Pending Prescriptions Disp Refills    ramipril (ALTACE) 10 MG capsule [Pharmacy Med Name: Ramipril 10 MG Oral Capsule] 180 capsule 1     Sig: TAKE 1 CAPSULE BY MOUTH TWICE DAILY

## 2020-02-25 NOTE — CARE COORDINATION
ED Care Transition    2020    Patient Name: Eleazar Abbott   : 1945  MRN: [id number]    JOSEY Score: 3  PCP: Nawaf Childress MD   Specialist: yes - Dr. Van Cohen, Dr. Shane Durham   Active ACC/CTC: no                       Utilization Review:  ED visits: 2  20 - Fall, right knee/ankle pain swelling   19 - Chest pain   Admissions: 0    Problem List:  Patient Active Problem List   Diagnosis    Type 2 diabetes mellitus without complication ([facility] [address]    Hyperlipidemia    Hypertension    CAD (coronary artery disease)    Aortic aneurysm ([facility] [address]    Hypothyroidism    Diabetic peripheral neuropathy ([facility] [address]    High-grade atrioventricular block    Sick sinus syndrome ([facility] [address]    S/P cardiac pacemaker procedure: 10/21/2017: Medtronic Dual Chamber.  Pulmonary nodule, right    Congestive heart failure (HCC)    Paroxysmal atrial fibrillation ([facility] [address]     Summary:  Met with Swapna Bougie, introduced self/role. Presented to ED for evaluation of right ankle/knee pain. Patient reports she fell yesterday getting into the shower. Reports she caught her foot on the edge of the tub. Patient resides at home with spouse, good family support, manages own medications, able to obtain medications, active , spouse drives, has cane/walker. Denies further needs/assistance. Reports spouse is having cataract surgery on both eyes over the next two weeks. Plan is for discharge. Scheduled follow up appointment with Dr. Shane Durham at [address] [place] [address] on 3/2/20 at [phone]. Patient reports she will have transportation to appointment. Continues to decline further needs/assistance upon discharge.        Follow up appointments:    Future Appointments   Date Time Provider [facility]   3/6/2020 11:00 AM Oscar Vega MD  [facility] [place]   3/11/2020  9:40 AM Nawaf Childress MD [facility]   2020 11:00 AM MD GARIMA Castellanos [facility] [place]   2021 11:30 AM SCHEDULE, [facility] PACER NURSE [facility] [place]

## 2020-02-25 NOTE — ED NOTES
Pt resting in bed watching tv. Denies needs at this time. Call light in reach. Will monitor.       Kena Pérez RN  02/25/20 [id number]

## 2020-02-25 NOTE — ED PROVIDER NOTES
[facility] [address] [address] EMERGENCY DEPT  eMERGENCYdEPARTMENT eNCOUnter      Pt Name: Jerome Armas  MRN: [id number]  [place] 1945  Date of evaluation: 2/25/2020  Provider:Alexander A Delana Closs, DO, [place] COMPLAINT       Chief Complaint   Patient presents with    Knee Injury     right    Ankle Injury     right       HISTORY OF PRESENT ILLNESS    Jerome Armas is a 70 y.o. female who presents to the emergency department from home for right ankle and knee pain. Patient states she fell yesterday getting into the shower. She states he left foot caught on the edge of the tub causing her to fall. She states she fell onto her right side. She reports right leg and ankle swelling. The patient rates her pain as a 1/10 in severity and describes it as aching in character. She states her pain is constant and gets worse with walking. She denies hip pain, back pain, or neck pain. Patient states she has been ambulating with a cane since the fall. The patient has a past medical history of aortic aneurysm, CAD, arthritis, CAD, A-fib, CHF, HLD, HTN, pulmonary nodule, DM, cardiac stent, pacemaker, and bilateral knee arthroscopy. No further complaints at the time of the initial encounter. Triage notes and Nursing notes were reviewed by myself. Any discrepancies are addressedabove. PAST MEDICAL HISTORY     Past Medical History:   Diagnosis Date    Aortic aneurysm (HCC)     4.5 cm aorta    Arthritis     Atrial fibrillation, chronic 1/25/2019    CAD (coronary artery disease)     CHF NYHA class II, unspecified failure chronicity, unspecified type ([facility] 10/8/2018    Colon polyps 3/01    Diabetic peripheral neuropathy ([facility] 2014      feet    Elbow fracture     Hyperlipidemia     Hypertension     Hypothyroidism     Pulmonary nodule, right 10/2017      repeat  ct of  chest  4-2018    S/P cardiac pacemaker procedure: 10/21/2017: Medtronic Dual Chamber. 10/21/2017    10/21/2017: Medtronic Dual Chamber.  Dr. Rodriguez Drew Type II or unspecified type diabetes mellitus without mention of complication, not stated as uncontrolled        SURGICALHISTORY       Past Surgical History:   Procedure Laterality Date    BREAST BIOPSY Right 2016     benign   abrahan    BREAST SURGERY      CARDIOVERSION  2019    atrial fib to sinus  baki     SECTION  over 30 years ago     COLONOSCOPY      colon polyps   segovia    CORONARY ANGIOPLASTY      baki    CORONARY ANGIOPLASTY WITH STENT PLACEMENT      baki    CORONARY ANGIOPLASTY WITH STENT PLACEMENT  10/2018     lad branch    HEEL SPUR SURGERY Bilateral     bilat with hardware    HYSTERECTOMY  1984    KNEE ARTHROSCOPY  ; 3/02    right and left knee    KNEE ARTHROSCOPY Bilateral     bilat    PACEMAKER PLACEMENT  10/2017    THYROIDECTOMY, PARTIAL  1980's       CURRENT MEDICATIONS       Discharge Medication List as of 2020  2:54 PM      CONTINUE these medications which have NOT CHANGED    Details   levothyroxine (SYNTHROID) 150 MCG tablet TAKE 1 TABLET BY MOUTH ONCE DAILY, Disp-90 tablet, R-1Normal      bumetanide (BUMEX) 2 MG tablet Take 1 tablet by mouth daily, Disp-30 tablet, R-3Normal      potassium chloride (KLOR-CON M) 20 MEQ extended release tablet Take 1 tablet by mouth daily, Disp-30 tablet, R-3Normal      clopidogrel (PLAVIX) 75 MG tablet TAKE 1 TABLET DAILY, Disp-90 tablet, R-1Normal      metFORMIN (GLUCOPHAGE-XR) 500 MG extended release tablet TAKE 2 TABLETS AT [facility] AT THE EVENING MEAL, Disp-360 tablet, R-1Normal      atorvastatin (LIPITOR) 20 MG tablet TAKE 1 TABLET DAILY, Disp-90 tablet, R-0Normal      hydrochlorothiazide (HYDRODIURIL) 25 MG tablet Take 1 tablet by mouth daily, Disp-90 tablet, R-0Normal      metoprolol tartrate (LOPRESSOR) 50 MG tablet TAKE 1 TABLET TWICE A DAY, Disp-180 tablet, R-1Normal      gabapentin (NEURONTIN) 300 MG capsule TAKE 1 CAPSULE BY MOUTH ONCE DAILY IN THE EVENING, Disp-90 capsule, R-1Normal      glimepiride (AMARYL) 4 MG tablet TAKE 1 TABLET DAILY, Disp-90 tablet, R-1Normal      amLODIPine (NORVASC) 10 MG tablet TAKE 1 TABLET DAILY, Disp-90 tablet, R-1Normal      ramipril (ALTACE) 10 MG capsule TAKE 1 CAPSULE BY MOUTH TWICE DAILY, Disp-180 capsule, R-1Normal      pantoprazole sodium (PROTONIX) 40 MG PACK packet Take 40 mg by mouth every morning (before breakfast)Historical Med      amiodarone (CORDARONE) 200 MG tablet Take 200 mg BID for 1 week then daily after, Disp-35 tablet, R-11Normal      apixaban (ELIQUIS) 5 MG TABS tablet Take 1 tablet by mouth 2 times daily, Disp-60 tablet, R-3Normal      Flaxseed, Linseed, (FLAX SEEDS PO) Take by mouthHistorical Med      nitroGLYCERIN (NITROSTAT) 0.4 MG SL tablet Place 1 tablet under the tongue every 5 minutes as needed for Chest pain up to max of 3 total doses. If no relief after 1 dose, call 911., Disp-25 tablet, R-1Normal      Glucose Blood (JOSE E BREEZE 2 TEST) DISK USE ONE STRIP TWICE DAILY, DX: E11.9, Disp-200 each, R-5Please consider 90 day supplies to promote better adherenceNormal      Ascorbic Acid (VITAMIN C) 500 MG tablet Take 1,000 mg by mouth daily Historical Med             ALLERGIES     Adhesive tape    FAMILY HISTORY       Family History   Problem Relation Age of Onset    Heart Disease Mother 80        bacterial infection at valve    Heart Disease Father     Heart Attack Father     Heart Disease Brother         CABG        SOCIAL HISTORY       Social History     Socioeconomic History    Marital status:      Spouse name: None    Number of children: None    Years of education: None    Highest education level: None   Occupational History    None   Social Needs    Financial resource strain: None    Food insecurity:     Worry: None     Inability: None    Transportation needs:     Medical: None     Non-medical: None   Tobacco Use    Smoking status: Never Smoker    Smokeless tobacco: Never Used   Substance and Sexual Activity    Alcohol use:  No blank)  None    Procedures:(None ifblank)       CLINICAL IMPRESSION      1. Sprain of right ankle, unspecified ligament, initial encounter    2. Hypertension, unspecified type          DISPOSITION/PLAN   DISPOSITION        PATIENTREFERRED TO:  [address]  [place] [address] [address] [facility] [phone]  In 3 days      Giovanni Johnson MD  [address]    In 1 week        DISCHARGE MEDICATIONS:  Discharge Medication List as of 2/25/2020  2:54 PM                 (Pleasenote that portions of this note were completed with a voice recognition program.  Efforts were made to edit the dictations but occasionally words are mis-transcribed.)    Scribe:  Marcella Epstein 2/25/20 1:31 PM Scribing for and in the presence of Fahad Royal DO. Signed by: Clarence Marquez, 03/02/20 12:18 PM    Provider:  I personally performed the services described in the documentation, reviewedand edited thedocumentation which was dictated to the scribe in my presence, and it accurately records my words and actions.     Fahad Royal DO 2/25/20 12:18 PM         Fahad Royal DO, FACEP (electronically signed)  Attending Emergency Physician        Fahad Royal DO  03/02/20 [id number]

## 2020-02-26 RX ORDER — RAMIPRIL 10 MG/1
CAPSULE ORAL
Qty: 180 CAPSULE | Refills: 1 | Status: SHIPPED | OUTPATIENT
Start: 2020-02-26 | End: 2020-07-30

## 2020-03-06 ENCOUNTER — OFFICE VISIT (OUTPATIENT)
Dept: CARDIOLOGY CLINIC | Age: 75
End: 2020-03-06
Payer: MEDICARE

## 2020-03-06 VITALS
WEIGHT: 255.4 LBS | DIASTOLIC BLOOD PRESSURE: 80 MMHG | BODY MASS INDEX: 38.71 KG/M2 | SYSTOLIC BLOOD PRESSURE: 160 MMHG | HEIGHT: 68 IN | HEART RATE: 84 BPM

## 2020-03-06 PROCEDURE — 99213 OFFICE O/P EST LOW 20 MIN: CPT | Performed by: NUCLEAR MEDICINE

## 2020-03-06 NOTE — PROGRESS NOTES
[address] [facility],[place] [address] [address]  [place] [address]  Dept: [phone]  Dept Fax: [phone]  Loc: [phone]    Visit Date: 3/6/2020    Jerome Armas is a 70 y.o. female who presents todayfor:  Chief Complaint   Patient presents with    Follow-up     increased SOB and pitting edema    Hypertension    Coronary Artery Disease    Atrial Fibrillation     Known A fib   Cardioverted   Did fair  Did have some CHF   Changed to bumex   Did better   Now with fall and ankle sprain  Some hematoma   Known LAD stent   And pacer    HPI:  HPI  Past Medical History:   Diagnosis Date    Aortic aneurysm ([facility]     4.5 cm aorta    Arthritis     Atrial fibrillation, chronic 2019    CAD (coronary artery disease)     CHF NYHA class II, unspecified failure chronicity, unspecified type ([address] 10/8/2018    Colon polyps 3/01    Diabetic peripheral neuropathy ([facility] 2014      feet    Elbow fracture     Hyperlipidemia     Hypertension     Hypothyroidism     Pulmonary nodule, right 10/2017      repeat  ct of  chest      S/P cardiac pacemaker procedure: 10/21/2017: Medtronic Dual Chamber. 10/21/2017    10/21/2017: Medtronic Dual Chamber.  Dr. Rodriguez Drew Type II or unspecified type diabetes mellitus without mention of complication, not stated as uncontrolled       Past Surgical History:   Procedure Laterality Date    BREAST BIOPSY Right 2016     benign   abrahan    BREAST SURGERY      CARDIOVERSION  2019    atrial fib to sinus  baki     SECTION  over 30 years ago     COLONOSCOPY  2012    colon polyps   segovia    CORONARY ANGIOPLASTY      baki    CORONARY ANGIOPLASTY WITH STENT PLACEMENT      baki    CORONARY ANGIOPLASTY WITH STENT PLACEMENT  10/2018     lad branch    HEEL SPUR SURGERY Bilateral     bilat with hardware    HYSTERECTOMY  1984    KNEE ARTHROSCOPY  ; 3/02    right and left knee    KNEE ARTHROSCOPY Bilateral developed, well nourished  Lungs:   Clear to auscultation  Heart:    Normal S1 S2, Slight murmur. no rubs, no gallops  Abdomen:   Soft, non tender, no organomegalies, positive bowel sounds  Extremities:   No edema, no cyanosis, good peripheral pulses  Neurological:   Awake, alert, oriented. No obvious focal deficits  Musculoskelatal:  No obvious deformities    Assessment:      Diagnosis Orders   1. Coronary artery disease involving native coronary artery of native heart without angina pectoris     2. Essential hypertension     3. Paroxysmal atrial fibrillation (HCC)      cardiac fair for now     Plan:  No follow-ups on file. As above  Continue risk factor modification and medical management  Thank you for allowing me to participate in the care of your patient. Please don't hesitate to contact me regarding any further issues related to the patient care    Orders Placed:  No orders of the defined types were placed in this encounter. Medications Prescribed:  No orders of the defined types were placed in this encounter. Discussed use, benefit, and side effects of prescribed medications. All patient questions answered. Pt voicedunderstanding. Instructed to continue current medications, diet and exercise. Continue risk factor modification and medical management. Patient agreed with treatment plan. Follow up as directed.     Electronically signedby Suyapa Juarez MD on 3/6/2020 at 11:25 AM

## 2020-03-20 ENCOUNTER — TELEPHONE (OUTPATIENT)
Dept: FAMILY MEDICINE CLINIC | Age: 75
End: 2020-03-20

## 2020-03-24 ENCOUNTER — TELEPHONE (OUTPATIENT)
Dept: CARDIOLOGY CLINIC | Age: 75
End: 2020-03-24

## 2020-03-24 RX ORDER — GABAPENTIN 300 MG/1
CAPSULE ORAL
Qty: 90 CAPSULE | Refills: 0 | Status: SHIPPED | OUTPATIENT
Start: 2020-03-24 | End: 2020-06-23

## 2020-03-24 NOTE — TELEPHONE ENCOUNTER
Patient is wanting to know if she should continue the Bumex or go back on the Lasix.     Please advise [phone]

## 2020-03-24 NOTE — TELEPHONE ENCOUNTER
tentatively see one month  As visit due or if interested will do  Video visit and will call .  Just place on list

## 2020-03-30 NOTE — TELEPHONE ENCOUNTER
Date of last visit:  12/4/2019  Date of next visit:  Visit date not found    Requested Prescriptions     Pending Prescriptions Disp Refills    glimepiride (AMARYL) 4 MG tablet 90 tablet 1     Sig: TAKE 1 TABLET DAILY

## 2020-03-31 RX ORDER — GLIMEPIRIDE 4 MG/1
TABLET ORAL
Qty: 90 TABLET | Refills: 1 | Status: SHIPPED | OUTPATIENT
Start: 2020-03-31 | End: 2020-09-29

## 2020-04-08 ENCOUNTER — TELEPHONE (OUTPATIENT)
Dept: FAMILY MEDICINE CLINIC | Age: 75
End: 2020-04-08

## 2020-04-08 NOTE — TELEPHONE ENCOUNTER
Pt called asking for samples of Eliquis 5mg. Pt takes 1 tab twice daily.     Vanessa Schmid may be reached at [phone]

## 2020-04-15 RX ORDER — ATORVASTATIN CALCIUM 20 MG/1
TABLET, FILM COATED ORAL
Qty: 90 TABLET | Refills: 0 | Status: SHIPPED | OUTPATIENT
Start: 2020-04-15 | End: 2020-05-29 | Stop reason: DRUGHIGH

## 2020-04-15 NOTE — TELEPHONE ENCOUNTER
Patient called req ref of Atorvastatin to [facility]. She is going to talk to her grandaughter and see if we can schedule face time appt on the IPad.     She will call back to schedule

## 2020-04-20 RX ORDER — AMIODARONE HYDROCHLORIDE 200 MG/1
TABLET ORAL
Qty: 30 TABLET | Refills: 8 | Status: SHIPPED | OUTPATIENT
Start: 2020-04-20 | End: 2021-01-13

## 2020-04-24 NOTE — TELEPHONE ENCOUNTER
Héctor Nune[facility] called requesting a refill of their:     amLODIPine (NORVASC) 10 MG tablet QD    Send 90 day supply to [facility]

## 2020-04-27 RX ORDER — AMLODIPINE BESYLATE 10 MG/1
TABLET ORAL
Qty: 90 TABLET | Refills: 1 | Status: SHIPPED | OUTPATIENT
Start: 2020-04-27 | End: 2020-10-17

## 2020-04-27 NOTE — TELEPHONE ENCOUNTER
Date of last visit:  12/4/2019  Date of next visit:  Visit date not found    Requested Prescriptions     Signed Prescriptions Disp Refills    amLODIPine (NORVASC) 10 MG tablet 90 tablet 1     Sig: TAKE 1 TABLET DAILY     Authorizing Provider: Roman Saha     Ordering User: Sandy Srivastava

## 2020-05-05 ENCOUNTER — PROCEDURE VISIT (OUTPATIENT)
Dept: CARDIOLOGY CLINIC | Age: 75
End: 2020-05-05
Payer: MEDICARE

## 2020-05-05 PROCEDURE — 93296 REM INTERROG EVL PM/IDS: CPT | Performed by: INTERNAL MEDICINE

## 2020-05-05 PROCEDURE — 93294 REM INTERROG EVL PM/LDLS PM: CPT | Performed by: INTERNAL MEDICINE

## 2020-05-08 ENCOUNTER — OFFICE VISIT (OUTPATIENT)
Dept: FAMILY MEDICINE CLINIC | Age: 75
End: 2020-05-08

## 2020-05-08 VITALS
RESPIRATION RATE: 16 BRPM | HEART RATE: 84 BPM | WEIGHT: 262.25 LBS | SYSTOLIC BLOOD PRESSURE: 132 MMHG | HEIGHT: 68 IN | BODY MASS INDEX: 39.74 KG/M2 | DIASTOLIC BLOOD PRESSURE: 74 MMHG

## 2020-05-08 LAB
CHP ED QC CHECK: ABNORMAL
GLUCOSE BLD-MCNC: 231 MG/DL
HBA1C MFR BLD: 6.6 %

## 2020-05-08 PROCEDURE — 82962 GLUCOSE BLOOD TEST: CPT | Performed by: FAMILY MEDICINE

## 2020-05-08 PROCEDURE — 99214 OFFICE O/P EST MOD 30 MIN: CPT | Performed by: FAMILY MEDICINE

## 2020-05-08 PROCEDURE — 83036 HEMOGLOBIN GLYCOSYLATED A1C: CPT | Performed by: FAMILY MEDICINE

## 2020-05-08 RX ORDER — BUMETANIDE 2 MG/1
2 TABLET ORAL DAILY
Qty: 90 TABLET | Refills: 1 | Status: SHIPPED | OUTPATIENT
Start: 2020-05-08 | End: 2020-11-17

## 2020-05-08 RX ORDER — POTASSIUM CHLORIDE 20 MEQ/1
20 TABLET, EXTENDED RELEASE ORAL DAILY
Qty: 90 TABLET | Refills: 1 | Status: SHIPPED | OUTPATIENT
Start: 2020-05-08 | End: 2020-10-16

## 2020-05-08 RX ORDER — METOPROLOL TARTRATE 50 MG/1
TABLET, FILM COATED ORAL
Qty: 180 TABLET | Refills: 1 | Status: SHIPPED | OUTPATIENT
Start: 2020-05-08 | End: 2020-12-15

## 2020-05-08 RX ORDER — CLOPIDOGREL BISULFATE 75 MG/1
TABLET ORAL
Qty: 90 TABLET | Refills: 1 | Status: SHIPPED | OUTPATIENT
Start: 2020-05-08 | End: 2020-11-23 | Stop reason: SDUPTHER

## 2020-05-08 RX ORDER — METFORMIN HYDROCHLORIDE 500 MG/1
TABLET, EXTENDED RELEASE ORAL
Qty: 360 TABLET | Refills: 1 | Status: SHIPPED | OUTPATIENT
Start: 2020-05-08 | End: 2021-04-07

## 2020-05-08 ASSESSMENT — ENCOUNTER SYMPTOMS
WHEEZING: 0
CONSTIPATION: 0
SHORTNESS OF BREATH: 0
COUGH: 0
ORTHOPNEA: 0
CHEST TIGHTNESS: 0
ABDOMINAL PAIN: 0
EYE PAIN: 0
NAUSEA: 0
SORE THROAT: 0

## 2020-05-08 ASSESSMENT — PATIENT HEALTH QUESTIONNAIRE - PHQ9
SUM OF ALL RESPONSES TO PHQ QUESTIONS 1-9: 0
1. LITTLE INTEREST OR PLEASURE IN DOING THINGS: 0
SUM OF ALL RESPONSES TO PHQ QUESTIONS 1-9: 0
2. FEELING DOWN, DEPRESSED OR HOPELESS: 0
SUM OF ALL RESPONSES TO PHQ9 QUESTIONS 1 & 2: 0

## 2020-05-08 NOTE — PROGRESS NOTES
mellitus without mention of complication, not stated as uncontrolled      Past Surgical History:   Procedure Laterality Date    BREAST BIOPSY Right 2016     benign   abrahan    BREAST SURGERY      CARDIOVERSION  2019    atrial fib to sinus  baki     SECTION  over 30 years ago     COLONOSCOPY  2012    colon polyps   segovia    CORONARY ANGIOPLASTY      baki    CORONARY ANGIOPLASTY WITH STENT PLACEMENT      baki    CORONARY ANGIOPLASTY WITH STENT PLACEMENT  10/2018     lad branch    HEEL SPUR SURGERY Bilateral     bilat with hardware    HYSTERECTOMY  1984    KNEE ARTHROSCOPY  ; 3/02    right and left knee    KNEE ARTHROSCOPY Bilateral     bilat    PACEMAKER PLACEMENT  10/2017    THYROIDECTOMY, PARTIAL       Social History     Socioeconomic History    Marital status:      Spouse name: Not on file    Number of children: Not on file    Years of education: Not on file    Highest education level: Not on file   Occupational History    Not on file   Social Needs    Financial resource strain: Not on file    Food insecurity     Worry: Not on file     Inability: Not on file    Transportation needs     Medical: Not on file     Non-medical: Not on file   Tobacco Use    Smoking status: Never Smoker    Smokeless tobacco: Never Used   Substance and Sexual Activity    Alcohol use: No    Drug use: No    Sexual activity: Never   Lifestyle    Physical activity     Days per week: Not on file     Minutes per session: Not on file    Stress: Not on file   Relationships    Social connections     Talks on phone: Not on file     Gets together: Not on file     Attends Sabianism service: Not on file     Active member of club or organization: Not on file     Attends meetings of clubs or organizations: Not on file     Relationship status: Not on file    Intimate partner violence     Fear of current or ex partner: Not on file     Emotionally abused: Not on file     Physically

## 2020-05-12 ENCOUNTER — TELEPHONE (OUTPATIENT)
Dept: CARDIOLOGY CLINIC | Age: 75
End: 2020-05-12

## 2020-05-12 NOTE — TELEPHONE ENCOUNTER
Pt called the office and said when she woke up this am about 4, she felt like she her pacemaker was giving her stimulation like a tens unit feels .   I asked her to send a remote download and we will take a look at it

## 2020-05-26 LAB
CHOLESTEROL, TOTAL: NORMAL
CHOLESTEROL/HDL RATIO: NORMAL
HDLC SERPL-MCNC: NORMAL MG/DL
LDL CHOLESTEROL CALCULATED: 94 MG/DL (ref 0–160)
TRIGL SERPL-MCNC: NORMAL MG/DL
VLDLC SERPL CALC-MCNC: NORMAL MG/DL

## 2020-05-29 ENCOUNTER — TELEPHONE (OUTPATIENT)
Dept: FAMILY MEDICINE CLINIC | Age: 75
End: 2020-05-29

## 2020-05-29 RX ORDER — ATORVASTATIN CALCIUM 20 MG/1
TABLET, FILM COATED ORAL
Qty: 90 TABLET | Refills: 0 | Status: SHIPPED
Start: 2020-05-29 | End: 2020-06-23

## 2020-05-29 NOTE — TELEPHONE ENCOUNTER
----- Message from Nataly Simeon MD sent at 5/29/2020  5:37 AM EDT -----  Call as same  Dose  Of thyroid and creat better at 1.46 as was 1.55.   Chol could be  Better and increase to 40 mg lipitor  And labs 9-1-20 on chol as want ldl lower

## 2020-06-23 RX ORDER — ATORVASTATIN CALCIUM 20 MG/1
TABLET, FILM COATED ORAL
Qty: 60 TABLET | Refills: 0 | Status: SHIPPED | OUTPATIENT
Start: 2020-06-23 | End: 2020-07-22

## 2020-06-23 RX ORDER — GABAPENTIN 300 MG/1
CAPSULE ORAL
Qty: 30 CAPSULE | Refills: 0 | Status: SHIPPED | OUTPATIENT
Start: 2020-06-23 | End: 2020-07-22

## 2020-07-21 NOTE — TELEPHONE ENCOUNTER
Date of last visit:  Visit date not found  Date of next visit:  8/11/2020    Requested Prescriptions     Pending Prescriptions Disp Refills    atorvastatin (LIPITOR) 20 MG tablet [Pharmacy Med Name: Atorvastatin Calcium 20 MG Oral Tablet] 60 tablet 0     Sig: Take 2 tablets by mouth once daily    gabapentin (NEURONTIN) 300 MG capsule [Pharmacy Med Name: Gabapentin 300 MG Oral Capsule] 30 capsule 0     Sig: TAKE 1 CAPSULE BY MOUTH ONCE DAILY IN THE EVENING

## 2020-07-22 RX ORDER — GABAPENTIN 300 MG/1
CAPSULE ORAL
Qty: 30 CAPSULE | Refills: 5 | Status: SHIPPED | OUTPATIENT
Start: 2020-07-22 | End: 2021-02-02

## 2020-07-22 RX ORDER — ATORVASTATIN CALCIUM 20 MG/1
TABLET, FILM COATED ORAL
Qty: 60 TABLET | Refills: 0 | Status: SHIPPED | OUTPATIENT
Start: 2020-07-22 | End: 2020-08-17

## 2020-07-29 NOTE — TELEPHONE ENCOUNTER
Date of last visit:  5/8/2020  Date of next visit:  8/11/2020    Requested Prescriptions     Pending Prescriptions Disp Refills    levothyroxine (SYNTHROID) 150 MCG tablet [Pharmacy Med Name: Levothyroxine Sodium 150 MCG Oral Tablet] 90 tablet 0     Sig: Take 1 tablet by mouth once daily    ramipril (ALTACE) 10 MG capsule [Pharmacy Med Name: Ramipril 10 MG Oral Capsule] 180 capsule 0     Sig: Take 1 capsule by mouth twice daily

## 2020-07-30 RX ORDER — LEVOTHYROXINE SODIUM 0.15 MG/1
TABLET ORAL
Qty: 90 TABLET | Refills: 0 | Status: SHIPPED | OUTPATIENT
Start: 2020-07-30 | End: 2020-11-17

## 2020-07-30 RX ORDER — RAMIPRIL 10 MG/1
CAPSULE ORAL
Qty: 180 CAPSULE | Refills: 0 | Status: SHIPPED | OUTPATIENT
Start: 2020-07-30 | End: 2020-11-17

## 2020-08-12 ENCOUNTER — HOSPITAL ENCOUNTER (OUTPATIENT)
Dept: WOMENS IMAGING | Age: 75
Discharge: HOME | End: 2020-08-12
Payer: MEDICARE

## 2020-08-12 PROCEDURE — 77063 BREAST TOMOSYNTHESIS BI: CPT

## 2020-08-13 ENCOUNTER — PROCEDURE VISIT (OUTPATIENT)
Dept: CARDIOLOGY CLINIC | Age: 75
End: 2020-08-13
Payer: MEDICARE

## 2020-08-13 PROCEDURE — 93296 REM INTERROG EVL PM/IDS: CPT | Performed by: NUCLEAR MEDICINE

## 2020-08-13 PROCEDURE — 93294 REM INTERROG EVL PM/LDLS PM: CPT | Performed by: NUCLEAR MEDICINE

## 2020-08-13 NOTE — PROGRESS NOTES
"[employer] dual pacer     . Ronold Kanner Battery longevity:  6 years   Presenting rhythm  AP     Atrial impedance 361  RV impedance 494    P wave sensing 0.9  R wave sensing 20    99.3 % atrial paced  100 % RV paced     Atrial threshold not measured   RV threshold 0.75 V at 0.4ms  Mode switches   0

## 2020-08-17 NOTE — TELEPHONE ENCOUNTER
Date of last visit:  5/8/2020  Date of next visit:  8/18/2020    Requested Prescriptions     Pending Prescriptions Disp Refills    atorvastatin (LIPITOR) 20 MG tablet [Pharmacy Med Name: Atorvastatin Calcium 20 MG Oral Tablet] 60 tablet 1     Sig: Take 2 tablets by mouth once daily

## 2020-08-18 ENCOUNTER — OFFICE VISIT (OUTPATIENT)
Dept: FAMILY MEDICINE CLINIC | Age: 75
End: 2020-08-18

## 2020-08-18 VITALS
BODY MASS INDEX: 39.46 KG/M2 | WEIGHT: 260.38 LBS | DIASTOLIC BLOOD PRESSURE: 74 MMHG | HEIGHT: 68 IN | SYSTOLIC BLOOD PRESSURE: 126 MMHG | TEMPERATURE: 97.3 F | RESPIRATION RATE: 16 BRPM | HEART RATE: 80 BPM

## 2020-08-18 LAB
CHP ED QC CHECK: ABNORMAL
CREATININE URINE POCT: NEGATIVE
GLUCOSE BLD-MCNC: 187 MG/DL
HBA1C MFR BLD: 6.8 %
MICROALBUMIN/CREAT 24H UR: NEGATIVE MG/G{CREAT}
MICROALBUMIN/CREAT UR-RTO: NORMAL

## 2020-08-18 PROCEDURE — 82962 GLUCOSE BLOOD TEST: CPT | Performed by: FAMILY MEDICINE

## 2020-08-18 PROCEDURE — 82044 UR ALBUMIN SEMIQUANTITATIVE: CPT | Performed by: FAMILY MEDICINE

## 2020-08-18 PROCEDURE — 83036 HEMOGLOBIN GLYCOSYLATED A1C: CPT | Performed by: FAMILY MEDICINE

## 2020-08-18 PROCEDURE — 99213 OFFICE O/P EST LOW 20 MIN: CPT | Performed by: FAMILY MEDICINE

## 2020-08-18 RX ORDER — ATORVASTATIN CALCIUM 20 MG/1
TABLET, FILM COATED ORAL
Qty: 60 TABLET | Refills: 1 | Status: SHIPPED | OUTPATIENT
Start: 2020-08-18 | End: 2020-10-27

## 2020-08-18 ASSESSMENT — ENCOUNTER SYMPTOMS
NAUSEA: 0
ABDOMINAL PAIN: 0
CHEST TIGHTNESS: 0
SORE THROAT: 0
CONSTIPATION: 0
ORTHOPNEA: 0
WHEEZING: 0
COUGH: 0
EYE PAIN: 0
SHORTNESS OF BREATH: 0

## 2020-09-29 NOTE — TELEPHONE ENCOUNTER
Date of last visit:  8/18/2020  Date of next visit:  11/23/2020    Requested Prescriptions     Pending Prescriptions Disp Refills    glimepiride (AMARYL) 4 MG tablet [Pharmacy Med Name: Glimepiride 4 MG Oral Tablet] 90 tablet 1     Sig: Take 1 tablet by mouth once daily

## 2020-09-30 RX ORDER — GLIMEPIRIDE 4 MG/1
TABLET ORAL
Qty: 90 TABLET | Refills: 1 | Status: SHIPPED | OUTPATIENT
Start: 2020-09-30 | End: 2021-04-06

## 2020-10-06 ENCOUNTER — TELEPHONE (OUTPATIENT)
Dept: FAMILY MEDICINE CLINIC | Age: 75
End: 2020-10-06

## 2020-10-12 ENCOUNTER — NURSE ONLY (OUTPATIENT)
Dept: LAB | Age: 75
End: 2020-10-12

## 2020-10-12 LAB
T4 FREE: 1.96 NG/DL (ref 0.93–1.76)
TSH SERPL DL<=0.05 MIU/L-ACNC: 6.83 UIU/ML (ref 0.4–4.2)

## 2020-10-13 ENCOUNTER — TELEPHONE (OUTPATIENT)
Dept: FAMILY MEDICINE CLINIC | Age: 75
End: 2020-10-13

## 2020-10-13 NOTE — TELEPHONE ENCOUNTER
----- Message from Clarence Nice MD sent at 10/12/2020  7:01 PM EDT -----  Stay on ssame dose of thyroid and will repeat level in 4 mths

## 2020-10-16 RX ORDER — POTASSIUM CHLORIDE 20 MEQ/1
TABLET, EXTENDED RELEASE ORAL
Qty: 30 TABLET | Refills: 1 | Status: SHIPPED | OUTPATIENT
Start: 2020-10-16 | End: 2021-01-13

## 2020-10-17 RX ORDER — AMLODIPINE BESYLATE 10 MG/1
TABLET ORAL
Qty: 90 TABLET | Refills: 0 | Status: SHIPPED | OUTPATIENT
Start: 2020-10-17 | End: 2021-01-13

## 2020-10-26 ENCOUNTER — NURSE ONLY (OUTPATIENT)
Dept: FAMILY MEDICINE CLINIC | Age: 75
End: 2020-10-26

## 2020-10-26 PROCEDURE — 90688 IIV4 VACCINE SPLT 0.5 ML IM: CPT | Performed by: FAMILY MEDICINE

## 2020-10-26 PROCEDURE — G0008 ADMIN INFLUENZA VIRUS VAC: HCPCS | Performed by: FAMILY MEDICINE

## 2020-10-26 NOTE — TELEPHONE ENCOUNTER
Date of last visit:  8/18/2020  Date of next visit:  11/23/2020    Requested Prescriptions     Pending Prescriptions Disp Refills    atorvastatin (LIPITOR) 20 MG tablet [Pharmacy Med Name: Atorvastatin Calcium 20 MG Oral Tablet] 60 tablet 0     Sig: Take 2 tablets by mouth once daily

## 2020-10-27 RX ORDER — ATORVASTATIN CALCIUM 20 MG/1
TABLET, FILM COATED ORAL
Qty: 60 TABLET | Refills: 0 | Status: SHIPPED | OUTPATIENT
Start: 2020-10-27 | End: 2020-11-17 | Stop reason: ALTCHOICE

## 2020-11-17 RX ORDER — ATORVASTATIN CALCIUM 20 MG/1
TABLET, FILM COATED ORAL
Qty: 60 TABLET | Refills: 0 | OUTPATIENT
Start: 2020-11-17

## 2020-11-17 RX ORDER — RAMIPRIL 10 MG/1
CAPSULE ORAL
Qty: 180 CAPSULE | Refills: 0 | Status: SHIPPED | OUTPATIENT
Start: 2020-11-17 | End: 2021-02-20

## 2020-11-17 RX ORDER — ATORVASTATIN CALCIUM 40 MG/1
40 TABLET, FILM COATED ORAL DAILY
Qty: 90 TABLET | Refills: 1 | Status: SHIPPED | OUTPATIENT
Start: 2020-11-17 | End: 2021-05-27

## 2020-11-17 RX ORDER — BUMETANIDE 2 MG/1
TABLET ORAL
Qty: 90 TABLET | Refills: 0 | Status: SHIPPED | OUTPATIENT
Start: 2020-11-17 | End: 2021-02-20

## 2020-11-17 RX ORDER — LEVOTHYROXINE SODIUM 0.15 MG/1
TABLET ORAL
Qty: 90 TABLET | Refills: 0 | Status: SHIPPED | OUTPATIENT
Start: 2020-11-17 | End: 2021-02-20

## 2020-11-17 RX ORDER — APIXABAN 5 MG/1
TABLET, FILM COATED ORAL
Qty: 60 TABLET | Refills: 0 | Status: SHIPPED | OUTPATIENT
Start: 2020-11-17 | End: 2020-12-15

## 2020-11-18 ENCOUNTER — PROCEDURE VISIT (OUTPATIENT)
Dept: CARDIOLOGY CLINIC | Age: 75
End: 2020-11-18
Payer: MEDICARE

## 2020-11-18 PROCEDURE — 93294 REM INTERROG EVL PM/LDLS PM: CPT | Performed by: NUCLEAR MEDICINE

## 2020-11-18 PROCEDURE — 93296 REM INTERROG EVL PM/IDS: CPT | Performed by: NUCLEAR MEDICINE

## 2020-11-18 NOTE — PROGRESS NOTES
[facility]    . Roula Kearns Battery longevity:  5.5 years   Presenting rhythm  AP     Atrial impedance 361  RV impedance 475    P wave sensing 0.9  R wave sensing 10.1    99.3 % atrial paced  100 % RV paced     Atrial threshold 1 V  at 0.4ms  RV threshold 0.75 V at 0.4ms  Mode switches   0

## 2020-11-23 ENCOUNTER — OFFICE VISIT (OUTPATIENT)
Dept: FAMILY MEDICINE CLINIC | Age: 75
End: 2020-11-23

## 2020-11-23 VITALS
HEART RATE: 90 BPM | SYSTOLIC BLOOD PRESSURE: 136 MMHG | TEMPERATURE: 96.8 F | HEIGHT: 68 IN | WEIGHT: 251.5 LBS | RESPIRATION RATE: 16 BRPM | BODY MASS INDEX: 38.12 KG/M2 | DIASTOLIC BLOOD PRESSURE: 80 MMHG

## 2020-11-23 LAB
CHP ED QC CHECK: ABNORMAL
GLUCOSE BLD-MCNC: 205 MG/DL
HBA1C MFR BLD: 6.1 %

## 2020-11-23 PROCEDURE — G0439 PPPS, SUBSEQ VISIT: HCPCS | Performed by: FAMILY MEDICINE

## 2020-11-23 PROCEDURE — 99213 OFFICE O/P EST LOW 20 MIN: CPT | Performed by: FAMILY MEDICINE

## 2020-11-23 PROCEDURE — 83036 HEMOGLOBIN GLYCOSYLATED A1C: CPT | Performed by: FAMILY MEDICINE

## 2020-11-23 PROCEDURE — 82962 GLUCOSE BLOOD TEST: CPT | Performed by: FAMILY MEDICINE

## 2020-11-23 RX ORDER — CLOPIDOGREL BISULFATE 75 MG/1
TABLET ORAL
Qty: 90 TABLET | Refills: 1 | Status: SHIPPED | OUTPATIENT
Start: 2020-11-23 | End: 2021-08-25

## 2020-11-23 ASSESSMENT — ENCOUNTER SYMPTOMS
CONSTIPATION: 0
EYE PAIN: 0
COUGH: 0
ABDOMINAL PAIN: 0
WHEEZING: 0
SORE THROAT: 0
NAUSEA: 0
CHEST TIGHTNESS: 0
SHORTNESS OF BREATH: 0

## 2020-11-23 ASSESSMENT — LIFESTYLE VARIABLES: HOW OFTEN DO YOU HAVE A DRINK CONTAINING ALCOHOL: 0

## 2020-11-23 NOTE — PATIENT INSTRUCTIONS
Personalized Preventive Plan for Sneha Cedeño - 11/23/2020  Medicare offers a range of preventive health benefits. Some of the tests and screenings are paid in full while other may be subject to a deductible, co-insurance, and/or copay. Some of these benefits include a comprehensive review of your medical history including lifestyle, illnesses that may run in your family, and various assessments and screenings as appropriate. After reviewing your medical record and screening and assessments performed today your provider may have ordered immunizations, labs, imaging, and/or referrals for you. A list of these orders (if applicable) as well as your Preventive Care list are included within your After Visit Summary for your review. Other Preventive Recommendations:    · A preventive eye exam performed by an eye specialist is recommended every 1-2 years to screen for glaucoma; cataracts, macular degeneration, and other eye disorders. · A preventive dental visit is recommended every 6 months. · Try to get at least 150 minutes of exercise per week or 10,000 steps per day on a pedometer . · Order or download the FREE \"Exercise & Physical Activity: Your Everyday Guide\" from [employer]. Call 3-426.400.5941 or search [employer] online. · You need 1995-6677 mg of calcium and 1164-3627 IU of vitamin D per day. It is possible to meet your calcium requirement with diet alone, but a vitamin D supplement is usually necessary to meet this goal.  · When exposed to the sun, use a sunscreen that protects against both UVA and UVB radiation with an SPF of 30 or greater. Reapply every 2 to 3 hours or after sweating, drying off with a towel, or swimming. · Always wear a seat belt when traveling in a car. Always wear a helmet when riding a bicycle or motorcycle.
93

## 2020-11-23 NOTE — PROGRESS NOTES
Medicare Annual Wellness Visit  Name: Daniel Tubbs Date: 2020   MRN: [id number] Sex: Female   Age: 70 y.o. Ethnicity: Non-/Non    : 1945 Race: Paty Madrigal is here for Medicare AWV    Screenings for behavioral, psychosocial and functional/safety risks, and cognitive dysfunction are all negative except as indicated below. These results, as well as other patient data from the [address] form, are documented in Flowsheets linked to this Encounter. Allergies   Allergen Reactions    Adhesive Tape Rash       Prior to Visit Medications    Medication Sig Taking?  Authorizing Provider   levothyroxine (SYNTHROID) 150 MCG tablet Take 1 tablet by mouth once daily Yes Jayme Carrillo MD   bumetanide (BUMEX) 2 MG tablet Take 1 tablet by mouth once daily Yes Jayme Carrillo MD   ramipril (ALTACE) 10 MG capsule Take 1 capsule by mouth twice daily Yes Jayme Carrillo MD   ELIQUIS 5 MG TABS tablet Take 1 tablet by mouth twice daily Yes Jayme Carrillo MD   atorvastatin (LIPITOR) 40 MG tablet Take 1 tablet by mouth daily  Patient taking differently: Take 40 mg by mouth 2 times daily  Yes Jayme Carrillo MD   amLODIPine (NORVASC) 10 MG tablet Take 1 tablet by mouth once daily Yes Jayme Carrillo MD   glimepiride (AMARYL) 4 MG tablet Take 1 tablet by mouth once daily Yes Jayme Carrillo MD   gabapentin (NEURONTIN) 300 MG capsule TAKE 1 CAPSULE BY MOUTH ONCE DAILY IN THE EVENING Yes Jayme Carrillo MD   metoprolol tartrate (LOPRESSOR) 50 MG tablet TAKE 1 TABLET TWICE A DAY Yes Jayme Carrillo MD   metFORMIN (GLUCOPHAGE-XR) 500 MG extended release tablet TAKE 2 TABLETS AT [facility] AT THE EVENING MEAL Yes Jayme Carrillo MD   clopidogrel (PLAVIX) 75 MG tablet TAKE 1 TABLET DAILY Yes Jayme Carrillo MD   amiodarone (CORDARONE) 200 MG tablet TAKE 1 TABLET (200 MG) BY MOUTH TWICE DAILY FOR 7 DAYS AND THEN TAKE 1 TABLET ONCE DAILY AFTER Yes Moe Mccarty MD   Flaxseed, Linseed, (FLAX SEEDS PO) Take by mouth Yes Historical Provider, MD   nitroGLYCERIN (NITROSTAT) 0.4 MG SL tablet Place 1 tablet under the tongue every 5 minutes as needed for Chest pain up to max of 3 total doses. If no relief after 1 dose, call 911. Yes GRANT Kerns - CNP   Glucose Blood (JOSE E BREEZE 2 TEST) DISK USE ONE STRIP TWICE DAILY, DX: E11.9 Yes Anirudh Carvalho MD   Ascorbic Acid (VITAMIN C) 500 MG tablet Take 1,000 mg by mouth daily  Yes Historical Provider, MD   potassium chloride (KLOR-CON M) 20 MEQ extended release tablet Take 1 tablet by mouth once daily  Moe Mccarty MD       Past Medical History:   Diagnosis Date    Aortic aneurysm ([facility] [address]     4.5 cm aorta    Arthritis     Atrial fibrillation, chronic ([facility] [address] 2019    CAD (coronary artery disease)     CHF NYHA class II, unspecified failure chronicity, unspecified type ([address] 10/8/2018    Colon polyps 3/01    Diabetic peripheral neuropathy ([address] 2014      feet    Elbow fracture     Hyperlipidemia     Hypertension     Hypothyroidism     Pulmonary nodule, right 10/2017      repeat  ct of  chest      S/P cardiac pacemaker procedure: 10/21/2017: Medtronic Dual Chamber. 10/21/2017    10/21/2017: Medtronic Dual Chamber.  Dr. Silverio Garsia Type II or unspecified type diabetes mellitus without mention of complication, not stated as uncontrolled        Past Surgical History:   Procedure Laterality Date    BREAST BIOPSY Right 2016     benign   abrahan    BREAST SURGERY      CARDIOVERSION  2019    atrial fib to sinus  baki     SECTION  over 30 years ago     COLONOSCOPY  2012    colon polyps   [place]    CORONARY ANGIOPLASTY      baki    CORONARY ANGIOPLASTY WITH STENT PLACEMENT      baki    CORONARY ANGIOPLASTY WITH STENT PLACEMENT  10/2018     lad branch    HEEL SPUR SURGERY Bilateral     bilat with hardware    HYSTERECTOMY      KNEE ARTHROSCOPY 11/01; 3/02    right and left knee    KNEE ARTHROSCOPY Bilateral     bilat    PACEMAKER PLACEMENT  10/2017    THYROIDECTOMY, PARTIAL  1980's       Family History   Problem Relation Age of Onset    Heart Disease Mother 80        bacterial infection at valve    Heart Disease Father     Heart Attack Father     Heart Disease Brother         CABG       CareTeam (Including outside providers/suppliers regularly involved in providing care):   Patient Care Team:  Astrid Bhatt MD as PCP - General (Family Medicine)  Astrid Bhatt MD as PCP - [facility] Empaneled Provider    Wt Readings from Last 3 Encounters:   11/23/20 251 lb 8 oz (114.1 kg)   08/18/20 260 lb 6 oz (118.1 kg)   05/08/20 262 lb 4 oz (119 kg)     Vitals:    11/23/20 1034   BP: 136/80   Site: Right Upper Arm   Position: Sitting   Cuff Size: Medium Adult   Pulse: 90   Resp: 16   Temp: 96.8 °F (36 °C)   TempSrc: Oral   Weight: 251 lb 8 oz (114.1 kg)   Height: 5' 8\" (1.727 m)     Body mass index is 38.24 kg/m². Based upon direct observation of the patient, evaluation of cognition reveals recent and remote memory intact. Patient's complete Health Risk Assessment and screening values have been reviewed and are found in Flowsheets. The following problems were reviewed today and where indicated follow up appointments were made and/or referrals ordered. Positive Risk Factor Screenings with Interventions:     Fall Risk:  Timed Up and Go Test > 12 seconds? (Complete if either Fall Risk answers are Yes): no  2 or more falls in past year?: no  Fall with injury in past year?: (!) yes  Fall Risk Interventions:    · Home safety tips provided    General Health and ACP:  General  In general, how would you say your health is?: Good  In the past 7 days, have you experienced any of the following?  New or Increased Pain, New or Increased Fatigue, Loneliness, Social Isolation, Stress or Anger?: None of These  Do you get the social and emotional support that you Glucose Blood (JOSE E BREEZE 2 TEST) DISK USE ONE STRIP TWICE DAILY, DX: E11.9 Yes Laura Player, MD   Ascorbic Acid (VITAMIN C) 500 MG tablet Take 1,000 mg by mouth daily  Yes Historical Provider, MD   potassium chloride (KLOR-CON M) 20 MEQ extended release tablet Take 1 tablet by mouth once daily  Kishan Harris MD        Social History     Tobacco Use    Smoking status: Never Smoker    Smokeless tobacco: Never Used   Substance Use Topics    Alcohol use: No        Vitals:    11/23/20 1034   BP: 136/80   Site: Right Upper Arm   Position: Sitting   Cuff Size: Medium Adult   Pulse: 90   Resp: 16   Temp: 96.8 °F (36 °C)   TempSrc: Oral   Weight: 251 lb 8 oz (114.1 kg)   Height: 5' 8\" (1.727 m)     Estimated body mass index is 38.24 kg/m² as calculated from the following:    Height as of this encounter: 5' 8\" (1.727 m). Weight as of this encounter: 251 lb 8 oz (114.1 kg). Physical Exam  Vitals signs and nursing note reviewed. Constitutional:       Appearance: She is well-developed. HENT:      Head: Normocephalic and atraumatic. Right Ear: External ear normal.      Left Ear: External ear normal.      Nose: Nose normal.   Eyes:      General: No scleral icterus. Conjunctiva/sclera: Conjunctivae normal.      Pupils: Pupils are equal, round, and reactive to light. Neck:      Musculoskeletal: Normal range of motion and neck supple. Thyroid: No thyromegaly. Vascular: No JVD. Cardiovascular:      Rate and Rhythm: Normal rate and regular rhythm. Heart sounds: Normal heart sounds. Pulmonary:      Effort: Pulmonary effort is normal.      Breath sounds: Normal breath sounds. No wheezing or rales. Abdominal:      General: Bowel sounds are normal. There is no distension. Palpations: Abdomen is soft. There is no mass. Tenderness: There is no abdominal tenderness. Musculoskeletal: Normal range of motion. General: No tenderness.       Right lower leg: Edema present. Left lower leg: Edema present. Comments:   1  To 2 +  Edema    Lymphadenopathy:      Cervical: No cervical adenopathy. Skin:     General: Skin is warm and dry. Findings: No rash. Neurological:      Mental Status: She is alert and oriented to person, place, and time. Cranial Nerves: No cranial nerve deficit. Deep Tendon Reflexes: Reflexes are normal and symmetric. ASSESSMENT/PLAN:  .   Diagnosis Orders   1. Type 2 diabetes mellitus without complication, without long-term current use of insulin ([facility])  POCT Glucose    POCT glycosylated hemoglobin (Hb A1C)   2. Coronary artery disease involving native coronary artery of native heart without angina pectoris  clopidogrel (PLAVIX) 75 MG tablet   3. Essential hypertension     4. Thoracic aortic aneurysm without rupture ([facility]     5. Systolic congestive heart failure, unspecified HF chronicity ([facility])     6. Paroxysmal atrial fibrillation ([facility]     7. Diabetic peripheral neuropathy ([facility] [address] [facility]     8. S/P cardiac pacemaker procedure: 10/21/2017: Medtronic Dual Chamber. 9. Hypothyroidism due to acquired atrophy of thyroid     10.  Pure hypercholesterolemia       PLAN    Current Outpatient Medications   Medication Sig Dispense Refill    levothyroxine (SYNTHROID) 150 MCG tablet Take 1 tablet by mouth once daily 90 tablet 0    bumetanide (BUMEX) 2 MG tablet Take 1 tablet by mouth once daily 90 tablet 0    ramipril (ALTACE) 10 MG capsule Take 1 capsule by mouth twice daily 180 capsule 0    ELIQUIS 5 MG TABS tablet Take 1 tablet by mouth twice daily 60 tablet 0    atorvastatin (LIPITOR) 40 MG tablet Take 1 tablet by mouth daily (Patient taking differently: Take 40 mg by mouth 2 times daily ) 90 tablet 1    amLODIPine (NORVASC) 10 MG tablet Take 1 tablet by mouth once daily 90 tablet 0    glimepiride (AMARYL) 4 MG tablet Take 1 tablet by mouth once daily 90 tablet 1    gabapentin (NEURONTIN) 300 MG capsule TAKE 1 CAPSULE BY MOUTH ONCE DAILY IN THE EVENING 30 capsule 5    metoprolol tartrate (LOPRESSOR) 50 MG tablet TAKE 1 TABLET TWICE A  tablet 1    metFORMIN (GLUCOPHAGE-XR) 500 MG extended release tablet TAKE 2 TABLETS AT [facility] AT THE EVENING MEAL 360 tablet 1    clopidogrel (PLAVIX) 75 MG tablet TAKE 1 TABLET DAILY 90 tablet 1    amiodarone (CORDARONE) 200 MG tablet TAKE 1 TABLET (200 MG) BY MOUTH TWICE DAILY FOR 7 DAYS AND THEN TAKE 1 TABLET ONCE DAILY AFTER 30 tablet 8    Flaxseed, Linseed, (FLAX SEEDS PO) Take by mouth      nitroGLYCERIN (NITROSTAT) 0.4 MG SL tablet Place 1 tablet under the tongue every 5 minutes as needed for Chest pain up to max of 3 total doses. If no relief after 1 dose, call 911. 25 tablet 1    Glucose Blood (JOSE E BREEZE 2 TEST) DISK USE ONE STRIP TWICE DAILY, DX: E11.9 200 each 5    Ascorbic Acid (VITAMIN C) 500 MG tablet Take 1,000 mg by mouth daily       potassium chloride (KLOR-CON M) 20 MEQ extended release tablet Take 1 tablet by mouth once daily 30 tablet 1     No current facility-administered medications for this visit. Orders Placed This Encounter   Procedures    POCT Glucose    POCT glycosylated hemoglobin (Hb A1C)     Results for orders placed or performed in visit on 11/23/20   POCT Glucose   Result Value Ref Range    Glucose 205 (A) mg/dL    QC OK? POCT glycosylated hemoglobin (Hb A1C)   Result Value Ref Range    Hemoglobin A1C 6.1 %     Tyron Brown received counseling on the following healthy behaviors: nutrition and exercise     colonoscopy   Due and   sugars    Patient given educational materials on Diabetes    I have instructed Tyron Brown to complete a self tracking handout on Blood Sugars  and Blood Pressures  and instructed them to bring it with them to her next appointment. Discussed use, benefit, and side effects of prescribed medications. Barriers to medication compliance addressed. All patient questions answered. Pt voiced understanding. No follow-ups on file. Sugars  Stable and  Fall with  Bruising  Of the  Face  Right  Side   Sugars  Are  Stable and  May  Need  To  Decrease  amaryl    An electronic signature was used to authenticate this note.     --Saqib Hobson MD on 11/23/2020 at 11:05 AM

## 2020-12-10 ENCOUNTER — OFFICE VISIT (OUTPATIENT)
Dept: CARDIOLOGY CLINIC | Age: 75
End: 2020-12-10
Payer: MEDICARE

## 2020-12-10 VITALS
DIASTOLIC BLOOD PRESSURE: 72 MMHG | WEIGHT: 252 LBS | BODY MASS INDEX: 39.55 KG/M2 | SYSTOLIC BLOOD PRESSURE: 122 MMHG | HEART RATE: 80 BPM | HEIGHT: 67 IN

## 2020-12-10 PROCEDURE — 99214 OFFICE O/P EST MOD 30 MIN: CPT | Performed by: NUCLEAR MEDICINE

## 2020-12-10 PROCEDURE — 93000 ELECTROCARDIOGRAM COMPLETE: CPT | Performed by: NUCLEAR MEDICINE

## 2020-12-10 NOTE — PROGRESS NOTES
[address]  [address] [place] [id number]  Dept: [phone]  Dept Fax: [phone]  Loc: [phone]    Visit Date: 12/10/2020    Kenney Wall is a 70 y.o. female who presents todayfor:  Chief Complaint   Patient presents with    Check-Up     EKG done today    Coronary Artery Disease    Atrial Fibrillation    Hypertension   known LAD stent   Some dyspnea   Dyspnea on exertion   No chest pain   No changes in breathing  No recent A fib after cardioversion   Pacer is followed  Bp is stable  No dizziness  No syncope  BS is fair  No changes or issues  On statins for hyperlipidemia        HPI:  HPI  Past Medical History:   Diagnosis Date    Aortic aneurysm (HCC)     4.5 cm aorta    Arthritis     Atrial fibrillation, chronic ([facility] 2019    CAD (coronary artery disease)     CHF NYHA class II, unspecified failure chronicity, unspecified type ([facility] [address] 10/8/2018    Colon polyps 3/01    Diabetic peripheral neuropathy ([facility] [address] 2014      feet    Elbow fracture     Hyperlipidemia     Hypertension     Hypothyroidism     Pulmonary nodule, right 10/2017      repeat  ct of  chest      S/P cardiac pacemaker procedure: 10/21/2017: Medtronic Dual Chamber. 10/21/2017    10/21/2017: Medtronic Dual Chamber.  Dr. Buffy Arias Type II or unspecified type diabetes mellitus without mention of complication, not stated as uncontrolled       Past Surgical History:   Procedure Laterality Date    BREAST BIOPSY Right 2016     benign   abrahan    BREAST SURGERY      CARDIOVERSION  2019    atrial fib to sinus  baki     SECTION  over 30 years ago     COLONOSCOPY  2012    colon polyps   [place]    CORONARY ANGIOPLASTY      baki    CORONARY ANGIOPLASTY WITH STENT PLACEMENT      baki    CORONARY ANGIOPLASTY WITH STENT PLACEMENT  10/2018     lad branch    HEEL SPUR SURGERY Bilateral     bilat with hardware    HYSTERECTOMY      KNEE ARTHROSCOPY  11/01; 3/02    right and left knee    KNEE ARTHROSCOPY Bilateral     bilat    PACEMAKER PLACEMENT  10/2017    THYROIDECTOMY, PARTIAL  1980's     Family History   Problem Relation Age of Onset    Heart Disease Mother 80        bacterial infection at valve    Heart Disease Father     Heart Attack Father     Heart Disease Brother         CABG     Social History     Tobacco Use    Smoking status: Never Smoker    Smokeless tobacco: Never Used   Substance Use Topics    Alcohol use: No      Current Outpatient Medications   Medication Sig Dispense Refill    clopidogrel (PLAVIX) 75 MG tablet TAKE 1 TABLET DAILY 90 tablet 1    levothyroxine (SYNTHROID) 150 MCG tablet Take 1 tablet by mouth once daily 90 tablet 0    bumetanide (BUMEX) 2 MG tablet Take 1 tablet by mouth once daily 90 tablet 0    ramipril (ALTACE) 10 MG capsule Take 1 capsule by mouth twice daily 180 capsule 0    ELIQUIS 5 MG TABS tablet Take 1 tablet by mouth twice daily 60 tablet 0    atorvastatin (LIPITOR) 40 MG tablet Take 1 tablet by mouth daily 90 tablet 1    amLODIPine (NORVASC) 10 MG tablet Take 1 tablet by mouth once daily 90 tablet 0    glimepiride (AMARYL) 4 MG tablet Take 1 tablet by mouth once daily 90 tablet 1    gabapentin (NEURONTIN) 300 MG capsule TAKE 1 CAPSULE BY MOUTH ONCE DAILY IN THE EVENING 30 capsule 5    metoprolol tartrate (LOPRESSOR) 50 MG tablet TAKE 1 TABLET TWICE A  tablet 1    metFORMIN (GLUCOPHAGE-XR) 500 MG extended release tablet TAKE 2 TABLETS AT [facility] AT THE EVENING MEAL 360 tablet 1    amiodarone (CORDARONE) 200 MG tablet TAKE 1 TABLET (200 MG) BY MOUTH TWICE DAILY FOR 7 DAYS AND THEN TAKE 1 TABLET ONCE DAILY AFTER 30 tablet 8    Flaxseed, Linseed, (FLAX SEEDS PO) Take by mouth      nitroGLYCERIN (NITROSTAT) 0.4 MG SL tablet Place 1 tablet under the tongue every 5 minutes as needed for Chest pain up to max of 3 total doses.  If no relief after 1 dose, call 911. 25 tablet 1    Glucose Blood (JOSE E BREEZE 2 TEST) DISK USE ONE STRIP TWICE DAILY, DX: E11.9 200 each 5    Ascorbic Acid (VITAMIN C) 500 MG tablet Take 1,000 mg by mouth daily       potassium chloride (KLOR-CON M) 20 MEQ extended release tablet Take 1 tablet by mouth once daily 30 tablet 1     No current facility-administered medications for this visit. Allergies   Allergen Reactions    Adhesive Tape Rash     Health Maintenance   Topic Date Due    Shingles Vaccine (1 of 2) 02/11/1997    DEXA (modify frequency per FRAX score)  02/11/2002    Diabetic retinal exam  03/12/2016    Diabetic foot exam  09/30/2016    Lipid screen  05/26/2021    Potassium monitoring  05/26/2021    Creatinine monitoring  05/26/2021    Diabetic microalbuminuria test  08/18/2021    TSH testing  10/12/2021    A1C test (Diabetic or Prediabetic)  11/23/2021    Annual Wellness Visit (AWV)  11/24/2021    Breast cancer screen  08/12/2022    Colon cancer screen colonoscopy  02/07/2023    DTaP/Tdap/Td vaccine (2 - Td) 04/28/2026    Flu vaccine  Completed    Pneumococcal 65+ years Vaccine  Completed    Hepatitis C screen  Completed    Hepatitis A vaccine  Aged Out    Hib vaccine  Aged Out    Meningococcal (ACWY) vaccine  Aged Out       Subjective:  Review of Systems  General:   No fever, no chills, No fatigue or weight loss  Pulmonary:    some dyspnea, no wheezing  Cardiac:    Denies recent chest pain,   GI:     No nausea or vomiting, no abdominal pain  Neuro:    No dizziness or light headedness,   Musculoskeletal:  No recent active issues  Extremities:   No edema, no obvious claudication       Objective:  Physical Exam  /72   Pulse 80   Ht 5' 7\" (1.702 m)   Wt 252 lb (114.3 kg)   BMI 39.47 kg/m²   General:   Well developed, well nourished  Lungs:   Clear to auscultation  Heart:    Normal S1 S2, Slight murmur.  no rubs, no gallops  Abdomen:   Soft, non tender, no organomegalies, positive bowel sounds  Extremities:   No edema, no cyanosis, good peripheral pulses  Neurological:   Awake, alert, oriented. No obvious focal deficits  Musculoskelatal:  No obvious deformities    Assessment:      Diagnosis Orders   1. Coronary artery disease involving native coronary artery of native heart without angina pectoris  EKG 12 Lead   2. Paroxysmal atrial fibrillation (HCC)  EKG 12 Lead   3. Essential hypertension     as above   Some dyspnea as above  Higher risk patient   ECG in office was done today. I reviewed the ECG. No acute findings      Plan:  No follow-ups on file. Discussed  Consider a stress test and echo   Monitor for now   Continue risk factor modification and medical management  Thank you for allowing me to participate in the care of your patient. Please don't hesitate to contact me regarding any further issues related to the patient care    Orders Placed:  Orders Placed This Encounter   Procedures    EKG 12 Lead     Order Specific Question:   Reason for Exam?     Answer: Other       Medications Prescribed:  No orders of the defined types were placed in this encounter. Discussed use, benefit, and side effects of prescribed medications. All patient questions answered. Pt voicedunderstanding. Instructed to continue current medications, diet and exercise. Continue risk factor modification and medical management. Patient agreed with treatment plan. Follow up as directed.     Electronically signedby Vinita Ceron MD on 12/10/2020 at 1:43 PM

## 2020-12-14 NOTE — TELEPHONE ENCOUNTER
Date of last visit:  11/23/2020  Date of next visit:  2/26/2021    Requested Prescriptions     Pending Prescriptions Disp Refills    ELIQUIS 5 MG TABS tablet [Pharmacy Med Name: Eliquis 5 MG Oral Tablet] 60 tablet 0     Sig: Take 1 tablet by mouth twice daily    metoprolol tartrate (LOPRESSOR) 50 MG tablet [Pharmacy Med Name: Metoprolol Tartrate 50 MG Oral Tablet] 180 tablet 0     Sig: Take 1 tablet by mouth twice daily

## 2020-12-15 RX ORDER — METOPROLOL TARTRATE 50 MG/1
TABLET, FILM COATED ORAL
Qty: 180 TABLET | Refills: 0 | Status: SHIPPED | OUTPATIENT
Start: 2020-12-15 | End: 2021-03-23

## 2020-12-15 RX ORDER — APIXABAN 5 MG/1
TABLET, FILM COATED ORAL
Qty: 60 TABLET | Refills: 0 | Status: SHIPPED | OUTPATIENT
Start: 2020-12-15 | End: 2021-01-13

## 2020-12-30 ENCOUNTER — HOSPITAL ENCOUNTER (OUTPATIENT)
Dept: NON INVASIVE DIAGNOSTICS | Age: 75
Discharge: HOME | End: 2020-12-30
Payer: MEDICARE

## 2020-12-30 LAB
LV EF: 45 %
LVEF MODALITY: NORMAL

## 2020-12-30 PROCEDURE — 6360000002 HC RX W HCPCS

## 2020-12-30 PROCEDURE — 93306 TTE W/DOPPLER COMPLETE: CPT

## 2020-12-30 PROCEDURE — 93017 CV STRESS TEST TRACING ONLY: CPT | Performed by: NUCLEAR MEDICINE

## 2020-12-30 PROCEDURE — 3430000000 HC RX DIAGNOSTIC RADIOPHARMACEUTICAL: Performed by: NUCLEAR MEDICINE

## 2020-12-30 PROCEDURE — A9500 TC99M SESTAMIBI: HCPCS | Performed by: NUCLEAR MEDICINE

## 2020-12-30 PROCEDURE — 78452 HT MUSCLE IMAGE SPECT MULT: CPT

## 2020-12-30 RX ADMIN — Medication 8.9 MILLICURIE: at 09:15

## 2020-12-30 RX ADMIN — Medication 31.8 MILLICURIE: at 10:20

## 2021-01-12 DIAGNOSIS — I10 ESSENTIAL HYPERTENSION: ICD-10-CM

## 2021-01-12 DIAGNOSIS — I48.0 PAROXYSMAL ATRIAL FIBRILLATION (HCC): ICD-10-CM

## 2021-01-12 NOTE — TELEPHONE ENCOUNTER
Date of last visit:  11/23/2020  Date of next visit:  2/26/2021    Requested Prescriptions     Pending Prescriptions Disp Refills    ELIQUIS 5 MG TABS tablet [Pharmacy Med Name: Eliquis 5 MG Oral Tablet] 60 tablet 0     Sig: Take 1 tablet by mouth twice daily    amLODIPine (NORVASC) 10 MG tablet [Pharmacy Med Name: amLODIPine Besylate 10 MG Oral Tablet] 90 tablet 0     Sig: Take 1 tablet by mouth once daily

## 2021-01-13 RX ORDER — AMLODIPINE BESYLATE 10 MG/1
TABLET ORAL
Qty: 90 TABLET | Refills: 0 | Status: SHIPPED | OUTPATIENT
Start: 2021-01-13 | End: 2021-04-23

## 2021-01-13 RX ORDER — POTASSIUM CHLORIDE 20 MEQ/1
TABLET, EXTENDED RELEASE ORAL
Qty: 90 TABLET | Refills: 3 | Status: ON HOLD | OUTPATIENT
Start: 2021-01-13 | End: 2021-06-11 | Stop reason: HOSPADM

## 2021-01-13 RX ORDER — APIXABAN 5 MG/1
TABLET, FILM COATED ORAL
Qty: 60 TABLET | Refills: 0 | Status: SHIPPED | OUTPATIENT
Start: 2021-01-13 | End: 2021-02-20

## 2021-01-13 RX ORDER — AMIODARONE HYDROCHLORIDE 200 MG/1
TABLET ORAL
Qty: 90 TABLET | Refills: 3 | Status: ON HOLD | OUTPATIENT
Start: 2021-01-13 | End: 2021-06-11 | Stop reason: HOSPADM

## 2021-01-29 DIAGNOSIS — E11.42 DIABETIC PERIPHERAL NEUROPATHY (HCC): ICD-10-CM

## 2021-02-01 NOTE — TELEPHONE ENCOUNTER
Date of last visit:  11/23/2020  Date of next visit:  2/26/2021    Requested Prescriptions     Pending Prescriptions Disp Refills    gabapentin (NEURONTIN) 300 MG capsule [Pharmacy Med Name: Gabapentin 300 MG Oral Capsule] 30 capsule 0     Sig: TAKE 1 CAPSULE BY MOUTH ONCE DAILY IN THE EVENING

## 2021-02-02 RX ORDER — GABAPENTIN 300 MG/1
CAPSULE ORAL
Qty: 30 CAPSULE | Refills: 5 | Status: SHIPPED | OUTPATIENT
Start: 2021-02-02 | End: 2021-08-18

## 2021-02-05 ENCOUNTER — NURSE ONLY (OUTPATIENT)
Dept: CARDIOLOGY CLINIC | Age: 76
End: 2021-02-05
Payer: MEDICARE

## 2021-02-05 DIAGNOSIS — Z95.0 S/P CARDIAC PACEMAKER PROCEDURE: Primary | ICD-10-CM

## 2021-02-05 PROCEDURE — 93280 PM DEVICE PROGR EVAL DUAL: CPT | Performed by: NUCLEAR MEDICINE

## 2021-02-05 NOTE — PROGRESS NOTES
DR Lelo Li PT   [employer] DUAL PACEMAKER CHECK IN OFFICE   BATTERY 4-6 YRS REMAINING  PRESENTS IN APVP  UNDERLYING PRETTY DEPENDENT IN VENTRICLES     ATRIAL IMPEDENCE 399  VENT IMPEDENCE 513  P WAVES 0.6  RV WAVES DEPENDENT TODAY   ATRIAL THRESHOLD 1 @ 0.4  VENT THRESHOLD 0.75 @ 0.4  ATRIAL AND VENT AMPLITUDES PROGRAMMED ADAPTIVE   A PACED 99.3%  V PACED 100%

## 2021-02-19 DIAGNOSIS — E03.4 HYPOTHYROIDISM DUE TO ACQUIRED ATROPHY OF THYROID: ICD-10-CM

## 2021-02-19 DIAGNOSIS — I48.0 PAROXYSMAL ATRIAL FIBRILLATION (HCC): ICD-10-CM

## 2021-02-19 DIAGNOSIS — I10 ESSENTIAL HYPERTENSION: ICD-10-CM

## 2021-02-19 NOTE — TELEPHONE ENCOUNTER
Date of last visit:  11/23/2020  Date of next visit:  2/26/2021    Requested Prescriptions     Pending Prescriptions Disp Refills    ramipril (ALTACE) 10 MG capsule [Pharmacy Med Name: Ramipril 10 MG Oral Capsule] 180 capsule 0     Sig: Take 1 capsule by mouth twice daily    levothyroxine (SYNTHROID) 150 MCG tablet [Pharmacy Med Name: Levothyroxine Sodium 150 MCG Oral Tablet] 90 tablet 0     Sig: Take 1 tablet by mouth once daily    ELIQUIS 5 MG TABS tablet [Pharmacy Med Name: Eliquis 5 MG Oral Tablet] 60 tablet 0     Sig: Take 1 tablet by mouth twice daily    bumetanide (BUMEX) 2 MG tablet [Pharmacy Med Name: Bumetanide 2 MG Oral Tablet] 90 tablet 0     Sig: Take 1 tablet by mouth once daily

## 2021-02-20 RX ORDER — RAMIPRIL 10 MG/1
CAPSULE ORAL
Qty: 180 CAPSULE | Refills: 1 | Status: ON HOLD | OUTPATIENT
Start: 2021-02-20 | End: 2021-06-11 | Stop reason: HOSPADM

## 2021-02-20 RX ORDER — LEVOTHYROXINE SODIUM 0.15 MG/1
TABLET ORAL
Qty: 90 TABLET | Refills: 1 | Status: SHIPPED | OUTPATIENT
Start: 2021-02-20 | End: 2021-05-28 | Stop reason: ALTCHOICE

## 2021-02-20 RX ORDER — APIXABAN 5 MG/1
TABLET, FILM COATED ORAL
Qty: 60 TABLET | Refills: 1 | Status: SHIPPED | OUTPATIENT
Start: 2021-02-20 | End: 2021-04-23

## 2021-02-20 RX ORDER — BUMETANIDE 2 MG/1
TABLET ORAL
Qty: 90 TABLET | Refills: 1 | Status: ON HOLD | OUTPATIENT
Start: 2021-02-20 | End: 2021-06-11 | Stop reason: HOSPADM

## 2021-03-08 ENCOUNTER — OFFICE VISIT (OUTPATIENT)
Dept: FAMILY MEDICINE CLINIC | Age: 76
End: 2021-03-08

## 2021-03-08 VITALS
HEART RATE: 80 BPM | WEIGHT: 252.13 LBS | DIASTOLIC BLOOD PRESSURE: 74 MMHG | BODY MASS INDEX: 39.57 KG/M2 | SYSTOLIC BLOOD PRESSURE: 126 MMHG | RESPIRATION RATE: 16 BRPM | TEMPERATURE: 97.1 F | HEIGHT: 67 IN

## 2021-03-08 DIAGNOSIS — E03.4 HYPOTHYROIDISM DUE TO ACQUIRED ATROPHY OF THYROID: ICD-10-CM

## 2021-03-08 DIAGNOSIS — I10 ESSENTIAL HYPERTENSION: ICD-10-CM

## 2021-03-08 DIAGNOSIS — I49.5 SICK SINUS SYNDROME (HCC): ICD-10-CM

## 2021-03-08 DIAGNOSIS — I50.32 CHRONIC DIASTOLIC CONGESTIVE HEART FAILURE (HCC): ICD-10-CM

## 2021-03-08 DIAGNOSIS — E11.9 TYPE 2 DIABETES MELLITUS WITHOUT COMPLICATION, WITHOUT LONG-TERM CURRENT USE OF INSULIN (HCC): Primary | ICD-10-CM

## 2021-03-08 DIAGNOSIS — I48.0 PAROXYSMAL ATRIAL FIBRILLATION (HCC): ICD-10-CM

## 2021-03-08 DIAGNOSIS — I25.10 CORONARY ARTERY DISEASE INVOLVING NATIVE CORONARY ARTERY OF NATIVE HEART WITHOUT ANGINA PECTORIS: ICD-10-CM

## 2021-03-08 DIAGNOSIS — E78.00 PURE HYPERCHOLESTEROLEMIA: ICD-10-CM

## 2021-03-08 DIAGNOSIS — E11.42 DIABETIC PERIPHERAL NEUROPATHY (HCC): ICD-10-CM

## 2021-03-08 LAB
CHP ED QC CHECK: ABNORMAL
GLUCOSE BLD-MCNC: 179 MG/DL
HBA1C MFR BLD: 7 %

## 2021-03-08 PROCEDURE — 82962 GLUCOSE BLOOD TEST: CPT | Performed by: FAMILY MEDICINE

## 2021-03-08 PROCEDURE — 83036 HEMOGLOBIN GLYCOSYLATED A1C: CPT | Performed by: FAMILY MEDICINE

## 2021-03-08 PROCEDURE — 99213 OFFICE O/P EST LOW 20 MIN: CPT | Performed by: FAMILY MEDICINE

## 2021-03-08 ASSESSMENT — ENCOUNTER SYMPTOMS
CONSTIPATION: 0
CHEST TIGHTNESS: 0
SORE THROAT: 0
COUGH: 0
ABDOMINAL PAIN: 0
SHORTNESS OF BREATH: 0
ORTHOPNEA: 0
EYE PAIN: 0
NAUSEA: 0
WHEEZING: 0

## 2021-03-08 ASSESSMENT — PATIENT HEALTH QUESTIONNAIRE - PHQ9
SUM OF ALL RESPONSES TO PHQ9 QUESTIONS 1 & 2: 0
1. LITTLE INTEREST OR PLEASURE IN DOING THINGS: 0
2. FEELING DOWN, DEPRESSED OR HOPELESS: 0
SUM OF ALL RESPONSES TO PHQ QUESTIONS 1-9: 0

## 2021-03-08 NOTE — PROGRESS NOTES
Subjective:      Patient ID: Nadine Simmons is a 78 y.o. female. Arrythmia  Noted       Thyroid  Stable       Neuropathy   Peripheral  Stable     Diabetes  She presents for her follow-up diabetic visit. She has type 2 diabetes mellitus. Her disease course has been stable. There are no hypoglycemic associated symptoms. Pertinent negatives for hypoglycemia include no dizziness, headaches or nervousness/anxiousness. There are no diabetic associated symptoms. Pertinent negatives for diabetes include no chest pain, no fatigue and no weakness. There are no hypoglycemic complications. Symptoms are stable. There are no diabetic complications. Current diabetic treatment includes oral agent (dual therapy). She is compliant with treatment all of the time. Her weight is stable. She is following a diabetic diet. Meal planning includes avoidance of concentrated sweets. There is no change in her home blood glucose trend. An ACE inhibitor/angiotensin II receptor blocker is being taken. Hypertension  This is a chronic problem. The current episode started more than 1 year ago. The problem has been resolved since onset. The problem is controlled. Pertinent negatives include no chest pain, headaches, neck pain, orthopnea, palpitations, peripheral edema or shortness of breath. The current treatment provides significant improvement. There are no compliance problems. Past Medical History:   Diagnosis Date    Aortic aneurysm (HCC)     4.5 cm aorta    Arthritis     Atrial fibrillation, chronic ([address] 1/25/2019    CAD (coronary artery disease)     CHF NYHA class II, unspecified failure chronicity, unspecified type ([address] 10/8/2018    Colon polyps 3/01    Diabetic peripheral neuropathy ([address] 2014      feet    Elbow fracture     Hyperlipidemia     Hypertension     Hypothyroidism     Pulmonary nodule, right 10/2017      repeat  ct of  chest  4-2018    S/P cardiac pacemaker procedure: 10/21/2017: Medtronic Dual Chamber. 10/21/2017    10/21/2017: Medtronic Dual Chamber. Dr. Luis Parker Type II or unspecified type diabetes mellitus without mention of complication, not stated as uncontrolled        Review of Systems   Constitutional: Negative for appetite change, fatigue and fever. HENT: Negative for congestion, ear pain, postnasal drip and sore throat. Eyes: Negative for pain and visual disturbance. Respiratory: Negative for cough, chest tightness, shortness of breath and wheezing. Cardiovascular: Negative for chest pain, palpitations, orthopnea and leg swelling. Gastrointestinal: Negative for abdominal pain, constipation and nausea. Genitourinary: Negative for dysuria and frequency. Musculoskeletal: Negative for arthralgias, joint swelling, neck pain and neck stiffness. Skin: Negative for rash. Neurological: Negative for dizziness, weakness, numbness and headaches. Hematological: Negative for adenopathy. Does not bruise/bleed easily. Psychiatric/Behavioral: Negative for behavioral problems and sleep disturbance. The patient is not nervous/anxious. /74 (Site: Right Upper Arm, Position: Sitting, Cuff Size: Medium Adult)   Pulse 80   Temp 97.1 °F (36.2 °C) (Oral)   Resp 16   Ht 5' 7\" (1.702 m)   Wt 252 lb 2 oz (114.4 kg)   BMI 39.49 kg/m²   Objective:   Physical Exam  Vitals signs and nursing note reviewed. Constitutional:       Appearance: She is well-developed. HENT:      Head: Normocephalic and atraumatic. Right Ear: External ear normal.      Left Ear: External ear normal.      Nose: Nose normal.   Eyes:      General: No scleral icterus. Conjunctiva/sclera: Conjunctivae normal.      Pupils: Pupils are equal, round, and reactive to light. Neck:      Musculoskeletal: Normal range of motion and neck supple. Thyroid: No thyromegaly. Vascular: No JVD. Cardiovascular:      Rate and Rhythm: Normal rate and regular rhythm. Heart sounds: Normal heart sounds.    Pulmonary: Effort: Pulmonary effort is normal.      Breath sounds: Normal breath sounds. No wheezing or rales. Abdominal:      General: Bowel sounds are normal. There is no distension. Palpations: Abdomen is soft. There is no mass. Tenderness: There is no abdominal tenderness. Musculoskeletal: Normal range of motion. General: No tenderness. Lymphadenopathy:      Cervical: No cervical adenopathy. Skin:     General: Skin is warm and dry. Findings: No rash. Neurological:      Mental Status: She is alert and oriented to person, place, and time. Cranial Nerves: No cranial nerve deficit. Deep Tendon Reflexes: Reflexes are normal and symmetric. Assessment:       Diagnosis Orders   1. Type 2 diabetes mellitus without complication, without long-term current use of insulin (HCC)  POCT Glucose    POCT glycosylated hemoglobin (Hb A1C)   2. Sick sinus syndrome (HCC)     3. Paroxysmal atrial fibrillation ([address] [address]     4. Hypothyroidism due to acquired atrophy of thyroid     5. Essential hypertension     6. Pure hypercholesterolemia     7. Coronary artery disease involving native coronary artery of native heart without angina pectoris     8. Diabetic peripheral neuropathy ([address]     9.  Chronic diastolic congestive heart failure ([address]           Plan:       Orders Placed This Encounter   Procedures    Lipid Panel     Standing Status:   Future     Standing Expiration Date:   3/8/2022     Order Specific Question:   Is Patient Fasting?/# of Hours     Answer:   YES 12 HOURS    Comprehensive Metabolic Panel     Standing Status:   Future     Standing Expiration Date:   3/8/2022    CBC Auto Differential     Standing Status:   Future     Standing Expiration Date:   3/8/2022    T4, Free     Standing Status:   Future     Standing Expiration Date:   3/8/2022    TSH without Reflex     Standing Status:   Future     Standing Expiration Date:   3/8/2022    POCT Glucose    POCT glycosylated hemoglobin (Hb A1C)     Results for orders placed or performed in visit on 03/08/21   POCT Glucose   Result Value Ref Range    Glucose 179 (A) mg/dL    QC OK? POCT glycosylated hemoglobin (Hb A1C)   Result Value Ref Range    Hemoglobin A1C 7.0 (A) %     Karen Bergeron received counseling on the following healthy behaviors: nutrition and exercise    Patient given educational materials on Diabetes and Hyperlipidemia    I have instructed Karen Bergeron to complete a self tracking handout on Blood Sugars  and Blood Pressures  and instructed them to bring it with them to her next appointment. Discussed use, benefit, and side effects of prescribed medications. Barriers to medication compliance addressed. All patient questions answered. Pt voiced understanding.        See in  3 mths and  Labs  Before    visit        Devika Bryant MD

## 2021-03-22 ENCOUNTER — TELEPHONE (OUTPATIENT)
Dept: FAMILY MEDICINE CLINIC | Age: 76
End: 2021-03-22

## 2021-03-22 DIAGNOSIS — I10 ESSENTIAL HYPERTENSION: ICD-10-CM

## 2021-03-22 RX ORDER — NYSTATIN 100000 [USP'U]/G
POWDER TOPICAL
Qty: 45 G | Refills: 1 | Status: SHIPPED | OUTPATIENT
Start: 2021-03-22 | End: 2021-05-24 | Stop reason: ALTCHOICE

## 2021-03-22 RX ORDER — TERBINAFINE HYDROCHLORIDE 250 MG/1
250 TABLET ORAL DAILY
Qty: 7 TABLET | Refills: 0 | Status: SHIPPED | OUTPATIENT
Start: 2021-03-22 | End: 2021-03-29

## 2021-03-22 NOTE — TELEPHONE ENCOUNTER
Date of last visit:  3/8/2021  Date of next visit:  6/14/2021    Requested Prescriptions     Signed Prescriptions Disp Refills    nystatin (MYCOSTATIN) 785404 UNIT/GM powder 45 g 1     Sig: Apply 2-3 times daily to affected areas. Authorizing Provider: Jose M Net     Ordering User: REUBEN LY    terbinafine (LAMISIL) 250 MG tablet 7 tablet 0     Sig: Take 1 tablet by mouth daily for 7 days     Authorizing Provider: Jose M Net     Ordering User: Andrew Price informed by Phone.

## 2021-03-22 NOTE — TELEPHONE ENCOUNTER
Date of last visit:  3/8/2021  Date of next visit:  6/14/2021    Requested Prescriptions     Pending Prescriptions Disp Refills    metoprolol tartrate (LOPRESSOR) 50 MG tablet [Pharmacy Med Name: Metoprolol Tartrate 50 MG Oral Tablet] 180 tablet 0     Sig: Take 1 tablet by mouth twice daily

## 2021-03-22 NOTE — TELEPHONE ENCOUNTER
Patient called C/O rash in groin are and under breasts. Has tried medicated powder and diaper rash oint and corn starch w/out success. What else could she try?     Please call her back [phone]

## 2021-03-23 RX ORDER — METOPROLOL TARTRATE 50 MG/1
TABLET, FILM COATED ORAL
Qty: 180 TABLET | Refills: 0 | Status: SHIPPED | OUTPATIENT
Start: 2021-03-23 | End: 2021-06-29

## 2021-04-03 DIAGNOSIS — E11.9 TYPE 2 DIABETES MELLITUS WITHOUT COMPLICATION, WITHOUT LONG-TERM CURRENT USE OF INSULIN (HCC): ICD-10-CM

## 2021-04-05 ENCOUNTER — APPOINTMENT (OUTPATIENT)
Dept: GENERAL RADIOLOGY | Age: 76
End: 2021-04-05
Payer: MEDICARE

## 2021-04-05 ENCOUNTER — HOSPITAL ENCOUNTER (EMERGENCY)
Age: 76
Discharge: HOME | End: 2021-04-05
Attending: EMERGENCY MEDICINE
Payer: MEDICARE

## 2021-04-05 VITALS
RESPIRATION RATE: 13 BRPM | HEART RATE: 64 BPM | DIASTOLIC BLOOD PRESSURE: 66 MMHG | OXYGEN SATURATION: 96 % | SYSTOLIC BLOOD PRESSURE: 129 MMHG | TEMPERATURE: 97.6 F

## 2021-04-05 DIAGNOSIS — R60.0 LEG EDEMA: Primary | ICD-10-CM

## 2021-04-05 LAB
ALBUMIN SERPL-MCNC: 4.4 G/DL (ref 3.5–5.1)
ALP BLD-CCNC: 63 U/L (ref 38–126)
ALT SERPL-CCNC: 16 U/L (ref 11–66)
ANION GAP SERPL CALCULATED.3IONS-SCNC: 14 MEQ/L (ref 8–16)
AST SERPL-CCNC: 16 U/L (ref 5–40)
BASOPHILS # BLD: 1.1 %
BASOPHILS ABSOLUTE: 0.1 THOU/MM3 (ref 0–0.1)
BILIRUB SERPL-MCNC: 0.6 MG/DL (ref 0.3–1.2)
BUN BLDV-MCNC: 44 MG/DL (ref 7–22)
CALCIUM SERPL-MCNC: 9.3 MG/DL (ref 8.5–10.5)
CHLORIDE BLD-SCNC: 99 MEQ/L (ref 98–111)
CO2: 26 MEQ/L (ref 23–33)
CREAT SERPL-MCNC: 2.3 MG/DL (ref 0.4–1.2)
EKG ATRIAL RATE: 65 BPM
EKG P AXIS: -77 DEGREES
EKG P-R INTERVAL: 188 MS
EKG Q-T INTERVAL: 510 MS
EKG QRS DURATION: 184 MS
EKG QTC CALCULATION (BAZETT): 534 MS
EKG R AXIS: 11 DEGREES
EKG T AXIS: 77 DEGREES
EKG VENTRICULAR RATE: 66 BPM
EOSINOPHIL # BLD: 3.7 %
EOSINOPHILS ABSOLUTE: 0.3 THOU/MM3 (ref 0–0.4)
ERYTHROCYTE [DISTWIDTH] IN BLOOD BY AUTOMATED COUNT: 14.5 % (ref 11.5–14.5)
ERYTHROCYTE [DISTWIDTH] IN BLOOD BY AUTOMATED COUNT: 48.7 FL (ref 35–45)
GFR SERPL CREATININE-BSD FRML MDRD: 21 ML/MIN/1.73M2
GLUCOSE BLD-MCNC: 127 MG/DL (ref 70–108)
HCT VFR BLD CALC: 39.6 % (ref 37–47)
HEMOGLOBIN: 12.3 GM/DL (ref 12–16)
IMMATURE GRANS (ABS): 0.04 THOU/MM3 (ref 0–0.07)
IMMATURE GRANULOCYTES: 0.5 %
LYMPHOCYTES # BLD: 14.5 %
LYMPHOCYTES ABSOLUTE: 1.1 THOU/MM3 (ref 1–4.8)
MCH RBC QN AUTO: 28.7 PG (ref 26–33)
MCHC RBC AUTO-ENTMCNC: 31.1 GM/DL (ref 32.2–35.5)
MCV RBC AUTO: 92.5 FL (ref 81–99)
MONOCYTES # BLD: 6.9 %
MONOCYTES ABSOLUTE: 0.5 THOU/MM3 (ref 0.4–1.3)
NUCLEATED RED BLOOD CELLS: 0 /100 WBC
OSMOLALITY CALCULATION: 290.3 MOSMOL/KG (ref 275–300)
PLATELET # BLD: 342 THOU/MM3 (ref 130–400)
PMV BLD AUTO: 11 FL (ref 9.4–12.4)
POTASSIUM REFLEX MAGNESIUM: 4.8 MEQ/L (ref 3.5–5.2)
PRO-BNP: 743.1 PG/ML (ref 0–900)
RBC # BLD: 4.28 MILL/MM3 (ref 4.2–5.4)
SEG NEUTROPHILS: 73.3 %
SEGMENTED NEUTROPHILS ABSOLUTE COUNT: 5.4 THOU/MM3 (ref 1.8–7.7)
SODIUM BLD-SCNC: 139 MEQ/L (ref 135–145)
TOTAL PROTEIN: 7.5 G/DL (ref 6.1–8)
TROPONIN T: < 0.01 NG/ML
WBC # BLD: 7.4 THOU/MM3 (ref 4.8–10.8)

## 2021-04-05 PROCEDURE — 96374 THER/PROPH/DIAG INJ IV PUSH: CPT

## 2021-04-05 PROCEDURE — 84484 ASSAY OF TROPONIN QUANT: CPT

## 2021-04-05 PROCEDURE — 71045 X-RAY EXAM CHEST 1 VIEW: CPT

## 2021-04-05 PROCEDURE — 85025 COMPLETE CBC W/AUTO DIFF WBC: CPT

## 2021-04-05 PROCEDURE — 93005 ELECTROCARDIOGRAM TRACING: CPT | Performed by: STUDENT IN AN ORGANIZED HEALTH CARE EDUCATION/TRAINING PROGRAM

## 2021-04-05 PROCEDURE — 2500000003 HC RX 250 WO HCPCS: Performed by: STUDENT IN AN ORGANIZED HEALTH CARE EDUCATION/TRAINING PROGRAM

## 2021-04-05 PROCEDURE — 36415 COLL VENOUS BLD VENIPUNCTURE: CPT

## 2021-04-05 PROCEDURE — 99283 EMERGENCY DEPT VISIT LOW MDM: CPT

## 2021-04-05 PROCEDURE — 83880 ASSAY OF NATRIURETIC PEPTIDE: CPT

## 2021-04-05 PROCEDURE — 80053 COMPREHEN METABOLIC PANEL: CPT

## 2021-04-05 RX ORDER — BUMETANIDE 0.25 MG/ML
1 INJECTION, SOLUTION INTRAMUSCULAR; INTRAVENOUS ONCE
Status: COMPLETED | OUTPATIENT
Start: 2021-04-05 | End: 2021-04-05

## 2021-04-05 RX ADMIN — BUMETANIDE 1 MG: 0.25 INJECTION, SOLUTION INTRAMUSCULAR; INTRAVENOUS at 14:35

## 2021-04-05 ASSESSMENT — ENCOUNTER SYMPTOMS
BACK PAIN: 0
COUGH: 0
VOMITING: 0
ABDOMINAL PAIN: 0
SORE THROAT: 0
NAUSEA: 0
SHORTNESS OF BREATH: 1
RHINORRHEA: 0
DIARRHEA: 0
EYE REDNESS: 0
SINUS PAIN: 0

## 2021-04-05 NOTE — ED PROVIDER NOTES
[facility] ENCOUNTER          Pt Name: Kiah Corrales  MRN: [id number]  [place] 1945  Date of evaluation: 4/5/2021  Treating Resident Physician: Moon Novak MD  Supervising Physician: Dr. Terry Del Real       Chief Complaint   Patient presents with    Shortness of Breath    Leg Swelling     History obtained from the patient. HISTORY OF PRESENT ILLNESS    HPI  Kiah Corrales is a 78 y.o. female with past medical history of paroxysmal atrial fibrillation, CHF, CAD, aortic aneurysm, AV block high-grade, type 2 diabetes mellitus who presents to the emergency department for evaluation of bilateral lower extremity edema has been worsening for the past 3 days. Patient takes Bumex at home however states this has not improved she has some minimal weight gain. She also complains of some dyspnea on exertion occasionally over the past week or so that has been intermittent. She denies any recent fever, chills, chest pain, cough, dysuria, hematuria or nausea or vomiting. The patient has no other acute complaints at this time. REVIEW OF SYSTEMS   Review of Systems   Constitutional: Negative for chills and fever. HENT: Negative for rhinorrhea, sinus pain and sore throat. Eyes: Negative for redness. Respiratory: Positive for shortness of breath. Negative for cough. Cardiovascular: Positive for leg swelling. Negative for chest pain. Gastrointestinal: Negative for abdominal pain, diarrhea, nausea and vomiting. Genitourinary: Negative for dysuria. Musculoskeletal: Negative for back pain. Skin: Negative for rash. Neurological: Negative for light-headedness and headaches. Psychiatric/Behavioral: Negative for agitation.          PAST MEDICAL AND SURGICAL HISTORY     Past Medical History:   Diagnosis Date    Aortic aneurysm (HCC)     4.5 cm aorta    Arthritis     Atrial fibrillation, chronic ([facility] 1/25/2019    CAD (coronary artery disease)     CHF NYHA class II, unspecified failure chronicity, unspecified type ([facility] 10/8/2018    Colon polyps 3/01    Diabetic peripheral neuropathy ([facility] 2014      feet    Elbow fracture     Hyperlipidemia     Hypertension     Hypothyroidism     Pulmonary nodule, right 10/2017      repeat  ct of  chest      S/P cardiac pacemaker procedure: 10/21/2017: Medtronic Dual Chamber. 10/21/2017    10/21/2017: Medtronic Dual Chamber. Dr. Johana Juan Type II or unspecified type diabetes mellitus without mention of complication, not stated as uncontrolled      Past Surgical History:   Procedure Laterality Date    BREAST BIOPSY Right 2016     benign   [place]    BREAST SURGERY      CARDIOVERSION  2019    atrial fib to sinus  [facility]     SECTION  over 30 years ago     COLONOSCOPY      colon polyps   [place]    CORONARY ANGIOPLASTY      [facility]aki    CORONARY ANGIOPLASTY WITH STENT PLACEMENT      [facility]aki    CORONARY ANGIOPLASTY WITH STENT PLACEMENT  10/2018     lad branch    HEEL SPUR SURGERY Bilateral     bilat with hardware    HYSTERECTOMY  1984    KNEE ARTHROSCOPY  ; 3/02    right and left knee    KNEE ARTHROSCOPY Bilateral     bilat    PACEMAKER PLACEMENT  10/2017    THYROIDECTOMY, PARTIAL           MEDICATIONS   No current facility-administered medications for this encounter. Current Outpatient Medications:     metoprolol tartrate (LOPRESSOR) 50 MG tablet, Take 1 tablet by mouth twice daily, Disp: 180 tablet, Rfl: 0    nystatin (MYCOSTATIN) 412221 UNIT/GM powder, Apply 2-3 times daily to affected areas. , Disp: 45 g, Rfl: 1    ramipril (ALTACE) 10 MG capsule, Take 1 capsule by mouth twice daily, Disp: 180 capsule, Rfl: 1    levothyroxine (SYNTHROID) 150 MCG tablet, Take 1 tablet by mouth once daily, Disp: 90 tablet, Rfl: 1    ELIQUIS 5 MG TABS tablet, Take 1 tablet by mouth twice daily, Disp: 60 tablet, Rfl: 1    bumetanide (BUMEX) 2 MG tablet, Take 1 tablet by mouth once daily, Disp: 90 tablet, Rfl: 1    gabapentin (NEURONTIN) 300 MG capsule, TAKE 1 CAPSULE BY MOUTH ONCE DAILY IN THE EVENING, Disp: 30 capsule, Rfl: 5    potassium chloride (KLOR-CON M) 20 MEQ extended release tablet, TAKE 1  BY MOUTH ONCE DAILY, Disp: 90 tablet, Rfl: 3    amLODIPine (NORVASC) 10 MG tablet, Take 1 tablet by mouth once daily, Disp: 90 tablet, Rfl: 0    amiodarone (CORDARONE) 200 MG tablet, TAKE ONE TABLET BY MOUTH TWICE DAILY FOR 7 DAYS AND THEN TAKE ONE TABLET ONCE DAILY THEREAFTER, Disp: 90 tablet, Rfl: 3    clopidogrel (PLAVIX) 75 MG tablet, TAKE 1 TABLET DAILY, Disp: 90 tablet, Rfl: 1    atorvastatin (LIPITOR) 40 MG tablet, Take 1 tablet by mouth daily, Disp: 90 tablet, Rfl: 1    glimepiride (AMARYL) 4 MG tablet, Take 1 tablet by mouth once daily, Disp: 90 tablet, Rfl: 1    metFORMIN (GLUCOPHAGE-XR) 500 MG extended release tablet, TAKE 2 TABLETS AT [facility] AT THE EVENING MEAL, Disp: 360 tablet, Rfl: 1    Flaxseed, Linseed, (FLAX SEEDS PO), Take by mouth, Disp: , Rfl:     nitroGLYCERIN (NITROSTAT) 0.4 MG SL tablet, Place 1 tablet under the tongue every 5 minutes as needed for Chest pain up to max of 3 total doses.  If no relief after 1 dose, call 911., Disp: 25 tablet, Rfl: 1    Glucose Blood (JOSE E BREEZE 2 TEST) DISK, USE ONE STRIP TWICE DAILY, DX: E11.9, Disp: 200 each, Rfl: 5    Ascorbic Acid (VITAMIN C) 500 MG tablet, Take 1,000 mg by mouth daily , Disp: , Rfl:       SOCIAL HISTORY     Social History     Social History Narrative    Not on file     Social History     Tobacco Use    Smoking status: Never Smoker    Smokeless tobacco: Never Used   Substance Use Topics    Alcohol use: No    Drug use: No         ALLERGIES     Allergies   Allergen Reactions    Adhesive Tape Rash         FAMILY HISTORY     Family History   Problem Relation Age of Onset    Heart Disease Mother 80        bacterial infection at valve    Heart Disease Father     Heart Attack Father     Heart Disease Brother         CABG         PREVIOUS RECORDS   Previous records reviewed: She was seen here 2/25/2020 for right ankle sprain. PHYSICAL EXAM     ED Triage Vitals   BP Temp Temp src Pulse Resp SpO2 Height Weight   -- -- -- -- -- -- -- --     Initial vital signs and nursing assessment reviewed and normal. Pulsoximetry is normal per my interpretation. Additional Vital Signs:  Vitals:    04/05/21 1436   BP: 129/66   Pulse: 64   Resp: 13   Temp:    SpO2: 96%       Physical Exam  Vitals signs and nursing note reviewed. Constitutional:       General: She is not in acute distress. Appearance: Normal appearance. She is not ill-appearing. HENT:      Head: Normocephalic and atraumatic. Right Ear: External ear normal.      Left Ear: External ear normal.      Nose: Nose normal.      Mouth/Throat:      Mouth: Mucous membranes are moist.      Pharynx: Oropharynx is clear. Eyes:      General: No scleral icterus. Extraocular Movements: Extraocular movements intact. Conjunctiva/sclera: Conjunctivae normal.   Neck:      Musculoskeletal: Normal range of motion and neck supple. No neck rigidity or muscular tenderness. Cardiovascular:      Rate and Rhythm: Normal rate and regular rhythm. Pulses: Normal pulses. Heart sounds: Normal heart sounds. Pulmonary:      Effort: Pulmonary effort is normal.      Breath sounds: Rales present. Comments: Some minimal rales on the left lower lung lobes. Abdominal:      General: Abdomen is flat. There is no distension. Palpations: Abdomen is soft. Tenderness: There is no abdominal tenderness. There is no guarding or rebound. Musculoskeletal: Normal range of motion. Right lower leg: Edema present. Left lower leg: Edema present. Comments: 3+ pitting edema bilaterally. Lymphadenopathy:      Cervical: No cervical adenopathy. Skin:     General: Skin is warm and dry.       Capillary Refill: Capillary refill takes less than 2 seconds. Coloration: Skin is not jaundiced. Neurological:      General: No focal deficit present. Mental Status: She is alert and oriented to person, place, and time. Psychiatric:         Mood and Affect: Mood normal.           MEDICAL DECISION MAKING   Initial Assessment: This is 73-year-old female with CHF recently diagnosed she is coming in with complaints of increasing bilateral lower extremity with the past 3 days as well as some dyspnea on exertion occasion the past week. Denies any recent fever or chills. She has 3+ pitting edema bilaterally. She has been taking Bumex at home with only some minimal improvement has had some weight gain over the past few days. Differential Diagnosis Included but not limited to: CHF exacerbation, pneumonia, ACS, PE    Plan: We will do laboratory studies including BNP, troponin. She will be given Bumex as well. ED RESULTS   Laboratory results:  Labs Reviewed   CBC WITH AUTO DIFFERENTIAL - Abnormal; Notable for the following components:       Result Value    MCHC 31.1 (*)     RDW-SD 48.7 (*)     All other components within normal limits   COMPREHENSIVE METABOLIC PANEL W/ REFLEX TO MG FOR LOW K - Abnormal; Notable for the following components:    Glucose 127 (*)     CREATININE 2.3 (*)     BUN 44 (*)     All other components within normal limits   GLOMERULAR FILTRATION RATE, ESTIMATED - Abnormal; Notable for the following components:    Est, Glom Filt Rate 21 (*)     All other components within normal limits   TROPONIN   BRAIN NATRIURETIC PEPTIDE   ANION GAP   OSMOLALITY       Radiologic studies results:  XR CHEST PORTABLE   Final Result   1. Mild cardiomegaly. Permanent dual-chamber pacemaker. 2. No acute findings. No infiltrates or effusions are seen. **This report has been created using voice recognition software. It may contain minor errors which are inherent in voice recognition technology. ** Final report electronically signed by Dr. Joy Lee on 4/5/2021 12:54 PM          ED Medications administered this visit:   Medications   bumetanide (BUMEX) injection 1 mg (1 mg Intravenous Given 4/5/21 1435)         ED COURSE     ED Course as of Apr 05 1510   Mon Apr 05, 2021   1437 \"IMPRESSION:  1. Mild cardiomegaly. Permanent dual-chamber pacemaker. 2. No acute findings. No infiltrates or effusions are seen. \"   XR CHEST PORTABLE [AL]      ED Course User Index  [AL] Randell Nair MD     Strict return precautions and follow up instructions were discussed with the patient prior to discharge, with which the patient agrees. MEDICATION CHANGES     New Prescriptions    No medications on file         FINAL DISPOSITION     Final diagnoses:   Leg edema     Condition: condition: good  Dispo: Discharge to home      This transcription was electronically signed. Parts of this transcriptions may have been dictated by use of voice recognition software and electronically transcribed, and parts may have been transcribed with the assistance of an ED scribe. The transcription may contain errors not detected in proofreading. Please refer to my supervising physician's documentation if my documentation differs.     Electronically Signed: Randell Nair, 04/05/21, 3:10 PM         Randell Nair MD  Resident  04/05/21 [id number]

## 2021-04-06 DIAGNOSIS — E11.9 TYPE 2 DIABETES MELLITUS WITHOUT COMPLICATION, WITHOUT LONG-TERM CURRENT USE OF INSULIN (HCC): ICD-10-CM

## 2021-04-06 RX ORDER — GLIMEPIRIDE 4 MG/1
TABLET ORAL
Qty: 90 TABLET | Refills: 0 | Status: SHIPPED | OUTPATIENT
Start: 2021-04-06 | End: 2021-06-29

## 2021-04-06 NOTE — TELEPHONE ENCOUNTER
Date of last visit:  3/8/2021  Date of next visit:  6/14/2021    Requested Prescriptions     Pending Prescriptions Disp Refills    metFORMIN (GLUCOPHAGE-XR) 500 MG extended release tablet [Pharmacy Med Name: metFORMIN HCl  MG Oral Tablet Extended Release 24 Hour] 360 tablet 0     Sig: TAKE 2 TABLETS BY MOUTH TWICE DAILY (WITH  BREAKFAST  AND  AT  THE  EVENING  MEAL)

## 2021-04-06 NOTE — TELEPHONE ENCOUNTER
Pt said that she that she is feeling better. Swelling is down. Pt said she was told at the ER to call her family doctor and have them maybe increase her Bumex. I tried to have her come in for an appt but she did not want to. Said she just wanted me to put a message back for doctor.

## 2021-04-07 RX ORDER — METFORMIN HYDROCHLORIDE 500 MG/1
TABLET, EXTENDED RELEASE ORAL
Qty: 360 TABLET | Refills: 0 | Status: SHIPPED | OUTPATIENT
Start: 2021-04-07 | End: 2021-06-23 | Stop reason: ALTCHOICE

## 2021-04-23 DIAGNOSIS — I48.0 PAROXYSMAL ATRIAL FIBRILLATION (HCC): ICD-10-CM

## 2021-04-23 DIAGNOSIS — I10 ESSENTIAL HYPERTENSION: ICD-10-CM

## 2021-04-23 RX ORDER — AMLODIPINE BESYLATE 10 MG/1
TABLET ORAL
Qty: 90 TABLET | Refills: 0 | Status: SHIPPED | OUTPATIENT
Start: 2021-04-23 | End: 2021-07-22

## 2021-04-23 RX ORDER — APIXABAN 5 MG/1
TABLET, FILM COATED ORAL
Qty: 60 TABLET | Refills: 0 | Status: SHIPPED | OUTPATIENT
Start: 2021-04-23 | End: 2021-05-27

## 2021-05-12 ENCOUNTER — PROCEDURE VISIT (OUTPATIENT)
Dept: CARDIOLOGY CLINIC | Age: 76
End: 2021-05-12
Payer: MEDICARE

## 2021-05-12 DIAGNOSIS — Z95.0 S/P CARDIAC PACEMAKER PROCEDURE: Primary | ICD-10-CM

## 2021-05-12 PROCEDURE — 93294 REM INTERROG EVL PM/LDLS PM: CPT | Performed by: NUCLEAR MEDICINE

## 2021-05-12 PROCEDURE — 93296 REM INTERROG EVL PM/IDS: CPT | Performed by: NUCLEAR MEDICINE

## 2021-05-12 NOTE — PROGRESS NOTES
[employer] Medtronic Dual Pacemaker --[facility] every 3m  Patient of [facility]    Battery 3V    Presenting rhythm AP     A Impedance 342  RV Impedance 456    P wave sensing 0.8  R wave sensing 13.6    A Threshold 0.875 @ 0.40  A Amplitude 1.50 @ 0.40  RV Thresholds 0.750 @ 0.40  RV Amplitude 2.0 @ 0.40      A Paced 99.5%  V Paced 100%    Programmed Mode DDDR      Afib [place] 0%    Episodes none

## 2021-05-22 ENCOUNTER — APPOINTMENT (OUTPATIENT)
Dept: GENERAL RADIOLOGY | Age: 76
End: 2021-05-22
Payer: MEDICARE

## 2021-05-22 ENCOUNTER — HOSPITAL ENCOUNTER (EMERGENCY)
Age: 76
Discharge: HOME | End: 2021-05-22
Attending: EMERGENCY MEDICINE
Payer: MEDICARE

## 2021-05-22 VITALS
BODY MASS INDEX: 38.19 KG/M2 | RESPIRATION RATE: 16 BRPM | OXYGEN SATURATION: 92 % | WEIGHT: 252 LBS | HEART RATE: 69 BPM | HEIGHT: 68 IN | SYSTOLIC BLOOD PRESSURE: 141 MMHG | DIASTOLIC BLOOD PRESSURE: 66 MMHG | TEMPERATURE: 97.6 F

## 2021-05-22 DIAGNOSIS — I50.23 ACUTE ON CHRONIC SYSTOLIC CONGESTIVE HEART FAILURE (HCC): Primary | ICD-10-CM

## 2021-05-22 LAB
ALLEN TEST: POSITIVE
ANION GAP SERPL CALCULATED.3IONS-SCNC: 13 MEQ/L (ref 8–16)
BASE EXCESS (CALCULATED): 2.3 MMOL/L (ref -2.5–2.5)
BASOPHILS # BLD: 0.7 %
BASOPHILS ABSOLUTE: 0.1 THOU/MM3 (ref 0–0.1)
BUN BLDV-MCNC: 30 MG/DL (ref 7–22)
CALCIUM SERPL-MCNC: 9 MG/DL (ref 8.5–10.5)
CHLORIDE BLD-SCNC: 103 MEQ/L (ref 98–111)
CO2: 26 MEQ/L (ref 23–33)
COLLECTED BY:: ABNORMAL
CREAT SERPL-MCNC: 1.7 MG/DL (ref 0.4–1.2)
DEVICE: ABNORMAL
EKG ATRIAL RATE: 70 BPM
EKG P-R INTERVAL: 198 MS
EKG Q-T INTERVAL: 484 MS
EKG QRS DURATION: 154 MS
EKG QTC CALCULATION (BAZETT): 522 MS
EKG R AXIS: 56 DEGREES
EKG T AXIS: 69 DEGREES
EKG VENTRICULAR RATE: 70 BPM
EOSINOPHIL # BLD: 3.6 %
EOSINOPHILS ABSOLUTE: 0.4 THOU/MM3 (ref 0–0.4)
ERYTHROCYTE [DISTWIDTH] IN BLOOD BY AUTOMATED COUNT: 14.2 % (ref 11.5–14.5)
ERYTHROCYTE [DISTWIDTH] IN BLOOD BY AUTOMATED COUNT: 47.9 FL (ref 35–45)
GFR SERPL CREATININE-BSD FRML MDRD: 29 ML/MIN/1.73M2
GLUCOSE BLD-MCNC: 195 MG/DL (ref 70–108)
HCO3: 27 MMOL/L (ref 23–28)
HCT VFR BLD CALC: 36.1 % (ref 37–47)
HEMOGLOBIN: 11.5 GM/DL (ref 12–16)
IMMATURE GRANS (ABS): 0.04 THOU/MM3 (ref 0–0.07)
IMMATURE GRANULOCYTES: 0.3 %
LYMPHOCYTES # BLD: 6.9 %
LYMPHOCYTES ABSOLUTE: 0.8 THOU/MM3 (ref 1–4.8)
MCH RBC QN AUTO: 29.7 PG (ref 26–33)
MCHC RBC AUTO-ENTMCNC: 31.9 GM/DL (ref 32.2–35.5)
MCV RBC AUTO: 93.3 FL (ref 81–99)
MONOCYTES # BLD: 7.5 %
MONOCYTES ABSOLUTE: 0.9 THOU/MM3 (ref 0.4–1.3)
NUCLEATED RED BLOOD CELLS: 0 /100 WBC
O2 SATURATION: 92 %
OSMOLALITY CALCULATION: 294.7 MOSMOL/KG (ref 275–300)
PCO2: 41 MMHG (ref 35–45)
PH BLOOD GAS: 7.43 (ref 7.35–7.45)
PLATELET # BLD: 265 THOU/MM3 (ref 130–400)
PMV BLD AUTO: 11.1 FL (ref 9.4–12.4)
PO2: 61 MMHG (ref 71–104)
POTASSIUM REFLEX MAGNESIUM: 4.7 MEQ/L (ref 3.5–5.2)
PRO-BNP: 1018 PG/ML (ref 0–900)
RBC # BLD: 3.87 MILL/MM3 (ref 4.2–5.4)
SEG NEUTROPHILS: 81 %
SEGMENTED NEUTROPHILS ABSOLUTE COUNT: 9.3 THOU/MM3 (ref 1.8–7.7)
SODIUM BLD-SCNC: 142 MEQ/L (ref 135–145)
SOURCE, BLOOD GAS: ABNORMAL
TROPONIN T: < 0.01 NG/ML
WBC # BLD: 11.5 THOU/MM3 (ref 4.8–10.8)

## 2021-05-22 PROCEDURE — 84484 ASSAY OF TROPONIN QUANT: CPT

## 2021-05-22 PROCEDURE — 36415 COLL VENOUS BLD VENIPUNCTURE: CPT

## 2021-05-22 PROCEDURE — 80048 BASIC METABOLIC PNL TOTAL CA: CPT

## 2021-05-22 PROCEDURE — 2500000003 HC RX 250 WO HCPCS: Performed by: EMERGENCY MEDICINE

## 2021-05-22 PROCEDURE — 82803 BLOOD GASES ANY COMBINATION: CPT

## 2021-05-22 PROCEDURE — 71045 X-RAY EXAM CHEST 1 VIEW: CPT

## 2021-05-22 PROCEDURE — 93010 ELECTROCARDIOGRAM REPORT: CPT | Performed by: INTERNAL MEDICINE

## 2021-05-22 PROCEDURE — 96374 THER/PROPH/DIAG INJ IV PUSH: CPT

## 2021-05-22 PROCEDURE — 85025 COMPLETE CBC W/AUTO DIFF WBC: CPT

## 2021-05-22 PROCEDURE — 99285 EMERGENCY DEPT VISIT HI MDM: CPT

## 2021-05-22 PROCEDURE — 93005 ELECTROCARDIOGRAM TRACING: CPT | Performed by: EMERGENCY MEDICINE

## 2021-05-22 PROCEDURE — 36600 WITHDRAWAL OF ARTERIAL BLOOD: CPT

## 2021-05-22 PROCEDURE — 83880 ASSAY OF NATRIURETIC PEPTIDE: CPT

## 2021-05-22 RX ORDER — BUMETANIDE 0.25 MG/ML
1 INJECTION, SOLUTION INTRAMUSCULAR; INTRAVENOUS ONCE
Status: COMPLETED | OUTPATIENT
Start: 2021-05-22 | End: 2021-05-22

## 2021-05-22 RX ADMIN — BUMETANIDE 1 MG: 0.25 INJECTION, SOLUTION INTRAMUSCULAR; INTRAVENOUS at 07:08

## 2021-05-22 ASSESSMENT — ENCOUNTER SYMPTOMS
VOMITING: 0
SORE THROAT: 0
NAUSEA: 0
EYE DISCHARGE: 0
EYE ITCHING: 0
EYE PAIN: 0
EYE REDNESS: 0
COUGH: 1
STRIDOR: 0
CONSTIPATION: 0
ABDOMINAL PAIN: 0
RHINORRHEA: 0
WHEEZING: 0
DIARRHEA: 0
BACK PAIN: 0
CHEST TIGHTNESS: 0
PHOTOPHOBIA: 0
SHORTNESS OF BREATH: 1
ABDOMINAL DISTENTION: 0

## 2021-05-22 ASSESSMENT — PAIN DESCRIPTION - LOCATION: LOCATION: ABDOMEN

## 2021-05-22 ASSESSMENT — PAIN DESCRIPTION - FREQUENCY: FREQUENCY: CONTINUOUS

## 2021-05-22 ASSESSMENT — PAIN DESCRIPTION - ONSET: ONSET: ON-GOING

## 2021-05-22 ASSESSMENT — PAIN DESCRIPTION - ORIENTATION: ORIENTATION: UPPER

## 2021-05-22 NOTE — ED NOTES
Bed: 002A  Expected date:   Expected time:   Means of arrival: [facility]  Comments:     Jarek Briones RN  05/22/21 0899

## 2021-05-22 NOTE — ED PROVIDER NOTES
I assumed care of the patient from Dr. Char Singleton at 0700. Pt is presenting with CHF exacerbation. Pt did well on her ambulatory test, after receiving 1mg of IV bumex. I discussed options with pt and her son Fabian Sloan. Via shared decision making, we've decided upon the outpatient route, with recommendations to increase her bumex dose from QD to BID 2mg. Pt agreed with this plan of care. Pt will be discharged home in stable condition.     Labs Reviewed   CBC WITH AUTO DIFFERENTIAL - Abnormal; Notable for the following components:       Result Value    WBC 11.5 (*)     RBC 3.87 (*)     Hemoglobin 11.5 (*)     Hematocrit 36.1 (*)     MCHC 31.9 (*)     RDW-SD 47.9 (*)     Segs Absolute 9.3 (*)     Lymphocytes Absolute 0.8 (*)     All other components within normal limits   BASIC METABOLIC PANEL W/ REFLEX TO MG FOR LOW K - Abnormal; Notable for the following components:    Glucose 195 (*)     BUN 30 (*)     CREATININE 1.7 (*)     All other components within normal limits   BRAIN NATRIURETIC PEPTIDE - Abnormal; Notable for the following components:    Pro-BNP 1018.0 (*)     All other components within normal limits   BLOOD GAS, ARTERIAL - Abnormal; Notable for the following components:    PO2 61 (*)     All other components within normal limits   GLOMERULAR FILTRATION RATE, ESTIMATED - Abnormal; Notable for the following components:    Est, Glom Filt Rate 29 (*)     All other components within normal limits   TROPONIN   ANION GAP   OSMOLALITY          Sada Ervin MD  05/22/21 6584

## 2021-05-22 NOTE — ACP (ADVANCE CARE PLANNING)
Advance Care Planning     Advance Care Planning Activator (Inpatient)  Conversation Note      Date of ACP Conversation: 5/22/2021     Conversation Conducted with: Patient with Decision Making Capacity    ACP Activator: Vinay Zazueta RN      Health Care Decision Maker:     Current Designated Health Care Decision Maker:     Primary Decision Maker: Regina Hathaway - [phone]    Secondary Decision Maker: Vianey Teixeira - Child - [phone]    Supplemental (Other) Decision Maker: João Nichols Child - [phone]      Care Preferences    Ventilation: \"If you were in your present state of health and suddenly became very ill and were unable to breathe on your own, what would your preference be about the use of a ventilator (breathing machine) if it were available to you? \"      Would the patient desire the use of ventilator (breathing machine)?: yes    \"If your health worsens and it becomes clear that your chance of recovery is unlikely, what would your preference be about the use of a ventilator (breathing machine) if it were available to you? \"     Would the patient desire the use of ventilator (breathing machine)?: No      Resuscitation  \"CPR works best to restart the heart when there is a sudden event, like a heart attack, in someone who is otherwise healthy. Unfortunately, CPR does not typically restart the heart for people who have serious health conditions or who are very sick. \"    \"In the event your heart stopped as a result of an underlying serious health condition, would you want attempts to be made to restart your heart (answer \"yes\" for attempt to resuscitate) or would you prefer a natural death (answer \"no\" for do not attempt to resuscitate)? \" yes       [] Yes   [x] No   Educated Patient / Timothy Low regarding differences between Advance Directives and portable DNR orders.     Length of ACP Conversation in minutes:  5  Conversation Outcomes:  [x] ACP discussion completed  [] Existing advance directive reviewed with patient; no changes to patient's previously recorded wishes  [] New Advance Directive completed  [] Portable Do Not Rescitate prepared for Provider review and signature  [] POLST/POST/MOLST/MOST prepared for Provider review and signature      Follow-up plan:    [] Schedule follow-up conversation to continue planning  [x] Referred individual to Provider for additional questions/concerns   [] Advised patient/agent/surrogate to review completed ACP document and update if needed with changes in condition, patient preferences or care setting    [x] This note routed to one or more involved healthcare providers          Pt came into ED via EMS for sob. Met with pt and son Jessica Brown in ED-02 to discuss ACP. Hierarchy of decision maker discussed. She would like [facility], then Jessica Brown, then [facility]. She has told her family that they are to try everything but she does not want anything long term. Jessica Brown acknowledges her feelings. States spiritually good.

## 2021-05-22 NOTE — ED NOTES
Patient ambulated to bathroom and back to room while pulse ox was being monitored. Patient's O2 stayed above 90% and HR stayed in the 80's. Patient stated she did feel SOB once returning to room.       Svitlana Jacobs, RN  05/22/21 Clark Ramirez 97., RN  05/22/21 2016

## 2021-05-22 NOTE — ED PROVIDER NOTES
and neck stiffness. Skin: Negative for pallor, rash and wound. Allergic/Immunologic: Negative for environmental allergies and food allergies. Neurological: Negative for dizziness, tremors, syncope, weakness and headaches. Psychiatric/Behavioral: Negative for agitation, behavioral problems, confusion, self-injury, sleep disturbance and suicidal ideas. PAST MEDICAL HISTORY     Past Medical History:   Diagnosis Date    Aortic aneurysm (HCC)     4.5 cm aorta    Arthritis     Atrial fibrillation, chronic ([facility] 2019    CAD (coronary artery disease)     CHF NYHA class II, unspecified failure chronicity, unspecified type ([address] 10/8/2018    Colon polyps 3/01    Diabetic peripheral neuropathy ([facility] [address] 2014      feet    Elbow fracture     Hyperlipidemia     Hypertension     Hypothyroidism     Pulmonary nodule, right 10/2017      repeat  ct of  chest      S/P cardiac pacemaker procedure: 10/21/2017: Medtronic Dual Chamber. 10/21/2017    10/21/2017: Medtronic Dual Chamber.  Dr. Stephani Urban Type II or unspecified type diabetes mellitus without mention of complication, not stated as uncontrolled        SURGICAL HISTORY       Past Surgical History:   Procedure Laterality Date    BREAST BIOPSY Right 2016     benign   abrahan    BREAST SURGERY      CARDIOVERSION  2019    atrial fib to sinus  baki     SECTION  over 30 years ago     COLONOSCOPY  2012    colon polyps   [facility]    CORONARY ANGIOPLASTY      baki    CORONARY ANGIOPLASTY WITH STENT PLACEMENT      baki    CORONARY ANGIOPLASTY WITH STENT PLACEMENT  10/2018     lad branch    HEEL SPUR SURGERY Bilateral     bilat with hardware    HYSTERECTOMY  1984    KNEE ARTHROSCOPY  ; 3/02    right and left knee    KNEE ARTHROSCOPY Bilateral     bilat    PACEMAKER PLACEMENT  10/2017    THYROIDECTOMY, PARTIAL  's       CURRENT MEDICATIONS       Discharge Medication List as of 2021  9:09 AM      CONTINUE these medications which have NOT CHANGED    Details   ELIQUIS 5 MG TABS tablet Take 1 tablet by mouth twice daily, Disp-60 tablet, R-0Normal      amLODIPine (NORVASC) 10 MG tablet Take 1 tablet by mouth once daily, Disp-90 tablet, R-0Normal      metFORMIN (GLUCOPHAGE-XR) 500 MG extended release tablet TAKE 2 TABLETS BY MOUTH TWICE DAILY (WITH  BREAKFAST  AND  AT  THE  EVENING  MEAL), Disp-360 tablet, R-0Normal      glimepiride (AMARYL) 4 MG tablet Take 1 tablet by mouth once daily, Disp-90 tablet, R-0Normal      metoprolol tartrate (LOPRESSOR) 50 MG tablet Take 1 tablet by mouth twice daily, Disp-180 tablet, R-0Normal      nystatin (MYCOSTATIN) 887281 UNIT/GM powder Apply 2-3 times daily to affected areas. , Disp-45 g, R-1, Normal      ramipril (ALTACE) 10 MG capsule Take 1 capsule by mouth twice daily, Disp-180 capsule, R-1Normal      levothyroxine (SYNTHROID) 150 MCG tablet Take 1 tablet by mouth once daily, Disp-90 tablet, R-1Normal      bumetanide (BUMEX) 2 MG tablet Take 1 tablet by mouth once daily, Disp-90 tablet, R-1Normal      gabapentin (NEURONTIN) 300 MG capsule TAKE 1 CAPSULE BY MOUTH ONCE DAILY IN THE EVENING, Disp-30 capsule, R-5Normal      potassium chloride (KLOR-CON M) 20 MEQ extended release tablet TAKE 1  BY MOUTH ONCE DAILY, Disp-90 tablet, R-3Normal      amiodarone (CORDARONE) 200 MG tablet TAKE ONE TABLET BY MOUTH TWICE DAILY FOR 7 DAYS AND THEN TAKE ONE TABLET ONCE DAILY THEREAFTER, Disp-90 tablet, R-3Normal      clopidogrel (PLAVIX) 75 MG tablet TAKE 1 TABLET DAILY, Disp-90 tablet,R-1Normal      atorvastatin (LIPITOR) 40 MG tablet Take 1 tablet by mouth daily, Disp-90 tablet,R-1Normal      Flaxseed, Linseed, (FLAX SEEDS PO) Take by mouthHistorical Med      nitroGLYCERIN (NITROSTAT) 0.4 MG SL tablet Place 1 tablet under the tongue every 5 minutes as needed for Chest pain up to max of 3 total doses.  If no relief after 1 dose, call 911., Disp-25 tablet, R-1Normal      Glucose Blood (JOSE E BREEZE 2 TEST) DISK USE ONE STRIP TWICE DAILY, DX: E11.9, Disp-200 each, R-5Please consider 90 day supplies to promote better adherenceNormal      Ascorbic Acid (VITAMIN C) 500 MG tablet Take 1,000 mg by mouth daily Historical Med             ALLERGIES     Adhesive tape    FAMILY HISTORY     She indicated that her mother is . She indicated that her father is . She indicated that her brother is alive. family history includes Heart Attack in her father; Heart Disease in her brother and father; Heart Disease (age of onset: 80) in her mother. SOCIAL HISTORY      reports that she has never smoked. She has never used smokeless tobacco. She reports that she does not drink alcohol and does not use drugs. PHYSICAL EXAM      height is 5' 8\" (1.727 m) and weight is 252 lb (114.3 kg). Her oral temperature is 97.6 °F (36.4 °C). Her blood pressure is 141/66 (abnormal) and her pulse is 69. Her respiration is 16 and oxygen saturation is 92%. Physical Exam  Vitals and nursing note reviewed. Constitutional:       Appearance: She is well-developed. She is not diaphoretic. HENT:      Head: Normocephalic and atraumatic. Nose: Nose normal.   Eyes:      General: No scleral icterus. Right eye: No discharge. Left eye: No discharge. Conjunctiva/sclera: Conjunctivae normal.      Pupils: Pupils are equal, round, and reactive to light. Neck:      Vascular: No JVD. Trachea: No tracheal deviation. Cardiovascular:      Rate and Rhythm: Normal rate and regular rhythm. Heart sounds: Normal heart sounds. No murmur heard. No friction rub. No gallop. Pulmonary:      Effort: Pulmonary effort is normal. No respiratory distress. Breath sounds: No stridor. Examination of the right-middle field reveals rales. Examination of the left-middle field reveals rales. Examination of the right-lower field reveals rales. Examination of the left-lower field reveals rales. Rales present. No wheezing. Chest:      Chest wall: No tenderness. Abdominal:      General: Bowel sounds are normal. There is no distension. Palpations: Abdomen is soft. There is no mass. Tenderness: There is no abdominal tenderness. There is no guarding or rebound. Hernia: No hernia is present. Musculoskeletal:         General: No tenderness or deformity. Cervical back: Normal range of motion and neck supple. Lymphadenopathy:      Cervical: No cervical adenopathy. Skin:     General: Skin is warm and dry. Capillary Refill: Capillary refill takes less than 2 seconds. Coloration: Skin is not pale. Findings: No erythema or rash. Neurological:      Mental Status: She is alert and oriented to person, place, and time. Cranial Nerves: No cranial nerve deficit. Sensory: No sensory deficit. Motor: No abnormal muscle tone. Coordination: Coordination normal.      Deep Tendon Reflexes: Reflexes normal.   Psychiatric:         Behavior: Behavior normal.         Thought Content: Thought content normal.         Judgment: Judgment normal.         ANCILLARY TEST RESULTS   EKG:    Interpreted by me  AV dual paced rhythm  Ventricular rate 70 bpm  NV interval 198 ms  QRS duration 154 ms   ms  No ST elevation or acute T wave    LAB RESULTS:  Results for orders placed or performed during the hospital encounter of 05/22/21   Troponin   Result Value Ref Range    Troponin T < 0.010 ng/ml   CBC Auto Differential   Result Value Ref Range    WBC 11.5 (H) 4.8 - 10.8 thou/mm3    RBC 3.87 (L) 4.20 - 5.40 mill/mm3    Hemoglobin 11.5 (L) 12.0 - 16.0 gm/dl    Hematocrit 36.1 (L) 37.0 - 47.0 %    MCV 93.3 81.0 - 99.0 fL    MCH 29.7 26.0 - 33.0 pg    MCHC 31.9 (L) 32.2 - 35.5 gm/dl    RDW-CV 14.2 11.5 - 14.5 %    RDW-SD 47.9 (H) 35.0 - 45.0 fL    Platelets 228 096 - 796 thou/mm3    MPV 11.1 9.4 - 12.4 fL    Seg Neutrophils 81.0 %    Lymphocytes 6.9 %    Monocytes 7.5 %    Eosinophils 3.6 %    Basophils 0.7 % Immature Granulocytes 0.3 %    Segs Absolute 9.3 (H) 1 - 7 thou/mm3    Lymphocytes Absolute 0.8 (L) 1.0 - 4.8 thou/mm3    Monocytes Absolute 0.9 0.4 - 1.3 thou/mm3    Eosinophils Absolute 0.4 0.0 - 0.4 thou/mm3    Basophils Absolute 0.1 0.0 - 0.1 thou/mm3    Immature Grans (Abs) 0.04 0.00 - 0.07 thou/mm3    nRBC 0 /100 wbc   Basic Metabolic Panel w/ Reflex to MG   Result Value Ref Range    Sodium 142 135 - 145 meq/L    Potassium reflex Magnesium 4.7 3.5 - 5.2 meq/L    Chloride 103 98 - 111 meq/L    CO2 26 23 - 33 meq/L    Glucose 195 (H) 70 - 108 mg/dL    BUN 30 (H) 7 - 22 mg/dL    CREATININE 1.7 (H) 0.4 - 1.2 mg/dL    Calcium 9.0 8.5 - 10.5 mg/dL   Brain Natriuretic Peptide   Result Value Ref Range    Pro-BNP 1018.0 (H) 0.0 - 900.0 pg/mL   Blood Gas, Arterial   Result Value Ref Range    pH, Blood Gas 7.43 7.35 - 7.45    PCO2 41 35 - 45 mmhg    PO2 61 (L) 71 - 104 mmhg    HCO3 27 23 - 28 mmol/l    Base Excess (Calculated) 2.3 -2.5 - 2.5 mmol/l    O2 Sat 92 %    DEVICE Room Air     Tejinder Test Positive     Source: R Radial     COLLECTED BY: [id number]    Anion Gap   Result Value Ref Range    Anion Gap 13.0 8.0 - 16.0 meq/L   Glomerular Filtration Rate, Estimated   Result Value Ref Range    Est, Glom Filt Rate 29 (A) ml/min/1.73m2   Osmolality   Result Value Ref Range    Osmolality Calc 294.7 275.0 - 300.0 mOsmol/kg       RADIOLOGY REPORTS  XR CHEST PORTABLE   Final Result   Impression:   1. Stable mild cardiomegaly. Mild passive venous congestion now present. 2.  Clear lungs   3.   No pleural effusions      This document has been electronically signed by: Lu Ro MD on    05/22/2021 07:41 AM          MEDICAL DEDISION MAKINGS AND RATIONALES     Differential diagnsis: Includes but not limited to: Pneumonia, bronchitis, CHF, PE, COPD, asthma, pulmonary edema, pleural effusion, AMI, URI, influenza, sinusitis, allergies, anxiety    Actions: Large bore IV, labs, CXR, EKG    ED Vitals:  Vitals:    05/22/21 0204 05/22/21 0655 05/22/21 0855   BP: (!) 173/68 (!) 160/72 (!) 141/66   Pulse: 70 68 69   Resp: 16 16 16   Temp: 97.6 °F (36.4 °C)     TempSrc: Oral     SpO2: 99% 92% 92%   Weight: 252 lb (114.3 kg)     Height: 5' 8\" (1.727 m)         She has no hypoxia on room air even though she complains shortness of breath. Her pulse ox during ambulation maintains above 90%. Lab results were reassuring. Chest x-ray shows mild congestion and cardiomegaly which is stable. She received 1 mg Bumex in ED. Pending arterial blood gas on room air. Signout to Dr. Marty Garnica at shift change, plan to discharge if ABG is reassuring. Medications   bumetanide (BUMEX) injection 1 mg (1 mg Intravenous Given 5/22/21 0708)         CRITICAL CARE   None    CONSULTS   None    PROCEDURES   None    FINAL IMPRESSION AND DISPOSITION      1. Acute on chronic systolic congestive heart failure (HCC)        Signout to Dr. Marty Garnica.     PATIENT REFERRED TO:  Barin Christie MD  [address] [facility] [address]  [phone]    Schedule an appointment as soon as possible for a visit in 2 days  As needed    [address] [place] [address] [address] [id number] EMERGENCY DEPT  [address]  Go in 2 days  If symptoms worsen      DISCHARGE MEDICATIONS:  Discharge Medication List as of 5/22/2021  9:09 AM          (Please note that portions of this note were completed with a voice recognition program.  Efforts were made to edit the dictations but occasionally words aremis-transcribed.)    MD Chichi Rosenberg MD  05/22/21 6636

## 2021-05-22 NOTE — ED NOTES
Pt resting in bed with call light in reach, states no further needs. Dr. Shae Jones turns off oxygen at this time to assess pt oxygenation. Pt 93% on room air at this time. No distress noted, pt breaths easy and unlabored.        [facility]  05/22/21 5296

## 2021-05-22 NOTE — ED NOTES
ED nurse-to-nurse bedside report    Chief Complaint   Patient presents with    Shortness of Breath      LOC: alert and orientated to name, place, date  Vital signs   Vitals:    05/22/21 0620 05/22/21 0655   BP: (!) 173/68 (!) 160/72   Pulse: 70 68   Resp: 16 16   Temp: 97.6 °F (36.4 °C)    TempSrc: Oral    SpO2: 99% 92%   Weight: 252 lb (114.3 kg)    Height: 5' 8\" (1.727 m)       Pain:    Pain Interventions: See MAR   Pain Goal: 2/10  Oxygen: No    Current needs required None   Telemetry: Yes  LDAs:   Peripheral IV 05/22/21 Left Antecubital (Active)   Site Assessment Clean;Dry; Intact 05/22/21 0633   Line Status Flushed 05/22/21 0633   Dressing Status Clean;Dry; Intact 05/22/21 0633     Continuous Infusions:   Mobility: Requires assistance * 1  Sandoval Fall Risk Score: Fall Risk 11/23/2020 5/8/2020 2/19/2019 10/2/2018 8/15/2017 2/8/2017 11/8/2016   2 or more falls in past year? no no no no no no no   Fall with injury in past year?  yes no no no no no no     Fall Interventions: Side rails up, hourly rounding   Report given to: [employer], [address] [place] [address]  05/22/21 5641

## 2021-05-22 NOTE — ED NOTES
Patient resting on cot, respirations are easy and unlabored. Updated on POC. Patient provided blanket at this time. Patient denies any other needs or concerns at this time.       Don Baker RN  05/22/21 0504

## 2021-05-24 ENCOUNTER — OFFICE VISIT (OUTPATIENT)
Dept: FAMILY MEDICINE CLINIC | Age: 76
End: 2021-05-24

## 2021-05-24 ENCOUNTER — TELEPHONE (OUTPATIENT)
Dept: FAMILY MEDICINE CLINIC | Age: 76
End: 2021-05-24

## 2021-05-24 VITALS
SYSTOLIC BLOOD PRESSURE: 130 MMHG | DIASTOLIC BLOOD PRESSURE: 60 MMHG | WEIGHT: 253.38 LBS | BODY MASS INDEX: 38.4 KG/M2 | HEIGHT: 68 IN | RESPIRATION RATE: 16 BRPM | HEART RATE: 80 BPM | TEMPERATURE: 96.7 F

## 2021-05-24 DIAGNOSIS — I10 ESSENTIAL HYPERTENSION: ICD-10-CM

## 2021-05-24 DIAGNOSIS — I49.5 SICK SINUS SYNDROME (HCC): ICD-10-CM

## 2021-05-24 DIAGNOSIS — I25.10 CORONARY ARTERY DISEASE INVOLVING NATIVE CORONARY ARTERY OF NATIVE HEART WITHOUT ANGINA PECTORIS: ICD-10-CM

## 2021-05-24 DIAGNOSIS — I48.0 PAROXYSMAL ATRIAL FIBRILLATION (HCC): ICD-10-CM

## 2021-05-24 DIAGNOSIS — I50.33 ACUTE ON CHRONIC DIASTOLIC CONGESTIVE HEART FAILURE (HCC): Primary | ICD-10-CM

## 2021-05-24 PROCEDURE — 99213 OFFICE O/P EST LOW 20 MIN: CPT | Performed by: FAMILY MEDICINE

## 2021-05-24 RX ORDER — METOLAZONE 5 MG/1
5 TABLET ORAL DAILY
Qty: 10 TABLET | Refills: 0 | Status: SHIPPED | OUTPATIENT
Start: 2021-05-24 | End: 2021-06-14 | Stop reason: ALTCHOICE

## 2021-05-24 SDOH — ECONOMIC STABILITY: FOOD INSECURITY: WITHIN THE PAST 12 MONTHS, THE FOOD YOU BOUGHT JUST DIDN'T LAST AND YOU DIDN'T HAVE MONEY TO GET MORE.: NEVER TRUE

## 2021-05-24 SDOH — ECONOMIC STABILITY: FOOD INSECURITY: WITHIN THE PAST 12 MONTHS, YOU WORRIED THAT YOUR FOOD WOULD RUN OUT BEFORE YOU GOT MONEY TO BUY MORE.: NEVER TRUE

## 2021-05-24 ASSESSMENT — ENCOUNTER SYMPTOMS
NAUSEA: 0
SORE THROAT: 0
EYE PAIN: 0
WHEEZING: 0
SHORTNESS OF BREATH: 0
CONSTIPATION: 0
ABDOMINAL PAIN: 0
CHEST TIGHTNESS: 0
COUGH: 0
ORTHOPNEA: 0

## 2021-05-24 ASSESSMENT — SOCIAL DETERMINANTS OF HEALTH (SDOH): HOW HARD IS IT FOR YOU TO PAY FOR THE VERY BASICS LIKE FOOD, HOUSING, MEDICAL CARE, AND HEATING?: NOT HARD AT ALL

## 2021-05-24 NOTE — PROGRESS NOTES
Subjective:      Patient ID: Jessica George is a 78 y.o. female. Did go to the emergency room over the weekend because of persistent shortness of breath over the course of 3 days. Labs chest x-ray EKG all stable. Sent home with increased dose of Bumex 2 mg twice a day    Hypertension  This is a chronic problem. The current episode started more than 1 year ago. The problem has been resolved since onset. The problem is controlled. Pertinent negatives include no chest pain, headaches, neck pain, orthopnea, palpitations, peripheral edema or shortness of breath. The current treatment provides significant improvement. There are no compliance problems. Congestive Heart Failure  Presents for follow-up visit. Pertinent negatives include no abdominal pain, chest pain, fatigue, palpitations or shortness of breath. The symptoms have been stable. Compliance with total regimen is %. Compliance problems include adherence to diet. Compliance with diet is %. Compliance with exercise is %. Compliance with medications is %. Past Medical History:   Diagnosis Date    Aortic aneurysm (HCC)     4.5 cm aorta    Arthritis     Atrial fibrillation, chronic ([address] 1/25/2019    CAD (coronary artery disease)     CHF NYHA class II, unspecified failure chronicity, unspecified type ([address] 10/8/2018    Colon polyps 3/01    Diabetic peripheral neuropathy ([address] 2014      feet    Elbow fracture     Hyperlipidemia     Hypertension     Hypothyroidism     Pulmonary nodule, right 10/2017      repeat  ct of  chest  4-2018    S/P cardiac pacemaker procedure: 10/21/2017: Medtronic Dual Chamber. 10/21/2017    10/21/2017: Medtronic Dual Chamber. Dr. Shobha Stevens Type II or unspecified type diabetes mellitus without mention of complication, not stated as uncontrolled        Review of Systems   Constitutional: Negative for appetite change, fatigue and fever.    HENT: Negative for congestion, ear pain, postnasal drip and sore throat. Eyes: Negative for pain and visual disturbance. Respiratory: Negative for cough, chest tightness, shortness of breath and wheezing. Less dyspnea  But  Tired    Cardiovascular: Negative for chest pain, palpitations, orthopnea and leg swelling. Gastrointestinal: Negative for abdominal pain, constipation and nausea. Genitourinary: Negative for dysuria and frequency. Musculoskeletal: Negative for arthralgias, joint swelling, neck pain and neck stiffness. Skin: Negative for rash. Neurological: Negative for dizziness, weakness, numbness and headaches. Hematological: Negative for adenopathy. Does not bruise/bleed easily. Psychiatric/Behavioral: Negative for behavioral problems and sleep disturbance. The patient is not nervous/anxious. /60 (Site: Right Upper Arm, Position: Sitting, Cuff Size: Medium Adult)   Pulse 80   Temp 96.7 °F (35.9 °C) (Infrared)   Resp 16   Ht 5' 8\" (1.727 m)   Wt 253 lb 6 oz (114.9 kg)   BMI 38.53 kg/m²   Objective:   Physical Exam  Vitals and nursing note reviewed. Constitutional:       Appearance: She is well-developed. HENT:      Head: Normocephalic and atraumatic. Right Ear: External ear normal.      Left Ear: External ear normal.      Nose: Nose normal.   Eyes:      General: No scleral icterus. Conjunctiva/sclera: Conjunctivae normal.      Pupils: Pupils are equal, round, and reactive to light. Neck:      Thyroid: No thyromegaly. Vascular: No JVD. Cardiovascular:      Rate and Rhythm: Normal rate and regular rhythm. Heart sounds: Normal heart sounds. Pulmonary:      Effort: Pulmonary effort is normal.      Breath sounds: Normal breath sounds. No wheezing or rales. Abdominal:      General: Bowel sounds are normal. There is no distension. Palpations: Abdomen is soft. There is no mass. Tenderness: There is no abdominal tenderness. Musculoskeletal:         General: No tenderness.  Normal range of motion. Cervical back: Normal range of motion and neck supple. Right lower leg: Edema present. Left lower leg: Edema present. Comments:   1 to 2 +  Of ankles    Lymphadenopathy:      Cervical: No cervical adenopathy. Skin:     General: Skin is warm and dry. Findings: No rash. Neurological:      Mental Status: She is alert and oriented to person, place, and time. Cranial Nerves: No cranial nerve deficit. Deep Tendon Reflexes: Reflexes are normal and symmetric. Assessment:       Diagnosis Orders   1. Acute on chronic diastolic congestive heart failure ([facility]     2. Coronary artery disease involving native coronary artery of native heart without angina pectoris     3. Paroxysmal atrial fibrillation (HCC)     4. Sick sinus syndrome ([facility]     5.  Essential hypertension           Plan:      Current Outpatient Medications   Medication Sig Dispense Refill    metOLazone (ZAROXOLYN) 5 MG tablet Take 1 tablet by mouth daily As  Needed 10 tablet 0    ELIQUIS 5 MG TABS tablet Take 1 tablet by mouth twice daily 60 tablet 0    amLODIPine (NORVASC) 10 MG tablet Take 1 tablet by mouth once daily 90 tablet 0    metFORMIN (GLUCOPHAGE-XR) 500 MG extended release tablet TAKE 2 TABLETS BY MOUTH TWICE DAILY (WITH  BREAKFAST  AND  AT  THE  EVENING  MEAL) 360 tablet 0    glimepiride (AMARYL) 4 MG tablet Take 1 tablet by mouth once daily 90 tablet 0    metoprolol tartrate (LOPRESSOR) 50 MG tablet Take 1 tablet by mouth twice daily 180 tablet 0    ramipril (ALTACE) 10 MG capsule Take 1 capsule by mouth twice daily 180 capsule 1    levothyroxine (SYNTHROID) 150 MCG tablet Take 1 tablet by mouth once daily 90 tablet 1    bumetanide (BUMEX) 2 MG tablet Take 1 tablet by mouth once daily (Patient taking differently: Take 2 mg by mouth 2 times daily ) 90 tablet 1    gabapentin (NEURONTIN) 300 MG capsule TAKE 1 CAPSULE BY MOUTH ONCE DAILY IN THE EVENING 30 capsule 5    potassium chloride

## 2021-05-24 NOTE — TELEPHONE ENCOUNTER
----- Message from Jessica Prince MD sent at 5/24/2021  5:34 AM EDT -----  Had chf and in ER over the weekend and we should see today

## 2021-05-26 DIAGNOSIS — I48.0 PAROXYSMAL ATRIAL FIBRILLATION (HCC): ICD-10-CM

## 2021-05-27 NOTE — TELEPHONE ENCOUNTER
Date of last visit:  5/24/2021  Date of next visit:  6/14/2021    Requested Prescriptions     Pending Prescriptions Disp Refills    ELIQUIS 5 MG TABS tablet [Pharmacy Med Name: Eliquis 5 MG Oral Tablet] 60 tablet 5     Sig: Take 1 tablet by mouth twice daily    atorvastatin (LIPITOR) 40 MG tablet [Pharmacy Med Name: Atorvastatin Calcium 40 MG Oral Tablet] 90 tablet 1     Sig: Take 1 tablet by mouth once daily

## 2021-05-28 ENCOUNTER — TELEPHONE (OUTPATIENT)
Dept: FAMILY MEDICINE CLINIC | Age: 76
End: 2021-05-28

## 2021-05-28 DIAGNOSIS — E03.4 HYPOTHYROIDISM DUE TO ACQUIRED ATROPHY OF THYROID: ICD-10-CM

## 2021-05-28 DIAGNOSIS — I10 ESSENTIAL HYPERTENSION: Primary | ICD-10-CM

## 2021-05-28 RX ORDER — ATORVASTATIN CALCIUM 40 MG/1
TABLET, FILM COATED ORAL
Qty: 90 TABLET | Refills: 1 | Status: SHIPPED | OUTPATIENT
Start: 2021-05-28 | End: 2021-12-10

## 2021-05-28 RX ORDER — APIXABAN 5 MG/1
TABLET, FILM COATED ORAL
Qty: 60 TABLET | Refills: 5 | Status: SHIPPED | OUTPATIENT
Start: 2021-05-28 | End: 2021-11-16

## 2021-05-28 RX ORDER — LEVOTHYROXINE SODIUM 0.12 MG/1
125 TABLET ORAL DAILY
Qty: 90 TABLET | Refills: 1 | Status: SHIPPED | OUTPATIENT
Start: 2021-05-28 | End: 2022-05-17

## 2021-05-28 NOTE — TELEPHONE ENCOUNTER
Called  As  Creat up  with the  [facility]. Up  To  3.2   So  Only  Had 2 days and hold next 2 days her Bumex potassium pill and metformin. She will repeat blood work on June 1. On May 31 resume Bumex one pill and one pill potassium but continued to hold the metformin.  We called the patient and informed her of the above and she will get the blood on Tuesday 6-1-21      Also  Thyroid  Level up and  Get  Thyroid level in 2 mth  Or  August a synthroid to 125 mcg

## 2021-06-01 ENCOUNTER — TELEPHONE (OUTPATIENT)
Dept: FAMILY MEDICINE CLINIC | Age: 76
End: 2021-06-01

## 2021-06-01 ENCOUNTER — NURSE ONLY (OUTPATIENT)
Dept: LAB | Age: 76
End: 2021-06-01

## 2021-06-01 DIAGNOSIS — I10 ESSENTIAL HYPERTENSION: ICD-10-CM

## 2021-06-01 LAB
ALBUMIN SERPL-MCNC: 4.2 G/DL (ref 3.5–5.1)
ALP BLD-CCNC: 64 U/L (ref 38–126)
ALT SERPL-CCNC: 18 U/L (ref 11–66)
ANION GAP SERPL CALCULATED.3IONS-SCNC: 12 MEQ/L (ref 8–16)
AST SERPL-CCNC: 21 U/L (ref 5–40)
BASOPHILS # BLD: 1.5 %
BASOPHILS ABSOLUTE: 0.1 THOU/MM3 (ref 0–0.1)
BILIRUB SERPL-MCNC: 0.7 MG/DL (ref 0.3–1.2)
BUN BLDV-MCNC: 42 MG/DL (ref 7–22)
CALCIUM SERPL-MCNC: 9.3 MG/DL (ref 8.5–10.5)
CHLORIDE BLD-SCNC: 98 MEQ/L (ref 98–111)
CHOLESTEROL, TOTAL: 208 MG/DL (ref 100–199)
CO2: 30 MEQ/L (ref 23–33)
CREAT SERPL-MCNC: 2.5 MG/DL (ref 0.4–1.2)
EOSINOPHIL # BLD: 5.7 %
EOSINOPHILS ABSOLUTE: 0.4 THOU/MM3 (ref 0–0.4)
ERYTHROCYTE [DISTWIDTH] IN BLOOD BY AUTOMATED COUNT: 13.6 % (ref 11.5–14.5)
ERYTHROCYTE [DISTWIDTH] IN BLOOD BY AUTOMATED COUNT: 46.3 FL (ref 35–45)
GFR SERPL CREATININE-BSD FRML MDRD: 19 ML/MIN/1.73M2
GLUCOSE BLD-MCNC: 193 MG/DL (ref 70–108)
HCT VFR BLD CALC: 36 % (ref 37–47)
HDLC SERPL-MCNC: 62 MG/DL
HEMOGLOBIN: 11.1 GM/DL (ref 12–16)
IMMATURE GRANS (ABS): 0.03 THOU/MM3 (ref 0–0.07)
IMMATURE GRANULOCYTES: 0.4 %
LDL CHOLESTEROL CALCULATED: 116 MG/DL
LYMPHOCYTES # BLD: 13 %
LYMPHOCYTES ABSOLUTE: 1 THOU/MM3 (ref 1–4.8)
MCH RBC QN AUTO: 28.9 PG (ref 26–33)
MCHC RBC AUTO-ENTMCNC: 30.8 GM/DL (ref 32.2–35.5)
MCV RBC AUTO: 93.8 FL (ref 81–99)
MONOCYTES # BLD: 7.9 %
MONOCYTES ABSOLUTE: 0.6 THOU/MM3 (ref 0.4–1.3)
NUCLEATED RED BLOOD CELLS: 0 /100 WBC
PLATELET # BLD: 315 THOU/MM3 (ref 130–400)
PMV BLD AUTO: 11 FL (ref 9.4–12.4)
POTASSIUM SERPL-SCNC: 4.3 MEQ/L (ref 3.5–5.2)
RBC # BLD: 3.84 MILL/MM3 (ref 4.2–5.4)
SEG NEUTROPHILS: 71.5 %
SEGMENTED NEUTROPHILS ABSOLUTE COUNT: 5.6 THOU/MM3 (ref 1.8–7.7)
SODIUM BLD-SCNC: 140 MEQ/L (ref 135–145)
T4 FREE: 2.08 NG/DL (ref 0.93–1.76)
TOTAL PROTEIN: 7.1 G/DL (ref 6.1–8)
TRIGL SERPL-MCNC: 150 MG/DL (ref 0–199)
TSH SERPL DL<=0.05 MIU/L-ACNC: 4.68 UIU/ML (ref 0.4–4.2)
WBC # BLD: 7.8 THOU/MM3 (ref 4.8–10.8)

## 2021-06-01 NOTE — TELEPHONE ENCOUNTER
Jameson Tavarez MD 4 days ago   ET      Called  As  Creat up  with the  [facility]. Up  To  3.2   So  Only  Had 2 days and hold next 2 days her Bumex potassium pill and metformin. She will repeat blood work on June 1. On May 31 resume Bumex one pill and one pill potassium but continued to hold the metformin.  We called the patient and informed her of the above and she will get the blood on Tuesday 6-1-21       Also  Thyroid  Level up and  Get  Thyroid level in 2 mth  Or  August a synthroid to 125 mcg

## 2021-06-01 NOTE — TELEPHONE ENCOUNTER
----- Message from Radha Gan MD sent at 5/28/2021  6:10 PM EDT -----    Did  call  patient  as decrease synthroid  to 125 mcg as too much     Had chf and  done with zaroxylyn and no bumex for  2 doses  then bumex 1 mg for one day then back to normal dose  . Repeat lab 6-1-21.   iDid  call the patient

## 2021-06-02 ENCOUNTER — TELEPHONE (OUTPATIENT)
Dept: FAMILY MEDICINE CLINIC | Age: 76
End: 2021-06-02

## 2021-06-02 DIAGNOSIS — I10 ESSENTIAL HYPERTENSION: Primary | ICD-10-CM

## 2021-06-02 DIAGNOSIS — N18.30 CRF (CHRONIC RENAL FAILURE), STAGE 3 (MODERATE) (HCC): ICD-10-CM

## 2021-06-02 NOTE — TELEPHONE ENCOUNTER
Call as creat better but still up so hold bumex and potasium 2 days and then 1/2 tab 2 days and lab on 6-7-21

## 2021-06-08 ENCOUNTER — APPOINTMENT (OUTPATIENT)
Dept: GENERAL RADIOLOGY | Age: 76
DRG: 291 | End: 2021-06-08
Payer: MEDICARE

## 2021-06-08 ENCOUNTER — HOSPITAL ENCOUNTER (INPATIENT)
Age: 76
LOS: 1 days | Discharge: HOME | DRG: 291 | End: 2021-06-11
Attending: EMERGENCY MEDICINE | Admitting: INTERNAL MEDICINE
Payer: MEDICARE

## 2021-06-08 DIAGNOSIS — R06.09 DYSPNEA ON EXERTION: Primary | ICD-10-CM

## 2021-06-08 DIAGNOSIS — N18.4 STAGE 4 CHRONIC KIDNEY DISEASE (HCC): ICD-10-CM

## 2021-06-08 DIAGNOSIS — I50.9 OTHER CONGESTIVE HEART FAILURE (HCC): ICD-10-CM

## 2021-06-08 PROCEDURE — 84443 ASSAY THYROID STIM HORMONE: CPT

## 2021-06-08 PROCEDURE — 80053 COMPREHEN METABOLIC PANEL: CPT

## 2021-06-08 PROCEDURE — 85025 COMPLETE CBC W/AUTO DIFF WBC: CPT

## 2021-06-08 PROCEDURE — 36415 COLL VENOUS BLD VENIPUNCTURE: CPT

## 2021-06-08 PROCEDURE — 84484 ASSAY OF TROPONIN QUANT: CPT

## 2021-06-08 PROCEDURE — 83880 ASSAY OF NATRIURETIC PEPTIDE: CPT

## 2021-06-08 PROCEDURE — 99283 EMERGENCY DEPT VISIT LOW MDM: CPT

## 2021-06-08 PROCEDURE — 83735 ASSAY OF MAGNESIUM: CPT

## 2021-06-08 PROCEDURE — 93005 ELECTROCARDIOGRAM TRACING: CPT | Performed by: EMERGENCY MEDICINE

## 2021-06-08 PROCEDURE — 71045 X-RAY EXAM CHEST 1 VIEW: CPT

## 2021-06-09 ENCOUNTER — APPOINTMENT (OUTPATIENT)
Dept: ULTRASOUND IMAGING | Age: 76
DRG: 291 | End: 2021-06-09
Payer: MEDICARE

## 2021-06-09 PROBLEM — E87.70 FLUID OVERLOAD: Status: ACTIVE | Noted: 2021-06-09

## 2021-06-09 PROBLEM — I50.41 CHF (CONGESTIVE HEART FAILURE), NYHA CLASS I, ACUTE, COMBINED (HCC): Status: ACTIVE | Noted: 2021-06-09

## 2021-06-09 LAB
ALBUMIN SERPL-MCNC: 4.2 G/DL (ref 3.5–5.1)
ALP BLD-CCNC: 70 U/L (ref 38–126)
ALT SERPL-CCNC: 14 U/L (ref 11–66)
ANION GAP SERPL CALCULATED.3IONS-SCNC: 12 MEQ/L (ref 8–16)
AST SERPL-CCNC: 14 U/L (ref 5–40)
BACTERIA: ABNORMAL /HPF
BASOPHILS # BLD: 0.8 %
BASOPHILS ABSOLUTE: 0.1 THOU/MM3 (ref 0–0.1)
BILIRUB SERPL-MCNC: 0.9 MG/DL (ref 0.3–1.2)
BILIRUBIN URINE: NEGATIVE
BLOOD, URINE: NEGATIVE
BUN BLDV-MCNC: 31 MG/DL (ref 7–22)
CALCIUM SERPL-MCNC: 9.6 MG/DL (ref 8.5–10.5)
CASTS 2: ABNORMAL /LPF
CASTS UA: ABNORMAL /LPF
CHARACTER, URINE: CLEAR
CHLORIDE BLD-SCNC: 101 MEQ/L (ref 98–111)
CO2: 28 MEQ/L (ref 23–33)
COLOR: YELLOW
CREAT SERPL-MCNC: 2.1 MG/DL (ref 0.4–1.2)
CREATININE URINE: 45.7 MG/DL
CRYSTALS, UA: ABNORMAL
EKG ATRIAL RATE: 72 BPM
EKG P-R INTERVAL: 198 MS
EKG Q-T INTERVAL: 448 MS
EKG QRS DURATION: 160 MS
EKG QTC CALCULATION (BAZETT): 490 MS
EKG R AXIS: 26 DEGREES
EKG T AXIS: 103 DEGREES
EKG VENTRICULAR RATE: 72 BPM
EOSINOPHIL # BLD: 3.3 %
EOSINOPHIL SMEAR: NORMAL
EOSINOPHILS ABSOLUTE: 0.4 THOU/MM3 (ref 0–0.4)
EPITHELIAL CELLS, UA: ABNORMAL /HPF
ERYTHROCYTE [DISTWIDTH] IN BLOOD BY AUTOMATED COUNT: 13.8 % (ref 11.5–14.5)
ERYTHROCYTE [DISTWIDTH] IN BLOOD BY AUTOMATED COUNT: 46.5 FL (ref 35–45)
GFR SERPL CREATININE-BSD FRML MDRD: 23 ML/MIN/1.73M2
GLUCOSE BLD-MCNC: 111 MG/DL (ref 70–108)
GLUCOSE BLD-MCNC: 171 MG/DL (ref 70–108)
GLUCOSE BLD-MCNC: 172 MG/DL (ref 70–108)
GLUCOSE BLD-MCNC: 255 MG/DL (ref 70–108)
GLUCOSE URINE: 100 MG/DL
HCT VFR BLD CALC: 35.8 % (ref 37–47)
HEMOGLOBIN: 11 GM/DL (ref 12–16)
IMMATURE GRANS (ABS): 0.06 THOU/MM3 (ref 0–0.07)
IMMATURE GRANULOCYTES: 0.5 %
KETONES, URINE: NEGATIVE
LEUKOCYTE ESTERASE, URINE: ABNORMAL
LYMPHOCYTES # BLD: 7.1 %
LYMPHOCYTES ABSOLUTE: 0.8 THOU/MM3 (ref 1–4.8)
MAGNESIUM: 2.2 MG/DL (ref 1.6–2.4)
MCH RBC QN AUTO: 28.7 PG (ref 26–33)
MCHC RBC AUTO-ENTMCNC: 30.7 GM/DL (ref 32.2–35.5)
MCV RBC AUTO: 93.5 FL (ref 81–99)
MISCELLANEOUS 2: ABNORMAL
MONOCYTES # BLD: 8.3 %
MONOCYTES ABSOLUTE: 1 THOU/MM3 (ref 0.4–1.3)
NITRITE, URINE: NEGATIVE
NUCLEATED RED BLOOD CELLS: 0 /100 WBC
OSMOLALITY CALCULATION: 291.9 MOSMOL/KG (ref 275–300)
PH UA: 5.5 (ref 5–9)
PLATELET # BLD: 344 THOU/MM3 (ref 130–400)
PMV BLD AUTO: 10.9 FL (ref 9.4–12.4)
POTASSIUM SERPL-SCNC: 4.3 MEQ/L (ref 3.5–5.2)
PRO-BNP: 1348 PG/ML (ref 0–900)
PROT/CREAT RATIO, UR: 0.11
PROTEIN UA: NEGATIVE
PROTEIN, URINE: 4.9 MG/DL
RBC # BLD: 3.83 MILL/MM3 (ref 4.2–5.4)
RBC URINE: ABNORMAL /HPF
RENAL EPITHELIAL, UA: ABNORMAL
SEG NEUTROPHILS: 80 %
SEGMENTED NEUTROPHILS ABSOLUTE COUNT: 9.5 THOU/MM3 (ref 1.8–7.7)
SODIUM BLD-SCNC: 141 MEQ/L (ref 135–145)
SODIUM URINE: 110 MEQ/L
SPECIFIC GRAVITY, URINE: 1.01 (ref 1–1.03)
SPECIMEN: NORMAL
TOTAL PROTEIN: 7.6 G/DL (ref 6.1–8)
TROPONIN T: < 0.01 NG/ML
TSH SERPL DL<=0.05 MIU/L-ACNC: 2.96 UIU/ML (ref 0.4–4.2)
UROBILINOGEN, URINE: 0.2 EU/DL (ref 0–1)
WBC # BLD: 11.9 THOU/MM3 (ref 4.8–10.8)
WBC UA: ABNORMAL /HPF
YEAST: ABNORMAL

## 2021-06-09 PROCEDURE — G0378 HOSPITAL OBSERVATION PER HR: HCPCS

## 2021-06-09 PROCEDURE — 6360000002 HC RX W HCPCS: Performed by: REGISTERED NURSE

## 2021-06-09 PROCEDURE — 96376 TX/PRO/DX INJ SAME DRUG ADON: CPT

## 2021-06-09 PROCEDURE — 82948 REAGENT STRIP/BLOOD GLUCOSE: CPT

## 2021-06-09 PROCEDURE — 99222 1ST HOSP IP/OBS MODERATE 55: CPT | Performed by: INTERNAL MEDICINE

## 2021-06-09 PROCEDURE — 2500000003 HC RX 250 WO HCPCS: Performed by: REGISTERED NURSE

## 2021-06-09 PROCEDURE — 96365 THER/PROPH/DIAG IV INF INIT: CPT

## 2021-06-09 PROCEDURE — 96372 THER/PROPH/DIAG INJ SC/IM: CPT

## 2021-06-09 PROCEDURE — 2580000003 HC RX 258: Performed by: REGISTERED NURSE

## 2021-06-09 PROCEDURE — 84156 ASSAY OF PROTEIN URINE: CPT

## 2021-06-09 PROCEDURE — 6370000000 HC RX 637 (ALT 250 FOR IP): Performed by: REGISTERED NURSE

## 2021-06-09 PROCEDURE — 96375 TX/PRO/DX INJ NEW DRUG ADDON: CPT

## 2021-06-09 PROCEDURE — 81001 URINALYSIS AUTO W/SCOPE: CPT

## 2021-06-09 PROCEDURE — 76770 US EXAM ABDO BACK WALL COMP: CPT

## 2021-06-09 PROCEDURE — 89190 NASAL SMEAR FOR EOSINOPHILS: CPT

## 2021-06-09 PROCEDURE — 82570 ASSAY OF URINE CREATININE: CPT

## 2021-06-09 PROCEDURE — 93010 ELECTROCARDIOGRAM REPORT: CPT | Performed by: NUCLEAR MEDICINE

## 2021-06-09 PROCEDURE — 84300 ASSAY OF URINE SODIUM: CPT

## 2021-06-09 PROCEDURE — 6360000002 HC RX W HCPCS: Performed by: INTERNAL MEDICINE

## 2021-06-09 RX ORDER — SODIUM CHLORIDE 9 MG/ML
25 INJECTION, SOLUTION INTRAVENOUS PRN
Status: DISCONTINUED | OUTPATIENT
Start: 2021-06-09 | End: 2021-06-11 | Stop reason: HOSPADM

## 2021-06-09 RX ORDER — ONDANSETRON 2 MG/ML
4 INJECTION INTRAMUSCULAR; INTRAVENOUS EVERY 6 HOURS PRN
Status: DISCONTINUED | OUTPATIENT
Start: 2021-06-09 | End: 2021-06-09

## 2021-06-09 RX ORDER — METOPROLOL TARTRATE 50 MG/1
50 TABLET, FILM COATED ORAL 2 TIMES DAILY
Status: DISCONTINUED | OUTPATIENT
Start: 2021-06-09 | End: 2021-06-11 | Stop reason: HOSPADM

## 2021-06-09 RX ORDER — NICOTINE POLACRILEX 4 MG
15 LOZENGE BUCCAL PRN
Status: DISCONTINUED | OUTPATIENT
Start: 2021-06-09 | End: 2021-06-11 | Stop reason: HOSPADM

## 2021-06-09 RX ORDER — GABAPENTIN 300 MG/1
300 CAPSULE ORAL NIGHTLY
Status: DISCONTINUED | OUTPATIENT
Start: 2021-06-09 | End: 2021-06-09

## 2021-06-09 RX ORDER — CLOPIDOGREL BISULFATE 75 MG/1
75 TABLET ORAL DAILY
Status: DISCONTINUED | OUTPATIENT
Start: 2021-06-09 | End: 2021-06-09

## 2021-06-09 RX ORDER — POLYETHYLENE GLYCOL 3350 17 G/17G
17 POWDER, FOR SOLUTION ORAL DAILY PRN
Status: DISCONTINUED | OUTPATIENT
Start: 2021-06-09 | End: 2021-06-11 | Stop reason: HOSPADM

## 2021-06-09 RX ORDER — POLYETHYLENE GLYCOL 3350 17 G/17G
17 POWDER, FOR SOLUTION ORAL DAILY PRN
Status: DISCONTINUED | OUTPATIENT
Start: 2021-06-09 | End: 2021-06-09

## 2021-06-09 RX ORDER — NITROGLYCERIN 0.4 MG/1
0.4 TABLET SUBLINGUAL EVERY 5 MIN PRN
Status: DISCONTINUED | OUTPATIENT
Start: 2021-06-09 | End: 2021-06-09

## 2021-06-09 RX ORDER — SODIUM CHLORIDE 0.9 % (FLUSH) 0.9 %
5-40 SYRINGE (ML) INJECTION EVERY 12 HOURS SCHEDULED
Status: DISCONTINUED | OUTPATIENT
Start: 2021-06-09 | End: 2021-06-11 | Stop reason: HOSPADM

## 2021-06-09 RX ORDER — ATORVASTATIN CALCIUM 40 MG/1
40 TABLET, FILM COATED ORAL NIGHTLY
Status: DISCONTINUED | OUTPATIENT
Start: 2021-06-09 | End: 2021-06-11 | Stop reason: HOSPADM

## 2021-06-09 RX ORDER — NITROGLYCERIN 20 MG/100ML
5-200 INJECTION INTRAVENOUS CONTINUOUS
Status: DISCONTINUED | OUTPATIENT
Start: 2021-06-09 | End: 2021-06-09

## 2021-06-09 RX ORDER — BUMETANIDE 0.25 MG/ML
0.5 INJECTION, SOLUTION INTRAMUSCULAR; INTRAVENOUS ONCE
Status: DISCONTINUED | OUTPATIENT
Start: 2021-06-09 | End: 2021-06-09

## 2021-06-09 RX ORDER — ONDANSETRON 2 MG/ML
4 INJECTION INTRAMUSCULAR; INTRAVENOUS EVERY 6 HOURS PRN
Status: DISCONTINUED | OUTPATIENT
Start: 2021-06-09 | End: 2021-06-11 | Stop reason: HOSPADM

## 2021-06-09 RX ORDER — ACETAMINOPHEN 650 MG/1
650 SUPPOSITORY RECTAL EVERY 6 HOURS PRN
Status: DISCONTINUED | OUTPATIENT
Start: 2021-06-09 | End: 2021-06-09

## 2021-06-09 RX ORDER — BUMETANIDE 0.25 MG/ML
1 INJECTION, SOLUTION INTRAMUSCULAR; INTRAVENOUS ONCE
Status: DISCONTINUED | OUTPATIENT
Start: 2021-06-09 | End: 2021-06-09

## 2021-06-09 RX ORDER — AMIODARONE HYDROCHLORIDE 200 MG/1
200 TABLET ORAL DAILY
Status: DISCONTINUED | OUTPATIENT
Start: 2021-06-09 | End: 2021-06-11 | Stop reason: HOSPADM

## 2021-06-09 RX ORDER — SODIUM CHLORIDE 0.9 % (FLUSH) 0.9 %
5-40 SYRINGE (ML) INJECTION EVERY 12 HOURS SCHEDULED
Status: DISCONTINUED | OUTPATIENT
Start: 2021-06-09 | End: 2021-06-09

## 2021-06-09 RX ORDER — ACETAMINOPHEN 325 MG/1
650 TABLET ORAL EVERY 6 HOURS PRN
Status: DISCONTINUED | OUTPATIENT
Start: 2021-06-09 | End: 2021-06-09

## 2021-06-09 RX ORDER — ONDANSETRON 4 MG/1
4 TABLET, ORALLY DISINTEGRATING ORAL EVERY 8 HOURS PRN
Status: DISCONTINUED | OUTPATIENT
Start: 2021-06-09 | End: 2021-06-09

## 2021-06-09 RX ORDER — DEXTROSE MONOHYDRATE 25 G/50ML
12.5 INJECTION, SOLUTION INTRAVENOUS PRN
Status: DISCONTINUED | OUTPATIENT
Start: 2021-06-09 | End: 2021-06-11 | Stop reason: HOSPADM

## 2021-06-09 RX ORDER — DEXTROSE MONOHYDRATE 50 MG/ML
100 INJECTION, SOLUTION INTRAVENOUS PRN
Status: DISCONTINUED | OUTPATIENT
Start: 2021-06-09 | End: 2021-06-11 | Stop reason: HOSPADM

## 2021-06-09 RX ORDER — SODIUM CHLORIDE 9 MG/ML
25 INJECTION, SOLUTION INTRAVENOUS PRN
Status: DISCONTINUED | OUTPATIENT
Start: 2021-06-09 | End: 2021-06-09

## 2021-06-09 RX ORDER — AMLODIPINE BESYLATE 10 MG/1
10 TABLET ORAL DAILY
Status: DISCONTINUED | OUTPATIENT
Start: 2021-06-09 | End: 2021-06-11 | Stop reason: HOSPADM

## 2021-06-09 RX ORDER — METOPROLOL TARTRATE 50 MG/1
50 TABLET, FILM COATED ORAL 2 TIMES DAILY
Status: DISCONTINUED | OUTPATIENT
Start: 2021-06-09 | End: 2021-06-09

## 2021-06-09 RX ORDER — ONDANSETRON 4 MG/1
4 TABLET, ORALLY DISINTEGRATING ORAL EVERY 8 HOURS PRN
Status: DISCONTINUED | OUTPATIENT
Start: 2021-06-09 | End: 2021-06-11 | Stop reason: HOSPADM

## 2021-06-09 RX ORDER — CLOPIDOGREL BISULFATE 75 MG/1
75 TABLET ORAL DAILY
Status: DISCONTINUED | OUTPATIENT
Start: 2021-06-09 | End: 2021-06-11 | Stop reason: HOSPADM

## 2021-06-09 RX ORDER — LEVOTHYROXINE SODIUM 0.12 MG/1
125 TABLET ORAL DAILY
Status: DISCONTINUED | OUTPATIENT
Start: 2021-06-09 | End: 2021-06-09

## 2021-06-09 RX ORDER — HEPARIN SODIUM 5000 [USP'U]/ML
5000 INJECTION, SOLUTION INTRAVENOUS; SUBCUTANEOUS EVERY 8 HOURS SCHEDULED
Status: DISCONTINUED | OUTPATIENT
Start: 2021-06-09 | End: 2021-06-09

## 2021-06-09 RX ORDER — AMIODARONE HYDROCHLORIDE 200 MG/1
200 TABLET ORAL DAILY
Status: DISCONTINUED | OUTPATIENT
Start: 2021-06-09 | End: 2021-06-09

## 2021-06-09 RX ORDER — SODIUM CHLORIDE 0.9 % (FLUSH) 0.9 %
5-40 SYRINGE (ML) INJECTION PRN
Status: DISCONTINUED | OUTPATIENT
Start: 2021-06-09 | End: 2021-06-09

## 2021-06-09 RX ORDER — LEVOTHYROXINE SODIUM 0.12 MG/1
125 TABLET ORAL DAILY
Status: DISCONTINUED | OUTPATIENT
Start: 2021-06-09 | End: 2021-06-11 | Stop reason: HOSPADM

## 2021-06-09 RX ORDER — GABAPENTIN 300 MG/1
300 CAPSULE ORAL NIGHTLY
Status: DISCONTINUED | OUTPATIENT
Start: 2021-06-09 | End: 2021-06-11 | Stop reason: HOSPADM

## 2021-06-09 RX ORDER — ACETAMINOPHEN 325 MG/1
650 TABLET ORAL ONCE
Status: DISCONTINUED | OUTPATIENT
Start: 2021-06-09 | End: 2021-06-09

## 2021-06-09 RX ORDER — ATORVASTATIN CALCIUM 40 MG/1
40 TABLET, FILM COATED ORAL NIGHTLY
Status: DISCONTINUED | OUTPATIENT
Start: 2021-06-09 | End: 2021-06-09

## 2021-06-09 RX ORDER — POTASSIUM CHLORIDE 750 MG/1
10 TABLET, FILM COATED, EXTENDED RELEASE ORAL
Status: DISCONTINUED | OUTPATIENT
Start: 2021-06-09 | End: 2021-06-11 | Stop reason: HOSPADM

## 2021-06-09 RX ORDER — GLIMEPIRIDE 4 MG/1
4 TABLET ORAL
Status: DISCONTINUED | OUTPATIENT
Start: 2021-06-10 | End: 2021-06-11 | Stop reason: HOSPADM

## 2021-06-09 RX ORDER — ACETAMINOPHEN 325 MG/1
650 TABLET ORAL EVERY 6 HOURS PRN
Status: DISCONTINUED | OUTPATIENT
Start: 2021-06-09 | End: 2021-06-11 | Stop reason: HOSPADM

## 2021-06-09 RX ORDER — ACETAMINOPHEN 650 MG/1
650 SUPPOSITORY RECTAL EVERY 6 HOURS PRN
Status: DISCONTINUED | OUTPATIENT
Start: 2021-06-09 | End: 2021-06-11 | Stop reason: HOSPADM

## 2021-06-09 RX ORDER — BUMETANIDE 0.25 MG/ML
2 INJECTION, SOLUTION INTRAMUSCULAR; INTRAVENOUS 2 TIMES DAILY
Status: DISCONTINUED | OUTPATIENT
Start: 2021-06-09 | End: 2021-06-10

## 2021-06-09 RX ORDER — SODIUM CHLORIDE 0.9 % (FLUSH) 0.9 %
5-40 SYRINGE (ML) INJECTION PRN
Status: DISCONTINUED | OUTPATIENT
Start: 2021-06-09 | End: 2021-06-11 | Stop reason: HOSPADM

## 2021-06-09 RX ADMIN — INSULIN LISPRO 3 UNITS: 100 INJECTION, SOLUTION INTRAVENOUS; SUBCUTANEOUS at 13:08

## 2021-06-09 RX ADMIN — SODIUM CHLORIDE, PRESERVATIVE FREE 10 ML: 5 INJECTION INTRAVENOUS at 20:23

## 2021-06-09 RX ADMIN — LEVOTHYROXINE SODIUM 125 MCG: 0.12 TABLET ORAL at 12:01

## 2021-06-09 RX ADMIN — GABAPENTIN 300 MG: 300 CAPSULE ORAL at 20:23

## 2021-06-09 RX ADMIN — ENOXAPARIN SODIUM 40 MG: 40 INJECTION SUBCUTANEOUS at 08:03

## 2021-06-09 RX ADMIN — AMIODARONE HYDROCHLORIDE 200 MG: 200 TABLET ORAL at 12:01

## 2021-06-09 RX ADMIN — BUMETANIDE 2 MG: 0.25 INJECTION, SOLUTION INTRAMUSCULAR; INTRAVENOUS at 20:23

## 2021-06-09 RX ADMIN — METOPROLOL TARTRATE 50 MG: 50 TABLET, FILM COATED ORAL at 12:01

## 2021-06-09 RX ADMIN — AMLODIPINE BESYLATE 10 MG: 10 TABLET ORAL at 12:01

## 2021-06-09 RX ADMIN — CEFTRIAXONE SODIUM 1000 MG: 1 INJECTION, POWDER, FOR SOLUTION INTRAMUSCULAR; INTRAVENOUS at 16:12

## 2021-06-09 RX ADMIN — POTASSIUM CHLORIDE 10 MEQ: 750 TABLET, EXTENDED RELEASE ORAL at 12:01

## 2021-06-09 RX ADMIN — CLOPIDOGREL BISULFATE 75 MG: 75 TABLET ORAL at 12:01

## 2021-06-09 RX ADMIN — METOPROLOL TARTRATE 50 MG: 50 TABLET, FILM COATED ORAL at 20:23

## 2021-06-09 RX ADMIN — BUMETANIDE 2 MG: 0.25 INJECTION, SOLUTION INTRAMUSCULAR; INTRAVENOUS at 11:44

## 2021-06-09 RX ADMIN — ATORVASTATIN CALCIUM 40 MG: 40 TABLET, FILM COATED ORAL at 20:23

## 2021-06-09 RX ADMIN — APIXABAN 5 MG: 5 TABLET, FILM COATED ORAL at 20:23

## 2021-06-09 RX ADMIN — SODIUM CHLORIDE, PRESERVATIVE FREE 10 ML: 5 INJECTION INTRAVENOUS at 11:44

## 2021-06-09 ASSESSMENT — ENCOUNTER SYMPTOMS
SORE THROAT: 0
SHORTNESS OF BREATH: 1
CONSTIPATION: 1
DIARRHEA: 0
ABDOMINAL PAIN: 0
COUGH: 0
BLOOD IN STOOL: 0
RHINORRHEA: 0
NAUSEA: 0
VOMITING: 0

## 2021-06-09 ASSESSMENT — PAIN SCALES - GENERAL
PAINLEVEL_OUTOF10: 0

## 2021-06-09 NOTE — ED PROVIDER NOTES
[facility] COMPLAINT       Chief Complaint   Patient presents with    Shortness of Breath       Nurses Notes reviewed and I agree except as noted in the HPI. HISTORY OF PRESENT ILLNESS    Petre Mitchell is a 78 y.o. pleasant female who presents emergency department for shortness of breath. Patient's been more short of breath the last 2 days. She was recently hospitalized 2-1/2 weeks ago for the same issue. She reports a history of CHF. Reports swelling in her legs, she does weigh herself daily. She states her weight went up 5 pounds, recently she is approximately 1 pound from baseline. She reports cough productive of phlegm but is uncertain of the appearance of the phlegm when she swallows it. Denies fever. When questioned about chest pain, patient states that that is \"hard to say\". Patient does report some recent constipation which was improved after she had a bowel movement. No nausea, vomiting, sweats, palpitations, syncope, dysuria or hematuria. She is taking all of her home medications as directed. She reports that her Bumex had to be held during her recent admission and she went home on extra Bumex for 3 days. She had labs done her creatinine was above 3 so her PCP had her hold her Bumex, potassium, and Metformin for 3 days. She had repeat labs on Monday but her PCP did not receive the results so she was told to take half of Bumex on Friday, then she went back on her usual dose. She is continue to hold her Metformin. Patient's granddaughter reports that patient has been having worsening shortness of breath, patient's daughter was worried this evening and had family bring her to the ER. She was having difficulty breathing, had to sit upright in a chair and seemed more labored. No other initial concerns. REVIEW OF SYSTEMS     Review of Systems   Constitutional: Positive for unexpected weight change.  Negative for CONTINUE these medications which have NOT CHANGED    Details   ELIQUIS 5 MG TABS tablet Take 1 tablet by mouth twice daily  Qty: 60 tablet, Refills: 5    Associated Diagnoses: Paroxysmal atrial fibrillation (HCC)      atorvastatin (LIPITOR) 40 MG tablet Take 1 tablet by mouth once daily  Qty: 90 tablet, Refills: 1      levothyroxine (SYNTHROID) 125 MCG tablet Take 1 tablet by mouth daily  Qty: 90 tablet, Refills: 1    Associated Diagnoses: Hypothyroidism due to acquired atrophy of thyroid      metOLazone (ZAROXOLYN) 5 MG tablet Take 1 tablet by mouth daily As  Needed  Qty: 10 tablet, Refills: 0      amLODIPine (NORVASC) 10 MG tablet Take 1 tablet by mouth once daily  Qty: 90 tablet, Refills: 0    Associated Diagnoses: Essential hypertension      metFORMIN (GLUCOPHAGE-XR) 500 MG extended release tablet TAKE 2 TABLETS BY MOUTH TWICE DAILY (WITH  BREAKFAST  AND  AT  THE  EVENING  MEAL)  Qty: 360 tablet, Refills: 0    Associated Diagnoses: Type 2 diabetes mellitus without complication, without long-term current use of insulin ([facility])      glimepiride (AMARYL) 4 MG tablet Take 1 tablet by mouth once daily  Qty: 90 tablet, Refills: 0    Associated Diagnoses: Type 2 diabetes mellitus without complication, without long-term current use of insulin ([facility])      metoprolol tartrate (LOPRESSOR) 50 MG tablet Take 1 tablet by mouth twice daily  Qty: 180 tablet, Refills: 0    Associated Diagnoses: Essential hypertension      ramipril (ALTACE) 10 MG capsule Take 1 capsule by mouth twice daily  Qty: 180 capsule, Refills: 1    Associated Diagnoses: Essential hypertension      bumetanide (BUMEX) 2 MG tablet Take 1 tablet by mouth once daily  Qty: 90 tablet, Refills: 1    Associated Diagnoses: Essential hypertension      gabapentin (NEURONTIN) 300 MG capsule TAKE 1 CAPSULE BY MOUTH ONCE DAILY IN THE EVENING  Qty: 30 capsule, Refills: 5    Associated Diagnoses: Diabetic peripheral neuropathy ([facility])      potassium chloride (KLOR-CON M) 20 equal, round & reactive to light, extraocular movements intact, no nystagmus, clear conjunctiva, non-icteric sclera]  ENT: [External ear canal clear without evidence of cerumen impaction or foreign body, TM's clear without erythema or bulging. Nares patent without drainage, septum appears midline. Moist mucus membranes, oropharynx clear without exudate, erythema, or mass. Uvula midline]  NECK: [Nontender and supple. No meningismus, no appreciated lymphadenopathy. Intact full range of motion. C-spine midline without vertebral tenderness. Trachea midline.]  CHEST: [Inspection normal, no lesions, equal rise. No crepitus or tenderness upon palpation.]  CARDIOVASCULAR: [Regular rate, rhythm, normal S1 and S2. No appreciated rubs, or gallops. 2/6 systolic murmur. No pulse deficits appreciated. Intact distal perfusion. JVD not appreciated.]  PULMONARY: [Respiratory distress present. Respiratory effort increased. Breath sounds diminished with scattered bibasilar rales. No wheezing. No accessory muscle use. No stridor]  ABDOMEN: [Inspection normal, without surgical scars. Soft, non-tender, non-distended, with normoactive bowel sounds. No palpable masses, rebound, or guarding]  BACK: [Intact ROM. No midline vertebral tenderness, step off, or crepitus. No CVA tenderness.]  MUSCULOSKELETAL: [Extremities nontender to palpation. No gross deformity or evidence of external trauma. Intact range of motion. Sensation intact. No clubbing or cyanosis. 2+ pitting to the mid calf edema.]  SKIN: [Warm, dry. No jaundice, rash, urticaria, or petechiae]  NEUROLOGIC: [Alert and oriented x 3, GCS 15, normal mentation for age. Moves all four extremities. No gross sensory deficit.  Cerebellar function grossly normal.]  PSYCHIATRIC: [Normal mood and affect, thought process is clear and linear]     DIFFERENTIAL DIAGNOSIS:   chf exacerbation, ?pneumonia, ?anemia, ?elevated troponin, and others    DIAGNOSTIC RESULTS     EKG: All EKG's are interpreted by the Emergency Department Physician who either signs or Co-signsthis chart in the absence of a cardiologist.  AV dual-paced rhythm   Abnormal ECG   When compared with ECG of 22-MAY-2021 06:23,   Ventricular rate has increased BY   2 BPM    RADIOLOGY: non-plain film images(s) such as CT,Ultrasound and MRI are read by the radiologist.    XR CHEST PORTABLE   Final Result   Impression:   Progressive CHF.       This document has been electronically signed by: Haim Castro MD on    06/09/2021 12:42 AM        [] Visualized and interpreted by me   [x] Radiologist's Wet Read Report Reviewed   [] Discussed withRadiologist.    LABS:   Labs Reviewed   CBC WITH AUTO DIFFERENTIAL - Abnormal; Notable for the following components:       Result Value    WBC 11.9 (*)     RBC 3.83 (*)     Hemoglobin 11.0 (*)     Hematocrit 35.8 (*)     MCHC 30.7 (*)     RDW-SD 46.5 (*)     Segs Absolute 9.5 (*)     Lymphocytes Absolute 0.8 (*)     All other components within normal limits   COMPREHENSIVE METABOLIC PANEL - Abnormal; Notable for the following components:    Glucose 172 (*)     CREATININE 2.1 (*)     BUN 31 (*)     All other components within normal limits   BRAIN NATRIURETIC PEPTIDE - Abnormal; Notable for the following components:    Pro-BNP 1348.0 (*)     All other components within normal limits   GLOMERULAR FILTRATION RATE, ESTIMATED - Abnormal; Notable for the following components:    Est, Glom Filt Rate 23 (*)     All other components within normal limits   MAGNESIUM   TROPONIN   ANION GAP   OSMOLALITY       EMERGENCY DEPARTMENT COURSE:   Vitals:    Vitals:    06/08/21 2328 06/08/21 2338 06/09/21 0215 06/09/21 0315   BP: (!) 153/76  (!) 157/65    Pulse: 77  68    Resp: 25  26    Temp:  98.4 °F (36.9 °C)     TempSrc:  Oral     SpO2: 93%  97%    Weight:  253 lb (114.8 kg)  252 lb 8 oz (114.5 kg)   Height:  5' 9\" (1.753 m)       Nitro and Bumex to ordered begin diuresis in the emergency department in for blood pressure control. I contacted Dr. Jolene Baer he would like for Dr. Gio Shepard to be contacted for admission. I spoke with Dr. Lewis Hiss agreed to admit the patient to the hospital for diuresis and further care. CRITICAL CARE:   None    CONSULTS:  Dr. Jolene Baer, Dr. Nestor Thomas as above    PROCEDURES:  None    FINAL IMPRESSION      1. Dyspnea on exertion    2. Other congestive heart failure ([facility] [address]          DISPOSITION/PLAN   admit    PATIENT REFERRED TO:  No follow-up provider specified. DISCHARGE MEDICATIONS:  Current Discharge Medication List          (Please note that portions of this note were completed with a voice recognition program.  Efforts were made to edit the dictations but occasionally words are mis-transcribed.)    Provider:  I personally performed the services described in the documentation, reviewed and edited the documentation which was dictated, and it accurately records my words and actions.     Steve Mckeon MD 6/9/21 3:19 AM                Steve Mckeon MD  06/09/21 [id number]

## 2021-06-09 NOTE — PLAN OF CARE
Problem: Discharge Planning:  Goal: Discharged to appropriate level of care  Description: Discharged to appropriate level of care  6/9/2021 0927 by Scott Bauman RN  Outcome: Ongoing  Note: Plan to discharge home. Problem: Cardiac Output - Decreased:  Goal: Hemodynamic stability will improve  Description: Hemodynamic stability will improve  6/9/2021 0927 by Scott Bauman RN  Outcome: Ongoing  Note: Vitals monitored. Problem: Pain:  Goal: Pain level will decrease  Description: Pain level will decrease  6/9/2021 0927 by Scott Bauman RN  Outcome: Ongoing  Note: Patient denies pain at this time. Will continue to monitor. Problem: Pain:  Goal: Control of acute pain  Description: Control of acute pain  6/9/2021 0927 by Scott Bauman RN  Outcome: Ongoing  Note: Patient denies pain at this time. Will continue to monitor. Problem: Pain:  Goal: Control of chronic pain  Description: Control of chronic pain  6/9/2021 0927 by Scott Bauman RN  Outcome: Ongoing  Note: Patient denies pain at this time. Will continue to monitor. Problem: Tissue Perfusion - Cardiopulmonary, Altered:  Goal: Absence of angina  Description: Absence of angina  6/9/2021 [id number] by Scott Bauman RN  Outcome: Ongoing  Note: Patient denies chest pain at this time. Problem: Tissue Perfusion - Cardiopulmonary, Altered:  Goal: Circulatory function within specified parameters  Description: Circulatory function within specified parameters  6/9/2021 0927 by Scott Bauman RN  Outcome: Ongoing  Note: Vitals monitored. Problem: Tissue Perfusion - Cardiopulmonary, Altered:  Goal: Hemodynamic stability will improve  Description: Hemodynamic stability will improve  6/9/2021 0927 by Scott Bauman RN  Outcome: Ongoing  Note: Vitals monitored.       Problem: Breathing Pattern - Ineffective:  Goal: Ability to achieve and maintain a regular respiratory rate will improve  Description: Ability to achieve and maintain a regular respiratory rate will improve  6/9/2021 0927 by Imer Elizondo RN  Outcome: Ongoing  Note: Dyspnea with exertion. On room air. Problem: Falls - Risk of:  Goal: Will remain free from falls  Description: Will remain free from falls  6/9/2021 0927 by Imer Elizondo RN  Outcome: Ongoing  Note: Free of falls this shift. Call light within reach. Bed alarm on. Falling star program in place. Problem: Falls - Risk of:  Goal: Absence of physical injury  Description: Absence of physical injury  6/9/2021 0927 by Imer Elizondo RN  Outcome: Ongoing  Note: Free of falls this shift. Call light within reach. Bed alarm on. Falling star program in place. Care plans reviewed with patient using teachback methods. No concerns voiced at this time.

## 2021-06-09 NOTE — CONSULTS
HPI:       Zaira Fierro is a 83 year old who presents for the following  Patient presents with:  RECHECK: Iron Levels and Medication Questions  Imm/Inj: Yoly Cabrera presents today for follow-up of restless legs and iron levels. Was seen by sleep medicine in June and July of this year, where she was re-started on tramadol (and gabapentin, requip, and melatonin were discontinued). Restless legs have been much better since these changes. Currently taking 50mg tramadol twice daily (at 4PM and 10PM) and 650mg tylenol 3x daily (in the AM, at 4PM, and at 10PM). She is sleeping quite well, and her legs are not bothering her.    Would like to check ferritin today. When she saw sleep medicine in July, they had recommended iron supplementation twice daily due to a ferritin of 25 in May 2017. They had planned for her to have ferritin re-checked in September and then follow up accordingly. She was unable to get that done in September but wants to do it now. She's also wanting to make sure that sleep medicine will see these labs if she gets them done here at our clinic. Wondering if she needs to follow up with them.  She does report that she has not been taking the iron supplement consistently. Rather than twice daily, she has been taking it only 1 or 2 times a week on average.     Problem, Medication and Allergy Lists were reviewed and are current.     Patient Active Problem List    Diagnosis Date Noted     Status post knee surgery 04/07/2017     Priority: Medium     Essential hypertension with goal blood pressure less than 140/90 07/01/2016     Priority: Medium     Arthritis of knee 12/11/2015     Priority: Medium     Primary osteoarthritis of both knees 10/17/2015     Priority: Medium     Macular degeneration (senile) of retina 04/07/2015     Priority: Medium     Non -exudative. Seen at [facility]. On latanoprost and Ocuvite       Cataract 04/07/2015     Priority: Medium     Advanced directives,  [facility]    [address]  [facility]  6/9/2021 1:07 PM    Pt Name:    Tram Hernandez  MRN:     [id number]   [id number]  YOB: 1945  Admit Date:    6/8/2021 11:28 PM  Primary Care Physician:  Lion Paredes MD        Reason for Consult:  CKD    History:   The patient is a 78 y.o. female with history of CKD IV, DM, HTN, Afib, CAD/PCI, HFrEF (EF 45%), and as below presented with increasing shortness of breath. CXR in the emergency department showed pulmonary edema. proBNP elevated. Patient was admitted for CHF exacerbation. Nephrology consulted for CKD. She has never seen a nephrologist before. Serum creatinine was 2.3 in April, 1.7 in May and increased to 3.2 after increase in diuretics. She has had a few ED visits since April for CHF exacerbation and has had adjustments with her bumex dose. She has Dyspnea on exertion and orthopnea. Her weights she says have overall been stable. Past Medical History:  Past Medical History:   Diagnosis Date    Aortic aneurysm (HCC)     4.5 cm aorta    Arthritis     Atrial fibrillation, chronic ([address] 1/25/2019    CAD (coronary artery disease)     CHF NYHA class II, unspecified failure chronicity, unspecified type ([address] 10/8/2018    Colon polyps 3/01    Diabetic peripheral neuropathy ([address] 2014      feet    Elbow fracture     Hyperlipidemia     Hypertension     Hypothyroidism     Pulmonary nodule, right 10/2017      repeat  ct of  chest  4-2018    S/P cardiac pacemaker procedure: 10/21/2017: Medtronic Dual Chamber. 10/21/2017    10/21/2017: Medtronic Dual Chamber.  Dr. Joe Lira Type II or unspecified type diabetes mellitus without mention of complication, not stated as uncontrolled        Past Surgical History:  Past Surgical History:   Procedure Laterality Date    BREAST BIOPSY Right 2016     benign   ab[place]han    BREAST SURGERY      CARDIOVERSION  02/2019    atrial counseling/discussion 07/30/2013     Priority: Medium     Completed POLST 7/30/2013 - see note for details.        Health Care Home 10/29/2012     Priority: Medium     Tier 1   DX V65.8 REPLACED WITH [facility] (04/08/2013)       Diverticulosis of large intestine 10/29/2012     Priority: Medium     Problem list name updated by automated process. Provider to review       Hyperlipidemia 10/29/2012     Priority: Medium     Problem list name updated by automated process. Provider to review       Obesity 10/29/2012     Priority: Medium     Problem list name updated by automated process. Provider to review       Disorder of bone and cartilage 10/29/2012     Priority: Medium     Problem list name updated by automated process. Provider to review       Prediabetes 10/29/2012     Priority: Medium     Patient is an established patient of this clinic.         Review of Systems:   Review of Systems   Constitutional: Negative for fatigue.   Cardiovascular: Negative for leg swelling.   Neurological: Negative for weakness and numbness.   Psychiatric/Behavioral: Negative for sleep disturbance.             Physical Exam:     Patient Vitals for the past 24 hrs:   BP Temp Temp src Pulse Resp SpO2 Height Weight   11/03/17 0926 122/83 97.9  F (36.6  C) Oral 86 16 98 % 5' (152.4 cm) 165 lb (74.8 kg)     Body mass index is 32.22 kg/(m^2).  Vitals were reviewed and were normal       Physical Exam   Constitutional: She is oriented to person, place, and time. She appears well-developed and well-nourished. No distress.   HENT:   Head: Normocephalic and atraumatic.   Eyes: Conjunctivae are normal.   Cardiovascular: Normal rate and regular rhythm.    Pulmonary/Chest: Effort normal and breath sounds normal. No respiratory distress.   Musculoskeletal: She exhibits no edema or deformity.   Neurological: She is alert and oriented to person, place, and time.   Skin: Skin is warm and dry.   Psychiatric: She has a normal mood and affect.  fib to sinus  baki     SECTION  over 30 years ago     COLONOSCOPY  2012    colon polyps   [place]    CORONARY ANGIOPLASTY      baki    CORONARY ANGIOPLASTY WITH STENT PLACEMENT      baki    CORONARY ANGIOPLASTY WITH STENT PLACEMENT  10/2018     lad branch    HEEL SPUR SURGERY Bilateral     bilat with hardware    HYSTERECTOMY  1984    KNEE ARTHROSCOPY  ; 3/02    right and left knee    KNEE ARTHROSCOPY Bilateral     bilat    PACEMAKER PLACEMENT  10/2017    THYROIDECTOMY, PARTIAL  's       Family History:  Family History   Problem Relation Age of Onset    Heart Disease Mother 80        bacterial infection at valve    Heart Disease Father     Heart Attack Father     Heart Disease Brother         CABG       Social History:  Social History     Socioeconomic History    Marital status:      Spouse name: Not on file    Number of children: Not on file    Years of education: Not on file    Highest education level: Not on file   Occupational History    Not on file   Tobacco Use    Smoking status: Never Smoker    Smokeless tobacco: Never Used   Vaping Use    Vaping Use: Never used   Substance and Sexual Activity    Alcohol use: No    Drug use: No    Sexual activity: Never   Other Topics Concern    Not on file   Social History Narrative    Not on file     Social Determinants of Health     Financial Resource Strain: Low Risk     Difficulty of Paying Living Expenses: Not hard at all   Food Insecurity: No Food Insecurity    Worried About Running Out of Food in the Last Year: Never true    Abraham of Food in the Last Year: Never true   Transportation Needs:     Lack of Transportation (Medical):      Lack of Transportation (Non-Medical):    Physical Activity:     Days of Exercise per Week:     Minutes of Exercise per Session:    Stress:     Feeling of Stress :    Social Connections:     Frequency of Communication with Friends and Family:     Frequency of Social Gatherings with Friends and Family:     Attends Yazidi Services:     Active Member of Clubs or Organizations:     Attends Club or Organization Meetings:     Marital Status:    Intimate Partner Violence:     Fear of Current or Ex-Partner:     Emotionally Abused:     Physically Abused:     Sexually Abused:        Home Meds:  Prior to Admission medications    Medication Sig Start Date End Date Taking?  Authorizing Provider   ELIQUIS 5 MG TABS tablet Take 1 tablet by mouth twice daily 5/28/21  Yes Gerry Cr MD   atorvastatin (LIPITOR) 40 MG tablet Take 1 tablet by mouth once daily 5/28/21  Yes Gerry Cr MD   levothyroxine (SYNTHROID) 125 MCG tablet Take 1 tablet by mouth daily 5/28/21  Yes Gerry Cr MD   amLODIPine (NORVASC) 10 MG tablet Take 1 tablet by mouth once daily 4/23/21  Yes Gerry Cr MD   glimepiride (AMARYL) 4 MG tablet Take 1 tablet by mouth once daily 4/6/21  Yes Gerry Cr MD   metoprolol tartrate (LOPRESSOR) 50 MG tablet Take 1 tablet by mouth twice daily 3/23/21  Yes Gerry Cr MD   ramipril (ALTACE) 10 MG capsule Take 1 capsule by mouth twice daily 2/20/21  Yes Gerry Cr MD   bumetanide (BUMEX) 2 MG tablet Take 1 tablet by mouth once daily  Patient taking differently: Take 2 mg by mouth 2 times daily  2/20/21  Yes Gerry Cr MD   gabapentin (NEURONTIN) 300 MG capsule TAKE 1 CAPSULE BY MOUTH ONCE DAILY IN THE EVENING 2/2/21 8/2/21 Yes Gerry Cr MD   potassium chloride (KLOR-CON M) 20 MEQ extended release tablet TAKE 1  BY MOUTH ONCE DAILY 1/13/21  Yes Laura Michael MD   amiodarone (CORDARONE) 200 MG tablet TAKE ONE TABLET BY MOUTH TWICE DAILY FOR 7 DAYS AND THEN TAKE ONE TABLET ONCE DAILY THEREAFTER  Patient taking differently: TAKE ONE TABLET ONCE DAILY 1/13/21  Yes Laura Michael MD   clopidogrel (PLAVIX) 75 MG tablet TAKE 1 TABLET DAILY 11/23/20  Yes Gerry Cr MD   Flaxseed, Linseed, Her behavior is normal.       Results:     None    Assessment and Plan     83 year old with history of hypertension, osteoarthritis, s/p right knee replacement, and restless legs syndrome who presents for follow-up on medication changes and labs after sleep medicine visit in 7/2017 for RLS.    1. Restless legs syndrome (RLS)  Re-started Tramadol after seeing sleep medicine, currently taking 50mg twice daily at 4PM and 10PM. Doing quite well, with good control of her symptoms and good sleep currently.  Although the tramadol working well for her, long-term Tramadol use is not ideal, so talked today about considering tapering off of it at some point. This may be challenging, given the fact that her RLS had really begun to be a problem when she had weaned off of Tramadol in the past (after being on it for her arthritis pain). She was successful in weaning last time although she does not recall that she was.  Made no changes to her dosing today, but revisit this topic in follow-up and/or in follow-up with sleep medicine.  Re-checking ferritin level today, with goal of 50 or higher. If still low, will encourage to continue taking the ferritin supplement. If 50 or higher, can stop the iron for a while.    - Ferritin    2. Routine health maintenance  - ADMIN VACCINE, INITIAL  - FLU VACCINE, INCREASED ANTIGEN, PRESV FREE  - calcium carb 1250 mg, 500 mg [place],/vitamin D 200 unit (OSCAL 500/200 D-3) 500-200 MG-UNIT per tablet; Take 1 tablet by mouth 2 times daily  Dispense: 90 tablet; Refill: 3  - Hemoglobin (HGB) ([facility])  - Hemoglobin A1c ([facility])  - Basic Metabolic Panel ([facility])    3. S/p right knee TKA - doing much better. Able to walk better. Encouraged to continue moving more, to help with weight loss and to consider left knee sooner than later if plans to have surgery.     4. HTN - controlled.  BMP today. HgA1c with prediabetes    Medications Discontinued During This Encounter   Medication Reason     calcium carb  1250 mg, 500 mg [place],/vitamin D 200 unit (OSCAL 500/200 D-3) 500-200 MG-UNIT per tablet Reorder     Options for treatment and follow-up care were reviewed with the patient. Zaira Fierro  engaged in the decision making process and verbalized understanding of the options discussed and agreed with the final plan.    This note is scribed by Melanie Neal MS3, on behalf of Dr. Crespo.  The medical student acted as scribe and the encounter documented above was completely performed by myself and the documentation reflects the work I have performed today. Ange Crespo MD     Total time - 25 min with more than half spent counseling on her RLS, knee follow up and exercise.     Ange Crespo MD       mEq Oral Daily with breakfast     Meds prn: sodium chloride flush, sodium chloride, ondansetron **OR** ondansetron, polyethylene glycol, acetaminophen **OR** acetaminophen, glucose, dextrose, glucagon (rDNA), dextrose     Allergies/Intolerances: ALLERGIES: Adhesive tape    24HR INTAKE/OUTPUT:      Intake/Output Summary (Last 24 hours) at 6/9/2021 1307  Last data filed at 6/9/2021 1250  Gross per 24 hour   Intake 240 ml   Output 450 ml   Net -210 ml     I/O last 3 completed shifts:  In: -   Out: 200 [Urine:200]  I/O this shift:  In: 240 [P.O.:240]  Out: 250 [Urine:250]  Admission weight: 253 lb (114.8 kg)  Wt Readings from Last 3 Encounters:   06/09/21 252 lb 8 oz (114.5 kg)   05/24/21 253 lb 6 oz (114.9 kg)   05/22/21 252 lb (114.3 kg)     Body mass index is 37.29 kg/m². Physical Examination:  VITALS:  BP (!) 155/71   Pulse 80   Temp 98.3 °F (36.8 °C) (Oral)   Resp 24   Ht 5' 9\" (1.753 m)   Wt 252 lb 8 oz (114.5 kg)   SpO2 90%   BMI 37.29 kg/m²   Weight:   Wt Readings from Last 3 Encounters:   06/09/21 252 lb 8 oz (114.5 kg)   05/24/21 253 lb 6 oz (114.9 kg)   05/22/21 252 lb (114.3 kg)     Constitutional and General Appearance: alert and cooperative with exam, appears comfortable, no distress  Eyes: no icteric sclera, no pallor conjunctiva, no discharge seen from either eye  Ears and Nose: normal external appearance of left and right ear and nose. No active drainage from nose.    Oral: moist oral mucus membranes  Neck: No jugular venous distention, appears symmetric, good ROM  Lungs: bibasilar rales  Heart: irregularly irregular  Extremities: trace LE edema  GI: soft, non-tender, no guarding  Skin: no rash seen on exposed extremities  Musculo: moves all extremities  Neuro: no slurred speech, no facial drooping, no asterixis  Psychiatric: Awake, alert, Oriented    Lab Data  CBC:   Recent Labs     06/08/21  2350   WBC 11.9*   HGB 11.0*   HCT 35.8*        BMP:  Recent Labs     06/08/21  2350    K 4.3      CO2 28   BUN 31*   CREATININE 2.1*   GLUCOSE 172*   CALCIUM 9.6   MG 2.2     PTH: @PTH@  TSH:   Recent Labs     06/08/21  2350   TSH 2.960     HgBa1c: No results for input(s): LABA1C in the last 72 hours. Hepatic:   Recent Labs     06/08/21  2350   LABALBU 4.2   AST 14   ALT 14   BILITOT 0.9   ALKPHOS 70     ABGs: No results found for: PHART, PO2ART, DTQ4EPW  Troponin: No results for input(s): TROPONINI in the last 72 hours. BNP: No results for input(s): BNP in the last 72 hours. Old labs reviewed. Impression and Plan:  1. CKD IV: renal function has declined over past few years. ? Cardiorenal syndrome. UA is bland. No significant proteinuria noted. Check renal ultrasound. Urine eosinophils are positive, not taking any known common cuplrit medications to cause AIN. Will continue to monitor. Hold nephrotoxic agents. Hold ACE-I for now  2. Systolic CHF: on IV bumex 2 mg BID, monitor renal fxn closely. Agree with repeating an echo  3. HTN  4. DM  5. Anemia  6. AFib    Thank you for allowing me to participate in the care of this patient. Please feel free to call me if you have any questions.      Electronically signed by Kym Jaime DO on 6/9/21 at 1:07 PM EDT    [facility]

## 2021-06-09 NOTE — PLAN OF CARE
Problem: Discharge Planning:  Goal: Discharged to appropriate level of care  Description: Discharged to appropriate level of care  Outcome: Ongoing  Note: Pt will discharge home when appropriate. Problem: Cardiac Output - Decreased:  Goal: Hemodynamic stability will improve  Description: Hemodynamic stability will improve  Outcome: Ongoing  Note: Pt vitals are within normal range. Cap refills and pulses all within normal ranges on all extremities. Will continue to assess. Problem: Pain:  Goal: Pain level will decrease  Description: Pain level will decrease  Outcome: Ongoing  Note: Pt denies pain on my shift. Non pharmaceutical interventions like ice and repositioning offered before pain medications. Will continue to assess. Goal: Control of acute pain  Description: Control of acute pain  Outcome: Ongoing  Note: Pt denies pain on my shift. Non pharmaceutical interventions like ice and repositioning offered before pain medications. Will continue to assess. Goal: Control of chronic pain  Description: Control of chronic pain  Outcome: Ongoing  Note: Pt denies pain on my shift. Non pharmaceutical interventions like ice and repositioning offered before pain medications. Will continue to assess. Problem: Tissue Perfusion - Cardiopulmonary, Altered:  Goal: Absence of angina  Description: Absence of angina  Outcome: Ongoing  Note: Pt denies chest pain on my shift. Will continue to assess. Goal: Circulatory function within specified parameters  Description: Circulatory function within specified parameters  Outcome: Ongoing  Note: Pt cap refill and pulses are all within normal range on all extremities. Will continue to assess. Goal: Hemodynamic stability will improve  Description: Hemodynamic stability will improve  Outcome: Ongoing  Note: Pt vitals are within normal range. Cap refills and pulses all within normal ranges on all extremities. Will continue to assess.        Problem: Breathing Pattern - Ineffective:  Goal: Ability to achieve and maintain a regular respiratory rate will improve  Description: Ability to achieve and maintain a regular respiratory rate will improve  Outcome: Ongoing     Problem: Falls - Risk of:  Goal: Will remain free from falls  Description: Will remain free from falls  Outcome: Ongoing  Note: Fall precaution in place. Bed alarm/chair alarm. Bed locked in lowest position. Fall band applied if applicable. Call light and overhead table within reach. Will continue to monitor. Goal: Absence of physical injury  Description: Absence of physical injury  Outcome: Ongoing  Note: Absence of physical injury. Pt verbalizes understanding of care plan. Pt contributes to goal settings.

## 2021-06-09 NOTE — CARE COORDINATION
6/9/21, 7:34 AM EDT  DISCHARGE PLANNING EVALUATION:    Tera Wong       Admitted: 6/8/2021/ [facility] day: 1   Location: 8A-20/020-A Reason for admit: CHF (congestive heart failure), NYHA class I, acute, combined ([facility] [I50.41]  Fluid overload [E87.70]   PMH:  has a past medical history of Aortic aneurysm (HCC), Arthritis, Atrial fibrillation, chronic ([address] CAD (coronary artery disease), CHF NYHA class II, unspecified failure chronicity, unspecified type ([facility] Colon polyps, Diabetic peripheral neuropathy ([facility] Elbow fracture, Hyperlipidemia, Hypertension, Hypothyroidism, Pulmonary nodule, right, S/P cardiac pacemaker procedure: 10/21/2017: Medtronic Dual Chamber. , and Type II or unspecified type diabetes mellitus without mention of complication, not stated as uncontrolled. Procedure: No.  Barriers to Discharge: To ER with SOB. Recent pt in ER for same. Hx CHF. Legs swollen and pt reports 5 lbs wt gain recently. Recent elevation of creatinine as well so diuretics were placed on hold briefly. Creat on admit 2. 1. BNP 1348. CHF nurse consulted. Consults to Nephrology, Cardiology and Dietitian. PCP: Jamin Dao MD    Patient Goals/Plan/Treatment Preferences: Met with pt today. From home alone currently as spouse is in ECF. Pt is typically self sufficient. He has no home services or DME. She drives, has a PCP and denies home going needs. CM to continue to follow for possible needs. Transportation/Food Security/Housekeeping Addressed:  No issues identified.

## 2021-06-09 NOTE — H&P
Internal Medicine Specialties  H&P  6/9/2021  11:28 AM    Patient:  Guru Cuevas  YOB: 1945    MRN: [id number]   Acct:  [de-identified]   8A-20/020-A  Primary Care Physician: Margarita Alex MD    Chief Complaint:  Chief Complaint   Patient presents with    Shortness of Breath       History of Present Illness: The patient is a 78 y.o. female with pmhx of Systolic CHF with EF 19%, CAD s/p stents, atrial fibrillation anticoagulated with Eliquis s/p pacemaker in 2017, sick sinus syndrome, HTN, CKD who presents with increasing SOB for the past few days. Pt was seen in the ED a few weeks ago for the same problem and was d/c on Bumex 2mg. At that time her Cr was 3.2 and so her PCP decreased her Bumex dose to 1mg. This was recently increased again as her Cr had come down to 2.5. Pt does not see a nephrologist for her CKD. Pt reports that she sleeps in a recliner due to her orthopnea. She ambulates at home without a walker and does not use O2 at home. She reports that she has been coughing up phlegm but no fever or chills. She denies chest pain. In the emergency department BNP elevated to 1348, WBC 11.9, CXR shows pulmonary edema but no infiltrates, EKG shows AV dual paced rhythm, Cr 2.1. Patient admitted for acute CHF exacerbation. Past Medical History:        Diagnosis Date    Aortic aneurysm (HCC)     4.5 cm aorta    Arthritis     Atrial fibrillation, chronic ([address] 1/25/2019    CAD (coronary artery disease)     CHF NYHA class II, unspecified failure chronicity, unspecified type ([address] 10/8/2018    Colon polyps 3/01    Diabetic peripheral neuropathy ([address] 2014      feet    Elbow fracture     Hyperlipidemia     Hypertension     Hypothyroidism     Pulmonary nodule, right 10/2017      repeat  ct of  chest  4-2018    S/P cardiac pacemaker procedure: 10/21/2017: Medtronic Dual Chamber. 10/21/2017    10/21/2017: [employer]onic Dual Chamber.  Dr. Corinne Richard    Type II or unspecified type diabetes C) 500 MG tablet, Take 1,000 mg by mouth daily   metOLazone (ZAROXOLYN) 5 MG tablet, Take 1 tablet by mouth daily As  Needed  metFORMIN (GLUCOPHAGE-XR) 500 MG extended release tablet, TAKE 2 TABLETS BY MOUTH TWICE DAILY (WITH  BREAKFAST  AND  AT  THE  EVENING  MEAL)  nitroGLYCERIN (NITROSTAT) 0.4 MG SL tablet, Place 1 tablet under the tongue every 5 minutes as needed for Chest pain up to max of 3 total doses. If no relief after 1 dose, call 911. Glucose Blood (JOSE E BREEZE 2 TEST) DISK, USE ONE STRIP TWICE DAILY, DX: E11.9      Allergies:  Adhesive tape    Social History:    reports that she has never smoked. She has never used smokeless tobacco. She reports that she does not drink alcohol and does not use drugs.       Family History:       Problem Relation Age of Onset    Heart Disease Mother 80        bacterial infection at valve    Heart Disease Father     Heart Attack Father     Heart Disease Brother         CABG       Review of Systems:    General ROS: negative  Psychological ROS: negative  Hematological and Lymphatic ROS: negative  Endocrine ROS: negative  Vision:negative  ENT ROS: Denies  Respiratory ROS: Shortness of breath, productive cough  Cardiovascular ROS: no chest pain, positive for dyspnea on exertion  Gastrointestinal ROS: no abdominal pain, change in bowel habits, or black or bloody stools  Genito-Urinary ROS: no dysuria, trouble voiding, or hematuria  Musculoskeletal ROS: negative  Neurological ROS: no TIA or stroke symptoms  Dermatological ROS: negative  Weight: Up 5 ib  Appetite: Ok      Physical Exam:    Vitals:  Patient Vitals for the past 24 hrs:   BP Temp Temp src Pulse Resp SpO2 Height Weight   06/09/21 1108 (!) 155/71 -- -- 80 24 90 % -- --   06/09/21 0745 (!) 157/67 98.3 °F (36.8 °C) Oral 64 22 91 % -- --   06/09/21 0315 (!) 154/68 97.8 °F (36.6 °C) Oral 68 20 95 % -- 252 lb 8 oz (114.5 kg)   06/09/21 0215 (!) 157/65 -- -- 68 26 97 % -- --   06/08/21 2338 -- 98.4 °F (36.9 °C) Oral -- -- -- 5' 9\" (1.753 m) 253 lb (114.8 kg)   06/08/21 2328 (!) 153/76 -- -- 77 25 93 % -- --     Weight: Weight: 252 lb 8 oz (114.5 kg)     24 hour intake/output:    Intake/Output Summary (Last 24 hours) at 6/9/2021 1128  Last data filed at 6/9/2021 1108  Gross per 24 hour   Intake --   Output 450 ml   Net -450 ml       General appearance - alert,  Ill appearing, and in moderate distress visibly dyspneic   Eyes - pupils equal and reactive, extraocular eye movements intact  Ears - bilateral TM's and external ear canals normal  Nose - normal and patent, no erythema, discharge or polyps  Mouth - mucous membranes moist, pharynx normal without lesions  Neck - supple, no significant adenopathy  Lymphatics - no palpable lymphadenopathy, no hepatosplenomegaly  Chest - Rales throughout  Distant Breath Sounds: No  Heart - normal rate, regular rhythm, normal S1, S2, 3/6 systolic murmur  Abdomen - soft, nontender, nondistended, no masses or organomegaly  Obese: Yes;   Neurological - alert, oriented, normal speech, no focal findings or movement disorder noted  Musculoskeletal - no joint tenderness, deformity or swelling  Extremities - peripheral pulses normal, Trace edema  Skin - normal coloration and turgor, no rashes, no suspicious skin lesions noted    Review of Labs and Diagnostic Testing:    CBC:   Recent Labs     06/08/21 2350   WBC 11.9*   HGB 11.0*   HCT 35.8*   MCV 93.5        BMP:   Recent Labs     06/08/21 2350      K 4.3      CO2 28   BUN 31*   CREATININE 2.1*   CALCIUM 9.6   GLUCOSE 172*     PT/INR: No results for input(s): PROTIME, INR in the last 72 hours. APTT: No results for input(s): APTT in the last 72 hours. Lipids:   Recent Labs     06/08/21 2350   ALKPHOS 70   ALT 14   AST 14   BILITOT 0.9   LABALBU 4.2     Troponin:   Recent Labs     06/08/21 2350   TROPONINT < 0.010        Imaging:  XR CHEST PORTABLE    Result Date: 6/9/2021  1 view chest x-ray.  Comparison:  CR,SR  - XR CHEST PORTABLE  - 05/22/2021 06:46 AM EDT Findings: Heart is enlarged. There is pulmonary edema. No sizable pleural effusion. No consolidation. Impression: Progressive CHF. This document has been electronically signed by: Luisa James MD on 06/09/2021 12:42 AM    XR CHEST PORTABLE    Result Date: 5/22/2021  1 view chest x-ray. Comparison: None Findings: No consolidation/airspace disease. No pneumothorax or pleural effusion. No mediastinal shift or tracheal deviation. Mild cardiomegaly. Mild passive venous congestion now present. Osseous structures intact. Tubes and catheters: Dual lead defibrillator right heart chamber in situ     Impression: 1. Stable mild cardiomegaly. Mild passive venous congestion now present. 2.  Clear lungs 3. No pleural effusions This document has been electronically signed by: Deb Foster MD on 05/22/2021 07:41 AM        Assessment/Plan:    1. Acute systolic CHF exacerbation  a. Start IV Bumex 2mg BID; Monitor renal function  b. O2 for comfort; pt SPO2 was 88% on room air on exam; notified RN to start NC to keep SPO2 >92%  c. Check limited echo on account of murmur   2. Atrial fibrillation anticoagulated with Eliquis s/p pacemaker   a. Cont Eliquis and BB  3. Hypothyroidism  a. Cont home dose Synthroid  b. Check TSH  4. Leukocytosis  a. Check UA  b. Monitor CBC tomorrow   c. No Pneumonia on CXR; Hold off on any abx for now  5. CKD IV  a. Consult nephrology-- pt does not see a nephrologist and in case of IV diuretics being administered renal function needs to be carefully monitored   b. Check urine Na, urine Cr, Urine eosinophils  6. DM2  a. Cont Sulfonylurea; Hold Metformin d/t CKD  b. Insulin SS while inpatient   c. Diabetic diet   7. PT/OT  8. DVT prophylaxis   a. On Eliquis     Assessment and plan of care discussed with supervising physician, Dr Mustapha Ryan.       Electronically signed by GRANT Cabrera CNP on 6/9/2021 at 11:28 AM         Copy: Primary Care Physician: Melani Hannah

## 2021-06-10 PROBLEM — I50.23 SYSTOLIC CHF, ACUTE ON CHRONIC (HCC): Status: ACTIVE | Noted: 2021-06-10

## 2021-06-10 LAB
ANION GAP SERPL CALCULATED.3IONS-SCNC: 8 MEQ/L (ref 8–16)
BASOPHILS # BLD: 0.7 %
BASOPHILS ABSOLUTE: 0 THOU/MM3 (ref 0–0.1)
BUN BLDV-MCNC: 27 MG/DL (ref 7–22)
CALCIUM SERPL-MCNC: 9.2 MG/DL (ref 8.5–10.5)
CHLORIDE BLD-SCNC: 100 MEQ/L (ref 98–111)
CO2: 31 MEQ/L (ref 23–33)
CREAT SERPL-MCNC: 1.8 MG/DL (ref 0.4–1.2)
EOSINOPHIL # BLD: 5 %
EOSINOPHILS ABSOLUTE: 0.3 THOU/MM3 (ref 0–0.4)
ERYTHROCYTE [DISTWIDTH] IN BLOOD BY AUTOMATED COUNT: 13.8 % (ref 11.5–14.5)
ERYTHROCYTE [DISTWIDTH] IN BLOOD BY AUTOMATED COUNT: 45.8 FL (ref 35–45)
GFR SERPL CREATININE-BSD FRML MDRD: 27 ML/MIN/1.73M2
GLUCOSE BLD-MCNC: 131 MG/DL (ref 70–108)
GLUCOSE BLD-MCNC: 154 MG/DL (ref 70–108)
GLUCOSE BLD-MCNC: 167 MG/DL (ref 70–108)
GLUCOSE BLD-MCNC: 202 MG/DL (ref 70–108)
GLUCOSE BLD-MCNC: 254 MG/DL (ref 70–108)
HCT VFR BLD CALC: 32.1 % (ref 37–47)
HEMOGLOBIN: 9.9 GM/DL (ref 12–16)
IMMATURE GRANS (ABS): 0.02 THOU/MM3 (ref 0–0.07)
IMMATURE GRANULOCYTES: 0.3 %
LYMPHOCYTES # BLD: 14.2 %
LYMPHOCYTES ABSOLUTE: 1 THOU/MM3 (ref 1–4.8)
MCH RBC QN AUTO: 28.6 PG (ref 26–33)
MCHC RBC AUTO-ENTMCNC: 30.8 GM/DL (ref 32.2–35.5)
MCV RBC AUTO: 92.8 FL (ref 81–99)
MONOCYTES # BLD: 10 %
MONOCYTES ABSOLUTE: 0.7 THOU/MM3 (ref 0.4–1.3)
NUCLEATED RED BLOOD CELLS: 0 /100 WBC
PLATELET # BLD: 281 THOU/MM3 (ref 130–400)
PMV BLD AUTO: 10.6 FL (ref 9.4–12.4)
POTASSIUM SERPL-SCNC: 4 MEQ/L (ref 3.5–5.2)
RBC # BLD: 3.46 MILL/MM3 (ref 4.2–5.4)
SEG NEUTROPHILS: 69.8 %
SEGMENTED NEUTROPHILS ABSOLUTE COUNT: 4.7 THOU/MM3 (ref 1.8–7.7)
SODIUM BLD-SCNC: 139 MEQ/L (ref 135–145)
WBC # BLD: 6.8 THOU/MM3 (ref 4.8–10.8)

## 2021-06-10 PROCEDURE — 93307 TTE W/O DOPPLER COMPLETE: CPT

## 2021-06-10 PROCEDURE — 2500000003 HC RX 250 WO HCPCS: Performed by: REGISTERED NURSE

## 2021-06-10 PROCEDURE — 6370000000 HC RX 637 (ALT 250 FOR IP): Performed by: INTERNAL MEDICINE

## 2021-06-10 PROCEDURE — 6370000000 HC RX 637 (ALT 250 FOR IP): Performed by: REGISTERED NURSE

## 2021-06-10 PROCEDURE — 99232 SBSQ HOSP IP/OBS MODERATE 35: CPT | Performed by: INTERNAL MEDICINE

## 2021-06-10 PROCEDURE — 97162 PT EVAL MOD COMPLEX 30 MIN: CPT

## 2021-06-10 PROCEDURE — 80048 BASIC METABOLIC PNL TOTAL CA: CPT

## 2021-06-10 PROCEDURE — 2580000003 HC RX 258: Performed by: REGISTERED NURSE

## 2021-06-10 PROCEDURE — 6360000002 HC RX W HCPCS: Performed by: REGISTERED NURSE

## 2021-06-10 PROCEDURE — 36415 COLL VENOUS BLD VENIPUNCTURE: CPT

## 2021-06-10 PROCEDURE — 82948 REAGENT STRIP/BLOOD GLUCOSE: CPT

## 2021-06-10 PROCEDURE — 85025 COMPLETE CBC W/AUTO DIFF WBC: CPT

## 2021-06-10 PROCEDURE — 1200000003 HC TELEMETRY R&B

## 2021-06-10 RX ORDER — BUMETANIDE 1 MG/1
2 TABLET ORAL 2 TIMES DAILY
Status: DISCONTINUED | OUTPATIENT
Start: 2021-06-10 | End: 2021-06-11

## 2021-06-10 RX ADMIN — METOPROLOL TARTRATE 50 MG: 50 TABLET, FILM COATED ORAL at 08:38

## 2021-06-10 RX ADMIN — METOPROLOL TARTRATE 50 MG: 50 TABLET, FILM COATED ORAL at 19:33

## 2021-06-10 RX ADMIN — CEFTRIAXONE SODIUM 1000 MG: 1 INJECTION, POWDER, FOR SOLUTION INTRAMUSCULAR; INTRAVENOUS at 16:36

## 2021-06-10 RX ADMIN — AMIODARONE HYDROCHLORIDE 200 MG: 200 TABLET ORAL at 08:38

## 2021-06-10 RX ADMIN — APIXABAN 5 MG: 5 TABLET, FILM COATED ORAL at 08:38

## 2021-06-10 RX ADMIN — AMLODIPINE BESYLATE 10 MG: 10 TABLET ORAL at 08:38

## 2021-06-10 RX ADMIN — LEVOTHYROXINE SODIUM 125 MCG: 0.12 TABLET ORAL at 06:44

## 2021-06-10 RX ADMIN — ATORVASTATIN CALCIUM 40 MG: 40 TABLET, FILM COATED ORAL at 19:34

## 2021-06-10 RX ADMIN — APIXABAN 5 MG: 5 TABLET, FILM COATED ORAL at 19:34

## 2021-06-10 RX ADMIN — GABAPENTIN 300 MG: 300 CAPSULE ORAL at 19:33

## 2021-06-10 RX ADMIN — SODIUM CHLORIDE, PRESERVATIVE FREE 10 ML: 5 INJECTION INTRAVENOUS at 19:35

## 2021-06-10 RX ADMIN — INSULIN LISPRO 3 UNITS: 100 INJECTION, SOLUTION INTRAVENOUS; SUBCUTANEOUS at 13:35

## 2021-06-10 RX ADMIN — BUMETANIDE 2 MG: 0.25 INJECTION, SOLUTION INTRAMUSCULAR; INTRAVENOUS at 08:39

## 2021-06-10 RX ADMIN — GLIMEPIRIDE 4 MG: 4 TABLET ORAL at 16:37

## 2021-06-10 RX ADMIN — BUMETANIDE 2 MG: 1 TABLET ORAL at 19:33

## 2021-06-10 RX ADMIN — INSULIN LISPRO 1 UNITS: 100 INJECTION, SOLUTION INTRAVENOUS; SUBCUTANEOUS at 08:41

## 2021-06-10 RX ADMIN — SODIUM CHLORIDE, PRESERVATIVE FREE 10 ML: 5 INJECTION INTRAVENOUS at 08:42

## 2021-06-10 RX ADMIN — POTASSIUM CHLORIDE 10 MEQ: 750 TABLET, EXTENDED RELEASE ORAL at 08:38

## 2021-06-10 RX ADMIN — CLOPIDOGREL BISULFATE 75 MG: 75 TABLET ORAL at 08:38

## 2021-06-10 ASSESSMENT — PAIN SCALES - GENERAL
PAINLEVEL_OUTOF10: 0

## 2021-06-10 NOTE — PROGRESS NOTES
[facility]  INPATIENT PHYSICAL THERAPY  EVALUATION  [facility] MED SURG 8A - 8A-20/020-A    Time In: 5715  Time Out: 1206  Timed Code Treatment Minutes: 0 Minutes  Minutes: 11          Date: 6/10/2021  Patient Name: Junella Saint,  Gender:  female        MRN: [id number]  : 1945  (78 y.o.)      Referring Practitioner: Dr Eliseo Dance  Diagnosis: CHF  Additional Pertinent Hx: Pt admitted 6-8 from home with SOB and CHF . Pt reports B knee arthritis     Restrictions/Precautions:  Restrictions/Precautions: General Precautions    Subjective:  Chart Reviewed: Yes  Patient assessed for rehabilitation services?: Yes  Family / Caregiver Present: No  Subjective: Pt in bedside chair, agreeable to PT and ambulation and therex. General:  Overall Orientation Status: Within Normal Limits  Follows Commands: Within Functional Limits    Vision: Within Functional Limits    Hearing: Within functional limits         Pain: 0/10:     Vitals: Vitals not assessed per clinical judgement, see nursing flowsheet    Social/Functional History:    Lives With: Alone (spouse is in NH)  Type of Home: House  Home Layout: Two level, Able to Live on Main level with bedroom/bathroom  Home Access: Level entry          Receives Help From: Family  ADL Assistance: Independent  Homemaking Assistance: Independent  Ambulation Assistance: Independent  Transfer Assistance: Independent          Additional Comments: Pt does not use AD , limited somewhat due to arthritic knees .     OBJECTIVE:  Range of Motion:  Bilateral Lower Extremity: WFL    Strength:  Bilateral Lower Extremity: Impaired - knees limited due to arthritis per pt    Balance:  Static Sitting Balance:  Supervision  Static Standing Balance: Stand By Assistance  Dynamic Standing Balance: Contact Guard Assistance    Bed Mobility:  Not Tested  Anticipate S to Mod I     Transfers:  Sit to Stand: Supervision  Stand to Sit:Supervision    Ambulation:  Stand By Assistance, [facility] Assistance  Distance: 200 feet  Surface: Level Tile  Device:No Device  Gait Deviations:  Slow Shahla, Decreased Step Length Bilaterally, Decreased Weight Shift Bilaterally and pt reaching lightly for the hand rail intermittently. Exercise:  Patient was guided in 1 set(s) 10 reps of exercise to both lower extremities. Seated marches, Seated heel/toe raises and Long arc quads. Exercises were completed for increased independence with functional mobility. Functional Outcome Measures: Completed  AM-PAC Inpatient Mobility Raw Score : 20  AM-PAC Inpatient T-Scale Score : 47.67    ASSESSMENT:  Activity Tolerance:  Patient tolerance of  treatment: good. Treatment Initiated: Treatment and education initiated within context of evaluation. Evaluation time included review of current medical information, gathering information related to past medical, social and functional history, completion of standardized testing, formal and informal observation of tasks, assessment of data and development of plan of care and goals. Treatment time included skilled education and facilitation of tasks to increase safety and independence with functional mobility for improved independence and quality of life. Assessment: Body structures, Functions, Activity limitations: Decreased functional mobility , Decreased endurance, Decreased strength  Assessment: Pt has decreased endurance and functional mobility due to her CHF. Pt is below baseline. Rec skilled therapy while here to imporve safety and tolerance to mobility and gait as prior to admission.   Prognosis: Excellent    REQUIRES PT FOLLOW UP: Yes    Discharge Recommendations:  Discharge Recommendations: Home with assist PRN    Patient Education:  PT Education: Goals, Gait Training, PT Role, Plan of Care, Home Exercise Program, Transfer Training, Functional Mobility Training    Equipment Recommendations:  Equipment Needed: No  Other: Pt denies needs, no reports of alls or her knees giving out . ? if cane would be beneficial at least when leaving the home    Plan:  Times per week: 5 X GM  Current Treatment Recommendations: Strengthening, Home Exercise Program, Endurance Training, Functional Mobility Training, Transfer Training, Gait Training    Goals:  Patient goals : Go home  Short term goals  Time Frame for Short term goals: by discharge  Short term goal 1: Supine to sit and return with Mod I to get in and out of bed  Short term goal 2: sit to stand with Mod I to get on and off various surfaces  Short term goal 3: Pt will ambulate with least restrictive device  if agreeable  200 feet with S in order to be safe for community distances  Long term goals  Time Frame for Long term goals : NA due to short ELOS    Following session, patient left in safe position with all fall risk precautions in place.

## 2021-06-10 NOTE — PROGRESS NOTES
Renal Progress Note  [facility]    Patient :  Bhavesh Mata; 78 y.o. MRN# [id number]  Location:  8A-20/020-A  Attending:  Marky Lucero MD  Admit Date:  6/8/2021   Hospital Day: 0      Subjective:     Nephrology is following the patient for CKD. Patient seen and examined. She states she is feeling better. Breathing is improved. Net negative 3 liters since admission. She is on room air.     Outpatient Medications:     Medications Prior to Admission: ELIQUIS 5 MG TABS tablet, Take 1 tablet by mouth twice daily  atorvastatin (LIPITOR) 40 MG tablet, Take 1 tablet by mouth once daily  levothyroxine (SYNTHROID) 125 MCG tablet, Take 1 tablet by mouth daily  amLODIPine (NORVASC) 10 MG tablet, Take 1 tablet by mouth once daily  glimepiride (AMARYL) 4 MG tablet, Take 1 tablet by mouth once daily  metoprolol tartrate (LOPRESSOR) 50 MG tablet, Take 1 tablet by mouth twice daily  ramipril (ALTACE) 10 MG capsule, Take 1 capsule by mouth twice daily  bumetanide (BUMEX) 2 MG tablet, Take 1 tablet by mouth once daily (Patient taking differently: Take 2 mg by mouth 2 times daily )  gabapentin (NEURONTIN) 300 MG capsule, TAKE 1 CAPSULE BY MOUTH ONCE DAILY IN THE EVENING  potassium chloride (KLOR-CON M) 20 MEQ extended release tablet, TAKE 1  BY MOUTH ONCE DAILY  amiodarone (CORDARONE) 200 MG tablet, TAKE ONE TABLET BY MOUTH TWICE DAILY FOR 7 DAYS AND THEN TAKE ONE TABLET ONCE DAILY THEREAFTER (Patient taking differently: TAKE ONE TABLET ONCE DAILY)  clopidogrel (PLAVIX) 75 MG tablet, TAKE 1 TABLET DAILY  Flaxseed, Linseed, (FLAX SEEDS PO), Take by mouth  Ascorbic Acid (VITAMIN C) 500 MG tablet, Take 1,000 mg by mouth daily   metOLazone (ZAROXOLYN) 5 MG tablet, Take 1 tablet by mouth daily As  Needed  metFORMIN (GLUCOPHAGE-XR) 500 MG extended release tablet, TAKE 2 TABLETS BY MOUTH TWICE DAILY (WITH  BREAKFAST  AND  AT  THE  EVENING  MEAL)  nitroGLYCERIN (NITROSTAT) 0.4 MG SL tablet, Place 1 tablet under the turgor. Extremities:  Trace edema noted    Labs:       Recent Labs     06/08/21  2350 06/10/21  0523   WBC 11.9* 6.8   RBC 3.83* 3.46*   HGB 11.0* 9.9*   HCT 35.8* 32.1*   MCV 93.5 92.8   MCH 28.7 28.6   MCHC 30.7* 30.8*    281   MPV 10.9 10.6      BMP:   Recent Labs     06/08/21  2350 06/10/21  0523    139   K 4.3 4.0    100   CO2 28 31   BUN 31* 27*   CREATININE 2.1* 1.8*   GLUCOSE 172* 154*   CALCIUM 9.6 9.2      Phosphorus:   No results for input(s): PHOS in the last 72 hours. Magnesium:    Recent Labs     06/08/21  2350   MG 2.2     Albumin:    Recent Labs     06/08/21  2350   LABALBU 4.2     BNP:    No results found for: BNP  CESAR:    No results found for: CESAR  SPEP:  Lab Results   Component Value Date    PROT 7.6 06/08/2021     UPEP:   No results found for: LABPE  C3:   No results found for: C3  C4:   No results found for: C4  MPO ANCA:   No results found for: MPO  PR3 ANCA:   No results found for: PR3  Anti-GBM:   No results found for: GBMABIGG  Hep BsAg:       No results found for: HEPBSAG  Hep C AB:        No results found for: HEPCAB    Urinalysis/Chemistries:      Lab Results   Component Value Date    NITRU NEGATIVE 06/09/2021    COLORU YELLOW 06/09/2021    PHUR 5.5 06/09/2021    WBCUA 5-9 06/09/2021    RBCUA NONE SEEN 06/09/2021    YEAST NONE SEEN 06/09/2021    BACTERIA NONE SEEN 06/09/2021    LEUKOCYTESUR SMALL 06/09/2021    UROBILINOGEN 0.2 06/09/2021    BILIRUBINUR NEGATIVE 06/09/2021    BLOODU NEGATIVE 06/09/2021    GLUCOSEU 100 06/09/2021    KETUA NEGATIVE 06/09/2021     Urine Sodium:   No results found for: TISH  Urine Potassium:  No results found for: KUR  Urine Chloride:  No results found for: CLUR  Urine Osmolarity: No results found for: OSMOU  Urine Protein:   No components found for: TOTALPROTEIN, URINE   Urine Creatinine:   No results found for: LABCREA  Urine Eosinophils:  No components found for: UEOS        Impression and Plan:  1.  CKD IV likely chronic cardiorenal syndrome and diabetic nephropathy although no overt proteinuria noted. Renal function is improved  2. Systolic CHF: improving, will change to po bumex 2 mg BID tonight. Await 2D echo  3. Normocytic anemia  4. HTN  5. DM  6. Afib        Please don't hesitate to call with any questions.   Electronically signed by Lyly Negrete,  on 6/10/2021 at 11:24 AM

## 2021-06-10 NOTE — PLAN OF CARE
Pattern - Ineffective:  Goal: Ability to achieve and maintain a regular respiratory rate will improve  Description: Ability to achieve and maintain a regular respiratory rate will improve  Outcome: Ongoing  Note: Pt able to maintain a regular RR on my shift. Will continue to assess. Problem: Falls - Risk of:  Goal: Will remain free from falls  Description: Will remain free from falls  Outcome: Ongoing  Note: Fall precaution in place. Bed alarm/chair alarm. Bed locked in lowest position. Fall band applied if applicable. Call light and overhead table within reach. Will continue to monitor. Goal: Absence of physical injury  Description: Absence of physical injury  Outcome: Ongoing  Note: Absence of physical injury. Pt verbalizes understanding of care plan. Pt contributes to goal settings.

## 2021-06-10 NOTE — PLAN OF CARE
Problem: Discharge Planning:  Goal: Discharged to appropriate level of care  Description: Discharged to appropriate level of care  6/10/2021 1043 by Adarsh Barksdale RN  Outcome: Ongoing  Note: Patient plans to discharge home    Problem: Cardiac Output - Decreased:  Goal: Hemodynamic stability will improve  Description: Hemodynamic stability will improve  6/10/2021 1043 by Adarsh Barksdale RN  Outcome: Ongoing    Problem: Pain:  Goal: Pain level will decrease  Description: Pain level will decrease  6/10/2021 1043 by Adarsh Barksdale RN  Outcome: Ongoing  Note: Patient denies pain    Goal: Control of acute pain  Description: Control of acute pain  6/10/2021 1043 by Adarsh Barksdale RN  Outcome: Ongoing  Note: Patient denies pain    Goal: Control of chronic pain  Description: Control of chronic pain  6/10/2021 1043 by Adarsh Barksdale RN  Outcome: Ongoing  Note: Patient denies pain     Problem: Tissue Perfusion - Cardiopulmonary, Altered:  Goal: Absence of angina  Description: Absence of angina  6/10/2021 1043 by Adarsh Barksdale RN  Outcome: Ongoing  Note: Denies pain this shift    Goal: Circulatory function within specified parameters  Description: Circulatory function within specified parameters  6/10/2021 1043 by Adarsh Barksdale RN  Outcome: Ongoing    Goal: Hemodynamic stability will improve  Description: Hemodynamic stability will improve  6/10/2021 1043 by Adarsh Barksdale RN  Outcome: Ongoing     Problem: Breathing Pattern - Ineffective:  Goal: Ability to achieve and maintain a regular respiratory rate will improve  Description: Ability to achieve and maintain a regular respiratory rate will improve  6/10/2021 1043 by Adarsh Barksdale RN  Outcome: Ongoing  Note: Patient breathing improved. Patient currently using room air requires 1L oxygen at time. Will continue to monitor.      Problem: Falls - Risk of:  Goal: Will remain free from falls  Description: Will remain free from falls  6/10/2021 1043 by Adarsh Barksdale RN  Outcome: Ongoing  Note: Pt remains free from falls/injury this shift. Wearing nonslip socks, uses call light appropriately, utilizes hourly rounding. Frequently used items within reach. Goal: Absence of physical injury  Description: Absence of physical injury  6/10/2021 1043 by Isadora Jones RN  Outcome: Ongoing  Note: Pt remains free from falls/injury this shift. Wearing nonslip socks, uses call light appropriately, utilizes hourly rounding. Frequently used items within reach. Care plan reviewed with patient. Patient verbalize understanding of the plan of care and contribute to goal setting.

## 2021-06-10 NOTE — PROGRESS NOTES
Dr. Kiley Hooks Progress note       Internal Medicine              Patient:  Johnnie Alamo  YOB: 1945    MRN: [id number]   Acct:  [de-identified]   8A-20/020-A  Primary Care Physician: Elijah Huynh MD    Admit Date: 6/8/2021           Subjective: she feels much better today.  at her bed side. Objective:      Physical Exam:    Vitals:  Patient Vitals for the past 24 hrs:   BP Temp Temp src Pulse Resp SpO2   06/10/21 1107 (!) 127/56 98.1 °F (36.7 °C) Oral 64 16 95 %   06/10/21 0830 (!) 150/75 97.5 °F (36.4 °C) Oral 60 16 97 %   06/10/21 0330 (!) 123/53 98.6 °F (37 °C) Oral 68 18 90 %   06/09/21 2015 (!) 146/66 98.8 °F (37.1 °C) Oral 69 18 92 %   06/09/21 1535 (!) 150/69 98.2 °F (36.8 °C) Oral 63 17 93 %     Weight: Weight: 252 lb 8 oz (114.5 kg)     24 hour intake/output:    Intake/Output Summary (Last 24 hours) at 6/10/2021 1309  Last data filed at 6/10/2021 0916  Gross per 24 hour   Intake 760 ml   Output 3550 ml   Net -2790 ml       General appearance - alert, well appearing, and in no distress  Eyes - pupils equal and reactive, extraocular eye movements intact  Mouth - mucous membranes moist, pharynx normal without lesions  Neck - supple, no significant adenopathy  Chest - clear to auscultation, no wheezes, rales or rhonchi, symmetric air entry  Heart - normal rate, S1, S2,   Abdomen - soft, nontender, nondistended, no masses or organomegaly, pos bs.   Neurological - alert, oriented, normal speech, no focal findings or movement disorder noted  Musculoskeletal - no joint tenderness, deformity or swelling  Extremities - peripheral pulses normal, no pedal edema, no clubbing or cyanosis  Skin - normal coloration and turgor, no rashes, no suspicious skin lesions noted    Review of Labs and Diagnostic Testing:    CBC:   Recent Labs     06/10/21  0523   WBC 6.8   HGB 9.9*   HCT 32.1*   MCV 92.8        BMP:   Recent Labs     06/10/21  0542    K 4.0      CO2 31   BUN 27*   CREATININE 1.8*   CALCIUM 9.2   GLUCOSE 154*     PT/INR: No results for input(s): PROTIME, INR in the last 72 hours. APTT: No results for input(s): APTT in the last 72 hours. Lipids:   Recent Labs     06/08/21  2350   ALKPHOS 70   ALT 14   AST 14   BILITOT 0.9   LABALBU 4.2     Troponin: No results for input(s): TROPONINI in the last 72 hours. Imaging:  US RENAL COMPLETE    Result Date: 6/9/2021  US Renal Comparison: None Findings: Right kidney normal size and echotexture, measures 11.8 x 5.9 x 4.6 cm. Cortical thickness: 1.1 cm Left kidney normal size and echotexture, measures 12.0 x 6.3 x 6.2 cm. Cortical thickness: 1.5 cm. No obvious calculi or hydronephrosis of either kidney. Normal color Doppler. Calculated prevoid urinary bladder volume: 450 cmï¿½. Post void bladder volume: 102 cmï¿½. 1. Normal kidneys. 2.  Significant post void bladder urine volume. This document has been electronically signed by: Royal Anabel MD on 06/09/2021 09:13 PM    XR CHEST PORTABLE    Result Date: 6/9/2021  1 view chest x-ray. Comparison:  CR,SR  - XR CHEST PORTABLE  - 05/22/2021 06:46 AM EDT Findings: Heart is enlarged. There is pulmonary edema. No sizable pleural effusion. No consolidation. Impression: Progressive CHF. This document has been electronically signed by: Aniya Smith MD on 06/09/2021 12:42 AM      EKG:      Diet: ADULT DIET; Regular; 3 carb choices (45 gm/meal);  Low Sodium (2 gm)        Data:   Scheduled Meds: Scheduled Meds:   bumetanide  2 mg Oral BID    sodium chloride flush  5-40 mL Intravenous 2 times per day    insulin lispro  0-6 Units Subcutaneous TID WC    insulin lispro  0-3 Units Subcutaneous Nightly    glimepiride  4 mg Oral Daily with breakfast    amiodarone  200 mg Oral Daily    atorvastatin  40 mg Oral Nightly    clopidogrel  75 mg Oral Daily    gabapentin  300 mg Oral Nightly    levothyroxine  125 mcg Oral Daily    metoprolol tartrate  50 mg Oral BID    amLODIPine  10 mg Oral Daily    apixaban  5 mg Oral BID    potassium chloride  10 mEq Oral Daily with breakfast    cefTRIAXone (ROCEPHIN) IV  1,000 mg Intravenous Q24H     Continuous Infusions:   sodium chloride      dextrose       PRN Meds:.sodium chloride flush, sodium chloride, ondansetron **OR** ondansetron, polyethylene glycol, acetaminophen **OR** acetaminophen, glucose, dextrose, glucagon (rDNA), dextrose  Continuous Infusions:   sodium chloride      dextrose           Assessment   Active Problems:    Type 2 diabetes mellitus without complication (HCC)    CHF (congestive heart failure), NYHA class I, acute, combined (HCC)    Fluid overload  Resolved Problems:    * No resolved hospital problems. *        Patient Active Problem List   Diagnosis    Type 2 diabetes mellitus without complication (HCC)    Hyperlipidemia    Hypertension    CAD (coronary artery disease)    Aortic aneurysm ([address]    Hypothyroidism    Diabetic peripheral neuropathy (HCC)    High-grade atrioventricular block    Sick sinus syndrome ([address]    S/P cardiac pacemaker procedure: 10/21/2017: Medtronic Dual Chamber.     Pulmonary nodule, right    Congestive heart failure (HCC)    Paroxysmal atrial fibrillation (HCC)    CHF (congestive heart failure), NYHA class I, acute, combined ([address]    Fluid overload        Plan   cont with med mgt  Pt/ot  Awaits echo  Home soon    Electronically signed by Jam Hamm MD on 6/10/2021 at 1:09 PM  Over 35 mins spent on this evaluation

## 2021-06-10 NOTE — CARE COORDINATION
6/10/21, 10:29 AM EDT    DISCHARGE ON Bak[facility] Doshi day: 0  Location: 8A-20/020-A Reason for admit: CHF (congestive heart failure), NYHA class I, acute, combined ([facility] [I50.41]  Fluid overload [E87.70]   Procedure: No.  Barriers to Discharge: Nephrology consulted for CKD. Creat down to 1.8 from 2.1 yesterday. Bumex bid. Rocephin. Echo today. O2 placed at 2L/NC. Lung with base rales and diminished. PCP: Binu Sherwood MD  Patient Goals/Plan/Treatment Preferences: Pt from home alone as spouse is currently in an ECF. Plans return home at discharge.

## 2021-06-11 VITALS
WEIGHT: 251.9 LBS | RESPIRATION RATE: 16 BRPM | HEIGHT: 69 IN | HEART RATE: 61 BPM | OXYGEN SATURATION: 93 % | TEMPERATURE: 97.6 F | BODY MASS INDEX: 37.31 KG/M2 | DIASTOLIC BLOOD PRESSURE: 67 MMHG | SYSTOLIC BLOOD PRESSURE: 128 MMHG

## 2021-06-11 LAB
ANION GAP SERPL CALCULATED.3IONS-SCNC: 12 MEQ/L (ref 8–16)
BUN BLDV-MCNC: 28 MG/DL (ref 7–22)
CALCIUM SERPL-MCNC: 8.8 MG/DL (ref 8.5–10.5)
CHLORIDE BLD-SCNC: 97 MEQ/L (ref 98–111)
CO2: 29 MEQ/L (ref 23–33)
CREAT SERPL-MCNC: 2 MG/DL (ref 0.4–1.2)
ERYTHROCYTE [DISTWIDTH] IN BLOOD BY AUTOMATED COUNT: 13.6 % (ref 11.5–14.5)
ERYTHROCYTE [DISTWIDTH] IN BLOOD BY AUTOMATED COUNT: 45.7 FL (ref 35–45)
GFR SERPL CREATININE-BSD FRML MDRD: 24 ML/MIN/1.73M2
GLUCOSE BLD-MCNC: 153 MG/DL (ref 70–108)
GLUCOSE BLD-MCNC: 173 MG/DL (ref 70–108)
GLUCOSE BLD-MCNC: 234 MG/DL (ref 70–108)
HCT VFR BLD CALC: 32.1 % (ref 37–47)
HEMOGLOBIN: 10.2 GM/DL (ref 12–16)
MCH RBC QN AUTO: 29.1 PG (ref 26–33)
MCHC RBC AUTO-ENTMCNC: 31.8 GM/DL (ref 32.2–35.5)
MCV RBC AUTO: 91.5 FL (ref 81–99)
PLATELET # BLD: 292 THOU/MM3 (ref 130–400)
PMV BLD AUTO: 10.7 FL (ref 9.4–12.4)
POTASSIUM SERPL-SCNC: 4.1 MEQ/L (ref 3.5–5.2)
RBC # BLD: 3.51 MILL/MM3 (ref 4.2–5.4)
SODIUM BLD-SCNC: 138 MEQ/L (ref 135–145)
WBC # BLD: 6.6 THOU/MM3 (ref 4.8–10.8)

## 2021-06-11 PROCEDURE — 99232 SBSQ HOSP IP/OBS MODERATE 35: CPT | Performed by: INTERNAL MEDICINE

## 2021-06-11 PROCEDURE — 97535 SELF CARE MNGMENT TRAINING: CPT

## 2021-06-11 PROCEDURE — 2580000003 HC RX 258: Performed by: REGISTERED NURSE

## 2021-06-11 PROCEDURE — 97116 GAIT TRAINING THERAPY: CPT

## 2021-06-11 PROCEDURE — 6370000000 HC RX 637 (ALT 250 FOR IP): Performed by: REGISTERED NURSE

## 2021-06-11 PROCEDURE — 82948 REAGENT STRIP/BLOOD GLUCOSE: CPT

## 2021-06-11 PROCEDURE — 6370000000 HC RX 637 (ALT 250 FOR IP): Performed by: INTERNAL MEDICINE

## 2021-06-11 PROCEDURE — 85027 COMPLETE CBC AUTOMATED: CPT

## 2021-06-11 PROCEDURE — 80048 BASIC METABOLIC PNL TOTAL CA: CPT

## 2021-06-11 PROCEDURE — 36415 COLL VENOUS BLD VENIPUNCTURE: CPT

## 2021-06-11 PROCEDURE — 97110 THERAPEUTIC EXERCISES: CPT

## 2021-06-11 PROCEDURE — 97166 OT EVAL MOD COMPLEX 45 MIN: CPT

## 2021-06-11 RX ORDER — BUMETANIDE 1 MG/1
1 TABLET ORAL 2 TIMES DAILY
Qty: 30 TABLET | Refills: 3 | Status: SHIPPED | OUTPATIENT
Start: 2021-06-11 | End: 2021-08-19 | Stop reason: SDUPTHER

## 2021-06-11 RX ORDER — AMIODARONE HYDROCHLORIDE 200 MG/1
200 TABLET ORAL DAILY
Qty: 30 TABLET | Refills: 0 | Status: SHIPPED | OUTPATIENT
Start: 2021-06-12 | End: 2021-08-19 | Stop reason: SDUPTHER

## 2021-06-11 RX ORDER — POTASSIUM CHLORIDE 750 MG/1
10 TABLET, FILM COATED, EXTENDED RELEASE ORAL
Qty: 60 TABLET | Refills: 3 | Status: SHIPPED | OUTPATIENT
Start: 2021-06-12 | End: 2022-02-07 | Stop reason: SDUPTHER

## 2021-06-11 RX ORDER — BUMETANIDE 1 MG/1
1 TABLET ORAL 2 TIMES DAILY
Status: DISCONTINUED | OUTPATIENT
Start: 2021-06-11 | End: 2021-06-11 | Stop reason: HOSPADM

## 2021-06-11 RX ADMIN — LEVOTHYROXINE SODIUM 125 MCG: 0.12 TABLET ORAL at 04:58

## 2021-06-11 RX ADMIN — INSULIN LISPRO 2 UNITS: 100 INJECTION, SOLUTION INTRAVENOUS; SUBCUTANEOUS at 12:58

## 2021-06-11 RX ADMIN — SODIUM CHLORIDE, PRESERVATIVE FREE 10 ML: 5 INJECTION INTRAVENOUS at 07:57

## 2021-06-11 RX ADMIN — INSULIN LISPRO 1 UNITS: 100 INJECTION, SOLUTION INTRAVENOUS; SUBCUTANEOUS at 07:58

## 2021-06-11 RX ADMIN — BUMETANIDE 2 MG: 1 TABLET ORAL at 07:57

## 2021-06-11 RX ADMIN — CLOPIDOGREL BISULFATE 75 MG: 75 TABLET ORAL at 07:57

## 2021-06-11 RX ADMIN — METOPROLOL TARTRATE 50 MG: 50 TABLET, FILM COATED ORAL at 07:57

## 2021-06-11 RX ADMIN — APIXABAN 5 MG: 5 TABLET, FILM COATED ORAL at 07:57

## 2021-06-11 RX ADMIN — GLIMEPIRIDE 4 MG: 4 TABLET ORAL at 07:57

## 2021-06-11 RX ADMIN — AMLODIPINE BESYLATE 10 MG: 10 TABLET ORAL at 07:57

## 2021-06-11 RX ADMIN — POTASSIUM CHLORIDE 10 MEQ: 750 TABLET, EXTENDED RELEASE ORAL at 07:57

## 2021-06-11 RX ADMIN — AMIODARONE HYDROCHLORIDE 200 MG: 200 TABLET ORAL at 07:57

## 2021-06-11 ASSESSMENT — PAIN SCALES - GENERAL
PAINLEVEL_OUTOF10: 0

## 2021-06-11 NOTE — PROGRESS NOTES
[address]  INPATIENT OCCUPATIONAL THERAPY  [facility] 8A  EVALUATION    Time:   Time In: 2665  Time Out: 1015  Timed Code Treatment Minutes: 14 Minutes  Minutes: 22          Date: 2021  Patient Name: Brian Otoole,   Gender: female      MRN: [id number]  : 1945  (78 y.o.)  Referring Practitioner: Maritza Felix MD  Diagnosis: CHF (cogestive heart failure), NYHA class I, acute combined  Additional Pertinent Hx: Per H&P \"  Brian Otoole is a 78 y.o. pleasant female who presents emergency department for shortness of breath. Patient's been more short of breath the last 2 days. She was recently hospitalized 2-1/2 weeks ago for the same issue. She reports a history of CHF. Reports swelling in her legs, she does weigh herself daily. She states her weight went up 5 pounds, recently she is approximately 1 pound from baseline. She reports cough productive of phlegm but is uncertain of the appearance of the phlegm when she swallows it. Denies fever. When questioned about chest pain, patient states that that is \"hard to say\". \"    Restrictions/Precautions:  Restrictions/Precautions: General Precautions    Subjective  Patient assessed for rehabilitation services?: Yes  Family / Caregiver Present: No    Subjective: RN approved OT session. Pt pleasant and cooperativer. Upon arrival pt in recliner chair, agreeable to OT. Pain:  Pain Assessment  Patient Currently in Pain: Denies    Vitals: Oxygen: O2 monitored throughotu session.  Pt on room air, at rest pt 97%, during standing sink side grooming O2 drops to 95% (pt educated regarding pursed lip breathing), during functional mobility O2 at 92% able to self recover to 95% seated rest break    Social/Functional History:  Lives With: Alone ( in SNF at this time, per pt hopes to return in July.)  Type of Home: House  Home Layout: Two level, Able to Live on Main level with bedroom/bathroom  Home Access: Level entry   Bathroom Shower/Tub: Tub/Shower unit, Shower chair with back  Bathroom Toilet: Standard  Bathroom Equipment: Grab bars in shower  Bathroom Accessibility: Accessible    Receives Help From: Family  ADL Assistance: [facility]: Independent  Homemaking Responsibilities: Yes  Ambulation Assistance: Independent  Transfer Assistance: Independent    Active : Yes (Family is able to assist as needed.)  Occupation: Retired  Additional Comments: Pt does not use AD , limited somewhat due to arthritic knees . VISION:WFL    HEARING:  WFL    COGNITION: WFL    RANGE OF MOTION:  Bilateral Upper Extremity:  WFL    STRENGTH:  Bilateral Upper Extremity:  WFL    ADL:   Grooming: Modified Independent. standing sink side with no AD. Lower Extremity Dressing: Supervision. seated in recliner chair, doffed and donned B socks. Toileting: Supervision. Toilet Transfer: Stand By Assistance for safety. BALANCE:  Standing Balance: Supervision. for safety during dynamic standing as pt does not use AD. BED MOBILITY:  Not Tested    TRANSFERS:  Sit to Stand:  Supervision. Stand to Sit: Supervision. FUNCTIONAL MOBILITY:  Assistive Device: Pt furniture walks, utilizing furniture in the room and railings in hallway. Assist Level:  Stand By Assistance. Distance: To and from bathroom and household distances. Additional Activities:  Pt performed simulated IADL, picking up basket and transferring to opposite side of room, with SBA for safety, navigating room and maintaining good posture, positioning and balance. Pt will benefit from continued OT services to address endurance and independence with IADLs upon return home alone. Activity Tolerance:  Patient tolerance of  treatment: good. Pt tolerated session well. Pt would benefit from continued OT services to increase endurance during IADLs. Pt reports she will be the primary caregiver for  upon his anticipated return home from the SNF in July. Pt reports  will use layo steady within home and she has previous experience assisting  to complete transfer with use of layo steady. She reports having no concerns for primary caregiver role at this time, as family is close by and will and able to assist as needed. Assessment:  Assessment: Pt admitted to hospital with CHF (congestive heart failure), NYHA class I, acute combined, demonstrating decreased endurance. Pt would benefit from skilled OT services for energy conservation techniques and IADL safety to return home alone. Performance deficits / Impairments: Decreased endurance, Decreased coordination, Decreased high-level IADLs  Prognosis: Good    Treatment Initiated: Treatment and education initiated within context of evaluation. Evaluation time included review of current medical information, gathering information related to past medical, social and functional history, completion of standardized testing, formal and informal observation of tasks, assessment of data and development of plan of care and goals. Treatment time included skilled education and facilitation of tasks to increase safety and independence with ADL's for improved functional independence and quality of life. Discharge Recommendations:  Patient would benefit from continued therapy after discharge, Home with Home health OT    Patient Education:  OT Education: OT Role, Plan of Care, Energy Conservation, IADL Safety  Barriers to Learning: None noted at this time. Equipment Recommendations:  Equipment Needed: No  Other: Pt plans to return home alone with family to chack in daily. Pt reports  to possibly return home from SNF in July. Pt will benefit from OT to improve endurance and ability to perform higher level IADLs in preperation for caregiver responsibilities.     Plan:  Times per week: 5x  Times per day: Daily  Current Treatment Recommendations: Balance Training, Functional Mobility Training, Endurance

## 2021-06-11 NOTE — PROGRESS NOTES
Renal Progress Note  [facility]    Patient :  Sonia Garsia; 78 y.o. MRN# [id number]  Location:  8A-20/020-A  Attending:  Leonel Vyas MD  Admit Date:  6/8/2021   Hospital Day: 1      Subjective:     Nephrology is following the patient for CKD, volume overload. Patient seen and examined. Feels better, breathing improved, back to baseline.     Outpatient Medications:     Medications Prior to Admission: ELIQUIS 5 MG TABS tablet, Take 1 tablet by mouth twice daily  atorvastatin (LIPITOR) 40 MG tablet, Take 1 tablet by mouth once daily  levothyroxine (SYNTHROID) 125 MCG tablet, Take 1 tablet by mouth daily  amLODIPine (NORVASC) 10 MG tablet, Take 1 tablet by mouth once daily  glimepiride (AMARYL) 4 MG tablet, Take 1 tablet by mouth once daily  metoprolol tartrate (LOPRESSOR) 50 MG tablet, Take 1 tablet by mouth twice daily  ramipril (ALTACE) 10 MG capsule, Take 1 capsule by mouth twice daily  bumetanide (BUMEX) 2 MG tablet, Take 1 tablet by mouth once daily (Patient taking differently: Take 2 mg by mouth 2 times daily )  gabapentin (NEURONTIN) 300 MG capsule, TAKE 1 CAPSULE BY MOUTH ONCE DAILY IN THE EVENING  potassium chloride (KLOR-CON M) 20 MEQ extended release tablet, TAKE 1  BY MOUTH ONCE DAILY  amiodarone (CORDARONE) 200 MG tablet, TAKE ONE TABLET BY MOUTH TWICE DAILY FOR 7 DAYS AND THEN TAKE ONE TABLET ONCE DAILY THEREAFTER (Patient taking differently: TAKE ONE TABLET ONCE DAILY)  clopidogrel (PLAVIX) 75 MG tablet, TAKE 1 TABLET DAILY  Flaxseed, Linseed, (FLAX SEEDS PO), Take by mouth  Ascorbic Acid (VITAMIN C) 500 MG tablet, Take 1,000 mg by mouth daily   metOLazone (ZAROXOLYN) 5 MG tablet, Take 1 tablet by mouth daily As  Needed  metFORMIN (GLUCOPHAGE-XR) 500 MG extended release tablet, TAKE 2 TABLETS BY MOUTH TWICE DAILY (WITH  BREAKFAST  AND  AT  THE  EVENING  MEAL)  nitroGLYCERIN (NITROSTAT) 0.4 MG SL tablet, Place 1 tablet under the tongue every 5 minutes as needed for Chest pain up to max of 3 total doses. If no relief after 1 dose, call 911. Glucose Blood (JOSE E BREEZE 2 TEST) DISK, USE ONE STRIP TWICE DAILY, DX: E11.9    Current Medications:     Scheduled Meds:    bumetanide  1 mg Oral BID    sodium chloride flush  5-40 mL Intravenous 2 times per day    insulin lispro  0-6 Units Subcutaneous TID WC    insulin lispro  0-3 Units Subcutaneous Nightly    glimepiride  4 mg Oral Daily with breakfast    amiodarone  200 mg Oral Daily    atorvastatin  40 mg Oral Nightly    clopidogrel  75 mg Oral Daily    gabapentin  300 mg Oral Nightly    levothyroxine  125 mcg Oral Daily    metoprolol tartrate  50 mg Oral BID    amLODIPine  10 mg Oral Daily    apixaban  5 mg Oral BID    potassium chloride  10 mEq Oral Daily with breakfast    cefTRIAXone (ROCEPHIN) IV  1,000 mg Intravenous Q24H     Continuous Infusions:    sodium chloride      dextrose       PRN Meds:  sodium chloride flush, sodium chloride, ondansetron **OR** ondansetron, polyethylene glycol, acetaminophen **OR** acetaminophen, glucose, dextrose, glucagon (rDNA), dextrose    Input/Output:       I/O last 3 completed shifts: In: 36 [P.O.:720; I.V.:10]  Out: 3275 [Urine:3275].       Patient Vitals for the past 96 hrs (Last 3 readings):   Weight   06/10/21 1915 251 lb 14.4 oz (114.3 kg)   21 0315 252 lb 8 oz (114.5 kg)   21 2338 253 lb (114.8 kg)       Vital Signs:   Temperature:  Temp: 98.1 °F (36.7 °C)  TMax:   Temp (24hrs), Av °F (36.7 °C), Min:97.4 °F (36.3 °C), Max:98.8 °F (37.1 °C)    Respirations:  Resp: 18  Pulse:   Pulse: 62  BP:    BP: (!) 147/72  BP Range: Systolic (36YHO), YJY:934 , Min:128 , ZDI:241       Diastolic (21OIX), DEQ:36, Min:61, Max:72      Physical Examination:     General:  Awake, alert, no acute distress  HEENT: NC/AT/ MMM  Chest:               Clear B/L no W/R/R  Cardiac:  S1 S2   Abdomen: Soft, non-tender,  Neuro:  No facial droop, No Asterixis  SKIN:  No rashes, good skin turgor. Extremities:  Trace edema noted    Labs:       Recent Labs     06/08/21  2350 06/10/21  0523 06/11/21  0559   WBC 11.9* 6.8 6.6   RBC 3.83* 3.46* 3.51*   HGB 11.0* 9.9* 10.2*   HCT 35.8* 32.1* 32.1*   MCV 93.5 92.8 91.5   MCH 28.7 28.6 29.1   MCHC 30.7* 30.8* 31.8*    281 292   MPV 10.9 10.6 10.7      BMP:   Recent Labs     06/08/21  2350 06/10/21  0523 06/11/21  0559    139 138   K 4.3 4.0 4.1    100 97*   CO2 28 31 29   BUN 31* 27* 28*   CREATININE 2.1* 1.8* 2.0*   GLUCOSE 172* 154* 153*   CALCIUM 9.6 9.2 8.8      Phosphorus:   No results for input(s): PHOS in the last 72 hours.   Magnesium:    Recent Labs     06/08/21  2350   MG 2.2     Albumin:    Recent Labs     06/08/21  2350   LABALBU 4.2     BNP:    No results found for: BNP  CESAR:    No results found for: CESAR  SPEP:  Lab Results   Component Value Date    PROT 7.6 06/08/2021     UPEP:   No results found for: LABPE  C3:   No results found for: C3  C4:   No results found for: C4  MPO ANCA:   No results found for: MPO  PR3 ANCA:   No results found for: PR3  Anti-GBM:   No results found for: GBMABIGG  Hep BsAg:       No results found for: HEPBSAG  Hep C AB:        No results found for: HEPCAB    Urinalysis/Chemistries:      Lab Results   Component Value Date    NITRU NEGATIVE 06/09/2021    COLORU YELLOW 06/09/2021    PHUR 5.5 06/09/2021    WBCUA 5-9 06/09/2021    RBCUA NONE SEEN 06/09/2021    YEAST NONE SEEN 06/09/2021    BACTERIA NONE SEEN 06/09/2021    LEUKOCYTESUR SMALL 06/09/2021    UROBILINOGEN 0.2 06/09/2021    BILIRUBINUR NEGATIVE 06/09/2021    BLOODU NEGATIVE 06/09/2021    GLUCOSEU 100 06/09/2021    KETUA NEGATIVE 06/09/2021     Urine Sodium:   No results found for: TISH  Urine Potassium:  No results found for: KUR  Urine Chloride:  No results found for: CLUR  Urine Osmolarity: No results found for: OSMOU  Urine Protein:   No components found for: TOTALPROTEIN, URINE   Urine Creatinine:   No results found for: LABCREA  Urine Eosinophils:  No components found for: UEOS        Impression and Plan:  1. CKD IV likely chronic cardiorenal syndrome and diabetic nephropathy although no overt proteinuria noted. Renal function stable with diuretics. -volume status is improved  -probably can be discharged today if okay with primary we will send her on bumex 1 mg BID and see how she does. Discussed with patient.   -if discharged then repeat bmp 1 week and see me in the office in 2 weeks. 2. Systolic CHF: improving, 2D echo actually shows improvement in EF to 55%  3. Normocytic anemia  4. HTN  5. DM  6. Afib        Please don't hesitate to call with any questions.   Electronically signed by Abigail Garcia DO on 6/11/2021 at 11:09 AM

## 2021-06-11 NOTE — DISCHARGE SUMMARY
Discharge Summary  6/11/2021  11:40 AM    Patient:  Tammy Chavez  YOB: 1945    MRN: [id number]   Acct: [de-identified]   [id number]   Primary Care Physician: Rhea Paulino MD    Admit date:  6/8/2021    Discharge date:  6/11/2021    Discharge Diagnoses:    Acute CHF exacerbation  HTN  Hypothyroidism  Atrial fibrillation s/p pacemaker   CKD      Discharge Medications:       [facility]   Home Medication Instructions [id number]    Printed on:06/11/21 1140   Medication Information                      amiodarone (CORDARONE) 200 MG tablet  Take 1 tablet by mouth daily             amLODIPine (NORVASC) 10 MG tablet  Take 1 tablet by mouth once daily             Ascorbic Acid (VITAMIN C) 500 MG tablet  Take 1,000 mg by mouth daily              atorvastatin (LIPITOR) 40 MG tablet  Take 1 tablet by mouth once daily             bumetanide (BUMEX) 1 MG tablet  Take 1 tablet by mouth 2 times daily             clopidogrel (PLAVIX) 75 MG tablet  TAKE 1 TABLET DAILY             ELIQUIS 5 MG TABS tablet  Take 1 tablet by mouth twice daily             Flaxseed, Linseed, (FLAX SEEDS PO)  Take by mouth             gabapentin (NEURONTIN) 300 MG capsule  TAKE 1 CAPSULE BY MOUTH ONCE DAILY IN THE EVENING             glimepiride (AMARYL) 4 MG tablet  Take 1 tablet by mouth once daily             Glucose Blood (JOSE E BREEZE 2 TEST) DISK  USE ONE STRIP TWICE DAILY, DX: E11.9             levothyroxine (SYNTHROID) 125 MCG tablet  Take 1 tablet by mouth daily             metFORMIN (GLUCOPHAGE-XR) 500 MG extended release tablet  TAKE 2 TABLETS BY MOUTH TWICE DAILY (WITH  BREAKFAST  AND  AT  THE  EVENING  MEAL)             metOLazone (ZAROXOLYN) 5 MG tablet  Take 1 tablet by mouth daily As  Needed             metoprolol tartrate (LOPRESSOR) 50 MG tablet  Take 1 tablet by mouth twice daily             nitroGLYCERIN (NITROSTAT) 0.4 MG SL tablet  Place 1 tablet under the tongue every 5 minutes as needed for Chest pain up to max of 3 total doses. If no relief after 1 dose, call 911. potassium chloride (KLOR-CON) 10 MEQ extended release tablet  Take 1 tablet by mouth daily (with breakfast)               Scheduled Meds: Scheduled Meds:   bumetanide  1 mg Oral BID    sodium chloride flush  5-40 mL Intravenous 2 times per day    insulin lispro  0-6 Units Subcutaneous TID WC    insulin lispro  0-3 Units Subcutaneous Nightly    glimepiride  4 mg Oral Daily with breakfast    amiodarone  200 mg Oral Daily    atorvastatin  40 mg Oral Nightly    clopidogrel  75 mg Oral Daily    gabapentin  300 mg Oral Nightly    levothyroxine  125 mcg Oral Daily    metoprolol tartrate  50 mg Oral BID    amLODIPine  10 mg Oral Daily    apixaban  5 mg Oral BID    potassium chloride  10 mEq Oral Daily with breakfast    cefTRIAXone (ROCEPHIN) IV  1,000 mg Intravenous Q24H     Continuous Infusions:   sodium chloride      dextrose       PRN Meds:.sodium chloride flush, sodium chloride, ondansetron **OR** ondansetron, polyethylene glycol, acetaminophen **OR** acetaminophen, glucose, dextrose, glucagon (rDNA), dextrose  Continuous Infusions:   sodium chloride      dextrose         Intake/Output Summary (Last 24 hours) at 6/11/2021 1140  Last data filed at 6/11/2021 1023  Gross per 24 hour   Intake 500 ml   Output 3750 ml   Net -3250 ml       Diet:  ADULT DIET; Regular; 3 carb choices (45 gm/meal);  Low Sodium (2 gm)    Activity:  Activity as tolerated (Patient may move about with assist as indicated or with supervision.)    Follow-up:  in the next few days with Elijah Huynh MD,  in 2 weeks with dr Candice Tobar nephrology    Consultants:  Nephrology      Objective:  Lab Results   Component Value Date    WBC 6.6 06/11/2021    RBC 3.51 06/11/2021    HGB 10.2 06/11/2021    HCT 32.1 06/11/2021    MCV 91.5 06/11/2021    MCH 29.1 06/11/2021    MCHC 31.8 06/11/2021    RDW 14.7 10/21/2017     06/11/2021    MPV 10.7 06/11/2021 Lab Results   Component Value Date     06/11/2021    K 4.1 06/11/2021    K 4.7 05/22/2021    CL 97 06/11/2021    CO2 29 06/11/2021    BUN 28 06/11/2021    CREATININE 2.0 06/11/2021    GLUCOSE 153 06/11/2021    CALCIUM 8.8 06/11/2021     Lab Results   Component Value Date    CALCIUM 8.8 06/11/2021     No results found for: IONCA  Lab Results   Component Value Date    MG 2.2 06/08/2021     No results found for: PHOS  No results found for: BNP  Lab Results   Component Value Date    ALKPHOS 70 06/08/2021    ALT 14 06/08/2021    AST 14 06/08/2021    PROT 7.6 06/08/2021    BILITOT 0.9 06/08/2021    BILIDIR <0.2 10/09/2018    LABALBU 4.2 06/08/2021     No results found for: LACTA  No results found for: AMYLASE  No results found for: LIPASE  Lab Results   Component Value Date    CHOL 208 06/01/2021    TRIG 150 06/01/2021    HDL 62 06/01/2021    LDLCALC 116 06/01/2021     Recent Labs     06/10/21  1659 06/10/21  1914 06/11/21  0752   POCGLU 131* 202* 173*     No results for input(s): CKTOTAL, CKMB, TROPONINI in the last 72 hours. Lab Results   Component Value Date    LABA1C 7.0 03/08/2021     Lab Results   Component Value Date    INR 1.41 02/08/2019     No results found for: PHART, PO2ART, GDT8WTG, SXN0FVR, [facility] Course: clinical course has improved, Pt was admitted to the hospital on 6/9/21 due to an acute systolic CHF exacerbation. She was started on IV Bumex 2mg BID. Nephrology was also consulted due to THAO on CKD and her Cr was monitored. Bumex was transitioned to PO and nephrology was ok with discharge. Pt's pulmonary edema improved. She remained on room air throughout her inpatient stay and her SOB improved. There was a questionable UTI present but pt remained asymptomatic and leukocytosis resolved hence abx stopped. Repeat echocardiogram showed improvement in EF. Follow up in 2 weeks with nephrology with BMP in 1 week.       Vitals: BP (!) 147/72   Pulse 62   Temp 98.1 °F (36.7 °C) (Oral) Resp 18   Ht 5' 9\" (1.753 m)   Wt 251 lb 14.4 oz (114.3 kg)   SpO2 93%   BMI 37.20 kg/m²   Physical Exam:  General appearance - alert, well appearing, and in no distress  Eyes - pupils equal and reactive, extraocular eye movements intact  Neck - supple, no significant adenopathy  Chest - Mild rales BL  Heart - normal rate, regular rhythm, normal S1, S2, no murmurs, rubs, clicks or gallops  Abdomen - soft, nontender, nondistended, no masses or organomegaly pos bs:   Neurological - alert, oriented, normal speech, no focal findings or movement disorder noted  Extremities - peripheral pulses normal, no pedal edema,       Disposition: home    Condition: Stable    Over 35 minutes spent on encounter  Assessment and plan of care discussed with supervising physician, Dr Ofe Leblanc. GRANT Tucker - CNP, CNP     Copy: Primary Care Physician: Bernardo Velazquez MD  Internal Medicine    I have discussed the case, including pertinent history and exam findings with the NP. I have seen and examined the patient and the key elements of the encounter have been performed by me. I agree with the assessment, plan and orders as documented by the NP.     Electronically signed by Milena Mccollum MD on 6/11/2021 at 12:15 PM

## 2021-06-11 NOTE — PROGRESS NOTES
time   Gait Deviations:  Fairly slower alireza with min path deviation when she wasn't reaching for rail or wall for support   Pt stepped upto 2in scale step w/ UE at support with SBA   Balance:  Dynamic balance w/o UE at support pt reaching ~1-2 in out of base with supervision and > 2 in close SBA     Exercise:  Patient was guided in 1 set(s) 10 reps of exercise to both lower extremities. Seated marches, Seated heel/toe raises and Long arc quads. Exercises were completed for increased independence with functional mobility. Functional Outcome Measures: Completed  AM-PAC Inpatient Mobility Raw Score : 22  AM-PAC Inpatient T-Scale Score : 53.28    ASSESSMENT:  Assessment: Patient progressing toward established goals. and however she cont to be slightly unsteady with gait when she wasn't holding onto rail and is adamant not to use an AD   Activity Tolerance:  Patient tolerance of  treatment: good. Equipment Recommendations:Equipment Needed: No  Other: Pt denies needs, no reports of alls or her knees giving out . ? if cane would be beneficial at least when leaving the home  Discharge Recommendations:    Home with assist PRN    Plan: Times per week: 5 X GM  Current Treatment Recommendations: Strengthening, Home Exercise Program, Endurance Training, Functional Mobility Training, Transfer Training, Gait Training    Patient Education  Patient Education: Plan of Care, discussed safety concerns with home going and talked about recommendation of trial of a cane due to unsteadiness     Goals:  Patient goals : Go home  Short term goals  Time Frame for Short term goals: by discharge  Short term goal 1: Supine to sit and return with Mod I to get in and out of bed  Short term goal 2: sit to stand with Mod I to get on and off various surfaces  Short term goal 3: Pt will ambulate with least restrictive device  if agreeable  200 feet with S in order to be safe for community distances  Long term goals  Time Frame for Long term goals : NA due to short ELOS    Following session, patient left in safe position with all fall risk precautions in place.

## 2021-06-11 NOTE — TELEPHONE ENCOUNTER
Deepali Ohs informed by Phone. She has an appt on 6-16-21 so requested Tuesday 6-.  Appt on 6- @ 3pm.

## 2021-06-11 NOTE — PROGRESS NOTES
Discharge teaching and instructions for diagnosis/procedure of congestive heart failure completed with patient using teach back method. AVS reviewed . Printed prescriptions, follow up appointments and care of self at home. Discharge ambulatory and in a wheelchair to home with support per family.

## 2021-06-11 NOTE — TELEPHONE ENCOUNTER
Admitted  At  This time at  [address] [place] [address],[address]  and see on 6-16-21  In  office

## 2021-06-14 ENCOUNTER — CARE COORDINATION (OUTPATIENT)
Dept: CASE MANAGEMENT | Age: 76
End: 2021-06-14

## 2021-06-14 ENCOUNTER — TELEPHONE (OUTPATIENT)
Dept: CARDIOLOGY CLINIC | Age: 76
End: 2021-06-14

## 2021-06-14 DIAGNOSIS — I50.33 ACUTE ON CHRONIC DIASTOLIC CONGESTIVE HEART FAILURE (HCC): Primary | ICD-10-CM

## 2021-06-14 PROCEDURE — 1111F DSCHRG MED/CURRENT MED MERGE: CPT | Performed by: PHYSICAL MEDICINE & REHABILITATION

## 2021-06-14 NOTE — TELEPHONE ENCOUNTER
Patient called in stating she is having a tooth pulled on Wednesday by Dr. Marcella Mack and doesn't know if or for how long she needs to hold her blood thinners. I advised patient that usually comes from the dentist and if they need to make sure that is okay then we ask the cardiologist.   I called Dr. Everette Cedillo office at [phone] and they were going to ask him and let us know. Patient is on both Plavix and Eliquis.

## 2021-06-14 NOTE — CARE COORDINATION
[facility] Transitions Initial Follow Up Call    Call within 2 business days of discharge: Yes    Patient: Chris Winters Patient : 1945   MRN: [id number]  Reason for Admission: SOB    CHF  Discharge Date: 21 RARS: Readmission Risk Score: 22      Last Discharge [facility]       Complaint Diagnosis Description Type Department Provider    21 Shortness of Breath Dyspnea on exertion . .. ED to Hosp-Admission (Discharged) (ADMITTED) Alva Pimentel MD; ART Do. Advance Care Planning:   Does patient have an Advance Directive:  reviewed and current. Was this a readmission? No  Patient stated reason for admission: SOB  Patients top risk factors for readmission: medical condition-CHF and polypharmacy    Care Transition Nurse (CTN) contacted the patient by telephone to perform post hospital discharge assessment. Verified name and  with patient as identifiers. Provided introduction to self, and explanation of the CTN role. CTN reviewed discharge instructions, medical action plan and red flags with patient who verbalized understanding. Patient given an opportunity to ask questions and does not have any further questions or concerns at this time. Were discharge instructions available to patient? Yes. Reviewed appropriate site of care based on symptoms and resources available to patient including: PCP. The patient agrees to contact the PCP office for questions related to their healthcare. Medication reconciliation was performed with patient, who verbalizes understanding of administration of home medications. Advised obtaining a 90-day supply of all daily and as-needed medications. CTN provided contact information. Plan for follow-up call in 3-5 days based on severity of symptoms and risk factors.     Non-face-to-face services provided:  Obtained and reviewed discharge summary and/or continuity of care documents    Care Transitions 24 Hour Call    Do you have any ongoing symptoms?: No  Do you have a copy of your discharge instructions?: Yes  Do you have all of your prescriptions and are they filled?: No  Have you been contacted by a [facility]?: No  Have you scheduled your follow up appointment?: Yes  How are you going to get to your appointment?: Car - drive self  Were you discharged with any Home Care or Post Acute Services: No  Do you feel like you have everything you need to keep you well at home?: Yes  Care Transitions Interventions  No Identified Needs     Called pt for the [place] initial F/U call. Pt stated her breathing has improved & she is at baseline. Pt denied any chest pain, palpitations, wheezing or swelling. Pt stated she weighs daily & voiced understanding to call Cardio for gain 3 #/day or 5 # /week. Pt stated she follows 1500ml fluid restriction & EDWIGE diet. Pt denied any needs or concerns. CTN will continue to follow.     Follow Up  Future Appointments   Date Time Provider Maria Del Carmen Gutierrez   6/15/2021  3:00 PM Hayley Ulloa MD [facility]   6/23/2021 10:00 AM [id number] Amelia Elizabeth DO N [facility], [facility]. [facility]   7/7/2021 11:00 AM eBst Valenzuela CHF [facility] [place] [facility] [place] [address]   12/10/2021 11:30 AM SCHEDULE, SR[facility] PACER NURSE N [facility] PACER [facility] [place] [facility] [place] [address]   12/10/2021  1:15 PM Stephanie Ordoñez MD [facility] RN  Care Transition Nurse  [phone]

## 2021-06-15 ENCOUNTER — OFFICE VISIT (OUTPATIENT)
Dept: FAMILY MEDICINE CLINIC | Age: 76
End: 2021-06-15

## 2021-06-15 VITALS
WEIGHT: 245.13 LBS | HEIGHT: 69 IN | DIASTOLIC BLOOD PRESSURE: 70 MMHG | RESPIRATION RATE: 16 BRPM | BODY MASS INDEX: 36.31 KG/M2 | SYSTOLIC BLOOD PRESSURE: 126 MMHG | HEART RATE: 62 BPM | TEMPERATURE: 97.1 F

## 2021-06-15 DIAGNOSIS — I49.5 SICK SINUS SYNDROME (HCC): ICD-10-CM

## 2021-06-15 DIAGNOSIS — E11.9 TYPE 2 DIABETES MELLITUS WITHOUT COMPLICATION, WITHOUT LONG-TERM CURRENT USE OF INSULIN (HCC): ICD-10-CM

## 2021-06-15 DIAGNOSIS — Z95.0 S/P CARDIAC PACEMAKER PROCEDURE: ICD-10-CM

## 2021-06-15 DIAGNOSIS — I71.20 THORACIC AORTIC ANEURYSM WITHOUT RUPTURE: ICD-10-CM

## 2021-06-15 DIAGNOSIS — E78.00 PURE HYPERCHOLESTEROLEMIA: ICD-10-CM

## 2021-06-15 DIAGNOSIS — I25.10 CORONARY ARTERY DISEASE INVOLVING NATIVE CORONARY ARTERY OF NATIVE HEART WITHOUT ANGINA PECTORIS: ICD-10-CM

## 2021-06-15 DIAGNOSIS — I48.0 PAROXYSMAL ATRIAL FIBRILLATION (HCC): ICD-10-CM

## 2021-06-15 DIAGNOSIS — I10 ESSENTIAL HYPERTENSION: ICD-10-CM

## 2021-06-15 DIAGNOSIS — I50.41 CHF (CONGESTIVE HEART FAILURE), NYHA CLASS I, ACUTE, COMBINED (HCC): Primary | ICD-10-CM

## 2021-06-15 PROBLEM — I50.23 SYSTOLIC CHF, ACUTE ON CHRONIC (HCC): Status: RESOLVED | Noted: 2021-06-10 | Resolved: 2021-06-15

## 2021-06-15 PROCEDURE — 1111F DSCHRG MED/CURRENT MED MERGE: CPT | Performed by: FAMILY MEDICINE

## 2021-06-15 PROCEDURE — 99496 TRANSJ CARE MGMT HIGH F2F 7D: CPT | Performed by: FAMILY MEDICINE

## 2021-06-15 ASSESSMENT — ENCOUNTER SYMPTOMS
SORE THROAT: 0
WHEEZING: 0
EYE PAIN: 0
ABDOMINAL PAIN: 0
CHEST TIGHTNESS: 0
NAUSEA: 0
COUGH: 0
CONSTIPATION: 0
SHORTNESS OF BREATH: 0

## 2021-06-15 NOTE — PROGRESS NOTES
Post-Discharge Transitional Care Management Services or Hospital Follow Up      Nimco Felix   YOB: 1945    Date of Office Visit:  6/15/2021  Date of Hospital Admission: 6/8/21  Date of Hospital Discharge: 6/11/21  Readmission Risk Score(high >=14%. Medium >=10%):Readmission Risk Score: 22      Care management risk score Rising risk (score 2-5) and Complex Care (Scores >=6): 7     Non face to face  following discharge, date last encounter closed (first attempt may have been earlier): 6/14/2021 11:36 AM 6/14/2021 11:36 AM    Call initiated 2 business days of discharge: Yes     Patient Active Problem List   Diagnosis    Type 2 diabetes mellitus without complication ([address]    Hyperlipidemia    Hypertension    CAD (coronary artery disease)    Aortic aneurysm ([facility] [address]    Hypothyroidism    Diabetic peripheral neuropathy ([facility] [address]    High-grade atrioventricular block    Sick sinus syndrome ([facility] [address]    S/P cardiac pacemaker procedure: 10/21/2017: Medtronic Dual Chamber.     Pulmonary nodule, right    Congestive heart failure (HCC)    Paroxysmal atrial fibrillation (HCC)    CHF (congestive heart failure), NYHA class I, acute, combined (HCC)    Fluid overload    Systolic CHF, acute on chronic (HCC)       Allergies   Allergen Reactions    Adhesive Tape Rash       Medications listed as ordered at the time of discharge from [facility] Medication Instructions ZULEYKA:    Printed on:06/15/21 1529   Medication Information                      amiodarone (CORDARONE) 200 MG tablet  Take 1 tablet by mouth daily             amLODIPine (NORVASC) 10 MG tablet  Take 1 tablet by mouth once daily             Ascorbic Acid (VITAMIN C) 500 MG tablet  Take 500 mg by mouth daily              atorvastatin (LIPITOR) 40 MG tablet  Take 1 tablet by mouth once daily             bumetanide (BUMEX) 1 MG tablet  Take 1 tablet by mouth 2 times daily             clopidogrel (PLAVIX) 75 MG tablet  TAKE 1 TABLET DAILY             ELIQUIS 5 MG TABS tablet  Take 1 tablet by mouth twice daily             Flaxseed, Linseed, (FLAX SEEDS PO)  Take by mouth             gabapentin (NEURONTIN) 300 MG capsule  TAKE 1 CAPSULE BY MOUTH ONCE DAILY IN THE EVENING             glimepiride (AMARYL) 4 MG tablet  Take 1 tablet by mouth once daily             Glucose Blood (JOSE E BREEZE 2 TEST) DISK  USE ONE STRIP TWICE DAILY, DX: E11.9             levothyroxine (SYNTHROID) 125 MCG tablet  Take 1 tablet by mouth daily             metFORMIN (GLUCOPHAGE-XR) 500 MG extended release tablet  TAKE 2 TABLETS BY MOUTH TWICE DAILY (WITH  BREAKFAST  AND  AT  THE  EVENING  MEAL)             metoprolol tartrate (LOPRESSOR) 50 MG tablet  Take 1 tablet by mouth twice daily             nitroGLYCERIN (NITROSTAT) 0.4 MG SL tablet  Place 1 tablet under the tongue every 5 minutes as needed for Chest pain up to max of 3 total doses. If no relief after 1 dose, call 911.              potassium chloride (KLOR-CON) 10 MEQ extended release tablet  Take 1 tablet by mouth daily (with breakfast)                   Medications marked \"taking\" at this time  Outpatient Medications Marked as Taking for the 6/15/21 encounter (Office Visit) with Hellen Banda MD   Medication Sig Dispense Refill    amiodarone (CORDARONE) 200 MG tablet Take 1 tablet by mouth daily 30 tablet 0    bumetanide (BUMEX) 1 MG tablet Take 1 tablet by mouth 2 times daily 30 tablet 3    potassium chloride (KLOR-CON) 10 MEQ extended release tablet Take 1 tablet by mouth daily (with breakfast) 60 tablet 3    ELIQUIS 5 MG TABS tablet Take 1 tablet by mouth twice daily 60 tablet 5    atorvastatin (LIPITOR) 40 MG tablet Take 1 tablet by mouth once daily 90 tablet 1    levothyroxine (SYNTHROID) 125 MCG tablet Take 1 tablet by mouth daily 90 tablet 1    amLODIPine (NORVASC) 10 MG tablet Take 1 tablet by mouth once daily 90 tablet 0    metFORMIN (GLUCOPHAGE-XR) 500 MG extended release tablet TAKE 2 TABLETS BY MOUTH TWICE DAILY (WITH  BREAKFAST  AND  AT  THE  EVENING  MEAL) 360 tablet 0    glimepiride (AMARYL) 4 MG tablet Take 1 tablet by mouth once daily 90 tablet 0    metoprolol tartrate (LOPRESSOR) 50 MG tablet Take 1 tablet by mouth twice daily 180 tablet 0    gabapentin (NEURONTIN) 300 MG capsule TAKE 1 CAPSULE BY MOUTH ONCE DAILY IN THE EVENING 30 capsule 5    clopidogrel (PLAVIX) 75 MG tablet TAKE 1 TABLET DAILY 90 tablet 1    Flaxseed, Linseed, (FLAX SEEDS PO) Take by mouth      nitroGLYCERIN (NITROSTAT) 0.4 MG SL tablet Place 1 tablet under the tongue every 5 minutes as needed for Chest pain up to max of 3 total doses. If no relief after 1 dose, call 911. 25 tablet 1    Glucose Blood (JOSE E BREEZE 2 TEST) DISK USE ONE STRIP TWICE DAILY, DX: E11.9 200 each 5    Ascorbic Acid (VITAMIN C) 500 MG tablet Take 500 mg by mouth daily           Medications patient taking as of now reconciled against medications ordered at time of hospital discharge: Yes    Chief Complaint   Patient presents with    Other         Inpatient course: Discharge summary reviewed- see chart. Interval history/Current status: he is in  Now as  Admitted  As chf and  Balance of  Fluids    As you    Review of Systems   Constitutional: Negative for appetite change, fatigue and fever. HENT: Negative for congestion, ear pain, postnasal drip and sore throat. Eyes: Negative for pain and visual disturbance. Respiratory: Negative for cough, chest tightness, shortness of breath and wheezing. Cardiovascular: Negative for chest pain, palpitations and leg swelling. Gastrointestinal: Negative for abdominal pain, constipation and nausea. Genitourinary: Negative for dysuria and frequency. Musculoskeletal: Negative for arthralgias, joint swelling, neck pain and neck stiffness. Skin: Negative for rash. Neurological: Negative for dizziness, weakness, numbness and headaches. Hematological: Negative for adenopathy.  Does not bruise/bleed easily. Psychiatric/Behavioral: Negative for behavioral problems and sleep disturbance. The patient is not nervous/anxious. a  Vitals:    06/15/21 1500   BP: 126/70   Site: Right Upper Arm   Position: Sitting   Cuff Size: Large Adult   Pulse: 62   Resp: 16   Temp: 97.1 °F (36.2 °C)   TempSrc: Skin   Weight: 245 lb 2 oz (111.2 kg)   Height: 5' 9\" (1.753 m)     Body mass index is 36.2 kg/m². Wt Readings from Last 3 Encounters:   06/15/21 245 lb 2 oz (111.2 kg)   06/10/21 251 lb 14.4 oz (114.3 kg)   05/24/21 253 lb 6 oz (114.9 kg)     BP Readings from Last 3 Encounters:   06/15/21 126/70   06/11/21 128/67   05/24/21 130/60       Physical Exam  Vitals and nursing note reviewed. Constitutional:       Appearance: She is well-developed. HENT:      Head: Normocephalic and atraumatic. Right Ear: External ear normal.      Left Ear: External ear normal.      Nose: Nose normal.   Eyes:      General: No scleral icterus. Conjunctiva/sclera: Conjunctivae normal.      Pupils: Pupils are equal, round, and reactive to light. Neck:      Thyroid: No thyromegaly. Vascular: No JVD. Cardiovascular:      Rate and Rhythm: Normal rate and regular rhythm. Heart sounds: Normal heart sounds. Pulmonary:      Effort: Pulmonary effort is normal.      Breath sounds: Normal breath sounds. No wheezing or rales. Abdominal:      General: Bowel sounds are normal. There is no distension. Palpations: Abdomen is soft. There is no mass. Tenderness: There is no abdominal tenderness. Musculoskeletal:         General: No tenderness. Normal range of motion. Cervical back: Normal range of motion and neck supple. Lymphadenopathy:      Cervical: No cervical adenopathy. Skin:     General: Skin is warm and dry. Findings: No rash. Neurological:      Mental Status: She is alert and oriented to person, place, and time. Cranial Nerves: No cranial nerve deficit.       Deep Tendon Reflexes: Reflexes are normal and symmetric. Assessment/Plan:     Diagnosis Orders   1. CHF (congestive heart failure), NYHA class I, acute, combined ([facility] [address]     2. Sick sinus syndrome ([facility] [address]     3. S/P cardiac pacemaker procedure: 10/21/2017: Medtronic Dual Chamber. 4. Paroxysmal atrial fibrillation (HCC)     5. Coronary artery disease involving native coronary artery of native heart without angina pectoris     6. Type 2 diabetes mellitus without complication, without long-term current use of insulin (HCC)     7. Essential hypertension     8. Pure hypercholesterolemia     9. Thoracic aortic aneurysm without rupture ([facility] [address]         There are no diagnoses linked to this encounter.       Medical Decision Making: moderate complexity    PLAN    Current Outpatient Medications   Medication Sig Dispense Refill    amiodarone (CORDARONE) 200 MG tablet Take 1 tablet by mouth daily 30 tablet 0    bumetanide (BUMEX) 1 MG tablet Take 1 tablet by mouth 2 times daily 30 tablet 3    potassium chloride (KLOR-CON) 10 MEQ extended release tablet Take 1 tablet by mouth daily (with breakfast) 60 tablet 3    ELIQUIS 5 MG TABS tablet Take 1 tablet by mouth twice daily 60 tablet 5    atorvastatin (LIPITOR) 40 MG tablet Take 1 tablet by mouth once daily 90 tablet 1    levothyroxine (SYNTHROID) 125 MCG tablet Take 1 tablet by mouth daily 90 tablet 1    amLODIPine (NORVASC) 10 MG tablet Take 1 tablet by mouth once daily 90 tablet 0    metFORMIN (GLUCOPHAGE-XR) 500 MG extended release tablet TAKE 2 TABLETS BY MOUTH TWICE DAILY (WITH  BREAKFAST  AND  AT  THE  EVENING  MEAL) 360 tablet 0    glimepiride (AMARYL) 4 MG tablet Take 1 tablet by mouth once daily 90 tablet 0    metoprolol tartrate (LOPRESSOR) 50 MG tablet Take 1 tablet by mouth twice daily 180 tablet 0    gabapentin (NEURONTIN) 300 MG capsule TAKE 1 CAPSULE BY MOUTH ONCE DAILY IN THE EVENING 30 capsule 5    clopidogrel (PLAVIX) 75 MG tablet TAKE 1 TABLET DAILY 90 tablet 1    Flaxseed, Linseed, (FLAX SEEDS PO) Take by mouth      nitroGLYCERIN (NITROSTAT) 0.4 MG SL tablet Place 1 tablet under the tongue every 5 minutes as needed for Chest pain up to max of 3 total doses. If no relief after 1 dose, call 911. 25 tablet 1    Glucose Blood (JOSE E BREEZE 2 TEST) DISK USE ONE STRIP TWICE DAILY, DX: E11.9 200 each 5    Ascorbic Acid (VITAMIN C) 500 MG tablet Take 500 mg by mouth daily        No current facility-administered medications for this visit. No orders of the defined types were placed in this encounter. see in one  Month     No orders of the defined types were placed in this encounter. No results found for this visit on 06/15/21.

## 2021-06-17 ENCOUNTER — CARE COORDINATION (OUTPATIENT)
Dept: CASE MANAGEMENT | Age: 76
End: 2021-06-17

## 2021-06-17 ENCOUNTER — NURSE ONLY (OUTPATIENT)
Dept: LAB | Age: 76
End: 2021-06-17

## 2021-06-17 DIAGNOSIS — N18.30 CRF (CHRONIC RENAL FAILURE), STAGE 3 (MODERATE) (HCC): ICD-10-CM

## 2021-06-17 DIAGNOSIS — I10 ESSENTIAL HYPERTENSION: ICD-10-CM

## 2021-06-17 LAB
ANION GAP SERPL CALCULATED.3IONS-SCNC: 15 MEQ/L (ref 8–16)
BUN BLDV-MCNC: 39 MG/DL (ref 7–22)
CHLORIDE BLD-SCNC: 100 MEQ/L (ref 98–111)
CO2: 28 MEQ/L (ref 23–33)
CREAT SERPL-MCNC: 2.1 MG/DL (ref 0.4–1.2)
GFR SERPL CREATININE-BSD FRML MDRD: 23 ML/MIN/1.73M2
POTASSIUM SERPL-SCNC: 4.3 MEQ/L (ref 3.5–5.2)
SODIUM BLD-SCNC: 143 MEQ/L (ref 135–145)

## 2021-06-17 NOTE — CARE COORDINATION
[facility] Transitions Follow Up Call    2021    Patient: Beto Shin  Patient : 1945   MRN: <[id number]>  Reason for Admission: SOB    CHF  Discharge Date: 21 RARS: Readmission Risk Score: 22         Spoke with: Rommel Christy she states she is doing very well, had her post Hospital PCP visit no new orders. Patient states her breathing seems to be improved. She is still following EDWIGE diet and 1500 ML fluid restrictions. Weight is doing well patient states she is down a few pounds. Denies chest pain, palpitations. No concerns at present time. Care Transitions Follow Up Call    Needs to be reviewed by the provider   Additional needs identified to be addressed with provider: No  none             Method of communication with provider : none      Care Transition Nurse (CTN) contacted the patient by telephone to follow up after admission on 21. Verified name and  with patient as identifiers. Addressed changes since last contact: none  Discussed follow-up appointments. If no appointment was previously scheduled, appointment scheduling offered: Yes. Is follow up appointment scheduled within 7 days of discharge? Yes. Advance Care Planning:   Does patient have an Advance Directive:  not on file. CTN reviewed discharge instructions, medical action plan and red flags with patient and discussed any barriers to care and/or understanding of plan of care after discharge. Discussed appropriate site of care based on symptoms and resources available to patient including: PCP and When to call 911. The patient agrees to contact the PCP office for questions related to their healthcare. Patients top risk factors for readmission: medical condition-CHF  Interventions to address risk factors: Obtained and reviewed discharge summary and/or continuity of care documents    Non-[facility] follow up appointment(s):     CTN provided contact information for future needs.  Plan for follow-up call in 7-10 days based on severity of symptoms and risk factors. Plan for next call: symptom management-SOB, chest pain            Care Transitions Subsequent and Final Call    Subsequent and Final Calls  Do you have any ongoing symptoms?: No  Have your medications changed?: No  Do you have any questions related to your medications?: No  Do you currently have any active services?: No  Do you have any needs or concerns that I can assist you with?: No  Identified Barriers: Lack of Education  Care Transitions Interventions  Other Interventions:            Follow Up  Future Appointments   Date Time Provider Maria Del Carmen Gutierrez   6/23/2021 10:00 AM [id number] [facility]   7/7/2021 11:00 AM Avelino Keating Aver CHF [facility]   7/15/2021 10:30 AM Serena Valiente MD [facility]   12/10/2021 11:30 AM SCHEDULE, SRPS PACER NURSE N [facility] PACER [facility]   12/10/2021  1:15 PM Vikas Nielsen MD [address]

## 2021-06-23 ENCOUNTER — TELEPHONE (OUTPATIENT)
Dept: FAMILY MEDICINE CLINIC | Age: 76
End: 2021-06-23

## 2021-06-23 ENCOUNTER — OFFICE VISIT (OUTPATIENT)
Dept: NEPHROLOGY | Age: 76
End: 2021-06-23
Payer: MEDICARE

## 2021-06-23 VITALS
OXYGEN SATURATION: 98 % | BODY MASS INDEX: 36.62 KG/M2 | TEMPERATURE: 97 F | HEART RATE: 77 BPM | DIASTOLIC BLOOD PRESSURE: 68 MMHG | SYSTOLIC BLOOD PRESSURE: 120 MMHG | WEIGHT: 248 LBS

## 2021-06-23 DIAGNOSIS — D63.1 ANEMIA IN STAGE 4 CHRONIC KIDNEY DISEASE (HCC): ICD-10-CM

## 2021-06-23 DIAGNOSIS — N18.4 CKD (CHRONIC KIDNEY DISEASE) STAGE 4, GFR 15-29 ML/MIN (HCC): Primary | ICD-10-CM

## 2021-06-23 DIAGNOSIS — N18.4 ANEMIA IN STAGE 4 CHRONIC KIDNEY DISEASE (HCC): ICD-10-CM

## 2021-06-23 PROCEDURE — 99213 OFFICE O/P EST LOW 20 MIN: CPT | Performed by: INTERNAL MEDICINE

## 2021-06-23 NOTE — TELEPHONE ENCOUNTER
----- Message from Candido Baker MD sent at 6/23/2021 10:10 AM EDT -----  Call kidney  function  stable and keep appt

## 2021-06-23 NOTE — PROGRESS NOTES
[address] [facility]  [address]  Dept: [phone]  Loc: [phone]  Progress Note  6/23/2021 10:54 AM      Pt Name:    Jessica George  YOB: 1945  Primary Care Physician:  Binu Sherwood MD     Chief Complaint:   Chief Complaint   Patient presents with    Follow-Up from Hospital        History of Present Illness: This is a hospital follow up visit for CKD IV. She has hx of DM, HTN, HFpEF  EF improved to 55%), Afib, CAD/PCI presented to the hospital with CHF exacerbation. She has had worsening renal fxn as outpatient with adjustment in diuretics. Since being discharged from the hospital she states her breathing is improved. On bumex 1 mg BID. Denies edema. She is weighing herself daily and weights are staying within 1 pound. Limiting fluid intake and not getting to 1500 mL. Watching sodium somewhat but has been eating out more. Pertinent items are noted in HPI. Past History:  Past Medical History:   Diagnosis Date    Aortic aneurysm (HCC)     4.5 cm aorta    Arthritis     Atrial fibrillation, chronic ([address] 1/25/2019    CAD (coronary artery disease)     CHF NYHA class II, unspecified failure chronicity, unspecified type ([address] 10/8/2018    Colon polyps 3/01    Diabetic peripheral neuropathy ([address] 2014      feet    Elbow fracture     Hyperlipidemia     Hypertension     Hypothyroidism     Pulmonary nodule, right 10/2017      repeat  ct of  chest  4-2018    S/P cardiac pacemaker procedure: 10/21/2017: Medtronic Dual Chamber. 10/21/2017    10/21/2017: Medtronic Dual Chamber.  Dr. Shobha Stevens Systolic CHF, acute on chronic [facility]) 6/10/2021    Type II or unspecified type diabetes mellitus without mention of complication, not stated as uncontrolled      Past Surgical History:   Procedure Laterality Date    BREAST BIOPSY Right 2016     benign   abrahan    BREAST SURGERY      CARDIOVERSION  2019    atrial fib to sinus  kiyai     SECTION  over 30 years ago     COLONOSCOPY  2012    colon polyps   se[place]    CORONARY ANGIOPLASTY      baki    CORONARY ANGIOPLASTY WITH STENT PLACEMENT      baki    CORONARY ANGIOPLASTY WITH STENT PLACEMENT  10/2018     lad branch    HEEL SPUR SURGERY Bilateral     bilat with hardware    HYSTERECTOMY  1984    KNEE ARTHROSCOPY  ; 3/02    right and left knee    KNEE ARTHROSCOPY Bilateral     bilat    PACEMAKER PLACEMENT  10/2017    THYROIDECTOMY, PARTIAL  1980's        VITALS:  /68 (Site: Right Upper Arm, Position: Sitting, Cuff Size: Large Adult)   Pulse 77   Temp 97 °F (36.1 °C) (Temporal)   Wt 248 lb (112.5 kg)   SpO2 98%   BMI 36.62 kg/m²   Wt Readings from Last 3 Encounters:   21 248 lb (112.5 kg)   06/15/21 245 lb 2 oz (111.2 kg)   06/10/21 251 lb 14.4 oz (114.3 kg)     Body mass index is 36.62 kg/m².      General Appearance: alert and cooperative with exam, appears comfortable, no distress  HEENT: EOMI, moist oral mucus membranes  Neck: No jugular venous distention,  Lungs: Air entry B/L, no crackles or rales, no use of accessory muscles  Heart: S1, S2 heard, no rub  GI: soft, non-tender, no guarding,  Extremities: nonpitting edema noted  Skin: warm, dry  Neurologic: no tremor, no asterixis, no focal neurologic deficits     Medications:  Current Outpatient Medications   Medication Sig Dispense Refill    amiodarone (CORDARONE) 200 MG tablet Take 1 tablet by mouth daily 30 tablet 0    bumetanide (BUMEX) 1 MG tablet Take 1 tablet by mouth 2 times daily 30 tablet 3    potassium chloride (KLOR-CON) 10 MEQ extended release tablet Take 1 tablet by mouth daily (with breakfast) 60 tablet 3    ELIQUIS 5 MG TABS tablet Take 1 tablet by mouth twice daily 60 tablet 5    atorvastatin (LIPITOR) 40 MG tablet Take 1 tablet by mouth once daily 90 tablet 1    levothyroxine (SYNTHROID) 125 MCG tablet Take 1 tablet by mouth daily 90 tablet 1    amLODIPine (NORVASC) 10 MG tablet Take 1 tablet by mouth once daily 90 tablet 0    glimepiride (AMARYL) 4 MG tablet Take 1 tablet by mouth once daily 90 tablet 0    metoprolol tartrate (LOPRESSOR) 50 MG tablet Take 1 tablet by mouth twice daily 180 tablet 0    gabapentin (NEURONTIN) 300 MG capsule TAKE 1 CAPSULE BY MOUTH ONCE DAILY IN THE EVENING 30 capsule 5    clopidogrel (PLAVIX) 75 MG tablet TAKE 1 TABLET DAILY 90 tablet 1    Flaxseed, Linseed, (FLAX SEEDS PO) Take by mouth      nitroGLYCERIN (NITROSTAT) 0.4 MG SL tablet Place 1 tablet under the tongue every 5 minutes as needed for Chest pain up to max of 3 total doses. If no relief after 1 dose, call 911. 25 tablet 1    Glucose Blood (JOSE E BREEZE 2 TEST) DISK USE ONE STRIP TWICE DAILY, DX: E11.9 200 each 5    Ascorbic Acid (VITAMIN C) 500 MG tablet Take 500 mg by mouth daily        No current facility-administered medications for this visit.         Laboratory & Diagnostics:  CBC:   Lab Results   Component Value Date    WBC 6.6 06/11/2021    HGB 10.2 (L) 06/11/2021    HCT 32.1 (L) 06/11/2021    MCV 91.5 06/11/2021     06/11/2021     BMP:    Lab Results   Component Value Date     06/17/2021     06/11/2021     06/10/2021    K 4.3 06/17/2021    K 4.1 06/11/2021    K 4.0 06/10/2021     06/17/2021    CL 97 (L) 06/11/2021     06/10/2021    CO2 28 06/17/2021    CO2 29 06/11/2021    CO2 31 06/10/2021    BUN 39 (H) 06/17/2021    BUN 28 (H) 06/11/2021    BUN 27 (H) 06/10/2021    CREATININE 2.1 (H) 06/17/2021    CREATININE 2.0 (H) 06/11/2021    CREATININE 1.8 (H) 06/10/2021    GLUCOSE 153 (H) 06/11/2021    GLUCOSE 154 (H) 06/10/2021    GLUCOSE 172 (H) 06/08/2021      Hepatic:   Lab Results   Component Value Date    AST 14 06/08/2021    AST 21 06/01/2021    AST 13 05/28/2021    ALT 14 06/08/2021    ALT 18 06/01/2021    ALT 15 05/28/2021    BILITOT 0.9 06/08/2021    BILITOT 0.7 06/01/2021    BILITOT 0.4 05/28/2021    ALKPHOS 70 06/08/2021    ALKPHOS 64 06/01/2021    ALKPHOS 64 05/28/2021     BNP: No results found for: BNP  Lipids:   Lab Results   Component Value Date    CHOL 208 (H) 06/01/2021    HDL 62 06/01/2021     INR:   Lab Results   Component Value Date    INR 1.41 (H) 02/08/2019    INR 1.10 10/12/2018    INR 1.00 10/21/2017     URINE:   Lab Results   Component Value Date    NAUR 110 06/09/2021    PROTUR 4.9 06/09/2021     Lab Results   Component Value Date    NITRU NEGATIVE 06/09/2021    COLORU YELLOW 06/09/2021    PHUR 5.5 06/09/2021    WBCUA 5-9 06/09/2021    RBCUA NONE SEEN 06/09/2021    YEAST NONE SEEN 06/09/2021    BACTERIA NONE SEEN 06/09/2021    LEUKOCYTESUR SMALL 06/09/2021    UROBILINOGEN 0.2 06/09/2021    BILIRUBINUR NEGATIVE 06/09/2021    BLOODU NEGATIVE 06/09/2021    GLUCOSEU 100 06/09/2021    KETUA NEGATIVE 06/09/2021      Microalbumen/Creatinine ratio:  No components found for: RUCREAT        Impression/Plan:   1. CKD IV: cardiorenal syndrome, diabetic nephropathy: stable . Goals of care include slowing rate of progression by controlling blood pressure, blood glucoses and albuminuria and by avoiding nephrotoxins such as NSAIDs and IV contrast.  -will need to stop Metformin as GFR < 30. Will notify PCP. 2. Diastolic CHF: volume status better. Continue bumex 1 mg BID  3. Lytes: K ok on KCl 10 meq daily  4. HTN: stable  5. DM  6. Anemia, check iron stores  7. CAD        Bloodwork and medications were reviewed and plan of care discussed with the patient. Return to clinic in 2 months  or sooner if the need arises.       Apollo Graham,   [facility]

## 2021-06-23 NOTE — PATIENT INSTRUCTIONS
Stop Metformin    Low Sodium Diet (2,000 Milligram/2 Gm)    The most common source of sodium is salt. People get most of the salt in their diet from canned, prepared, and packaged foods. Fast food and restaurant meals also are very high in sodium. Limit your sodium to less than 2,000 milligrams (mg) a day. This limit counts all the sodium in prepared and packaged foods and any salt you add to your food. And try to further reduce how much sodium you eat to less than 1,500 mg a day if you are 46 or older, are black, or have high blood pressure, diabetes, or chronic kidney disease. Buy low-sodium foods   Buy foods that are labeled \"unsalted\" (no salt added), \"sodium-free\" (less than 5 mg of sodium per serving), or \"low-sodium\" (less than 140 mg of sodium per serving). Foods labeled \"reduced-sodium\" and \"light sodium\" may still have too much sodium. Be sure to read the label to see how much sodium you are getting. Buy fresh vegetables, or frozen vegetables without added sauces. Buy low-sodium versions of canned vegetables, soups, and other canned goods. Prepare low-sodium meals   Cut back on the amount of salt you use in cooking. This will help you adjust to the taste. Do not add salt after cooking. One teaspoon of salt has about 2,300 mg of sodium. Take the salt shaker off the table. Flavor your food with garlic, lemon juice, onion, vinegar, herbs, and spices. Do not use soy sauce, lite soy sauce, steak sauce, onion salt, garlic salt, celery salt, mustard, or ketchup on your food. Use low-sodium salad dressings, sauces, and ketchup. Or make your own salad dressings and sauces without adding salt. Use less salt (or none) when recipes call for it. You can often use half the salt a recipe calls for without losing flavor. Other foods such as rice, pasta, and grains do not need added salt. Rinse canned vegetables, and cook them in fresh water. This removes somebut not allof the salt.    Avoid water that is naturally high in sodium or that has been treated with water softeners, which add sodium. Call your local water company to find out the sodium content of your water supply. If you buy bottled water, read the label and choose a sodium-free brand. Avoid high-sodium foods   Avoid eating:   Smoked, cured, salted, and canned meat, fish, and poultry. Ham, mosqueda, hot dogs, and luncheon meats. Regular, hard, and processed cheese such as American, Patsey Age  Regular peanut butter. Crackers with salted tops, and other salted snack foods such as pretzels, chips, and salted popcorn. Frozen prepared meals, unless labeled low-sodium. Canned and dried soups, broths, and bouillon, unless labeled sodium-free or low-sodium. Canned vegetables, unless labeled sodium-free or low-sodium. Dinner kits such as hamburger and pasta meals  Frozen meal- entrees, dinners, vegetable with sauce  [place] fries, pizza, tacos, and other fast foods. Instant cooking foods to which you add hot water and stir-Potatoes, cereals, noodles  Packaged starch foods-seasoned noodle or rice dish, stuffing mix, macaroni and cheese dinner  Pickles, olives, ketchup, and other condiments, especially soy sauce, unless labeled sodium-free or low-sodium. How to Read a Food Label to Limit Sodium: After Your Visit     Read ingredient lists on food labels   Read the list of ingredients on food labels to help you find how much sodium is in a food. The label lists the ingredients in a food in descending order (from the most to the least). If salt or sodium is high on the list, there may be a lot of sodium in the food. Know that sodium has different names. Sodium is also called monosodium glutamate (MSG, common in [facility] food), sodium citrate, sodium alginate, sodium hydroxide, and sodium phosphate. Read Nutrition Facts labels   On most foods, there is a Nutrition Facts label.  This will tell you how much sodium is in one serving of food. Look at both the serving size and the sodium amount. The serving size is located at the top of the label, usually right under the \"Nutrition Facts\" title. The amount of sodium is given in the list under the title. It is given in milligrams (mg). Check the serving size carefully. A single serving is often very small, and you may eat more than one serving. If this is the case, you will eat more sodium than listed on the label. For example, if the serving size for a canned soup is 1 cup and the sodium amount is 470 mg, if you have 2 cups you will eat 940 mg of sodium. The nutrition facts for fresh fruits and vegetables are not listed on the food. They may be listed somewhere in the store. These foods usually have no sodium or low sodium. The Nutrition Facts label also gives you the Percent Daily Value for sodium. This is how much of the recommended amount of sodium a serving contains. The daily value for sodium is less than 2,300 mg. So if the Percent Daily Value says 50%, this means one serving is giving you half of this, or 1,150 mg.

## 2021-06-24 ENCOUNTER — CARE COORDINATION (OUTPATIENT)
Dept: CASE MANAGEMENT | Age: 76
End: 2021-06-24

## 2021-06-24 NOTE — CARE COORDINATION
Lia 45 Transitions Follow Up Call    2021    Patient: Emmett Potter  Patient : 1945   MRN: <[id number]>  Reason for Admission: SOB    CHF  Discharge Date: 21 RARS: Readmission Risk Score: 22         Spoke with: Jose Waterser who states she is doing well. She denies chest pain, SOB, palpitations, dizzy spells. She is eating and drinking has fluid restriction of 1500 ml. Denies feet ankle and leg swelling weight is staying within 1-2 lbs shift. No concerns. Care Transitions Follow Up Call    Needs to be reviewed by the provider   Additional needs identified to be addressed with provider: No  none             Method of communication with provider : none      Care Transition Nurse (CTN) contacted the patient by telephone to follow up after admission on 21. Verified name and  with patient as identifiers. Addressed changes since last contact: statse she is doing well no concerns  Discussed follow-up appointments. If no appointment was previously scheduled, appointment scheduling offered: Yes. Is follow up appointment scheduled within 7 days of discharge? Yes. Advance Care Planning:   Does patient have an Advance Directive:  not on file. CTN reviewed discharge instructions, medical action plan and red flags with patient and discussed any barriers to care and/or understanding of plan of care after discharge. Discussed appropriate site of care based on symptoms and resources available to patient including: PCP and When to call 911. The patient agrees to contact the PCP office for questions related to their healthcare. Patients top risk factors for readmission: medical condition-CHF  Interventions to address risk factors: Obtained and reviewed discharge summary and/or continuity of care documents    Non-[facility] follow up appointment(s):     CTN provided contact information for future needs. Plan for follow-up call in 7-10 days based on severity of symptoms and risk factors.   Plan for next call: symptom management-any sob, weight gain            Care Transitions Subsequent and Final Call    Subsequent and Final Calls  Do you have any ongoing symptoms?: No  Have your medications changed?: No  Do you have any questions related to your medications?: No  Do you currently have any active services?: No  Do you have any needs or concerns that I can assist you with?: No  Identified Barriers: Lack of Education, Other  Care Transitions Interventions  Other Interventions:            Follow Up  Future Appointments   Date Time Provider Maria Del Carmen Gutierrez   7/13/2021 10:30 AM Madison Rouse CHF [facility]   7/15/2021 10:30 AM Magui White MD [facility]   8/25/2021 10:00 AM [id number] Amelia Elizabeth DO [facility]   12/10/2021 11:30 AM SCHEDULE, SRPS PACER NURSE N SRPX PACER [facility]   12/10/2021  1:15 PM Melody Warren MD [address]

## 2021-06-26 DIAGNOSIS — I10 ESSENTIAL HYPERTENSION: ICD-10-CM

## 2021-06-28 DIAGNOSIS — E11.9 TYPE 2 DIABETES MELLITUS WITHOUT COMPLICATION, WITHOUT LONG-TERM CURRENT USE OF INSULIN (HCC): ICD-10-CM

## 2021-06-28 NOTE — TELEPHONE ENCOUNTER
Date of last visit:  6/15/2021   Date of next visit:  7/15/2021    Requested Prescriptions     Pending Prescriptions Disp Refills    metoprolol tartrate (LOPRESSOR) 50 MG tablet [Pharmacy Med Name: Metoprolol Tartrate 50 MG Oral Tablet] 180 tablet 0     Sig: Take 1 tablet by mouth twice daily

## 2021-06-28 NOTE — TELEPHONE ENCOUNTER
Date of last visit:  6/15/2021  Date of next visit:  7/15/2021    Requested Prescriptions     Pending Prescriptions Disp Refills    glimepiride (AMARYL) 4 MG tablet [Pharmacy Med Name: Glimepiride 4 MG Oral Tablet] 90 tablet 0     Sig: Take 1 tablet by mouth once daily
26y m  (el Ab x 2) p/w R groin pain x 2 weeks. Accomp by vaginal itching/burning, white discharge and mild spotting x 2d. LMP 9/15. Is sexually active w/o condoms, thinks she might be pregnant. H/o gonorrhea/chlamydia dx appx 2 mos ago, was tx'd w/abx. Denies f/c, cp/sob, nvd, flank pain. Has first appt w/[facility] in appx 1 mo. PE: young f nad, ncat, neck supple, rrr nl s1s2, ctab, abd soft nd +mild R pelvic ttp rest non-ttp, no palpable masses no rgr, pelvic- as per mlp note, no cvat, ext nl. a/p: see mdm

## 2021-06-29 RX ORDER — METOPROLOL TARTRATE 50 MG/1
TABLET, FILM COATED ORAL
Qty: 180 TABLET | Refills: 0 | Status: SHIPPED | OUTPATIENT
Start: 2021-06-29 | End: 2021-09-24

## 2021-06-29 RX ORDER — GLIMEPIRIDE 4 MG/1
TABLET ORAL
Qty: 90 TABLET | Refills: 0 | Status: SHIPPED | OUTPATIENT
Start: 2021-06-29 | End: 2021-07-15 | Stop reason: SDUPTHER

## 2021-07-01 ENCOUNTER — CARE COORDINATION (OUTPATIENT)
Dept: CASE MANAGEMENT | Age: 76
End: 2021-07-01

## 2021-07-01 NOTE — CARE COORDINATION
Lia 45 Transitions Follow Up Call    2021    Patient: Riley Romo  Patient : 1945   MRN: <[id number]>  Reason for Admission: SOB    CHF  Discharge Date: 21 RARS: Readmission Risk Score: 22         Spoke with: Bernardo Barker she states she is doing well, denies SOB, cough, Chest pain, pattient has some BLLE . Denies concerns  Care Transitions Follow Up Call    Needs to be reviewed by the provider   Additional needs identified to be addressed with provider: No  none             Method of communication with provider : none      Care Transition Nurse (CTN) contacted the patient by telephone to follow up after admission on . Verified name and  with patient as identifiers. Addressed changes since last contact: has some BLLE  Discussed follow-up appointments. If no appointment was previously scheduled, appointment scheduling offered: Yes. Is follow up appointment scheduled within 7 days of discharge? Yes. Advance Care Planning:   Does patient have an Advance Directive:  not on file. CTN reviewed discharge instructions, medical action plan and red flags with patient and discussed any barriers to care and/or understanding of plan of care after discharge. Discussed appropriate site of care based on symptoms and resources available to patient including: PCP and When to call 911. The patient agrees to contact the PCP office for questions related to their healthcare. Patients top risk factors for readmission: medical condition-CHF  Interventions to address risk factors: Obtained and reviewed discharge summary and/or continuity of care documents    Non-[facility] follow up appointment(s):     CTN provided contact information for future needs. Plan for follow-up call in 7-10 days based on severity of symptoms and risk factors.   Plan for next call: symptom management-BLLE            Care Transitions Subsequent and Final Call    Subsequent and Final Calls  Do you have any ongoing symptoms?: No  Have your medications changed?: No  Do you have any questions related to your medications?: No  Do you currently have any active services?: No  Do you have any needs or concerns that I can assist you with?: No  Identified Barriers: Lack of Education  Care Transitions Interventions  Other Interventions:            Follow Up  Future Appointments   Date Time Provider Maria Del Carmen Gutierrez   7/13/2021 10:30 AM Edgardo Bocanegra [facility] [place]   7/15/2021 10:30 AM Shannan Diaz MD [facility]   8/25/2021 10:00 AM [id number] DO LIDA Con[facility]   12/10/2021 11:30 AM SCHEDULE, [facility] PACER NURSE N SRPX PACER [facility] [place]   12/10/2021  1:15 PM Kristin Dennis MD [address]

## 2021-07-08 ENCOUNTER — CARE COORDINATION (OUTPATIENT)
Dept: CASE MANAGEMENT | Age: 76
End: 2021-07-08

## 2021-07-08 NOTE — CARE COORDINATION
Lia 45 Transitions Follow Up Call    2021    Patient: Gerald Black  Patient : 1945   MRN: [id number]  Reason for Admission:  SOB    CHF  Discharge Date: 21 RARS: Readmission Risk Score: 22         Spoke with: Arlene Rhoades she reports she is doing well, no questions or concerns, no SOB. Care Transitions Follow Up Call    Needs to be reviewed by the provider   Additional needs identified to be addressed with provider: No  none             Method of communication with provider : none      Care Transition Nurse (CTN) contacted the patient by telephone to follow up after admission on 2021 . Verified name and  with patient as identifiers. Addressed changes since last contact: none  Discussed follow-up appointments. If no appointment was previously scheduled, appointment scheduling offered: No.   Is follow up appointment scheduled within 7 days of discharge? Yes. Advance Care Planning:   Does patient have an Advance Directive: not on file. CTN reviewed discharge instructions, medical action plan and red flags with patient and discussed any barriers to care and/or understanding of plan of care after discharge. Discussed appropriate site of care based on symptoms and resources available to patient including: PCP and Specialist. The patient agrees to contact the PCP office for questions related to their healthcare. Patients top risk factors for readmission: polypharmacy  Interventions to address risk factors: Education of patient/family/caregiver/guardian to support self-management- CHF      Non-[facility] follow up appointment(s):      CTN provided contact information for future needs. No further follow-up call indicated based on severity of symptoms and risk factors. Plan for next call: ctn episode complete      Care Transitions Subsequent and Final Call    Subsequent and Final Calls  Care Transitions Interventions  Other Interventions:            Follow Up  Future Appointments Date Time Provider Maria Del Carmen Gutierrez   7/13/2021 10:30 AM Sylwia Lara Armandoalessandra Crabtree CHF [facility]   7/15/2021 10:30 AM Jannie Bianchi MD [employer]   8/25/2021 10:00 AM DO LIDA Soriano [facility]   12/10/2021 11:30 AM SCHEDULE, SRPS PACER NURSE N SRPX PACER [facility]   12/10/2021  1:15 PM Madie Harry MD N SRPX Heart [facility] - Isidro Nicolas RN

## 2021-07-15 ENCOUNTER — OFFICE VISIT (OUTPATIENT)
Dept: FAMILY MEDICINE CLINIC | Age: 76
End: 2021-07-15

## 2021-07-15 VITALS
SYSTOLIC BLOOD PRESSURE: 138 MMHG | RESPIRATION RATE: 16 BRPM | DIASTOLIC BLOOD PRESSURE: 84 MMHG | TEMPERATURE: 96.7 F | HEART RATE: 88 BPM | WEIGHT: 247 LBS | HEIGHT: 69 IN | BODY MASS INDEX: 36.58 KG/M2

## 2021-07-15 DIAGNOSIS — I48.0 PAROXYSMAL ATRIAL FIBRILLATION (HCC): ICD-10-CM

## 2021-07-15 DIAGNOSIS — I50.41 CHF (CONGESTIVE HEART FAILURE), NYHA CLASS I, ACUTE, COMBINED (HCC): ICD-10-CM

## 2021-07-15 DIAGNOSIS — E11.42 DIABETIC PERIPHERAL NEUROPATHY (HCC): ICD-10-CM

## 2021-07-15 DIAGNOSIS — I10 ESSENTIAL HYPERTENSION: ICD-10-CM

## 2021-07-15 DIAGNOSIS — I71.20 THORACIC AORTIC ANEURYSM WITHOUT RUPTURE: ICD-10-CM

## 2021-07-15 DIAGNOSIS — Z95.0 S/P CARDIAC PACEMAKER PROCEDURE: ICD-10-CM

## 2021-07-15 DIAGNOSIS — I49.5 SICK SINUS SYNDROME (HCC): ICD-10-CM

## 2021-07-15 DIAGNOSIS — E11.9 TYPE 2 DIABETES MELLITUS WITHOUT COMPLICATION, WITHOUT LONG-TERM CURRENT USE OF INSULIN (HCC): Primary | ICD-10-CM

## 2021-07-15 DIAGNOSIS — E78.00 PURE HYPERCHOLESTEROLEMIA: ICD-10-CM

## 2021-07-15 DIAGNOSIS — I44.39 HIGH-GRADE ATRIOVENTRICULAR BLOCK: ICD-10-CM

## 2021-07-15 DIAGNOSIS — E03.4 HYPOTHYROIDISM DUE TO ACQUIRED ATROPHY OF THYROID: ICD-10-CM

## 2021-07-15 PROBLEM — E87.70 FLUID OVERLOAD: Status: RESOLVED | Noted: 2021-06-09 | Resolved: 2021-07-15

## 2021-07-15 LAB
CHP ED QC CHECK: ABNORMAL
GLUCOSE BLD-MCNC: 224 MG/DL
HBA1C MFR BLD: 7.4 %

## 2021-07-15 PROCEDURE — 99213 OFFICE O/P EST LOW 20 MIN: CPT | Performed by: FAMILY MEDICINE

## 2021-07-15 PROCEDURE — 83036 HEMOGLOBIN GLYCOSYLATED A1C: CPT | Performed by: FAMILY MEDICINE

## 2021-07-15 PROCEDURE — 82962 GLUCOSE BLOOD TEST: CPT | Performed by: FAMILY MEDICINE

## 2021-07-15 RX ORDER — GLIMEPIRIDE 4 MG/1
TABLET ORAL
Qty: 135 TABLET | Refills: 1 | Status: SHIPPED | OUTPATIENT
Start: 2021-07-15 | End: 2021-08-13 | Stop reason: DRUGHIGH

## 2021-07-15 ASSESSMENT — ENCOUNTER SYMPTOMS
CHEST TIGHTNESS: 0
EYE PAIN: 0
NAUSEA: 0
SHORTNESS OF BREATH: 0
ABDOMINAL PAIN: 0
SORE THROAT: 0
WHEEZING: 0
CONSTIPATION: 0
COUGH: 0

## 2021-07-15 NOTE — PROGRESS NOTES
Subjective:      Patient ID: Fadi Carpenter is a 78 y.o. female. atrail  Fib   on  eliquis       chf  Chronic  Combined  stabl e     lipids  Stable     Heart  Block  With pacer    thyroid  Stable      Sick sinus  With pacer          Diabetes  She presents for her follow-up diabetic visit. She has type 2 diabetes mellitus. Her disease course has been stable. There are no hypoglycemic associated symptoms. Pertinent negatives for hypoglycemia include no dizziness, headaches or nervousness/anxiousness. There are no diabetic associated symptoms. Pertinent negatives for diabetes include no chest pain, no fatigue and no weakness. There are no hypoglycemic complications. Symptoms are stable. There are no diabetic complications. She is compliant with treatment none of the time. She is following a diabetic diet. There is no change in her home blood glucose trend. Her breakfast blood glucose range is generally 140-180 mg/dl. An ACE inhibitor/angiotensin II receptor blocker is being taken. Congestive Heart Failure  Pertinent negatives include no abdominal pain, chest pain, fatigue, palpitations or shortness of breath. Past Medical History:   Diagnosis Date    Aortic aneurysm (HCC)     4.5 cm aorta    Arthritis     Atrial fibrillation, chronic ([address] 1/25/2019    CAD (coronary artery disease)     CHF NYHA class II, unspecified failure chronicity, unspecified type ([address] 10/8/2018    Colon polyps 3/01    Diabetic peripheral neuropathy ([address] 2014      feet    Elbow fracture     Hyperlipidemia     Hypertension     Hypothyroidism     Pulmonary nodule, right 10/2017      repeat  ct of  chest  4-2018    S/P cardiac pacemaker procedure: 10/21/2017: Medtronic Dual Chamber. 10/21/2017    10/21/2017: Medtronic Dual Chamber.  Dr. Denice Padilla Systolic CHF, acute on chronic [facility]) 6/10/2021    Type II or unspecified type diabetes mellitus without mention of complication, not stated as uncontrolled      Review of Systems Constitutional: Negative for appetite change, fatigue and fever. HENT: Negative for congestion, ear pain, postnasal drip and sore throat. Eyes: Negative for pain and visual disturbance. Respiratory: Negative for cough, chest tightness, shortness of breath and wheezing. Cardiovascular: Negative for chest pain, palpitations and leg swelling. Gastrointestinal: Negative for abdominal pain, constipation and nausea. Genitourinary: Negative for dysuria and frequency. Musculoskeletal: Negative for arthralgias, joint swelling, neck pain and neck stiffness. Skin: Negative for rash. Neurological: Negative for dizziness, weakness, numbness and headaches. Hematological: Negative for adenopathy. Does not bruise/bleed easily. Psychiatric/Behavioral: Negative for behavioral problems and sleep disturbance. The patient is not nervous/anxious. /84 (Site: Right Upper Arm, Position: Sitting, Cuff Size: Medium Adult)   Pulse 88   Temp 96.7 °F (35.9 °C) (Infrared)   Resp 16   Ht 5' 9\" (1.753 m)   Wt 247 lb (112 kg)   BMI 36.48 kg/m²   Objective:   Physical Exam  Vitals and nursing note reviewed. Constitutional:       Appearance: She is well-developed. HENT:      Head: Normocephalic and atraumatic. Right Ear: External ear normal.      Left Ear: External ear normal.      Nose: Nose normal.   Eyes:      General: No scleral icterus. Conjunctiva/sclera: Conjunctivae normal.      Pupils: Pupils are equal, round, and reactive to light. Neck:      Thyroid: No thyromegaly. Vascular: No JVD. Cardiovascular:      Rate and Rhythm: Normal rate and regular rhythm. Heart sounds: Normal heart sounds. Pulmonary:      Effort: Pulmonary effort is normal.      Breath sounds: Normal breath sounds. No wheezing or rales. Abdominal:      General: Bowel sounds are normal. There is no distension. Palpations: Abdomen is soft. There is no mass. Tenderness:  There is no abdominal tenderness. Musculoskeletal:         General: No tenderness. Normal range of motion. Cervical back: Normal range of motion and neck supple. Right lower leg: Edema present. Left lower leg: Edema present. Comments:   1  To 2 + Of ankles    Lymphadenopathy:      Cervical: No cervical adenopathy. Skin:     General: Skin is warm and dry. Findings: No rash. Neurological:      Mental Status: She is alert and oriented to person, place, and time. Cranial Nerves: No cranial nerve deficit. Deep Tendon Reflexes: Reflexes are normal and symmetric. Assessment:       Diagnosis Orders   1. Type 2 diabetes mellitus without complication, without long-term current use of insulin (HCC)  POCT Glucose    POCT glycosylated hemoglobin (Hb A1C)    glimepiride (AMARYL) 4 MG tablet   2. High-grade atrioventricular block     3. Sick sinus syndrome ([address]     4. S/P cardiac pacemaker procedure: 10/21/2017: Medtronic Dual Chamber. 5. Pure hypercholesterolemia     6. Essential hypertension     7. Thoracic aortic aneurysm without rupture ([address]     8. Hypothyroidism due to acquired atrophy of thyroid     9. Diabetic peripheral neuropathy ([address]     10. Paroxysmal atrial fibrillation (HCC)     11.  CHF (congestive heart failure), NYHA class I, acute, combined ([address]           Plan:      Current Outpatient Medications   Medication Sig Dispense Refill    glimepiride (AMARYL) 4 MG tablet One and  1/2 pills  a day 135 tablet 1    metoprolol tartrate (LOPRESSOR) 50 MG tablet Take 1 tablet by mouth twice daily 180 tablet 0    amiodarone (CORDARONE) 200 MG tablet Take 1 tablet by mouth daily 30 tablet 0    bumetanide (BUMEX) 1 MG tablet Take 1 tablet by mouth 2 times daily 30 tablet 3    potassium chloride (KLOR-CON) 10 MEQ extended release tablet Take 1 tablet by mouth daily (with breakfast) 60 tablet 3    ELIQUIS 5 MG TABS tablet Take 1 tablet by mouth twice daily 60 tablet 5    atorvastatin (LIPITOR) 40 MG tablet Take 1 tablet by mouth once daily 90 tablet 1    levothyroxine (SYNTHROID) 125 MCG tablet Take 1 tablet by mouth daily 90 tablet 1    amLODIPine (NORVASC) 10 MG tablet Take 1 tablet by mouth once daily 90 tablet 0    gabapentin (NEURONTIN) 300 MG capsule TAKE 1 CAPSULE BY MOUTH ONCE DAILY IN THE EVENING 30 capsule 5    clopidogrel (PLAVIX) 75 MG tablet TAKE 1 TABLET DAILY 90 tablet 1    Flaxseed, Linseed, (FLAX SEEDS PO) Take by mouth      nitroGLYCERIN (NITROSTAT) 0.4 MG SL tablet Place 1 tablet under the tongue every 5 minutes as needed for Chest pain up to max of 3 total doses. If no relief after 1 dose, call 911. 25 tablet 1    Glucose Blood (JOSE E BREEZE 2 TEST) DISK USE ONE STRIP TWICE DAILY, DX: E11.9 200 each 5    Ascorbic Acid (VITAMIN C) 500 MG tablet Take 500 mg by mouth daily        No current facility-administered medications for this visit. Orders Placed This Encounter   Procedures    POCT Glucose    POCT glycosylated hemoglobin (Hb A1C)     Results for orders placed or performed in visit on 07/15/21   POCT Glucose   Result Value Ref Range    Glucose 224 (A) mg/dL    QC OK?      POCT glycosylated hemoglobin (Hb A1C)   Result Value Ref Range    Hemoglobin A1C 7.4 (A) %     Sussy Alexander MD

## 2021-07-19 DIAGNOSIS — I10 ESSENTIAL HYPERTENSION: ICD-10-CM

## 2021-07-19 NOTE — TELEPHONE ENCOUNTER
Date of last visit:  7/15/2021  Date of next visit:  10/22/2021    Requested Prescriptions     Pending Prescriptions Disp Refills    amLODIPine (NORVASC) 10 MG tablet [Pharmacy Med Name: amLODIPine Besylate 10 MG Oral Tablet] 90 tablet 0     Sig: Take 1 tablet by mouth once daily

## 2021-07-22 RX ORDER — AMLODIPINE BESYLATE 10 MG/1
TABLET ORAL
Qty: 90 TABLET | Refills: 0 | Status: SHIPPED | OUTPATIENT
Start: 2021-07-22 | End: 2021-10-21

## 2021-08-03 ENCOUNTER — OFFICE VISIT (OUTPATIENT)
Dept: CARDIOLOGY CLINIC | Age: 76
End: 2021-08-03
Payer: MEDICARE

## 2021-08-03 VITALS
HEART RATE: 69 BPM | BODY MASS INDEX: 37.56 KG/M2 | SYSTOLIC BLOOD PRESSURE: 136 MMHG | OXYGEN SATURATION: 96 % | DIASTOLIC BLOOD PRESSURE: 64 MMHG | HEIGHT: 68 IN | WEIGHT: 247.8 LBS

## 2021-08-03 DIAGNOSIS — I25.10 CORONARY ARTERY DISEASE INVOLVING NATIVE CORONARY ARTERY OF NATIVE HEART WITHOUT ANGINA PECTORIS: ICD-10-CM

## 2021-08-03 DIAGNOSIS — I48.0 PAROXYSMAL ATRIAL FIBRILLATION (HCC): ICD-10-CM

## 2021-08-03 DIAGNOSIS — I50.32 CHF NYHA CLASS II, CHRONIC, DIASTOLIC (HCC): Primary | ICD-10-CM

## 2021-08-03 DIAGNOSIS — I10 ESSENTIAL HYPERTENSION: ICD-10-CM

## 2021-08-03 PROCEDURE — 99214 OFFICE O/P EST MOD 30 MIN: CPT | Performed by: NURSE PRACTITIONER

## 2021-08-03 RX ORDER — METOLAZONE 5 MG/1
5 TABLET ORAL DAILY
Qty: 10 TABLET | Refills: 0 | COMMUNITY
Start: 2021-08-03 | End: 2022-07-27 | Stop reason: SDUPTHER

## 2021-08-03 ASSESSMENT — ENCOUNTER SYMPTOMS
ABDOMINAL DISTENTION: 0
COUGH: 0
SHORTNESS OF BREATH: 1

## 2021-08-03 NOTE — PROGRESS NOTES
Heart Failure Clinic       Visit Date: 8/3/2021  Cardiologist:  Dr. Becca Rush  Primary Care Physician: Dr. Nallely Thompson MD    Laina Pierre is a 78 y.o. female who presents today for:  Chief Complaint   Patient presents with    Congestive Heart Failure     New Patient        HPI:   Laina Pierre is a 78 y.o. female who presents to the office for a NEW patient visit in the heart failure clinic. Accompanied by no one    TYPE HF: HFpEF (EF 55%)  Device: Pacemaker (2017, bradycardia)  HX: HTN, HLD, Afib s/p DCCV (Eliquis), CAD-PCI LAD, PCI OMx2, CKD (Hemmelgarn)    Dry Wt:  245-250    Hospitalization:  June x 3 days = CHF, LE edema/SOB. [facility] - Nephrology saw. D/c Bumex 1 BID    Concerns today: feeling good - no concerns voiced. Feels back to baseline since hospitalization in June. Denies SOB, LE edema. Has taken Metolazone 2x since hospitalization - effective. Activity: walked from entrance, no SOB. Knee/back pain more limiting. Worked in yard all day yesterday. Does own grocery shopping, can walk w/ cart. Diet:  Watches, does not add salt. Patient has:  Chest Pain: no  SOB: no  Orthopnea/PND: no  LAZARUS: no  Edema: no  Fatigue: no  Abdominal bloating: no  Cough: no  Appetite: good  Home weight: stable   Home blood pressure: 100-120s    Past Medical History:   Diagnosis Date    Aortic aneurysm (HCC)     4.5 cm aorta    Arthritis     Atrial fibrillation, chronic ([address] 1/25/2019    CAD (coronary artery disease)     CHF NYHA class II, unspecified failure chronicity, unspecified type ([address] 10/8/2018    Colon polyps 3/01    Diabetic peripheral neuropathy ([address] 2014      feet    Elbow fracture     Fluid overload 6/9/2021    Hyperlipidemia     Hypertension     Hypothyroidism     Pulmonary nodule, right 10/2017      repeat  ct of  chest  4-2018    S/P cardiac pacemaker procedure: 10/21/2017: Medtronic Dual Chamber. 10/21/2017    10/21/2017: Medtronic Dual Chamber.  Dr. Darion Archuleta Systolic CHF, acute on chronic ([facility] 6/10/2021    Type II or unspecified type diabetes mellitus without mention of complication, not stated as uncontrolled      Past Surgical History:   Procedure Laterality Date    BREAST BIOPSY Right 2016     benign   abrahan    BREAST SURGERY      CARDIOVERSION  2019    atrial fib to sinus  baki     SECTION  over 30 years ago     COLONOSCOPY      colon polyps   [place]    CORONARY ANGIOPLASTY      baki    CORONARY ANGIOPLASTY WITH STENT PLACEMENT      baki    CORONARY ANGIOPLASTY WITH STENT PLACEMENT  10/2018     lad branch    HEEL SPUR SURGERY Bilateral     bilat with hardware    HYSTERECTOMY      KNEE ARTHROSCOPY  ; 3/02    right and left knee    KNEE ARTHROSCOPY Bilateral     bilat    PACEMAKER PLACEMENT  10/2017    THYROIDECTOMY, PARTIAL  's     Family History   Problem Relation Age of Onset    Heart Disease Mother 80        bacterial infection at valve    Heart Disease Father     Heart Attack Father     Heart Disease Brother         CABG     Social History     Tobacco Use    Smoking status: Never Smoker    Smokeless tobacco: Never Used   Substance Use Topics    Alcohol use: No     Current Outpatient Medications   Medication Sig Dispense Refill    metOLazone (ZAROXOLYN) 5 MG tablet Take 1 tablet by mouth daily As  Needed 10 tablet 0    amLODIPine (NORVASC) 10 MG tablet Take 1 tablet by mouth once daily 90 tablet 0    glimepiride (AMARYL) 4 MG tablet One and  1/2 pills  a day 135 tablet 1    metoprolol tartrate (LOPRESSOR) 50 MG tablet Take 1 tablet by mouth twice daily 180 tablet 0    amiodarone (CORDARONE) 200 MG tablet Take 1 tablet by mouth daily 30 tablet 0    bumetanide (BUMEX) 1 MG tablet Take 1 tablet by mouth 2 times daily 30 tablet 3    potassium chloride (KLOR-CON) 10 MEQ extended release tablet Take 1 tablet by mouth daily (with breakfast) 60 tablet 3    ELIQUIS 5 MG TABS tablet Take 1 tablet by mouth twice daily 60 tablet 5    atorvastatin (LIPITOR) 40 MG tablet Take 1 tablet by mouth once daily 90 tablet 1    levothyroxine (SYNTHROID) 125 MCG tablet Take 1 tablet by mouth daily 90 tablet 1    gabapentin (NEURONTIN) 300 MG capsule TAKE 1 CAPSULE BY MOUTH ONCE DAILY IN THE EVENING 30 capsule 5    clopidogrel (PLAVIX) 75 MG tablet TAKE 1 TABLET DAILY 90 tablet 1    Flaxseed, Linseed, (FLAX SEEDS PO) Take by mouth      nitroGLYCERIN (NITROSTAT) 0.4 MG SL tablet Place 1 tablet under the tongue every 5 minutes as needed for Chest pain up to max of 3 total doses. If no relief after 1 dose, call 911. 25 tablet 1    Glucose Blood (JOSE E BREEZE 2 TEST) DISK USE ONE STRIP TWICE DAILY, DX: E11.9 200 each 5    Ascorbic Acid (VITAMIN C) 500 MG tablet Take 500 mg by mouth daily        No current facility-administered medications for this visit. Allergies   Allergen Reactions    Adhesive Tape Rash       SUBJECTIVE:   Review of Systems   Constitutional: Negative for activity change, appetite change and fatigue. Respiratory: Positive for shortness of breath (some CARLIN at times). Negative for cough. Cardiovascular: Negative for chest pain, palpitations and leg swelling. Gastrointestinal: Negative for abdominal distention. Neurological: Negative for weakness, light-headedness and headaches. Hematological: Negative for adenopathy. Psychiatric/Behavioral: Negative for sleep disturbance. OBJECTIVE:   Today's Vitals:  /64   Pulse 69   Ht 5' 8\" (1.727 m)   Wt 247 lb 12.8 oz (112.4 kg)   SpO2 96%   BMI 37.68 kg/m²     Physical Exam  Vitals reviewed. Constitutional:       General: She is not in acute distress. Appearance: Normal appearance. She is well-developed. She is obese. She is not diaphoretic. HENT:      Head: Normocephalic and atraumatic.    Eyes:      Conjunctiva/sclera: Conjunctivae normal.   Neck:      Comments: No JVD  Cardiovascular:      Rate and Rhythm: Normal rate and regular rhythm. Heart sounds: Normal heart sounds. No murmur heard. Pulmonary:      Effort: Pulmonary effort is normal. No respiratory distress. Breath sounds: Normal breath sounds. No wheezing or rales. Abdominal:      General: Bowel sounds are normal. There is no distension. Palpations: Abdomen is soft. Tenderness: There is no abdominal tenderness. Musculoskeletal:         General: Normal range of motion. Cervical back: Normal range of motion and neck supple. Right lower leg: No edema. Left lower leg: No edema. Skin:     General: Skin is warm and dry. Capillary Refill: Capillary refill takes less than 2 seconds. Neurological:      Mental Status: She is alert and oriented to person, place, and time. Coordination: Coordination normal.   Psychiatric:         Behavior: Behavior normal.         Wt Readings from Last 3 Encounters:   08/03/21 247 lb 12.8 oz (112.4 kg)   07/15/21 247 lb (112 kg)   06/23/21 248 lb (112.5 kg)     BP Readings from Last 3 Encounters:   08/03/21 136/64   07/15/21 138/84   06/23/21 120/68     Pulse Readings from Last 3 Encounters:   08/03/21 69   07/15/21 88   06/23/21 77     Body mass index is 37.68 kg/m². ECHO:    Conclusions   Summary   limited echo   Ejection fraction is visually estimated at 55%. Overall left ventricular function is normal.   The aortic valve leaflets were not well visualized. Aortic valve appears tricuspid. Thickened aortic valve leaflets noted. Aortic valve leaflets are Moderately calcified. Dilation of ascending aorta . Signature      ----------------------------------------------------------------   Electronically signed by Carlos Coreas MD (Interpreting   physician) on 06/10/2021 at 06:57 PM   ----------------------------------------------------------------      CATH/STRESS:   SUMMARY:  Successful PCI of the proximal OM1 with a second  drug-eluting stent.     PLAN:  1. DAPT.   2.  Optimal medical therapy. 3.  Risk factor management. 4.  Bedrest.  5.  IV fluids. 6.  Observation. 7.  Follow up with myself in one to two weeks postprocedure.     All the above was explained to the patient and the patient's family. They were all agreeable and amenable to the plan.        Raymond Claude, MD     D: 10/16/2018 7:40:52       Results reviewed:  BNP: No results found for: BNP  CBC:   Lab Results   Component Value Date    WBC 6.6 06/11/2021    RBC 3.51 06/11/2021    HGB 10.2 06/11/2021    HCT 32.1 06/11/2021     06/11/2021     CMP:    Lab Results   Component Value Date     06/17/2021    K 4.3 06/17/2021    K 4.7 05/22/2021     06/17/2021    CO2 28 06/17/2021    BUN 39 06/17/2021    CREATININE 2.1 06/17/2021    LABGLOM 23 06/17/2021    GLUCOSE 224 07/15/2021    CALCIUM 8.8 06/11/2021     Hepatic Function Panel:    Lab Results   Component Value Date    ALKPHOS 70 06/08/2021    ALT 14 06/08/2021    AST 14 06/08/2021    PROT 7.6 06/08/2021    BILITOT 0.9 06/08/2021    BILIDIR <0.2 10/09/2018    LABALBU 4.2 06/08/2021     Magnesium:    Lab Results   Component Value Date    MG 2.2 06/08/2021     PT/INR:    Lab Results   Component Value Date    INR 1.41 02/08/2019     Lipids:    Lab Results   Component Value Date    TRIG 150 06/01/2021    HDL 62 06/01/2021    LDLCALC 116 06/01/2021    LDLDIRECT 101 12/29/2014       ASSESSMENT AND PLAN:   The patient's condition/symptoms are Stable: No clinical evidence of fluid overload today. Continue current medical regimen without changes at present time. Diagnosis Orders   1. CHF NYHA class II, chronic, diastolic ([address]     2. Essential hypertension     3. Coronary artery disease involving native coronary artery of native heart without angina pectoris     4.  Paroxysmal atrial fibrillation (HCC)       Continue:  GDMT:   ACE/ARB/ARNI - None   BB - Lopressor 50 BID   Diuretic - Bumex 1 BID, Kcl 10/day  AA - consider adding  Vasodilator - no  Other - Eliquis, Amiodarone, Norvasc  Stable, appears Euvolemic - back baseline  Weights stable  BP stable - HR controlled. Discussed diet/fluid/sodium adherence - reviewed handouts. Continue meds same. Call if using Metolazone more often. Labs end month w/ Hemmelgarn  F/U w/ Baki in Dec  F/U in clinic in March  New HF Pamphlet given and ZONE sheet reviewed, patient voices understanding. Questions answered. Tolerating above noted HF meds, no ill side effects noted. Will continue to monitor kidney function and electrolytes. Will optimize as tolerated. Pt is compliant w/ medications. Total visit time of 30 minutes has been spent with patient on education of symptoms, management, medication, and plan of care; as well as review of chart: labs, ECHO, radiology reports, etc.   I personally spent more then 50% of the appt time face to face with the patient. Daily weights  Fluid restriction of 2 Liters per day  Limit sodium in diet to around 5021-2649 mg/day  Monitor BP  Activity as tolerated     Patient was instructed to call the Micheline Andrea for any changes in symptoms as noted in AVS.      Return in about 6 months (around 2/3/2022). or sooner if needed     Patient given educational materials - see patient instructions. We discussed the importance of weighing oneself and recording daily. We also discussed the importance of a low sodium diet, higher sodium foods to avoid and better low sodium food options. Patient verbalizes understanding of plan of care using teach back method, and is agreeable to the treatment plan.        Electronically signed by GRANT Velez CNP on 8/3/2021 at 11:16 AM

## 2021-08-03 NOTE — PATIENT INSTRUCTIONS
You may receive a survey regarding the care you received during your visit. Your input is valuable to us. We encourage you to complete and return your survey. We hope you will choose us in the future for your healthcare needs.     Continue:  · Continue current medications  · Daily weights and record  · Fluid restriction of 2 Liters per day  · Limit sodium in diet to around 5904-4917 mg/day  · Monitor BP  · Activity as tolerated     Call the Heart Failure Clinic for any of the following symptoms: [phone]   Weight gain of 2-3 pounds in 1 day or 5 pounds in 1 week   Increased shortness of breath   Shortness of breath while laying down   Cough   Chest pain   Swelling in feet, ankles or legs   Tenderness or bloating in the abdomen   Fatigue    Decreased appetite or nausea    Confusion   

## 2021-08-09 ENCOUNTER — TELEPHONE (OUTPATIENT)
Dept: FAMILY MEDICINE CLINIC | Age: 76
End: 2021-08-09

## 2021-08-09 DIAGNOSIS — E11.9 TYPE 2 DIABETES MELLITUS WITHOUT COMPLICATION, WITHOUT LONG-TERM CURRENT USE OF INSULIN (HCC): ICD-10-CM

## 2021-08-11 NOTE — TELEPHONE ENCOUNTER
Pt called asking about this message. She went ahead and increased the Glimepiride to 2 pills daily and it is still not helping. Started that this past Monday. FBS this morning was 240.

## 2021-08-13 RX ORDER — GLIMEPIRIDE 4 MG/1
TABLET ORAL
Qty: 135 TABLET | Refills: 1 | Status: SHIPPED
Start: 2021-08-13 | End: 2021-08-25

## 2021-08-13 NOTE — TELEPHONE ENCOUNTER
amaryl  To  One  Tab po  Bid   And  Call next  tues / wed on  Sugars as  With  Creat  Up  May  Need to go to an injection    Metformin will no longer  Be  able  To  Be  Used

## 2021-08-17 DIAGNOSIS — E11.42 DIABETIC PERIPHERAL NEUROPATHY (HCC): ICD-10-CM

## 2021-08-17 DIAGNOSIS — E03.4 HYPOTHYROIDISM DUE TO ACQUIRED ATROPHY OF THYROID: ICD-10-CM

## 2021-08-17 NOTE — TELEPHONE ENCOUNTER
Date of last visit:  7/15/2021  Date of next visit:  10/22/2021    Requested Prescriptions     Pending Prescriptions Disp Refills    gabapentin (NEURONTIN) 300 MG capsule [Pharmacy Med Name: Gabapentin 300 MG Oral Capsule] 30 capsule 0     Sig: TAKE 1 CAPSULE BY MOUTH ONCE DAILY IN THE EVENING    levothyroxine (SYNTHROID) 150 MCG tablet [Pharmacy Med Name: Levothyroxine Sodium 150 MCG Oral Tablet] 90 tablet 0     Sig: Take 1 tablet by mouth once daily

## 2021-08-18 ENCOUNTER — PROCEDURE VISIT (OUTPATIENT)
Dept: CARDIOLOGY CLINIC | Age: 76
End: 2021-08-18
Payer: MEDICARE

## 2021-08-18 ENCOUNTER — NURSE ONLY (OUTPATIENT)
Dept: LAB | Age: 76
End: 2021-08-18

## 2021-08-18 DIAGNOSIS — D63.1 ANEMIA IN STAGE 4 CHRONIC KIDNEY DISEASE (HCC): ICD-10-CM

## 2021-08-18 DIAGNOSIS — N18.4 CKD (CHRONIC KIDNEY DISEASE) STAGE 4, GFR 15-29 ML/MIN (HCC): ICD-10-CM

## 2021-08-18 DIAGNOSIS — Z95.0 S/P CARDIAC PACEMAKER PROCEDURE: Primary | ICD-10-CM

## 2021-08-18 DIAGNOSIS — N18.4 ANEMIA IN STAGE 4 CHRONIC KIDNEY DISEASE (HCC): ICD-10-CM

## 2021-08-18 LAB
ANION GAP SERPL CALCULATED.3IONS-SCNC: 10 MEQ/L (ref 8–16)
BUN BLDV-MCNC: 29 MG/DL (ref 7–22)
CALCIUM SERPL-MCNC: 9.2 MG/DL (ref 8.5–10.5)
CHLORIDE BLD-SCNC: 98 MEQ/L (ref 98–111)
CO2: 30 MEQ/L (ref 23–33)
CREAT SERPL-MCNC: 1.9 MG/DL (ref 0.4–1.2)
CREATININE, URINE: 135.4 MG/DL
ERYTHROCYTE [DISTWIDTH] IN BLOOD BY AUTOMATED COUNT: 14.9 % (ref 11.5–14.5)
ERYTHROCYTE [DISTWIDTH] IN BLOOD BY AUTOMATED COUNT: 51.4 FL (ref 35–45)
FERRITIN: 61 NG/ML (ref 10–291)
GFR SERPL CREATININE-BSD FRML MDRD: 26 ML/MIN/1.73M2
GLUCOSE BLD-MCNC: 271 MG/DL (ref 70–108)
HCT VFR BLD CALC: 37.5 % (ref 37–47)
HEMOGLOBIN: 11.3 GM/DL (ref 12–16)
IRON SATURATION: 24 % (ref 20–50)
IRON: 80 UG/DL (ref 50–170)
MCH RBC QN AUTO: 28.4 PG (ref 26–33)
MCHC RBC AUTO-ENTMCNC: 30.1 GM/DL (ref 32.2–35.5)
MCV RBC AUTO: 94.2 FL (ref 81–99)
MICROALBUMIN UR-MCNC: 3.05 MG/DL
MICROALBUMIN/CREAT UR-RTO: 23 MG/G (ref 0–30)
PHOSPHORUS: 3.8 MG/DL (ref 2.4–4.7)
PLATELET # BLD: 304 THOU/MM3 (ref 130–400)
PMV BLD AUTO: 11.3 FL (ref 9.4–12.4)
POTASSIUM SERPL-SCNC: 4.7 MEQ/L (ref 3.5–5.2)
PTH INTACT: 98.9 PG/ML (ref 15–65)
RBC # BLD: 3.98 MILL/MM3 (ref 4.2–5.4)
SODIUM BLD-SCNC: 138 MEQ/L (ref 135–145)
TOTAL IRON BINDING CAPACITY: 339 UG/DL (ref 171–450)
WBC # BLD: 8.1 THOU/MM3 (ref 4.8–10.8)

## 2021-08-18 PROCEDURE — 93296 REM INTERROG EVL PM/IDS: CPT | Performed by: NUCLEAR MEDICINE

## 2021-08-18 PROCEDURE — 93294 REM INTERROG EVL PM/LDLS PM: CPT | Performed by: NUCLEAR MEDICINE

## 2021-08-18 RX ORDER — LEVOTHYROXINE SODIUM 0.15 MG/1
TABLET ORAL
Qty: 90 TABLET | Refills: 0 | Status: SHIPPED | OUTPATIENT
Start: 2021-08-18 | End: 2021-09-16 | Stop reason: DRUGHIGH

## 2021-08-18 RX ORDER — GABAPENTIN 300 MG/1
CAPSULE ORAL
Qty: 30 CAPSULE | Refills: 5 | Status: SHIPPED | OUTPATIENT
Start: 2021-08-18 | End: 2022-02-09 | Stop reason: SDUPTHER

## 2021-08-18 NOTE — PROGRESS NOTES
DR Weiner Score PT/ KNOWN AFIB / ELIQUIS   AFIB BURDEN 0%    MEDTRONIC DUAL PACEMAKER REMOTE   BATTERY 3.5-5.5    ATRIAL IMPEDENCE 342  VENT IMPEDENCE 456  P WAVES 0.6  RV WAVES 13.6  ATRIAL THRESHOLD PER THE DEVICE 1 @ 0.4  VENT THRESHOLD PER THE DEVICE 0.875 @ 0.4  ATRIAL AMPLITUDE 1.5 @ 0.4  VENT AMPLITUDE 2 @ 0.4    DDDR   A [ANGE 99.3%  V PACED 100%

## 2021-08-19 ENCOUNTER — TELEPHONE (OUTPATIENT)
Dept: FAMILY MEDICINE CLINIC | Age: 76
End: 2021-08-19

## 2021-08-19 DIAGNOSIS — E11.9 TYPE 2 DIABETES MELLITUS WITHOUT COMPLICATION, WITHOUT LONG-TERM CURRENT USE OF INSULIN (HCC): Primary | ICD-10-CM

## 2021-08-19 NOTE — TELEPHONE ENCOUNTER
Date of last visit:  7/15/2021  Date of next visit:  10/22/2021    Requested Prescriptions     Pending Prescriptions Disp Refills    amiodarone (CORDARONE) 200 MG tablet 90 tablet 1     Sig: Take 1 tablet by mouth daily    bumetanide (BUMEX) 1 MG tablet 180 tablet 1     Sig: Take 1 tablet by mouth 2 times daily

## 2021-08-19 NOTE — TELEPHONE ENCOUNTER
Patient called req referral to [facility] diabetic clinic.     Please call her when done 21 [phone]

## 2021-08-19 NOTE — TELEPHONE ENCOUNTER
[address] [facility] called requesting 90 day Rx for following:    Amiodarone 200 mg one tablet daily    Bumetanide 1 mg one tablet twice daily

## 2021-08-20 RX ORDER — BUMETANIDE 1 MG/1
1 TABLET ORAL 2 TIMES DAILY
Qty: 180 TABLET | Refills: 1 | Status: SHIPPED | OUTPATIENT
Start: 2021-08-20 | End: 2022-02-09 | Stop reason: SDUPTHER

## 2021-08-20 RX ORDER — AMIODARONE HYDROCHLORIDE 200 MG/1
200 TABLET ORAL DAILY
Qty: 90 TABLET | Refills: 1 | Status: SHIPPED | OUTPATIENT
Start: 2021-08-20 | End: 2022-02-07

## 2021-08-24 DIAGNOSIS — E11.9 TYPE 2 DIABETES MELLITUS WITHOUT COMPLICATION, WITHOUT LONG-TERM CURRENT USE OF INSULIN (HCC): ICD-10-CM

## 2021-08-24 DIAGNOSIS — I25.10 CORONARY ARTERY DISEASE INVOLVING NATIVE CORONARY ARTERY OF NATIVE HEART WITHOUT ANGINA PECTORIS: ICD-10-CM

## 2021-08-25 ENCOUNTER — VIRTUAL VISIT (OUTPATIENT)
Dept: NEPHROLOGY | Age: 76
End: 2021-08-25
Payer: MEDICARE

## 2021-08-25 DIAGNOSIS — N18.4 CKD (CHRONIC KIDNEY DISEASE) STAGE 4, GFR 15-29 ML/MIN (HCC): Primary | ICD-10-CM

## 2021-08-25 PROCEDURE — 99213 OFFICE O/P EST LOW 20 MIN: CPT | Performed by: INTERNAL MEDICINE

## 2021-08-25 RX ORDER — GLIMEPIRIDE 4 MG/1
TABLET ORAL
Qty: 90 TABLET | Refills: 0 | Status: SHIPPED | OUTPATIENT
Start: 2021-08-25 | End: 2021-10-20

## 2021-08-25 RX ORDER — CLOPIDOGREL BISULFATE 75 MG/1
TABLET ORAL
Qty: 90 TABLET | Refills: 0 | Status: SHIPPED | OUTPATIENT
Start: 2021-08-25 | End: 2021-12-10

## 2021-08-25 NOTE — PROGRESS NOTES
[address] [facility]  [address] [place] [address]  [address]  [facility] [address]  Dept: [phone]  Loc: [phone]  Progress Note  8/25/2021 10:23 AM       TELEHEALTH EVALUATION -- Audio/Visual (During [id number]-[id number] public health emergency)     Telehealth service was provided with the patient at her room and myself the physician in my office in [place], [place] and my assistant, Rubia Niño, who has initiated the visit. Pursuant to the emergency declaration under the [address] [place] [address], [facility] waiver authority and the [employer] and [employer] Act, this Virtual  Visit was conducted, with patient's consent, to reduce the patient's risk of exposure to COVID-19 and provide continuity of care for an established patient. Services were provided through a video synchronous discussion virtually to substitute for in-person clinic visit. Pt Name:    Rebecca Willson  YOB: 1945  Primary Care Physician:  Jayme Carrillo MD     Chief Complaint:   Chief Complaint   Patient presents with    Follow-up        History of Present Illness: This is a follow up visit for CKD IV. She has hx of DM, HTN, HFpEF (EF 55%), Afib, CAD/PCI. Volume status is stable, on bumex 1 mg BID. Weights staying stable. Has occasional SOB but states it doesn't last long. She will take metolazone on PRN basis, has needed it twice in past 2 months. BP is controlled. Pertinent items are noted in HPI.          Past History:  Past Medical History:   Diagnosis Date    Aortic aneurysm (HCC)     4.5 cm aorta    Arthritis     Atrial fibrillation, chronic ([address] 1/25/2019    CAD (coronary artery disease)     CHF NYHA class II, unspecified failure chronicity, unspecified type ([address] 10/8/2018    Colon polyps 3/01    Diabetic peripheral neuropathy ([address] 2014      feet    Elbow fracture     Fluid overload 2021    Hyperlipidemia     Hypertension     Hypothyroidism     Pulmonary nodule, right 10/2017      repeat  ct of  chest      S/P cardiac pacemaker procedure: 10/21/2017: Medtronic Dual Chamber. 10/21/2017    10/21/2017: Medtronic Dual Chamber. Dr. Viry Medeiros Systolic CHF, acute on chronic ([facility] 6/10/2021    Type II or unspecified type diabetes mellitus without mention of complication, not stated as uncontrolled      Past Surgical History:   Procedure Laterality Date    BREAST BIOPSY Right 2016     benign   abrahan    BREAST SURGERY      CARDIOVERSION  2019    atrial fib to sinus  baki     SECTION  over 30 years ago     COLONOSCOPY      colon polyps   [place]    CORONARY ANGIOPLASTY      baki    CORONARY ANGIOPLASTY WITH STENT PLACEMENT      baki    CORONARY ANGIOPLASTY WITH STENT PLACEMENT  10/2018     lad branch    HEEL SPUR SURGERY Bilateral     bilat with hardware    HYSTERECTOMY      KNEE ARTHROSCOPY  ; 3/02    right and left knee    KNEE ARTHROSCOPY Bilateral     bilat    PACEMAKER PLACEMENT  10/2017    THYROIDECTOMY, PARTIAL  1980's        VITALS:  There were no vitals taken for this visit. Blood pressure 119/68, HR 86, weight 244 pounds  Wt Readings from Last 3 Encounters:   21 247 lb 12.8 oz (112.4 kg)   07/15/21 247 lb (112 kg)   21 248 lb (112.5 kg)     There is no height or weight on file to calculate BMI. General -- no distress  Oral Mucosa -- moist  Neck --  JVD - no  Extremities -- no edema, moves all extremeties  CNS - awake and alert   Pschy - not agitated, mood and memory normal    Due to this being a TeleHealth encounter, evaluation of the following organ systems is limited: Vitals/EENT/Resp/CV/GI//MS/Neuro/Skin/Heme-Lymph-Imm.   Medications:  Current Outpatient Medications   Medication Sig Dispense Refill    glimepiride (AMARYL) 4 MG tablet Take 1 tablet by mouth once daily 90 tablet 0    clopidogrel (PLAVIX) 75 MG tablet Take 1 tablet by mouth once daily 90 tablet 0    amiodarone (CORDARONE) 200 MG tablet Take 1 tablet by mouth daily 90 tablet 1    bumetanide (BUMEX) 1 MG tablet Take 1 tablet by mouth 2 times daily 180 tablet 1    gabapentin (NEURONTIN) 300 MG capsule TAKE 1 CAPSULE BY MOUTH ONCE DAILY IN THE EVENING 30 capsule 5    levothyroxine (SYNTHROID) 150 MCG tablet Take 1 tablet by mouth once daily 90 tablet 0    metOLazone (ZAROXOLYN) 5 MG tablet Take 1 tablet by mouth daily As  Needed 10 tablet 0    amLODIPine (NORVASC) 10 MG tablet Take 1 tablet by mouth once daily 90 tablet 0    metoprolol tartrate (LOPRESSOR) 50 MG tablet Take 1 tablet by mouth twice daily 180 tablet 0    potassium chloride (KLOR-CON) 10 MEQ extended release tablet Take 1 tablet by mouth daily (with breakfast) 60 tablet 3    ELIQUIS 5 MG TABS tablet Take 1 tablet by mouth twice daily 60 tablet 5    atorvastatin (LIPITOR) 40 MG tablet Take 1 tablet by mouth once daily 90 tablet 1    levothyroxine (SYNTHROID) 125 MCG tablet Take 1 tablet by mouth daily 90 tablet 1    Flaxseed, Linseed, (FLAX SEEDS PO) Take by mouth      nitroGLYCERIN (NITROSTAT) 0.4 MG SL tablet Place 1 tablet under the tongue every 5 minutes as needed for Chest pain up to max of 3 total doses. If no relief after 1 dose, call 911. 25 tablet 1    Glucose Blood (JOSE E BREEZE 2 TEST) DISK USE ONE STRIP TWICE DAILY, DX: E11.9 200 each 5    Ascorbic Acid (VITAMIN C) 500 MG tablet Take 500 mg by mouth daily        No current facility-administered medications for this visit.         Laboratory & Diagnostics:  CBC:   Lab Results   Component Value Date    WBC 8.1 08/18/2021    HGB 11.3 (L) 08/18/2021    HCT 37.5 08/18/2021    MCV 94.2 08/18/2021     08/18/2021     BMP:    Lab Results   Component Value Date     08/18/2021     06/17/2021     06/11/2021    K 4.7 08/18/2021    K 4.3 06/17/2021    K 4.1 06/11/2021    CL 98 08/18/2021     06/17/2021 CL 97 (L) 06/11/2021    CO2 30 08/18/2021    CO2 28 06/17/2021    CO2 29 06/11/2021    BUN 29 (H) 08/18/2021    BUN 39 (H) 06/17/2021    BUN 28 (H) 06/11/2021    CREATININE 1.9 (H) 08/18/2021    CREATININE 2.1 (H) 06/17/2021    CREATININE 2.0 (H) 06/11/2021    GLUCOSE 271 (H) 08/18/2021    GLUCOSE 224 (A) 07/15/2021    GLUCOSE 153 (H) 06/11/2021      Hepatic:   Lab Results   Component Value Date    AST 14 06/08/2021    AST 21 06/01/2021    AST 13 05/28/2021    ALT 14 06/08/2021    ALT 18 06/01/2021    ALT 15 05/28/2021    BILITOT 0.9 06/08/2021    BILITOT 0.7 06/01/2021    BILITOT 0.4 05/28/2021    ALKPHOS 70 06/08/2021    ALKPHOS 64 06/01/2021    ALKPHOS 64 05/28/2021     BNP: No results found for: BNP  Lipids:   Lab Results   Component Value Date    CHOL 208 (H) 06/01/2021    HDL 62 06/01/2021     INR:   Lab Results   Component Value Date    INR 1.41 (H) 02/08/2019    INR 1.10 10/12/2018    INR 1.00 10/21/2017     URINE:   Lab Results   Component Value Date    NAUR 110 06/09/2021    PROTUR 4.9 06/09/2021     Lab Results   Component Value Date    NITRU NEGATIVE 06/09/2021    COLORU YELLOW 06/09/2021    PHUR 5.5 06/09/2021    WBCUA 5-9 06/09/2021    RBCUA NONE SEEN 06/09/2021    YEAST NONE SEEN 06/09/2021    BACTERIA NONE SEEN 06/09/2021    LEUKOCYTESUR SMALL 06/09/2021    UROBILINOGEN 0.2 06/09/2021    BILIRUBINUR NEGATIVE 06/09/2021    BLOODU NEGATIVE 06/09/2021    GLUCOSEU 100 06/09/2021    KETUA NEGATIVE 06/09/2021      Microalbumen/Creatinine ratio:  No components found for: RUCREAT        Impression/Plan:   1. CKD IV: cardiorenal syndrome, diabetic nephropathy: stable . Goals of care include slowing rate of progression by controlling blood pressure, blood glucoses and albuminuria and by avoiding nephrotoxins such as NSAIDs and IV contrast.    2. Diastolic CHF: volume status stable, on bumex 1 mg BID  3. Lytes: K 4.7. on kcl 10 meq daily  4. HTN: stable  5. DM, no microalbuminuria  6. Anemia, improved  7. CAD        Bloodwork and medications were reviewed and plan of care discussed with the patient. Return to clinic in 4 months  or sooner if the need arises.       Cal Fountain,   [facility]

## 2021-09-16 ENCOUNTER — OFFICE VISIT (OUTPATIENT)
Dept: INTERNAL MEDICINE CLINIC | Age: 76
End: 2021-09-16
Payer: MEDICARE

## 2021-09-16 VITALS
DIASTOLIC BLOOD PRESSURE: 72 MMHG | SYSTOLIC BLOOD PRESSURE: 132 MMHG | WEIGHT: 249.8 LBS | BODY MASS INDEX: 39.21 KG/M2 | HEIGHT: 67 IN | TEMPERATURE: 97.7 F

## 2021-09-16 DIAGNOSIS — E11.9 TYPE 2 DIABETES MELLITUS WITHOUT COMPLICATION, WITHOUT LONG-TERM CURRENT USE OF INSULIN (HCC): ICD-10-CM

## 2021-09-16 PROCEDURE — G0108 DIAB MANAGE TRN  PER INDIV: HCPCS | Performed by: REGISTERED NURSE

## 2021-09-16 NOTE — PATIENT INSTRUCTIONS
Try to begin exercise program with stretch bands 2 times per day            When able--10 -15 minutes            --sitting in the chair             -- after meals is best, but when ever able to do it  2-3 meals per day.             Recommend 3 servings of carbohydrate at each meal                         --limit any snack to only 1 carbohydrate serving               Even better is only 2 servings carbohydrate per meal           Avoid any beverages with sugar in them  Keep checking blood sugars every day                 4 days-check in the morning and 3 days check before dinner                 Or bedtime              --goal for your blood sugar

## 2021-09-16 NOTE — PROGRESS NOTES
stretch bands 2 times per day            When able--10 -15 minutes            --sitting in the chair             -- after meals is best, but when ever able to do it  2-3 meals per day. Recommend 3 servings of carbohydrate at each meal                         --limit any snack to only 1 carbohydrate serving               Even better is only 2 servings carbohydrate per meal           Avoid any beverages with sugar in them  Keep checking blood sugars every day                 4 days-check in the morning and 3 days check before dinner                 Or bedtime              --goal for your blood sugar   Adam Singh voices understanding of above instructions via teach back and willingness to participate in the above plan of care. Time spent in direct contact with patient 60 minutes.

## 2021-09-21 ENCOUNTER — TELEPHONE (OUTPATIENT)
Dept: FAMILY MEDICINE CLINIC | Age: 76
End: 2021-09-21

## 2021-09-21 DIAGNOSIS — E11.9 TYPE 2 DIABETES MELLITUS WITHOUT COMPLICATION, WITHOUT LONG-TERM CURRENT USE OF INSULIN (HCC): ICD-10-CM

## 2021-09-21 DIAGNOSIS — E11.42 DIABETIC PERIPHERAL NEUROPATHY (HCC): Primary | ICD-10-CM

## 2021-09-22 ENCOUNTER — TELEPHONE (OUTPATIENT)
Dept: INTERNAL MEDICINE CLINIC | Age: 76
End: 2021-09-22

## 2021-09-23 NOTE — TELEPHONE ENCOUNTER
Barb Argueta informed by Phone.  Will check with her Insurance and let us know which one is covered at a cheaper cost.

## 2021-09-23 NOTE — TELEPHONE ENCOUNTER
Pt called back stating both meds appear to be around the same price. She is checking to make sure dose differences would cost the same and will call us back.

## 2021-09-23 NOTE — TELEPHONE ENCOUNTER
Diabetes education. Recent A1C 7.4%. Medication: Glimepiride 4mg BID  We have reviewed and set carbohydrate goals and she follows with the dietician 10/4/21. We have set an exercise goal as she is currently fairly inactive. We reviewed the medication options for Diabetes and benefits.  Suzette Martinez could benefit from GLP1 therapy, if it is affordable for her,

## 2021-09-23 NOTE — TELEPHONE ENCOUNTER
agree and will prescribe and just see if this or ozempic are best for cost  For here .  We can give samples to get  started

## 2021-09-24 DIAGNOSIS — I10 ESSENTIAL HYPERTENSION: ICD-10-CM

## 2021-09-24 NOTE — TELEPHONE ENCOUNTER
Date of last visit:  7/15/2021  Date of next visit:  10/22/2021    Requested Prescriptions     Pending Prescriptions Disp Refills    metoprolol tartrate (LOPRESSOR) 50 MG tablet [Pharmacy Med Name: Metoprolol Tartrate 50 MG Oral Tablet] 180 tablet 1     Sig: Take 1 tablet by mouth twice daily

## 2021-09-25 RX ORDER — METOPROLOL TARTRATE 50 MG/1
TABLET, FILM COATED ORAL
Qty: 180 TABLET | Refills: 1 | Status: SHIPPED | OUTPATIENT
Start: 2021-09-25 | End: 2022-03-30

## 2021-09-27 RX ORDER — DULAGLUTIDE 0.75 MG/.5ML
0.75 INJECTION, SOLUTION SUBCUTANEOUS WEEKLY
Qty: 2 PEN | Refills: 3 | Status: SHIPPED | OUTPATIENT
Start: 2021-09-27 | End: 2022-01-21

## 2021-10-20 DIAGNOSIS — I10 ESSENTIAL HYPERTENSION: ICD-10-CM

## 2021-10-20 DIAGNOSIS — E11.9 TYPE 2 DIABETES MELLITUS WITHOUT COMPLICATION, WITHOUT LONG-TERM CURRENT USE OF INSULIN (HCC): ICD-10-CM

## 2021-10-20 NOTE — TELEPHONE ENCOUNTER
Date of last visit:  7/15/2021  Date of next visit:  10/22/2021    Requested Prescriptions     Pending Prescriptions Disp Refills    glimepiride (AMARYL) 4 MG tablet [Pharmacy Med Name: Glimepiride 4 MG Oral Tablet] 180 tablet 1     Sig: Take 1 tablet by mouth 2 times daily

## 2021-10-21 ENCOUNTER — NURSE ONLY (OUTPATIENT)
Dept: LAB | Age: 76
End: 2021-10-21

## 2021-10-21 DIAGNOSIS — N18.4 CKD (CHRONIC KIDNEY DISEASE) STAGE 4, GFR 15-29 ML/MIN (HCC): ICD-10-CM

## 2021-10-21 LAB
ANION GAP SERPL CALCULATED.3IONS-SCNC: 14 MEQ/L (ref 8–16)
BUN BLDV-MCNC: 29 MG/DL (ref 7–22)
CALCIUM SERPL-MCNC: 9.1 MG/DL (ref 8.5–10.5)
CHLORIDE BLD-SCNC: 100 MEQ/L (ref 98–111)
CO2: 29 MEQ/L (ref 23–33)
CREAT SERPL-MCNC: 2.3 MG/DL (ref 0.4–1.2)
ERYTHROCYTE [DISTWIDTH] IN BLOOD BY AUTOMATED COUNT: 14.6 % (ref 11.5–14.5)
ERYTHROCYTE [DISTWIDTH] IN BLOOD BY AUTOMATED COUNT: 48.4 FL (ref 35–45)
GFR SERPL CREATININE-BSD FRML MDRD: 21 ML/MIN/1.73M2
GLUCOSE BLD-MCNC: 163 MG/DL (ref 70–108)
HCT VFR BLD CALC: 39.1 % (ref 37–47)
HEMOGLOBIN: 12.3 GM/DL (ref 12–16)
MCH RBC QN AUTO: 28.9 PG (ref 26–33)
MCHC RBC AUTO-ENTMCNC: 31.5 GM/DL (ref 32.2–35.5)
MCV RBC AUTO: 91.8 FL (ref 81–99)
PHOSPHORUS: 4.6 MG/DL (ref 2.4–4.7)
PLATELET # BLD: 309 THOU/MM3 (ref 130–400)
PMV BLD AUTO: 11.1 FL (ref 9.4–12.4)
POTASSIUM SERPL-SCNC: 4.5 MEQ/L (ref 3.5–5.2)
RBC # BLD: 4.26 MILL/MM3 (ref 4.2–5.4)
SODIUM BLD-SCNC: 143 MEQ/L (ref 135–145)
VITAMIN D 25-HYDROXY: 28 NG/ML (ref 30–100)
WBC # BLD: 7.6 THOU/MM3 (ref 4.8–10.8)

## 2021-10-21 RX ORDER — GLIMEPIRIDE 4 MG/1
4 TABLET ORAL 2 TIMES DAILY
Qty: 180 TABLET | Refills: 1 | Status: SHIPPED | OUTPATIENT
Start: 2021-10-21 | End: 2022-02-09 | Stop reason: DRUGHIGH

## 2021-10-21 RX ORDER — AMLODIPINE BESYLATE 10 MG/1
TABLET ORAL
Qty: 90 TABLET | Refills: 0 | Status: SHIPPED | OUTPATIENT
Start: 2021-10-21 | End: 2022-01-21

## 2021-10-22 ENCOUNTER — OFFICE VISIT (OUTPATIENT)
Dept: FAMILY MEDICINE CLINIC | Age: 76
End: 2021-10-22

## 2021-10-22 ENCOUNTER — TELEPHONE (OUTPATIENT)
Dept: NEPHROLOGY | Age: 76
End: 2021-10-22

## 2021-10-22 VITALS
RESPIRATION RATE: 16 BRPM | HEART RATE: 92 BPM | HEIGHT: 67 IN | SYSTOLIC BLOOD PRESSURE: 126 MMHG | BODY MASS INDEX: 39.26 KG/M2 | WEIGHT: 250.13 LBS | TEMPERATURE: 96 F | DIASTOLIC BLOOD PRESSURE: 78 MMHG

## 2021-10-22 DIAGNOSIS — E03.4 HYPOTHYROIDISM DUE TO ACQUIRED ATROPHY OF THYROID: ICD-10-CM

## 2021-10-22 DIAGNOSIS — I50.33 ACUTE ON CHRONIC DIASTOLIC CONGESTIVE HEART FAILURE (HCC): ICD-10-CM

## 2021-10-22 DIAGNOSIS — I10 PRIMARY HYPERTENSION: ICD-10-CM

## 2021-10-22 DIAGNOSIS — Z95.0 S/P CARDIAC PACEMAKER PROCEDURE: ICD-10-CM

## 2021-10-22 DIAGNOSIS — I25.10 CORONARY ARTERY DISEASE INVOLVING NATIVE CORONARY ARTERY OF NATIVE HEART WITHOUT ANGINA PECTORIS: ICD-10-CM

## 2021-10-22 DIAGNOSIS — E11.42 DIABETIC PERIPHERAL NEUROPATHY (HCC): ICD-10-CM

## 2021-10-22 DIAGNOSIS — I50.41 CHF (CONGESTIVE HEART FAILURE), NYHA CLASS I, ACUTE, COMBINED (HCC): ICD-10-CM

## 2021-10-22 DIAGNOSIS — I44.39 HIGH-GRADE ATRIOVENTRICULAR BLOCK: ICD-10-CM

## 2021-10-22 DIAGNOSIS — Z23 NEED FOR INFLUENZA VACCINATION: ICD-10-CM

## 2021-10-22 DIAGNOSIS — E11.9 TYPE 2 DIABETES MELLITUS WITHOUT COMPLICATION, WITHOUT LONG-TERM CURRENT USE OF INSULIN (HCC): Primary | ICD-10-CM

## 2021-10-22 DIAGNOSIS — N18.4 CKD (CHRONIC KIDNEY DISEASE) STAGE 4, GFR 15-29 ML/MIN (HCC): Primary | ICD-10-CM

## 2021-10-22 DIAGNOSIS — E78.00 PURE HYPERCHOLESTEROLEMIA: ICD-10-CM

## 2021-10-22 LAB
CHP ED QC CHECK: ABNORMAL
GLUCOSE BLD-MCNC: 187 MG/DL
HBA1C MFR BLD: 7.7 %

## 2021-10-22 PROCEDURE — 82962 GLUCOSE BLOOD TEST: CPT | Performed by: FAMILY MEDICINE

## 2021-10-22 PROCEDURE — 83036 HEMOGLOBIN GLYCOSYLATED A1C: CPT | Performed by: FAMILY MEDICINE

## 2021-10-22 PROCEDURE — 90688 IIV4 VACCINE SPLT 0.5 ML IM: CPT | Performed by: FAMILY MEDICINE

## 2021-10-22 PROCEDURE — 99213 OFFICE O/P EST LOW 20 MIN: CPT | Performed by: FAMILY MEDICINE

## 2021-10-22 PROCEDURE — G0008 ADMIN INFLUENZA VIRUS VAC: HCPCS | Performed by: FAMILY MEDICINE

## 2021-10-22 ASSESSMENT — ENCOUNTER SYMPTOMS
WHEEZING: 0
ORTHOPNEA: 0
COUGH: 0
EYE PAIN: 0
SORE THROAT: 0
CHEST TIGHTNESS: 0
SHORTNESS OF BREATH: 0
CONSTIPATION: 0
ABDOMINAL PAIN: 0
NAUSEA: 0

## 2021-10-22 NOTE — TELEPHONE ENCOUNTER
----- Message from Madison Marc DO sent at 10/22/2021 12:29 PM EDT -----  Creatinine is up slightly at 2.3. If her swelling is stable then reduce bumex to once daily for the next 3 days and then can go back to bid.    Repeat a bmp again in 2 weeks  ----- Message -----  From: Lady Velez Incoming Lab Results From Soft  Sent: 10/21/2021   4:33 PM EDT  To: Madison Marc DO

## 2021-10-22 NOTE — PROGRESS NOTES
chronic ([facility] 6/10/2021    Type II or unspecified type diabetes mellitus without mention of complication, not stated as uncontrolled 2005       Review of Systems   Constitutional: Negative for appetite change, fatigue and fever. HENT: Negative for congestion, ear pain, postnasal drip and sore throat. Eyes: Negative for pain and visual disturbance. Respiratory: Negative for cough, chest tightness, shortness of breath and wheezing. Cardiovascular: Negative for chest pain, palpitations, orthopnea and leg swelling. Gastrointestinal: Negative for abdominal pain, constipation and nausea. Genitourinary: Negative for dysuria and frequency. Musculoskeletal: Negative for arthralgias, joint swelling, neck pain and neck stiffness. Skin: Negative for rash. Neurological: Negative for dizziness, weakness, numbness and headaches. Hematological: Negative for adenopathy. Does not bruise/bleed easily. Psychiatric/Behavioral: Negative for behavioral problems and sleep disturbance. The patient is not nervous/anxious. /78 (Site: Right Upper Arm, Position: Sitting, Cuff Size: Medium Adult)   Pulse 92   Temp 96 °F (35.6 °C) (Infrared)   Resp 16   Ht 5' 7\" (1.702 m)   Wt 250 lb 2 oz (113.5 kg)   BMI 39.18 kg/m²   Objective:   Physical Exam  Vitals and nursing note reviewed. Constitutional:       Appearance: She is well-developed. HENT:      Head: Normocephalic and atraumatic. Right Ear: External ear normal.      Left Ear: External ear normal.      Nose: Nose normal.   Eyes:      General: No scleral icterus. Conjunctiva/sclera: Conjunctivae normal.      Pupils: Pupils are equal, round, and reactive to light. Neck:      Thyroid: No thyromegaly. Vascular: No JVD. Cardiovascular:      Rate and Rhythm: Normal rate and regular rhythm. Heart sounds: Normal heart sounds. Pulmonary:      Effort: Pulmonary effort is normal.      Breath sounds: Normal breath sounds.  No wheezing or rales.   Abdominal:      General: Bowel sounds are normal. There is no distension. Palpations: Abdomen is soft. There is no mass. Tenderness: There is no abdominal tenderness. Musculoskeletal:         General: No tenderness. Normal range of motion. Cervical back: Normal range of motion and neck supple. Lymphadenopathy:      Cervical: No cervical adenopathy. Skin:     General: Skin is warm and dry. Findings: No rash. Neurological:      Mental Status: She is alert and oriented to person, place, and time. Cranial Nerves: No cranial nerve deficit. Deep Tendon Reflexes: Reflexes are normal and symmetric. Assessment:       Diagnosis Orders   1. Type 2 diabetes mellitus without complication, without long-term current use of insulin (HCC)  POCT Glucose    POCT glycosylated hemoglobin (Hb A1C)   2. Need for influenza vaccination  INFLUENZA, QUADV, 3 YRS AND OLDER, IM, MDV, 0.5ML (AFLURIA QUADV)   3. High-grade atrioventricular block     4. Acute on chronic diastolic congestive heart failure ([address]     5. Hypothyroidism due to acquired atrophy of thyroid     6. CHF (congestive heart failure), NYHA class I, acute, combined ([address]     7. Diabetic peripheral neuropathy ([address]     8. Pure hypercholesterolemia     9. S/P cardiac pacemaker procedure: 10/21/2017: Medtronic Dual Chamber. 10. Coronary artery disease involving native coronary artery of native heart without angina pectoris     11.  Primary hypertension           Plan:       Current Outpatient Medications   Medication Sig Dispense Refill    amLODIPine (NORVASC) 10 MG tablet Take 1 tablet by mouth once daily 90 tablet 0    glimepiride (AMARYL) 4 MG tablet Take 1 tablet by mouth 2 times daily 180 tablet 1    Dulaglutide (TRULICITY) 1.93 HT/9.6FP SOPN Inject 0.75 mg into the skin once a week 2 pen 3    metoprolol tartrate (LOPRESSOR) 50 MG tablet Take 1 tablet by mouth twice daily 180 tablet 1    clopidogrel (PLAVIX) 75 MG tablet Take 1 tablet by mouth once daily 90 tablet 0    amiodarone (CORDARONE) 200 MG tablet Take 1 tablet by mouth daily 90 tablet 1    bumetanide (BUMEX) 1 MG tablet Take 1 tablet by mouth 2 times daily 180 tablet 1    gabapentin (NEURONTIN) 300 MG capsule TAKE 1 CAPSULE BY MOUTH ONCE DAILY IN THE EVENING 30 capsule 5    metOLazone (ZAROXOLYN) 5 MG tablet Take 5 mg by mouth daily As  Needed 10 tablet 0    potassium chloride (KLOR-CON) 10 MEQ extended release tablet Take 1 tablet by mouth daily (with breakfast) 60 tablet 3    ELIQUIS 5 MG TABS tablet Take 1 tablet by mouth twice daily 60 tablet 5    atorvastatin (LIPITOR) 40 MG tablet Take 1 tablet by mouth once daily 90 tablet 1    levothyroxine (SYNTHROID) 125 MCG tablet Take 1 tablet by mouth daily 90 tablet 1    Flaxseed, Linseed, (FLAX SEEDS PO) Take by mouth      nitroGLYCERIN (NITROSTAT) 0.4 MG SL tablet Place 1 tablet under the tongue every 5 minutes as needed for Chest pain up to max of 3 total doses. If no relief after 1 dose, call 911. 25 tablet 1    Glucose Blood (JOSE E BREEZE 2 TEST) DISK USE ONE STRIP TWICE DAILY, DX: E11.9 200 each 5    Ascorbic Acid (VITAMIN C) 500 MG tablet Take 500 mg by mouth daily        No current facility-administered medications for this visit. Orders Placed This Encounter   Procedures    INFLUENZA, QUADV, 3 YRS AND OLDER, IM, MDV, 0.5ML (AFLURIA QUADV)    POCT Glucose    POCT glycosylated hemoglobin (Hb A1C)     Results for orders placed or performed in visit on 10/22/21   POCT Glucose   Result Value Ref Range    Glucose 187 (A) mg/dL    QC OK?      POCT glycosylated hemoglobin (Hb A1C)   Result Value Ref Range    Hemoglobin A1C 7.7 (A) %     Lucio Weaver received counseling on the following healthy behaviors: nutrition and exercise    Patient given educational materials on Diabetes and Hyperlipidemia    I have instructed Lucio Weaver to complete a self tracking handout on Blood Sugars  and Blood Pressures  and instructed them to bring it with them to her next appointment. Discussed use, benefit, and side effects of prescribed medications. Barriers to medication compliance addressed. All patient questions answered. Pt voiced understanding.             See in  3 mths      Cassie Dobbs MD

## 2021-10-22 NOTE — PROGRESS NOTES
Immunizations Administered     Name Date Dose Route    Influenza, Quadv, IM, (6 mo and older Fluzone, Flulaval, Fluarix and 3 yrs and older Afluria) 10/22/2021 0.5 mL Intramuscular    Site: Deltoid- Right    Lot: [id number]    NDC: 26785-016-24

## 2021-11-16 DIAGNOSIS — I48.0 PAROXYSMAL ATRIAL FIBRILLATION (HCC): ICD-10-CM

## 2021-11-16 NOTE — TELEPHONE ENCOUNTER
Date of last visit:  10/22/2021  Date of next visit:  1/25/2022    Requested Prescriptions     Pending Prescriptions Disp Refills    ELIQUIS 5 MG TABS tablet [Pharmacy Med Name: Eliquis 5 MG Oral Tablet] 60 tablet 3     Sig: Take 1 tablet by mouth twice daily

## 2021-11-17 RX ORDER — APIXABAN 5 MG/1
TABLET, FILM COATED ORAL
Qty: 60 TABLET | Refills: 3 | Status: SHIPPED | OUTPATIENT
Start: 2021-11-17 | End: 2022-03-30

## 2021-11-29 ENCOUNTER — PROCEDURE VISIT (OUTPATIENT)
Dept: CARDIOLOGY CLINIC | Age: 76
End: 2021-11-29
Payer: MEDICARE

## 2021-11-29 DIAGNOSIS — Z95.0 S/P CARDIAC PACEMAKER PROCEDURE: Primary | ICD-10-CM

## 2021-11-29 PROCEDURE — 93296 REM INTERROG EVL PM/IDS: CPT | Performed by: NUCLEAR MEDICINE

## 2021-11-29 PROCEDURE — 93294 REM INTERROG EVL PM/LDLS PM: CPT | Performed by: NUCLEAR MEDICINE

## 2021-11-29 NOTE — PROGRESS NOTES
[employer] Medtronic Dual Pacemaker --  Patient of [facility]    Battery 3.5-5.5 years    Presenting rhythm AP     A Impedance 361  RV Impedance 494    P wave sensing 0.6  R wave sensing 15.3    A Threshold - @ -  A Amplitude 1.5 @ 0.40  RV Thresholds 0.750 @ 0.40  RV Amplitude 2.0 @ 0.40      A Paced 99.3%  V Paced 100%    Programmed Mode DDDR       Afib [place] 0%    Episodes none

## 2021-12-09 DIAGNOSIS — I25.10 CORONARY ARTERY DISEASE INVOLVING NATIVE CORONARY ARTERY OF NATIVE HEART WITHOUT ANGINA PECTORIS: ICD-10-CM

## 2021-12-09 NOTE — TELEPHONE ENCOUNTER
Date of last visit:  10/22/2021  Date of next visit:  1/25/2022    Requested Prescriptions     Pending Prescriptions Disp Refills    atorvastatin (LIPITOR) 40 MG tablet [Pharmacy Med Name: Atorvastatin Calcium 40 MG Oral Tablet] 90 tablet 0     Sig: Take 1 tablet by mouth once daily    clopidogrel (PLAVIX) 75 MG tablet [Pharmacy Med Name: Clopidogrel Bisulfate 75 MG Oral Tablet] 90 tablet 0     Sig: Take 1 tablet by mouth once daily

## 2021-12-10 ENCOUNTER — OFFICE VISIT (OUTPATIENT)
Dept: CARDIOLOGY CLINIC | Age: 76
End: 2021-12-10
Payer: MEDICARE

## 2021-12-10 ENCOUNTER — NURSE ONLY (OUTPATIENT)
Dept: CARDIOLOGY CLINIC | Age: 76
End: 2021-12-10
Payer: MEDICARE

## 2021-12-10 VITALS
DIASTOLIC BLOOD PRESSURE: 79 MMHG | HEART RATE: 83 BPM | WEIGHT: 241.8 LBS | BODY MASS INDEX: 37.95 KG/M2 | HEIGHT: 67 IN | SYSTOLIC BLOOD PRESSURE: 136 MMHG

## 2021-12-10 DIAGNOSIS — I25.10 CORONARY ARTERY DISEASE INVOLVING NATIVE CORONARY ARTERY OF NATIVE HEART WITHOUT ANGINA PECTORIS: Primary | ICD-10-CM

## 2021-12-10 DIAGNOSIS — Z95.0 S/P CARDIAC PACEMAKER PROCEDURE: Primary | ICD-10-CM

## 2021-12-10 DIAGNOSIS — I10 PRIMARY HYPERTENSION: ICD-10-CM

## 2021-12-10 DIAGNOSIS — Z95.0 PACEMAKER: ICD-10-CM

## 2021-12-10 PROCEDURE — 99214 OFFICE O/P EST MOD 30 MIN: CPT | Performed by: NUCLEAR MEDICINE

## 2021-12-10 PROCEDURE — 93280 PM DEVICE PROGR EVAL DUAL: CPT | Performed by: NUCLEAR MEDICINE

## 2021-12-10 RX ORDER — ATORVASTATIN CALCIUM 40 MG/1
TABLET, FILM COATED ORAL
Qty: 90 TABLET | Refills: 0 | Status: SHIPPED | OUTPATIENT
Start: 2021-12-10 | End: 2022-03-08

## 2021-12-10 RX ORDER — CLOPIDOGREL BISULFATE 75 MG/1
TABLET ORAL
Qty: 90 TABLET | Refills: 0 | Status: SHIPPED | OUTPATIENT
Start: 2021-12-10 | End: 2022-02-09 | Stop reason: SDUPTHER

## 2021-12-10 NOTE — PROGRESS NOTES
DR Юлия Rose PT / KNOWN AFIB/ ELIQUI S  AFIB BURDEN 0%    MEDTRONIC DUAL PACEMAKER CHECK IN OFFICE   BATTERY 3.5-5.5 YRS REMAINING      PRESENTS IN [facility] 72  UNDERLYING AS VS VERLY SLOW     P WAVES 0.5  RV WAVES 15.3    A PACED 99.3%  V PACED 100%      ATRIAL THRESHOLD 1 @ 0.4  VENT THRESHOLD 1 @ 0.4   NO EPISODES   ATRIAL AND VENT AMPLITUDES ADAPTIVE

## 2021-12-10 NOTE — PROGRESS NOTES
[address] [place] [address] [address]  [address]  [facility] [address]  Dept: [phone]  Dept Fax: [phone]  Loc: [phone]    Visit Date: 12/10/2021    Prashant Montero is a 78 y.o. female who presents todayfor:  Chief Complaint   Patient presents with    Coronary Artery Disease    Hypertension    Atrial Fibrillation     Know CAd and pacer  Known A fib as well  No chest pain  No changes in breathing  No dizziness  No syncope  Bp is stable   No dizziness  No syncope  On statins for hyperlipidemia  No tolerance issues  No bleeding with eliquis       HPI:  HPI  Past Medical History:   Diagnosis Date    Aortic aneurysm (HCC)     4.5 cm aorta    Arthritis     Atrial fibrillation, chronic ([address] 2019    CAD (coronary artery disease)     CHF NYHA class II, unspecified failure chronicity, unspecified type ([address] 10/8/2018    Colon polyps 3/01    Diabetic peripheral neuropathy ([address]       feet    Elbow fracture     Fluid overload 2021    Hyperlipidemia     Hypertension     Hypothyroidism     Pulmonary nodule, right 10/2017      repeat  ct of  chest      S/P cardiac pacemaker procedure: 10/21/2017: Medtronic Dual Chamber. 10/21/2017    10/21/2017: Medtronic Dual Chamber.  Dr. Amy Rogers Systolic CHF, acute on chronic ([address] 6/10/2021    Type II or unspecified type diabetes mellitus without mention of complication, not stated as uncontrolled       Past Surgical History:   Procedure Laterality Date    BREAST BIOPSY Right 2016     benign   abrahan    BREAST SURGERY      CARDIOVERSION  2019    atrial fib to sinus  [facility]     SECTION  over 30 years ago     COLONOSCOPY  2012    colon polyps   [place]    CORONARY ANGIOPLASTY      [facility]aki    CORONARY ANGIOPLASTY WITH STENT PLACEMENT      baki    CORONARY ANGIOPLASTY WITH STENT PLACEMENT  10/2018     lad branch    HEEL SPUR SURGERY Bilateral     bilat with hardware    HYSTERECTOMY  1984    KNEE ARTHROSCOPY  11/01; 3/02    right and left knee    KNEE ARTHROSCOPY Bilateral     bilat    PACEMAKER PLACEMENT  10/2017    THYROIDECTOMY, PARTIAL  1980's     Family History   Problem Relation Age of Onset    Heart Disease Mother 80        bacterial infection at valve    Heart Disease Father     Heart Attack Father     Heart Disease Brother         CABG     Social History     Tobacco Use    Smoking status: Never Smoker    Smokeless tobacco: Never Used   Substance Use Topics    Alcohol use: No      Current Outpatient Medications   Medication Sig Dispense Refill    atorvastatin (LIPITOR) 40 MG tablet Take 1 tablet by mouth once daily 90 tablet 0    clopidogrel (PLAVIX) 75 MG tablet Take 1 tablet by mouth once daily 90 tablet 0    ELIQUIS 5 MG TABS tablet Take 1 tablet by mouth twice daily 60 tablet 3    amLODIPine (NORVASC) 10 MG tablet Take 1 tablet by mouth once daily 90 tablet 0    glimepiride (AMARYL) 4 MG tablet Take 1 tablet by mouth 2 times daily 180 tablet 1    Dulaglutide (TRULICITY) 3.81 OO/2.2UJ SOPN Inject 0.75 mg into the skin once a week 2 pen 3    metoprolol tartrate (LOPRESSOR) 50 MG tablet Take 1 tablet by mouth twice daily 180 tablet 1    amiodarone (CORDARONE) 200 MG tablet Take 1 tablet by mouth daily 90 tablet 1    bumetanide (BUMEX) 1 MG tablet Take 1 tablet by mouth 2 times daily 180 tablet 1    gabapentin (NEURONTIN) 300 MG capsule TAKE 1 CAPSULE BY MOUTH ONCE DAILY IN THE EVENING 30 capsule 5    metOLazone (ZAROXOLYN) 5 MG tablet Take 5 mg by mouth daily As  Needed 10 tablet 0    potassium chloride (KLOR-CON) 10 MEQ extended release tablet Take 1 tablet by mouth daily (with breakfast) 60 tablet 3    levothyroxine (SYNTHROID) 125 MCG tablet Take 1 tablet by mouth daily 90 tablet 1    Flaxseed, Linseed, (FLAX SEEDS PO) Take by mouth      nitroGLYCERIN (NITROSTAT) 0.4 MG SL tablet Place 1 tablet under the tongue every 5 minutes as needed for Chest pain up to max of 3 total doses. If no relief after 1 dose, call 911. 25 tablet 1    Glucose Blood (JOSE E BREEZE 2 TEST) DISK USE ONE STRIP TWICE DAILY, DX: E11.9 200 each 5    Ascorbic Acid (VITAMIN C) 500 MG tablet Take 500 mg by mouth daily        No current facility-administered medications for this visit. Allergies   Allergen Reactions    Adhesive Tape Rash     Health Maintenance   Topic Date Due    Shingles Vaccine (1 of 2) Never done    DEXA (modify frequency per FRAX score)  Never done    Diabetic retinal exam  03/12/2016    Diabetic foot exam  09/30/2016    Annual Wellness Visit (AWV)  11/24/2021    Lipid screen  06/01/2022    TSH testing  06/08/2022    Breast cancer screen  08/12/2022    Diabetic microalbuminuria test  08/18/2022    Potassium monitoring  10/21/2022    Creatinine monitoring  10/21/2022    A1C test (Diabetic or Prediabetic)  10/22/2022    Colon cancer screen colonoscopy  02/07/2023    DTaP/Tdap/Td vaccine (2 - Td or Tdap) 04/28/2026    Flu vaccine  Completed    Pneumococcal 65+ yrs at Risk Vaccine  Completed    COVID-19 Vaccine  Completed    Hepatitis C screen  Completed    Hepatitis A vaccine  Aged Out    Hib vaccine  Aged Out    Meningococcal (ACWY) vaccine  Aged Out       Subjective:  Review of Systems  General:   No fever, no chills, No fatigue or weight loss  Pulmonary:    some dyspnea, no wheezing  Cardiac:    Denies recent chest pain,   GI:     No nausea or vomiting, no abdominal pain  Neuro:    No dizziness or light headedness,   Musculoskeletal:  No recent active issues  Extremities:   No edema, no obvious claudication       Objective:  Physical Exam  /79   Pulse 83   Ht 5' 7\" (1.702 m)   Wt 241 lb 12.8 oz (109.7 kg)   BMI 37.87 kg/m²   General:   Well developed, well nourished  Lungs:   Clear to auscultation  Heart:    Normal S1 S2, Slight murmur.  no rubs, no gallops  Abdomen:   Soft, non tender, no organomegalies, positive bowel

## 2021-12-29 ENCOUNTER — TELEPHONE (OUTPATIENT)
Dept: FAMILY MEDICINE CLINIC | Age: 76
End: 2021-12-29

## 2021-12-29 ENCOUNTER — VIRTUAL VISIT (OUTPATIENT)
Dept: FAMILY MEDICINE CLINIC | Age: 76
End: 2021-12-29

## 2021-12-29 DIAGNOSIS — J01.90 ACUTE SINUSITIS, RECURRENCE NOT SPECIFIED, UNSPECIFIED LOCATION: Primary | ICD-10-CM

## 2021-12-29 PROCEDURE — 99213 OFFICE O/P EST LOW 20 MIN: CPT | Performed by: FAMILY MEDICINE

## 2021-12-29 RX ORDER — CEFDINIR 300 MG/1
300 CAPSULE ORAL 2 TIMES DAILY
Qty: 20 CAPSULE | Refills: 0 | Status: SHIPPED | OUTPATIENT
Start: 2021-12-29 | End: 2022-01-08

## 2021-12-29 ASSESSMENT — ENCOUNTER SYMPTOMS
DIARRHEA: 0
SHORTNESS OF BREATH: 0
SORE THROAT: 1
RHINORRHEA: 1
SINUS PRESSURE: 0
NAUSEA: 0

## 2021-12-29 NOTE — PROGRESS NOTES
Mouth/Throat: Mucous membranes are moist    External Ears [x] Normal  [] Abnormal -    Neck: [x] No visualized mass [] Abnormal -     Pulmonary/Chest: [x] Respiratory effort normal   [x] No visualized signs of difficulty breathing or respiratory distress        [] Abnormal -      Musculoskeletal:   [] Normal gait with no signs of ataxia         [x] Normal range of motion of neck        [] Abnormal -     Neurological:        [x] No Facial Asymmetry (Cranial nerve 7 motor function) (limited exam due to video visit)          [x] No gaze palsy        [] Abnormal -          Skin:        [x] No significant exanthematous lesions or discoloration noted on facial skin         [] Abnormal -            Psychiatric:       [x] Normal Affect [] Abnormal -        [x] No Hallucinations    Other pertinent observable physical exam findings:-                Macel Claude, was evaluated through a synchronous (real-time) audio-video encounter. The patient (or guardian if applicable) is aware that this is a billable service. Verbal consent to proceed has been obtained within the past 12 months. The visit was conducted pursuant to the emergency declaration under the [address] [place] [address] authority and the [employer] and [employer] General Act. Patient identification was verified, and a caregiver was present when appropriate. The patient was located in a state where the provider was credentialed to provide care. An electronic signature was used to authenticate this note.     --Juan Foster MD

## 2022-01-19 DIAGNOSIS — I10 ESSENTIAL HYPERTENSION: ICD-10-CM

## 2022-01-19 DIAGNOSIS — E11.9 TYPE 2 DIABETES MELLITUS WITHOUT COMPLICATION, WITHOUT LONG-TERM CURRENT USE OF INSULIN (HCC): ICD-10-CM

## 2022-01-19 DIAGNOSIS — E11.42 DIABETIC PERIPHERAL NEUROPATHY (HCC): ICD-10-CM

## 2022-01-19 NOTE — TELEPHONE ENCOUNTER
Date of last visit:  10/22/2021  Date of next visit:  1/25/2022    Requested Prescriptions     Pending Prescriptions Disp Refills    [facility] BV/9.0NE SOPN [Pharmacy Med Name: [facility] 7.24 ZY/9.8IR Subcutaneous Solution Pen-injector] 4 mL 0     Sig: INJECT 1 SYRINGE SUBCUTANEOUSLY ONCE A WEEK    amLODIPine (NORVASC) 10 MG tablet [Pharmacy Med Name: amLODIPine Besylate 10 MG Oral Tablet] 90 tablet 0     Sig: Take 1 tablet by mouth once daily

## 2022-01-21 RX ORDER — AMLODIPINE BESYLATE 10 MG/1
TABLET ORAL
Qty: 90 TABLET | Refills: 1 | Status: SHIPPED | OUTPATIENT
Start: 2022-01-21 | End: 2022-07-13

## 2022-01-21 RX ORDER — DULAGLUTIDE 0.75 MG/.5ML
INJECTION, SOLUTION SUBCUTANEOUS
Qty: 4 ML | Refills: 1 | Status: SHIPPED | OUTPATIENT
Start: 2022-01-21 | End: 2022-03-30

## 2022-02-07 NOTE — TELEPHONE ENCOUNTER
Date of last visit:  10/22/2021  Date of next visit:  2/9/2022    Requested Prescriptions     Pending Prescriptions Disp Refills    amiodarone (CORDARONE) 200 MG tablet [Pharmacy Med Name: Amiodarone HCl 200 MG Oral Tablet] 90 tablet 1     Sig: Take 1 tablet by mouth once daily    potassium chloride (KLOR-CON) 10 MEQ extended release tablet 90 tablet 1     Sig: Take 1 tablet by mouth daily (with breakfast)

## 2022-02-09 ENCOUNTER — OFFICE VISIT (OUTPATIENT)
Dept: FAMILY MEDICINE CLINIC | Age: 77
End: 2022-02-09

## 2022-02-09 VITALS
DIASTOLIC BLOOD PRESSURE: 76 MMHG | HEART RATE: 76 BPM | WEIGHT: 238.5 LBS | SYSTOLIC BLOOD PRESSURE: 134 MMHG | BODY MASS INDEX: 37.43 KG/M2 | TEMPERATURE: 96.5 F | HEIGHT: 67 IN | RESPIRATION RATE: 16 BRPM

## 2022-02-09 DIAGNOSIS — I48.0 PAROXYSMAL ATRIAL FIBRILLATION (HCC): ICD-10-CM

## 2022-02-09 DIAGNOSIS — I25.10 CORONARY ARTERY DISEASE INVOLVING NATIVE CORONARY ARTERY OF NATIVE HEART WITHOUT ANGINA PECTORIS: ICD-10-CM

## 2022-02-09 DIAGNOSIS — E11.9 TYPE 2 DIABETES MELLITUS WITHOUT COMPLICATION, WITHOUT LONG-TERM CURRENT USE OF INSULIN (HCC): Primary | ICD-10-CM

## 2022-02-09 DIAGNOSIS — E11.42 DIABETIC PERIPHERAL NEUROPATHY (HCC): ICD-10-CM

## 2022-02-09 DIAGNOSIS — I50.41 CHF (CONGESTIVE HEART FAILURE), NYHA CLASS I, ACUTE, COMBINED (HCC): ICD-10-CM

## 2022-02-09 DIAGNOSIS — I50.33 ACUTE ON CHRONIC DIASTOLIC CONGESTIVE HEART FAILURE (HCC): ICD-10-CM

## 2022-02-09 DIAGNOSIS — I10 ESSENTIAL HYPERTENSION: ICD-10-CM

## 2022-02-09 DIAGNOSIS — I10 PRIMARY HYPERTENSION: ICD-10-CM

## 2022-02-09 DIAGNOSIS — E78.00 PURE HYPERCHOLESTEROLEMIA: ICD-10-CM

## 2022-02-09 LAB
CHP ED QC CHECK: ABNORMAL
GLUCOSE BLD-MCNC: 190 MG/DL
HBA1C MFR BLD: 6.4 %

## 2022-02-09 PROCEDURE — 99213 OFFICE O/P EST LOW 20 MIN: CPT | Performed by: FAMILY MEDICINE

## 2022-02-09 PROCEDURE — 82962 GLUCOSE BLOOD TEST: CPT | Performed by: FAMILY MEDICINE

## 2022-02-09 PROCEDURE — 83036 HEMOGLOBIN GLYCOSYLATED A1C: CPT | Performed by: FAMILY MEDICINE

## 2022-02-09 RX ORDER — POTASSIUM CHLORIDE 750 MG/1
10 TABLET, FILM COATED, EXTENDED RELEASE ORAL
Qty: 90 TABLET | Refills: 1 | Status: SHIPPED | OUTPATIENT
Start: 2022-02-09 | End: 2022-05-11 | Stop reason: SDUPTHER

## 2022-02-09 RX ORDER — AMIODARONE HYDROCHLORIDE 200 MG/1
TABLET ORAL
Qty: 90 TABLET | Refills: 1 | Status: SHIPPED | OUTPATIENT
Start: 2022-02-09 | End: 2022-08-10

## 2022-02-09 RX ORDER — CLOPIDOGREL BISULFATE 75 MG/1
TABLET ORAL
Qty: 90 TABLET | Refills: 1 | Status: SHIPPED | OUTPATIENT
Start: 2022-02-09 | End: 2022-09-08

## 2022-02-09 RX ORDER — BUMETANIDE 1 MG/1
1 TABLET ORAL 2 TIMES DAILY
Qty: 180 TABLET | Refills: 1 | Status: SHIPPED | OUTPATIENT
Start: 2022-02-09 | End: 2022-08-16

## 2022-02-09 RX ORDER — GABAPENTIN 300 MG/1
CAPSULE ORAL
Qty: 90 CAPSULE | Refills: 1 | Status: SHIPPED | OUTPATIENT
Start: 2022-02-09 | End: 2022-08-10

## 2022-02-09 RX ORDER — GLIMEPIRIDE 4 MG/1
6 TABLET ORAL
Qty: 180 TABLET | Refills: 1 | Status: SHIPPED
Start: 2022-02-09 | End: 2022-04-20

## 2022-02-09 ASSESSMENT — ENCOUNTER SYMPTOMS
WHEEZING: 0
EYE PAIN: 0
SORE THROAT: 0
CHEST TIGHTNESS: 0
SHORTNESS OF BREATH: 0
CONSTIPATION: 0
COUGH: 0
ORTHOPNEA: 0
NAUSEA: 0
ABDOMINAL PAIN: 0

## 2022-02-09 NOTE — PROGRESS NOTES
on chronic ([facility] 6/10/2021    Type II or unspecified type diabetes mellitus without mention of complication, not stated as uncontrolled 2005     Review of Systems   Constitutional: Negative for appetite change, fatigue and fever. HENT: Negative for congestion, ear pain, postnasal drip and sore throat. Eyes: Negative for pain and visual disturbance. Respiratory: Negative for cough, chest tightness, shortness of breath and wheezing. Cardiovascular: Negative for chest pain, palpitations, orthopnea and leg swelling. Gastrointestinal: Negative for abdominal pain, constipation and nausea. Genitourinary: Negative for dysuria and frequency. Musculoskeletal: Negative for arthralgias, joint swelling, neck pain and neck stiffness. Skin: Negative for rash. Neurological: Negative for dizziness, weakness, numbness and headaches. Hematological: Negative for adenopathy. Does not bruise/bleed easily. Psychiatric/Behavioral: Negative for behavioral problems and sleep disturbance. The patient is not nervous/anxious. /76 (Site: Right Upper Arm, Position: Sitting, Cuff Size: Medium Adult)   Pulse 76   Temp 96.5 °F (35.8 °C) (Infrared)   Resp 16   Ht 5' 7\" (1.702 m)   Wt 238 lb 8 oz (108.2 kg)   BMI 37.35 kg/m²   Objective:   Physical Exam  Vitals and nursing note reviewed. Constitutional:       Appearance: She is well-developed. HENT:      Head: Normocephalic and atraumatic. Right Ear: External ear normal.      Left Ear: External ear normal.      Nose: Nose normal.   Eyes:      General: No scleral icterus. Conjunctiva/sclera: Conjunctivae normal.      Pupils: Pupils are equal, round, and reactive to light. Neck:      Thyroid: No thyromegaly. Vascular: No JVD. Cardiovascular:      Rate and Rhythm: Normal rate and regular rhythm. Heart sounds: Normal heart sounds. Pulmonary:      Effort: Pulmonary effort is normal.      Breath sounds: Normal breath sounds.  No wheezing once daily 90 tablet 1    glimepiride (AMARYL) 4 MG tablet Take 1.5 tablets by mouth every morning (before breakfast) 180 tablet 1    TRULICITY 4.46 ZW/8.7CM SOPN INJECT 1 SYRINGE SUBCUTANEOUSLY ONCE A WEEK 4 mL 1    amLODIPine (NORVASC) 10 MG tablet Take 1 tablet by mouth once daily 90 tablet 1    atorvastatin (LIPITOR) 40 MG tablet Take 1 tablet by mouth once daily 90 tablet 0    ELIQUIS 5 MG TABS tablet Take 1 tablet by mouth twice daily 60 tablet 3    metoprolol tartrate (LOPRESSOR) 50 MG tablet Take 1 tablet by mouth twice daily 180 tablet 1    metOLazone (ZAROXOLYN) 5 MG tablet Take 5 mg by mouth daily As  Needed 10 tablet 0    levothyroxine (SYNTHROID) 125 MCG tablet Take 1 tablet by mouth daily 90 tablet 1    Flaxseed, Linseed, (FLAX SEEDS PO) Take by mouth      nitroGLYCERIN (NITROSTAT) 0.4 MG SL tablet Place 1 tablet under the tongue every 5 minutes as needed for Chest pain up to max of 3 total doses. If no relief after 1 dose, call 911. 25 tablet 1    Glucose Blood (JOSE E BREEZE 2 TEST) DISK USE ONE STRIP TWICE DAILY, DX: E11.9 200 each 5    Ascorbic Acid (VITAMIN C) 500 MG tablet Take 500 mg by mouth daily        No current facility-administered medications for this visit. Orders Placed This Encounter   Procedures    POCT Glucose    POCT glycosylated hemoglobin (Hb A1C)     Results for orders placed or performed in visit on 02/09/22   POCT Glucose   Result Value Ref Range    Glucose 190 (A) mg/dL    QC OK? POCT glycosylated hemoglobin (Hb A1C)   Result Value Ref Range    Hemoglobin A1C 6.4 %     Rosi See received counseling on the following healthy behaviors: nutrition and exercise    Patient given educational materials on Diabetes and Hyperlipidemia    I have instructed Rosi See to complete a self tracking handout on Blood Sugars  and Blood Pressures  and instructed them to bring it with them to her next appointment. Discussed use, benefit, and side effects of prescribed medications. Barriers to medication compliance addressed. All patient questions answered. Pt voiced understanding.          Ally Yeager MD

## 2022-03-07 ENCOUNTER — NURSE ONLY (OUTPATIENT)
Dept: LAB | Age: 77
End: 2022-03-07

## 2022-03-07 DIAGNOSIS — N18.4 CKD (CHRONIC KIDNEY DISEASE) STAGE 4, GFR 15-29 ML/MIN (HCC): ICD-10-CM

## 2022-03-07 LAB
ANION GAP SERPL CALCULATED.3IONS-SCNC: 15 MEQ/L (ref 8–16)
BUN BLDV-MCNC: 28 MG/DL (ref 7–22)
CALCIUM SERPL-MCNC: 9.5 MG/DL (ref 8.5–10.5)
CHLORIDE BLD-SCNC: 94 MEQ/L (ref 98–111)
CO2: 28 MEQ/L (ref 23–33)
CREAT SERPL-MCNC: 2.1 MG/DL (ref 0.4–1.2)
GFR SERPL CREATININE-BSD FRML MDRD: 23 ML/MIN/1.73M2
GLUCOSE BLD-MCNC: 198 MG/DL (ref 70–108)
POTASSIUM SERPL-SCNC: 4.3 MEQ/L (ref 3.5–5.2)
SODIUM BLD-SCNC: 137 MEQ/L (ref 135–145)

## 2022-03-07 NOTE — TELEPHONE ENCOUNTER
Date of last visit:  2/9/2022  Date of next visit:  5/11/2022    Requested Prescriptions     Pending Prescriptions Disp Refills    atorvastatin (LIPITOR) 40 MG tablet [Pharmacy Med Name: Atorvastatin Calcium 40 MG Oral Tablet] 90 tablet 0     Sig: Take 1 tablet by mouth once daily

## 2022-03-08 RX ORDER — ATORVASTATIN CALCIUM 40 MG/1
TABLET, FILM COATED ORAL
Qty: 90 TABLET | Refills: 1 | Status: SHIPPED | OUTPATIENT
Start: 2022-03-08 | End: 2022-03-10

## 2022-03-09 ENCOUNTER — OFFICE VISIT (OUTPATIENT)
Dept: NEPHROLOGY | Age: 77
End: 2022-03-09
Payer: COMMERCIAL

## 2022-03-09 VITALS
WEIGHT: 240 LBS | SYSTOLIC BLOOD PRESSURE: 121 MMHG | HEIGHT: 67 IN | BODY MASS INDEX: 37.67 KG/M2 | OXYGEN SATURATION: 97 % | HEART RATE: 76 BPM | DIASTOLIC BLOOD PRESSURE: 72 MMHG | TEMPERATURE: 97.9 F

## 2022-03-09 DIAGNOSIS — N18.4 ANEMIA IN STAGE 4 CHRONIC KIDNEY DISEASE (HCC): Primary | ICD-10-CM

## 2022-03-09 DIAGNOSIS — D63.1 ANEMIA IN STAGE 4 CHRONIC KIDNEY DISEASE (HCC): Primary | ICD-10-CM

## 2022-03-09 DIAGNOSIS — N18.4 CKD (CHRONIC KIDNEY DISEASE) STAGE 4, GFR 15-29 ML/MIN (HCC): ICD-10-CM

## 2022-03-09 PROCEDURE — 99213 OFFICE O/P EST LOW 20 MIN: CPT | Performed by: INTERNAL MEDICINE

## 2022-03-09 NOTE — PROGRESS NOTES
[address] [facility]IDNEY [facility]ERTENSION  [address]  [facility] [address]  Dept: [phone]  Loc: [phone]  Progress Note  3/9/2022 9:28 AM      Pt Name:    Kerby Sandhoff  YOB: 1945  Primary Care Physician:  Carmen Schaefer MD     Chief Complaint:   Chief Complaint   Patient presents with    Chronic Kidney Disease        History of Present Illness: This is a hospital follow up visit for CKD IV. She has hx of DM, HTN, HFpEF  EF improved to 55%), Afib, CAD/PCI presented to the hospital with CHF exacerbation. She has had worsening renal fxn as outpatient with adjustment in diuretics. She is overall doing well. Not having any issues with swelling or SOB. Weight is stable. On bumex 1 mg BID. Has not needed any Metolazone. Pertinent items are noted in HPI. Past History:  Past Medical History:   Diagnosis Date    Aortic aneurysm (HCC)     4.5 cm aorta    Arthritis     Atrial fibrillation, chronic ([address] 2019    CAD (coronary artery disease)     CHF NYHA class II, unspecified failure chronicity, unspecified type ([address] 10/8/2018    Colon polyps 3/01    Diabetic peripheral neuropathy ([facility] 2014      feet    Elbow fracture     Fluid overload 2021    Hyperlipidemia     Hypertension     Hypothyroidism     Pulmonary nodule, right 10/2017      repeat  ct of  chest      S/P cardiac pacemaker procedure: 10/21/2017: Medtronic Dual Chamber. 10/21/2017    10/21/2017: Medtronic Dual Chamber.  Dr. Pham Proper Systolic CHF, acute on chronic [facility]) 6/10/2021    Type II or unspecified type diabetes mellitus without mention of complication, not stated as uncontrolled      Past Surgical History:   Procedure Laterality Date    BREAST BIOPSY Right 2016     benign   abrahan    BREAST SURGERY      CARDIOVERSION  2019    atrial fib to sinus  baki     SECTION  over 30 years ago     COLONOSCOPY  2012    colon polyps   [facility]    CORONARY ANGIOPLASTY  2005    baki    CORONARY ANGIOPLASTY WITH STENT PLACEMENT  2005    baki    CORONARY ANGIOPLASTY WITH STENT PLACEMENT  10/2018     lad branch    HEEL SPUR SURGERY Bilateral     bilat with hardware    HYSTERECTOMY  1984    KNEE ARTHROSCOPY  11/01; 3/02    right and left knee    KNEE ARTHROSCOPY Bilateral     bilat    PACEMAKER PLACEMENT  10/2017    THYROIDECTOMY, PARTIAL  1980's        VITALS:  /72 (Site: Right Upper Arm, Position: Sitting, Cuff Size: Large Adult)   Pulse 76   Temp 97.9 °F (36.6 °C)   Ht 5' 7\" (1.702 m)   Wt 240 lb (108.9 kg)   SpO2 97%   BMI 37.59 kg/m²   Wt Readings from Last 3 Encounters:   03/09/22 240 lb (108.9 kg)   02/09/22 238 lb 8 oz (108.2 kg)   12/10/21 241 lb 12.8 oz (109.7 kg)     Body mass index is 37.59 kg/m².      General Appearance: alert and cooperative with exam, appears comfortable, no distress  HEENT: EOMI, moist oral mucus membranes  Neck: No jugular venous distention,  Lungs: Air entry B/L, no crackles or rales, no use of accessory muscles  Heart: S1, S2 heard, no rub  GI: soft, non-tender, no guarding,  Extremities: nonpitting edema noted  Skin: warm, dry  Neurologic: no tremor, no asterixis, no focal neurologic deficits     Medications:  Current Outpatient Medications   Medication Sig Dispense Refill    atorvastatin (LIPITOR) 40 MG tablet Take 1 tablet by mouth once daily 90 tablet 1    amiodarone (CORDARONE) 200 MG tablet Take 1 tablet by mouth once daily 90 tablet 1    potassium chloride (KLOR-CON) 10 MEQ extended release tablet Take 1 tablet by mouth daily (with breakfast) 90 tablet 1    bumetanide (BUMEX) 1 MG tablet Take 1 tablet by mouth 2 times daily 180 tablet 1    gabapentin (NEURONTIN) 300 MG capsule TAKE 1 CAPSULE BY MOUTH ONCE DAILY IN THE EVENING 90 capsule 1    clopidogrel (PLAVIX) 75 MG tablet Take 1 tablet by mouth once daily 90 tablet 1    glimepiride (AMARYL) 4 MG tablet Take 1.5 tablets by mouth every morning (before breakfast) 180 tablet 1    TRULICITY 2.04 JZ/3.0PP SOPN INJECT 1 SYRINGE SUBCUTANEOUSLY ONCE A WEEK 4 mL 1    amLODIPine (NORVASC) 10 MG tablet Take 1 tablet by mouth once daily 90 tablet 1    ELIQUIS 5 MG TABS tablet Take 1 tablet by mouth twice daily 60 tablet 3    metoprolol tartrate (LOPRESSOR) 50 MG tablet Take 1 tablet by mouth twice daily 180 tablet 1    metOLazone (ZAROXOLYN) 5 MG tablet Take 5 mg by mouth daily As  Needed 10 tablet 0    levothyroxine (SYNTHROID) 125 MCG tablet Take 1 tablet by mouth daily 90 tablet 1    Flaxseed, Linseed, (FLAX SEEDS PO) Take by mouth      Glucose Blood (JOSE E BREEZE 2 TEST) DISK USE ONE STRIP TWICE DAILY, DX: E11.9 200 each 5    Ascorbic Acid (VITAMIN C) 500 MG tablet Take 500 mg by mouth 2 times daily       nitroGLYCERIN (NITROSTAT) 0.4 MG SL tablet Place 1 tablet under the tongue every 5 minutes as needed for Chest pain up to max of 3 total doses. If no relief after 1 dose, call 911. (Patient not taking: Reported on 3/9/2022) 25 tablet 1     No current facility-administered medications for this visit.         Laboratory & Diagnostics:  CBC:   Lab Results   Component Value Date    WBC 7.6 10/21/2021    HGB 12.3 10/21/2021    HCT 39.1 10/21/2021    MCV 91.8 10/21/2021     10/21/2021     BMP:    Lab Results   Component Value Date     03/07/2022     10/21/2021     08/18/2021    K 4.3 03/07/2022    K 4.5 10/21/2021    K 4.7 08/18/2021    CL 94 (L) 03/07/2022     10/21/2021    CL 98 08/18/2021    CO2 28 03/07/2022    CO2 29 10/21/2021    CO2 30 08/18/2021    BUN 28 (H) 03/07/2022    BUN 29 (H) 10/21/2021    BUN 29 (H) 08/18/2021    CREATININE 2.1 (H) 03/07/2022    CREATININE 2.3 (H) 10/21/2021    CREATININE 1.9 (H) 08/18/2021    GLUCOSE 198 (H) 03/07/2022    GLUCOSE 190 (A) 02/09/2022    GLUCOSE 187 (A) 10/22/2021      Hepatic:   Lab Results   Component Value Date    AST 14 06/08/2021    AST 21 06/01/2021    AST 13 05/28/2021    ALT 14 06/08/2021    ALT 18 06/01/2021    ALT 15 05/28/2021    BILITOT 0.9 06/08/2021    BILITOT 0.7 06/01/2021    BILITOT 0.4 05/28/2021    ALKPHOS 70 06/08/2021    ALKPHOS 64 06/01/2021    ALKPHOS 64 05/28/2021     BNP: No results found for: BNP  Lipids:   Lab Results   Component Value Date    CHOL 208 (H) 06/01/2021    HDL 62 06/01/2021     INR:   Lab Results   Component Value Date    INR 1.41 (H) 02/08/2019    INR 1.10 10/12/2018    INR 1.00 10/21/2017     URINE:   Lab Results   Component Value Date    NAUR 110 06/09/2021    PROTUR 4.9 06/09/2021     Lab Results   Component Value Date    NITRU NEGATIVE 06/09/2021    COLORU YELLOW 06/09/2021    PHUR 5.5 06/09/2021    WBCUA 5-9 06/09/2021    RBCUA NONE SEEN 06/09/2021    YEAST NONE SEEN 06/09/2021    BACTERIA NONE SEEN 06/09/2021    LEUKOCYTESUR SMALL 06/09/2021    UROBILINOGEN 0.2 06/09/2021    BILIRUBINUR NEGATIVE 06/09/2021    BLOODU NEGATIVE 06/09/2021    GLUCOSEU 100 06/09/2021    KETUA NEGATIVE 06/09/2021      Microalbumen/Creatinine ratio:  No components found for: RUCREAT        Impression/Plan:   1. CKD IV: cardiorenal syndrome, diabetic nephropathy: stable . Goals of care include slowing rate of progression by controlling blood pressure, blood glucoses and albuminuria and by avoiding nephrotoxins such as NSAIDs and IV contrast.    2. Diastolic CHF: euvolemic  3. Lytes: K ok on KCl 10 meq daily  4. HTN: stable  5. DM  6. Anemia, no need for DOREEN  7. CAD        Bloodwork and medications were reviewed and plan of care discussed with the patient. Return to clinic in 4 months  or sooner if the need arises.       Jose Luis Prescott,   [facility]

## 2022-03-10 ENCOUNTER — PROCEDURE VISIT (OUTPATIENT)
Dept: CARDIOLOGY CLINIC | Age: 77
End: 2022-03-10
Payer: COMMERCIAL

## 2022-03-10 DIAGNOSIS — Z95.0 S/P CARDIAC PACEMAKER PROCEDURE: Primary | ICD-10-CM

## 2022-03-10 PROCEDURE — 93296 REM INTERROG EVL PM/IDS: CPT | Performed by: NUCLEAR MEDICINE

## 2022-03-10 PROCEDURE — 93294 REM INTERROG EVL PM/LDLS PM: CPT | Performed by: NUCLEAR MEDICINE

## 2022-03-10 RX ORDER — ATORVASTATIN CALCIUM 40 MG/1
TABLET, FILM COATED ORAL
Qty: 90 TABLET | Refills: 0 | Status: SHIPPED | OUTPATIENT
Start: 2022-03-10 | End: 2022-06-01

## 2022-03-10 NOTE — PROGRESS NOTES
[facility] Medtronic Dual Pacemaker   Patient of Baki    Battery 3-5 years    Presenting rhythm AP     A Impedance 342  RV Impedance 475    P wave sensing 0.4  R wave sensing 15.3    A Threshold 1.0 @ 0.40  A Amplitude 1.50 @ 0.40  RV Thresholds 0.875 @ 0.40  RV Amplitude 2.0 @ 0.40      A Paced 98.7%  V Paced 100%    Programmed Mode DDDR       Afib [place] 0%    Episodes   none

## 2022-03-22 ENCOUNTER — OFFICE VISIT (OUTPATIENT)
Dept: CARDIOLOGY CLINIC | Age: 77
End: 2022-03-22
Payer: COMMERCIAL

## 2022-03-22 VITALS
SYSTOLIC BLOOD PRESSURE: 122 MMHG | HEART RATE: 85 BPM | DIASTOLIC BLOOD PRESSURE: 70 MMHG | BODY MASS INDEX: 37.76 KG/M2 | WEIGHT: 240.6 LBS | HEIGHT: 67 IN | OXYGEN SATURATION: 98 %

## 2022-03-22 DIAGNOSIS — I50.32 CHF NYHA CLASS II, CHRONIC, DIASTOLIC (HCC): Primary | ICD-10-CM

## 2022-03-22 DIAGNOSIS — I48.0 PAROXYSMAL ATRIAL FIBRILLATION (HCC): ICD-10-CM

## 2022-03-22 DIAGNOSIS — N18.4 STAGE 4 CHRONIC KIDNEY DISEASE (HCC): ICD-10-CM

## 2022-03-22 DIAGNOSIS — I10 ESSENTIAL HYPERTENSION: ICD-10-CM

## 2022-03-22 PROCEDURE — 99214 OFFICE O/P EST MOD 30 MIN: CPT | Performed by: NURSE PRACTITIONER

## 2022-03-22 ASSESSMENT — ENCOUNTER SYMPTOMS
COUGH: 0
SHORTNESS OF BREATH: 0
ABDOMINAL DISTENTION: 0

## 2022-03-22 NOTE — PROGRESS NOTES
Heart Failure Clinic       Visit Date: 3/22/2022  Cardiologist:  Dr. Maura Mai  Primary Care Physician: Dr. Phuc Shepard MD    Jolene Almonte is a 78 y.o. female who presents today for:  Chief Complaint   Patient presents with    Congestive Heart Failure       HPI:   Jolene Almonte is a 78 y.o. female who presents to the office for a follow up patient visit in the heart failure clinic. Accompanied by no one    TYPE HF: HFpEF (EF 55%)  Device: Pacemaker (2017, bradycardia)  HX: HTN, HLD, Afib s/p DCCV (Eliquis), CAD-PCI LAD, PCI OMx2, CKD ([facility])    Dry Wt:  245-250    Hospitalization:  June x 3 days = CHF, LE edema/SOB. [facility] - Nephrology saw. D/c Bumex 1 BID    Last OV 8/2021 - doing well. No admission/ED visits since    Concerns today: feeling good - no concerns voiced. No fluid on exam.  Not needed Metolazone in months. 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  is pt here - currently in NH - she visits often, very involved in his care. Patient has:  Chest Pain: no  SOB: no  Orthopnea/PND: no  LAZARUS: no  Edema: no  Fatigue: no  Abdominal bloating: no  Cough: no  Appetite: good  Home weight: stable   Home blood pressure: 120s     Past Medical History:   Diagnosis Date    Aortic aneurysm (HCC)     4.5 cm aorta    Arthritis     Atrial fibrillation, chronic ([facility] 1/25/2019    CAD (coronary artery disease)     CHF NYHA class II, unspecified failure chronicity, unspecified type ([address] 10/8/2018    Colon polyps 3/01    Diabetic peripheral neuropathy ([facility] [address] 2014      feet    Elbow fracture     Fluid overload 6/9/2021    Hyperlipidemia     Hypertension     Hypothyroidism     Pulmonary nodule, right 10/2017      repeat  ct of  chest  4-2018    S/P cardiac pacemaker procedure: 10/21/2017: Medtronic Dual Chamber. 10/21/2017    10/21/2017: Medtronic Dual Chamber.  Dr. Jane Garibay Systolic CHF, acute on chronic [facility]) 6/10/2021    Type II or unspecified type diabetes mellitus without mention of complication, not stated as uncontrolled      Past Surgical History:   Procedure Laterality Date    BREAST BIOPSY Right 2016     benign   abrahan    BREAST SURGERY      CARDIOVERSION  2019    atrial fib to sinus  baki     SECTION  over 30 years ago     COLONOSCOPY  2012    [place] polyps   [place]    CORONARY ANGIOPLASTY      baki    CORONARY ANGIOPLASTY WITH STENT PLACEMENT      baki    CORONARY ANGIOPLASTY WITH STENT PLACEMENT  10/2018     lad branch    HEEL SPUR SURGERY Bilateral     bilat with hardware    HYSTERECTOMY  1984    KNEE ARTHROSCOPY  ; 3/02    right and left knee    KNEE ARTHROSCOPY Bilateral     bilat    PACEMAKER PLACEMENT  10/2017    THYROIDECTOMY, PARTIAL  1980's     Family History   Problem Relation Age of Onset    Heart Disease Mother 80        bacterial infection at valve    Heart Disease Father     Heart Attack Father     Heart Disease Brother         CABG     Social History     Tobacco Use    Smoking status: Never Smoker    Smokeless tobacco: Never Used   Substance Use Topics    Alcohol use: No     Current Outpatient Medications   Medication Sig Dispense Refill    atorvastatin (LIPITOR) 40 MG tablet Take 1 tablet by mouth once daily 90 tablet 0    amiodarone (CORDARONE) 200 MG tablet Take 1 tablet by mouth once daily 90 tablet 1    potassium chloride (KLOR-CON) 10 MEQ extended release tablet Take 1 tablet by mouth daily (with breakfast) 90 tablet 1    bumetanide (BUMEX) 1 MG tablet Take 1 tablet by mouth 2 times daily 180 tablet 1    gabapentin (NEURONTIN) 300 MG capsule TAKE 1 CAPSULE BY MOUTH ONCE DAILY IN THE EVENING 90 capsule 1    clopidogrel (PLAVIX) 75 MG tablet Take 1 tablet by mouth once daily 90 tablet 1    glimepiride (AMARYL) 4 MG tablet Take 1.5 tablets by mouth every morning (before breakfast) 180 tablet 1    TRULICITY 6.66 MG/9.5YI SOPN INJECT 1 SYRINGE SUBCUTANEOUSLY ONCE A WEEK 4 mL 1    amLODIPine (NORVASC) 10 MG tablet Take 1 tablet by mouth once daily 90 tablet 1    ELIQUIS 5 MG TABS tablet Take 1 tablet by mouth twice daily 60 tablet 3    metoprolol tartrate (LOPRESSOR) 50 MG tablet Take 1 tablet by mouth twice daily 180 tablet 1    levothyroxine (SYNTHROID) 125 MCG tablet Take 1 tablet by mouth daily 90 tablet 1    Flaxseed, Linseed, (FLAX SEEDS PO) Take by mouth      Glucose Blood (JOSE E BREEZE 2 TEST) DISK USE ONE STRIP TWICE DAILY, DX: E11.9 200 each 5    Ascorbic Acid (VITAMIN C) 500 MG tablet Take 500 mg by mouth 2 times daily       metOLazone (ZAROXOLYN) 5 MG tablet Take 5 mg by mouth daily As  Needed (Patient not taking: Reported on 3/22/2022) 10 tablet 0    nitroGLYCERIN (NITROSTAT) 0.4 MG SL tablet Place 1 tablet under the tongue every 5 minutes as needed for Chest pain up to max of 3 total doses. If no relief after 1 dose, call 911. (Patient not taking: Reported on 3/9/2022) 25 tablet 1     No current facility-administered medications for this visit. Allergies   Allergen Reactions    Adhesive Tape Rash       SUBJECTIVE:   Review of Systems   Constitutional: Negative for activity change, appetite change and fatigue. Respiratory: Negative for cough and shortness of breath. Cardiovascular: Negative for chest pain, palpitations and leg swelling. Gastrointestinal: Negative for abdominal distention. Neurological: Negative for weakness, light-headedness and headaches. Hematological: Negative for adenopathy. Psychiatric/Behavioral: Negative for sleep disturbance. OBJECTIVE:   Today's Vitals:  /70   Pulse 85   Ht 5' 7\" (1.702 m)   Wt 240 lb 9.6 oz (109.1 kg)   SpO2 98%   BMI 37.68 kg/m²     Physical Exam  Vitals reviewed. Constitutional:       General: She is not in acute distress. Appearance: Normal appearance. She is well-developed. She is not diaphoretic. HENT:      Head: Normocephalic and atraumatic.    Eyes:      Conjunctiva/sclera: Conjunctivae normal.   Neck: Comments: No JVD  Cardiovascular:      Rate and Rhythm: Normal rate and regular rhythm. Heart sounds: Normal heart sounds. No murmur heard. Pulmonary:      Effort: Pulmonary effort is normal. No respiratory distress. Breath sounds: Normal breath sounds. No wheezing or rales. Abdominal:      General: Bowel sounds are normal. There is no distension. Palpations: Abdomen is soft. Tenderness: There is no abdominal tenderness. Musculoskeletal:         General: Normal range of motion. Cervical back: Normal range of motion and neck supple. Right lower leg: No edema. Left lower leg: No edema. Skin:     General: Skin is warm and dry. Capillary Refill: Capillary refill takes less than 2 seconds. Neurological:      Mental Status: She is alert and oriented to person, place, and time. Coordination: Coordination normal.   Psychiatric:         Behavior: Behavior normal.         Wt Readings from Last 3 Encounters:   03/22/22 240 lb 9.6 oz (109.1 kg)   03/09/22 240 lb (108.9 kg)   02/09/22 238 lb 8 oz (108.2 kg)     BP Readings from Last 3 Encounters:   03/22/22 122/70   03/09/22 121/72   02/09/22 134/76     Pulse Readings from Last 3 Encounters:   03/22/22 85   03/09/22 76   02/09/22 76     Body mass index is 37.68 kg/m². ECHO:    Conclusions   Summary   limited echo   Ejection fraction is visually estimated at 55%. Overall left ventricular function is normal.   The aortic valve leaflets were not well visualized. Aortic valve appears tricuspid. Thickened aortic valve leaflets noted. Aortic valve leaflets are Moderately calcified. Dilation of ascending aorta .       Signature      ----------------------------------------------------------------   Electronically signed by Otilia Pagan MD (Interpreting   physician) on 06/10/2021 at 06:57 PM   ----------------------------------------------------------------      CATH/STRESS:   SUMMARY:  Successful PCI of the proximal OM1 with a second  drug-eluting stent.     PLAN:  1. DAPT. 2.  Optimal medical therapy. 3.  Risk factor management. 4.  Bedrest.  5.  IV fluids. 6.  Observation. 7.  Follow up with myself in one to two weeks postprocedure.     All the above was explained to the patient and the patient's family. They were all agreeable and amenable to the plan.        Tanya Arevalo MD     D: 10/16/2018 7:40:52       Results reviewed:  BNP: No results found for: BNP  CBC:   Lab Results   Component Value Date    WBC 7.6 10/21/2021    RBC 4.26 10/21/2021    HGB 12.3 10/21/2021    HCT 39.1 10/21/2021     10/21/2021     CMP:    Lab Results   Component Value Date     03/07/2022    K 4.3 03/07/2022    K 4.7 05/22/2021    CL 94 03/07/2022    CO2 28 03/07/2022    BUN 28 03/07/2022    CREATININE 2.1 03/07/2022    LABGLOM 23 03/07/2022    GLUCOSE 198 03/07/2022    CALCIUM 9.5 03/07/2022     Hepatic Function Panel:    Lab Results   Component Value Date    ALKPHOS 70 06/08/2021    ALT 14 06/08/2021    AST 14 06/08/2021    PROT 7.6 06/08/2021    BILITOT 0.9 06/08/2021    BILIDIR <0.2 10/09/2018    LABALBU 4.2 06/08/2021     Magnesium:    Lab Results   Component Value Date    MG 2.2 06/08/2021     PT/INR:    Lab Results   Component Value Date    INR 1.41 02/08/2019     Lipids:    Lab Results   Component Value Date    TRIG 150 06/01/2021    HDL 62 06/01/2021    LDLCALC 116 06/01/2021    LDLDIRECT 101 12/29/2014       ASSESSMENT AND PLAN:   The patient's condition/symptoms are Stable: No clinical evidence of fluid overload today. Continue current medical regimen without changes at present time. Diagnosis Orders   1. CHF NYHA class II, chronic, diastolic ([address]     2. Essential hypertension     3.  Paroxysmal atrial fibrillation (HCC)     4. Stage 4 chronic kidney disease (HCC)       Continue:  GDMT:   ACE/ARB/ARNI - None   BB - Lopressor 50 BID   Diuretic - Bumex 1 BID, Kcl 10/day, Metolazone PRN (not used in months)  AA - consider adding  Vasodilator - no  Other - Eliquis, Amiodarone, Norvasc  Stable, appears Euvolemic  Weights stable  Labs reviewed 3/2022 - stable - creat 2.1  Repeat labs in 4 months w/ [facility]  BP stable - HR controlled. Continue meds same. Call if using Metolazone more often. Continue diet/fluid adherence  F/U w/ [facility] in July  F/U w/ Leeta Salem in Dec  F/U in clinic in 1 yr      Tolerating above noted HF meds, no ill side effects noted. Will continue to monitor kidney function and electrolytes. Will optimize as tolerated. Pt is compliant w/ medications. Total visit time of 30 minutes has been spent with patient on education of symptoms, management, medication, and plan of care; as well as review of chart: labs, ECHO, radiology reports, etc.   I personally spent more then 50% of the appt time face to face with the patient. Daily weights  Fluid restriction of 2 Liters per day  Limit sodium in diet to around 2786-2299 mg/day  Monitor BP  Activity as tolerated     Patient was instructed to call the [facility] Andrea for any changes in symptoms as noted in AVS.      Return in about 1 year (around 3/22/2023). or sooner if needed     Patient given educational materials - see patient instructions. We discussed the importance of weighing oneself and recording daily. We also discussed the importance of a low sodium diet, higher sodium foods to avoid and better low sodium food options. Patient verbalizes understanding of plan of care using teach back method, and is agreeable to the treatment plan.        Electronically signed by Kirsten Hammans, APRN - CNP on 3/22/2022 at 12:09 PM

## 2022-03-22 NOTE — PATIENT INSTRUCTIONS
You may receive a survey regarding the care you received during your visit. Your input is valuable to us. We encourage you to complete and return your survey. We hope you will choose us in the future for your healthcare needs.     Continue:  · Continue current medications  · Daily weights and record  · Fluid restriction of 2 Liters per day  · Limit sodium in diet to around 7375-6965 mg/day  · Monitor BP  · Activity as tolerated     Call the Heart Failure Clinic for any of the following symptoms: [phone]   Weight gain of 2-3 pounds in 1 day or 5 pounds in 1 week   Increased shortness of breath   Shortness of breath while laying down   Cough   Chest pain   Swelling in feet, ankles or legs   Tenderness or bloating in the abdomen   Fatigue    Decreased appetite or nausea    Confusion   

## 2022-03-29 DIAGNOSIS — I10 ESSENTIAL HYPERTENSION: ICD-10-CM

## 2022-03-29 DIAGNOSIS — I48.0 PAROXYSMAL ATRIAL FIBRILLATION (HCC): ICD-10-CM

## 2022-03-29 DIAGNOSIS — E11.42 DIABETIC PERIPHERAL NEUROPATHY (HCC): ICD-10-CM

## 2022-03-29 DIAGNOSIS — E11.9 TYPE 2 DIABETES MELLITUS WITHOUT COMPLICATION, WITHOUT LONG-TERM CURRENT USE OF INSULIN (HCC): ICD-10-CM

## 2022-03-29 NOTE — TELEPHONE ENCOUNTER
Date of last visit:  2/9/2022  Date of next visit:  5/11/2022    Requested Prescriptions     Pending Prescriptions Disp Refills    [facility] SOPN [Pharmacy Med Name: [facility] 6.10 UN/9.6QG Subcutaneous Solution Pen-injector] 4 mL 0     Sig: INJECT 1  SUBCUTANEOUSLY ONCE A WEEK    metoprolol tartrate (LOPRESSOR) 50 MG tablet [Pharmacy Med Name: Metoprolol Tartrate 50 MG Oral Tablet] 180 tablet 0     Sig: Take 1 tablet by mouth twice daily    ELIQUIS 5 MG TABS tablet [Pharmacy Med Name: Eliquis 5 MG Oral Tablet] 60 tablet 0     Sig: Take 1 tablet by mouth twice daily [History reviewed] : History reviewed. [Medications and Allergies reviewed] : Medications and allergies reviewed.

## 2022-03-30 RX ORDER — METOPROLOL TARTRATE 50 MG/1
TABLET, FILM COATED ORAL
Qty: 180 TABLET | Refills: 0 | Status: SHIPPED | OUTPATIENT
Start: 2022-03-30 | End: 2022-06-27

## 2022-03-30 RX ORDER — DULAGLUTIDE 0.75 MG/.5ML
INJECTION, SOLUTION SUBCUTANEOUS
Qty: 4 ML | Refills: 0 | Status: SHIPPED | OUTPATIENT
Start: 2022-03-30

## 2022-03-30 RX ORDER — APIXABAN 5 MG/1
TABLET, FILM COATED ORAL
Qty: 180 TABLET | Refills: 1 | Status: SHIPPED | OUTPATIENT
Start: 2022-03-30 | End: 2022-10-06

## 2022-03-31 ENCOUNTER — TELEPHONE (OUTPATIENT)
Dept: FAMILY MEDICINE CLINIC | Age: 77
End: 2022-03-31

## 2022-03-31 NOTE — TELEPHONE ENCOUNTER
Pt dropped off Patient Assistance Forms for [facility]. She is needing it completed for [employer]. Blank forms scanned into Epic and placed on cart for completion. Pt asking for form to be faxed to number provided on form.

## 2022-04-16 ENCOUNTER — HOSPITAL ENCOUNTER (EMERGENCY)
Age: 77
Discharge: HOME | End: 2022-04-16
Attending: EMERGENCY MEDICINE
Payer: COMMERCIAL

## 2022-04-16 VITALS
OXYGEN SATURATION: 94 % | DIASTOLIC BLOOD PRESSURE: 67 MMHG | SYSTOLIC BLOOD PRESSURE: 143 MMHG | TEMPERATURE: 98.7 F | RESPIRATION RATE: 18 BRPM | HEART RATE: 68 BPM | BODY MASS INDEX: 37.12 KG/M2 | WEIGHT: 237 LBS

## 2022-04-16 DIAGNOSIS — J01.10 ACUTE NON-RECURRENT FRONTAL SINUSITIS: Primary | ICD-10-CM

## 2022-04-16 PROCEDURE — 99203 OFFICE O/P NEW LOW 30 MIN: CPT | Performed by: EMERGENCY MEDICINE

## 2022-04-16 PROCEDURE — 99213 OFFICE O/P EST LOW 20 MIN: CPT

## 2022-04-16 RX ORDER — DOXYCYCLINE HYCLATE 100 MG
100 TABLET ORAL 2 TIMES DAILY
Qty: 14 TABLET | Refills: 0 | Status: SHIPPED | OUTPATIENT
Start: 2022-04-16 | End: 2022-04-23

## 2022-04-16 RX ORDER — BENZONATATE 200 MG/1
200 CAPSULE ORAL 3 TIMES DAILY PRN
Qty: 30 CAPSULE | Refills: 0 | Status: SHIPPED | OUTPATIENT
Start: 2022-04-16 | End: 2022-04-23

## 2022-04-16 ASSESSMENT — ENCOUNTER SYMPTOMS
DIARRHEA: 0
SHORTNESS OF BREATH: 0
CONSTIPATION: 0
RHINORRHEA: 1
TROUBLE SWALLOWING: 0
ABDOMINAL PAIN: 0
SINUS PRESSURE: 1
EYE PAIN: 0
COUGH: 1

## 2022-04-16 NOTE — ED PROVIDER NOTES
[address]       Chief Complaint   Patient presents with    Cough     sob with walking    Sinusitis       Nurses Notes reviewed and I agree except as noted in the HPI. HISTORY OF PRESENT ILLNESS   Sandeep Mckeon is a 78 y.o. female w/ PMH of NIDDM2, HTN, and CAD who presents for sinus congestion. Four days of symptoms. Has cough, no fever, no lymphadenopathy, and no exudate. Mild sore throat from coughing. Mild noted shortness of air with exertion but not at rest.  Has been eating and drinking well. Normal urination and stools. Has no sick contacts, no known COVID exposure. No prior illnesses before this. No headaches, fatigue, muscle aches, or rashes. No asthma history or wheezing. No major allergies history or COPD. Patient has tried corcidin with mild improvement. She is vaccinated. Blood pressure was about 110 at home but 180. REVIEW OF SYSTEMS     Review of Systems   Constitutional: Negative for appetite change and unexpected weight change. HENT: Positive for congestion, postnasal drip, rhinorrhea, sinus pressure and sneezing. Negative for ear pain, hearing loss and trouble swallowing. Eyes: Negative for pain and visual disturbance. Respiratory: Positive for cough. Negative for shortness of breath. Cardiovascular: Negative for chest pain and leg swelling. Gastrointestinal: Negative for abdominal pain, constipation and diarrhea. Genitourinary: Negative for difficulty urinating and dysuria. Musculoskeletal: Negative for arthralgias and myalgias. Psychiatric/Behavioral: Negative for behavioral problems and sleep disturbance.        PAST MEDICAL HISTORY         Diagnosis Date    Aortic aneurysm (HCC)     4.5 cm aorta    Arthritis     Atrial fibrillation, chronic ([facility] 1/25/2019    CAD (coronary artery disease)     CHF NYHA class II, unspecified failure chronicity, unspecified type ([facility] [address] 10/8/2018    Colon polyps 3/01    Diabetic peripheral neuropathy ([facility] [address] 2014      feet    Elbow fracture     Fluid overload 2021    Hyperlipidemia     Hypertension     Hypothyroidism     Pulmonary nodule, right 10/2017      repeat  ct of  chest      S/P cardiac pacemaker procedure: 10/21/2017: Medtronic Dual Chamber. 10/21/2017    10/21/2017: Medtronic Dual Chamber. Dr. Denice Padilla Systolic CHF, acute on chronic [facility]) 6/10/2021    Type II or unspecified type diabetes mellitus without mention of complication, not stated as uncontrolled        SURGICAL HISTORY     Patient  has a past surgical history that includes Thyroidectomy, partial (); Coronary angioplasty with stent (); Coronary angioplasty ();  section (over 30 years ago ); Knee arthroscopy (; 3/02); Hysterectomy (); Heel spur surgery (Bilateral); Knee arthroscopy (Bilateral); Colonoscopy (); Breast biopsy (Right, 2016); Breast surgery; pacemaker placement (10/2017); Coronary angioplasty with stent (10/2018); and Cardioversion (2019).     CURRENT MEDICATIONS       Discharge Medication List as of 2022 11:18 AM      CONTINUE these medications which have NOT CHANGED    Details   TRULICITY 5.41 YW/4.8JR SOPN INJECT 1  SUBCUTANEOUSLY ONCE A WEEK, Disp-4 mL, R-0Normal      metoprolol tartrate (LOPRESSOR) 50 MG tablet Take 1 tablet by mouth twice daily, Disp-180 tablet, R-0Normal      ELIQUIS 5 MG TABS tablet Take 1 tablet by mouth twice daily, Disp-180 tablet, R-1Normal      atorvastatin (LIPITOR) 40 MG tablet Take 1 tablet by mouth once daily, Disp-90 tablet, R-0Normal      amiodarone (CORDARONE) 200 MG tablet Take 1 tablet by mouth once daily, Disp-90 tablet, R-1Normal      potassium chloride (KLOR-CON) 10 MEQ extended release tablet Take 1 tablet by mouth daily (with breakfast), Disp-90 tablet, R-1Normal      bumetanide (BUMEX) 1 MG tablet Take 1 tablet by mouth 2 times daily, Disp-180 tablet, R-1Normal membranes are moist.      Pharynx: Oropharynx is clear. Eyes:      Extraocular Movements: Extraocular movements intact. Pupils: Pupils are equal, round, and reactive to light. Cardiovascular:      Rate and Rhythm: Normal rate and regular rhythm. Pulses: Normal pulses. Heart sounds: Normal heart sounds. Pulmonary:      Effort: Pulmonary effort is normal.      Breath sounds: Normal breath sounds. Abdominal:      Palpations: Abdomen is soft. Tenderness: There is no abdominal tenderness. Musculoskeletal:         General: No signs of injury. Normal range of motion. Cervical back: Normal range of motion and neck supple. Skin:     General: Skin is warm and dry. Capillary Refill: Capillary refill takes less than 2 seconds. Neurological:      General: No focal deficit present. Mental Status: She is alert and oriented to person, place, and time. Mental status is at baseline. Psychiatric:         Mood and Affect: Mood normal.         Behavior: Behavior normal.         DIAGNOSTIC RESULTS   Labs:No results found for this visit on 04/16/22. IMAGING:  No orders to display     URGENT CARE COURSE:     Vitals:    04/16/22 1059 04/16/22 1122   BP: (!) 187/85 (!) 143/67   Pulse: 80 68   Resp: 20 18   Temp: 98.7 °F (37.1 °C)    SpO2: 94%    Weight: 237 lb (107.5 kg)        Medications - No data to display  PROCEDURES:  FINALIMPRESSION      1. Acute non-recurrent frontal sinusitis        DISPOSITION/PLAN   DISPOSITION      Patient was seen and evaluated here in the urgent care. Patient was in no acute distress. Vitals signs are within normal limits. Nontoxic, well-hydrated, normal airway. No airway abscess or epiglottitis, sepsis, CNS infection, pneumonia, hypoxia, bronchospasm. No ACS, CHF, PE. Patient has acute sinusitis.   Will treat with doxycycline, tessalon pearls, Coricidin HBP, Tylenol, increased oral clear liquids, vaporizer, rest.  Patient understands to avoid all other over-the-counter medications. Patient to follow-up with family medicine practice for recheck within 5 days, and he understands to go to ED if worse.       PATIENT REFERRED TO:  Saqib Hobson MD  [address] [place] [address] Vipul [place]  [phone]      If symptoms worsen    DISCHARGE MEDICATIONS:  Discharge Medication List as of 4/16/2022 11:18 AM      START taking these medications    Details   benzonatate (TESSALON) 200 MG capsule Take 1 capsule by mouth 3 times daily as needed for Cough, Disp-30 capsule, R-0Normal      doxycycline hyclate (VIBRA-TABS) 100 MG tablet Take 1 tablet by mouth 2 times daily for 7 days, Disp-14 tablet, R-0Normal           Discharge Medication List as of 4/16/2022 11:18 AM          MD Arabella Gallegos MD  Resident  04/16/22 1120       Arabella Fitzgerald MD  Resident  04/16/22 1149       Arabella Fitzgerald MD  Resident  04/16/22 0010

## 2022-04-16 NOTE — ED PROVIDER NOTES
[address]       Chief Complaint   Patient presents with    Cough     sob with walking    Sinusitis       Nurses Notes reviewed and I agree except as noted in the HPI. HISTORY OF PRESENT ILLNESS   Rebecca Willson is a 78 y.o. female who presents with sinus pressure and congestion. Aditya Garcia MD,  personally performed and participated in key or critical portions of the evaluation and management including personally performing the exam and medical decision making. I verify the accuracy of the documentation by the resident.   Please review resident note for specifics and further details of this urgent care evaluation    Electronically signed by Juan Diego Schuler MD on 4/16/2022 at 11:44 AM     Juan Diego Schuler MD  04/16/22 8650

## 2022-04-19 ENCOUNTER — TELEPHONE (OUTPATIENT)
Dept: FAMILY MEDICINE CLINIC | Age: 77
End: 2022-04-19

## 2022-04-19 DIAGNOSIS — E11.9 TYPE 2 DIABETES MELLITUS WITHOUT COMPLICATION, WITHOUT LONG-TERM CURRENT USE OF INSULIN (HCC): ICD-10-CM

## 2022-04-19 RX ORDER — CEFDINIR 300 MG/1
300 CAPSULE ORAL 2 TIMES DAILY
Qty: 20 CAPSULE | Refills: 0 | Status: SHIPPED | OUTPATIENT
Start: 2022-04-19 | End: 2022-04-29

## 2022-04-19 NOTE — TELEPHONE ENCOUNTER
Date of last visit:  2/9/2022  Date of next visit:  5/11/2022    Requested Prescriptions     Pending Prescriptions Disp Refills    glimepiride (AMARYL) 4 MG tablet [Pharmacy Med Name: Glimepiride 4 MG Oral Tablet] 180 tablet 0     Sig: Take 1 tablet by mouth twice daily

## 2022-04-19 NOTE — TELEPHONE ENCOUNTER
Date of last visit:  2/9/2022  Date of next visit:  5/11/2022    Requested Prescriptions     Signed Prescriptions Disp Refills    cefdinir (OMNICEF) 300 MG capsule 20 capsule 0     Sig: Take 1 capsule by mouth 2 times daily for 10 days     Authorizing Provider: Kylee Huerta     Ordering User: Dominic Santos informed by Phone.

## 2022-04-19 NOTE — TELEPHONE ENCOUNTER
Patient called stating she was at Urgent Care on Saturday and they prescribed Doxycycline 100mg and she says it is not working. She doesn't think it is strong enough and she would like another stronger antibiotic. DX Sinus infection    She does not have a fever, still having nasal congestion. Uses [facility].     Please call her 21

## 2022-04-20 RX ORDER — GLIMEPIRIDE 4 MG/1
TABLET ORAL
Qty: 180 TABLET | Refills: 1 | Status: SHIPPED | OUTPATIENT
Start: 2022-04-20 | End: 2022-10-28

## 2022-05-11 ENCOUNTER — OFFICE VISIT (OUTPATIENT)
Dept: FAMILY MEDICINE CLINIC | Age: 77
End: 2022-05-11

## 2022-05-11 VITALS
HEART RATE: 84 BPM | BODY MASS INDEX: 37.89 KG/M2 | HEIGHT: 67 IN | DIASTOLIC BLOOD PRESSURE: 70 MMHG | WEIGHT: 241.38 LBS | SYSTOLIC BLOOD PRESSURE: 118 MMHG | RESPIRATION RATE: 16 BRPM

## 2022-05-11 DIAGNOSIS — E11.9 TYPE 2 DIABETES MELLITUS WITHOUT COMPLICATION, WITHOUT LONG-TERM CURRENT USE OF INSULIN (HCC): Primary | ICD-10-CM

## 2022-05-11 DIAGNOSIS — I48.0 PAROXYSMAL ATRIAL FIBRILLATION (HCC): ICD-10-CM

## 2022-05-11 DIAGNOSIS — I10 PRIMARY HYPERTENSION: ICD-10-CM

## 2022-05-11 DIAGNOSIS — I10 ESSENTIAL HYPERTENSION: ICD-10-CM

## 2022-05-11 DIAGNOSIS — I25.10 CORONARY ARTERY DISEASE INVOLVING NATIVE CORONARY ARTERY OF NATIVE HEART WITHOUT ANGINA PECTORIS: ICD-10-CM

## 2022-05-11 DIAGNOSIS — I50.33 ACUTE ON CHRONIC DIASTOLIC CONGESTIVE HEART FAILURE (HCC): ICD-10-CM

## 2022-05-11 DIAGNOSIS — I50.41 CHF (CONGESTIVE HEART FAILURE), NYHA CLASS I, ACUTE, COMBINED (HCC): ICD-10-CM

## 2022-05-11 DIAGNOSIS — E78.00 PURE HYPERCHOLESTEROLEMIA: ICD-10-CM

## 2022-05-11 DIAGNOSIS — E11.42 DIABETIC PERIPHERAL NEUROPATHY (HCC): ICD-10-CM

## 2022-05-11 LAB
CHP ED QC CHECK: ABNORMAL
GLUCOSE BLD-MCNC: 210 MG/DL
HBA1C MFR BLD: 6.6 %

## 2022-05-11 PROCEDURE — 99213 OFFICE O/P EST LOW 20 MIN: CPT | Performed by: FAMILY MEDICINE

## 2022-05-11 PROCEDURE — 82962 GLUCOSE BLOOD TEST: CPT | Performed by: FAMILY MEDICINE

## 2022-05-11 PROCEDURE — 83036 HEMOGLOBIN GLYCOSYLATED A1C: CPT | Performed by: FAMILY MEDICINE

## 2022-05-11 RX ORDER — POTASSIUM CHLORIDE 750 MG/1
10 TABLET, FILM COATED, EXTENDED RELEASE ORAL
Qty: 90 TABLET | Refills: 1 | Status: SHIPPED | OUTPATIENT
Start: 2022-05-11 | End: 2022-08-10

## 2022-05-11 ASSESSMENT — ENCOUNTER SYMPTOMS
EYE PAIN: 0
SORE THROAT: 0
CONSTIPATION: 0
COUGH: 0
NAUSEA: 0
WHEEZING: 0
SHORTNESS OF BREATH: 0
ABDOMINAL PAIN: 0
CHEST TIGHTNESS: 0

## 2022-05-11 ASSESSMENT — PATIENT HEALTH QUESTIONNAIRE - PHQ9
SUM OF ALL RESPONSES TO PHQ QUESTIONS 1-9: 0
2. FEELING DOWN, DEPRESSED OR HOPELESS: 0
SUM OF ALL RESPONSES TO PHQ QUESTIONS 1-9: 0
SUM OF ALL RESPONSES TO PHQ QUESTIONS 1-9: 0
SUM OF ALL RESPONSES TO PHQ9 QUESTIONS 1 & 2: 0
SUM OF ALL RESPONSES TO PHQ QUESTIONS 1-9: 0
1. LITTLE INTEREST OR PLEASURE IN DOING THINGS: 0

## 2022-05-11 NOTE — PROGRESS NOTES
Subjective:      Patient ID: Kelley Barlow is a 78 y.o. female. Pacer  Stable      par atrial  Fib  Stable      Thyroid  stable  hypothyriod         Hx  Of  chf  Stable       Diabetic  Neuropathy  stable       Creat  Up and no  Ace   Or arb          Diabetes  She presents for her follow-up diabetic visit. She has type 2 diabetes mellitus. Her disease course has been stable. There are no hypoglycemic associated symptoms. Pertinent negatives for hypoglycemia include no dizziness, headaches or nervousness/anxiousness. Pertinent negatives for diabetes include no chest pain, no fatigue and no weakness. There are no hypoglycemic complications. Symptoms are stable. There are no diabetic complications. Current diabetic treatment includes oral agent (triple therapy). She is compliant with treatment all of the time. Her weight is stable. Meal planning includes avoidance of concentrated sweets. There is no change in her home blood glucose trend. Her breakfast blood glucose is taken between 6-7 am. Her breakfast blood glucose range is generally 130-140 mg/dl. An ACE inhibitor/angiotensin II receptor blocker is contraindicated (  creat  2.1). Hypertension  This is a chronic problem. The current episode started more than 1 year ago. The problem has been resolved since onset. Pertinent negatives include no chest pain, headaches, neck pain, palpitations or shortness of breath. The current treatment provides significant improvement. There are no compliance problems.       Past Medical History:   Diagnosis Date    Aortic aneurysm (HCC)     4.5 cm aorta    Arthritis     Atrial fibrillation, chronic ([address] 1/25/2019    CAD (coronary artery disease)     CHF NYHA class II, unspecified failure chronicity, unspecified type ([address] 10/8/2018    Colon polyps 3/01    Diabetic peripheral neuropathy ([address] 2014      feet    Elbow fracture     Fluid overload 6/9/2021    Hyperlipidemia     Hypertension     Hypothyroidism     Pulmonary nodule, right 10/2017      repeat  ct of  chest  4-2018    S/P cardiac pacemaker procedure: 10/21/2017: Medtronic Dual Chamber. 10/21/2017    10/21/2017: Medtronic Dual Chamber. Dr. Ruiz Speak Systolic CHF, acute on chronic [facility]) 6/10/2021    Type II or unspecified type diabetes mellitus without mention of complication, not stated as uncontrolled 2005       Review of Systems   Constitutional: Negative for appetite change, fatigue and fever. HENT: Negative for congestion, ear pain, postnasal drip and sore throat. Eyes: Negative for pain and visual disturbance. Respiratory: Negative for cough, chest tightness, shortness of breath and wheezing. Cardiovascular: Negative for chest pain, palpitations and leg swelling. Gastrointestinal: Negative for abdominal pain, constipation and nausea. Genitourinary: Negative for dysuria and frequency. Musculoskeletal: Negative for arthralgias, joint swelling, neck pain and neck stiffness. Skin: Negative for rash. Neurological: Negative for dizziness, weakness, numbness and headaches. Hematological: Negative for adenopathy. Does not bruise/bleed easily. Psychiatric/Behavioral: Negative for behavioral problems and sleep disturbance. The patient is not nervous/anxious. /70 (Site: Right Upper Arm, Position: Sitting, Cuff Size: Medium Adult)   Pulse 84   Resp 16   Ht 5' 7\" (1.702 m)   Wt 241 lb 6 oz (109.5 kg)   BMI 37.80 kg/m²   Objective:   Physical Exam  Vitals and nursing note reviewed. Constitutional:       Appearance: She is well-developed. HENT:      Head: Normocephalic and atraumatic. Right Ear: External ear normal.      Left Ear: External ear normal.      Nose: Nose normal.   Eyes:      General: No scleral icterus. Conjunctiva/sclera: Conjunctivae normal.      Pupils: Pupils are equal, round, and reactive to light. Neck:      Thyroid: No thyromegaly. Vascular: No JVD.    Cardiovascular:      Rate and Rhythm: Normal rate and regular rhythm. Heart sounds: Normal heart sounds. Pulmonary:      Effort: Pulmonary effort is normal.      Breath sounds: Normal breath sounds. No wheezing or rales. Abdominal:      General: Bowel sounds are normal. There is no distension. Palpations: Abdomen is soft. There is no mass. Tenderness: There is no abdominal tenderness. Musculoskeletal:         General: No tenderness. Normal range of motion. Cervical back: Normal range of motion and neck supple. Lymphadenopathy:      Cervical: No cervical adenopathy. Skin:     General: Skin is warm and dry. Findings: No rash. Neurological:      Mental Status: She is alert and oriented to person, place, and time. Cranial Nerves: No cranial nerve deficit. Deep Tendon Reflexes: Reflexes are normal and symmetric. Assessment:       Diagnosis Orders   1. Type 2 diabetes mellitus without complication, without long-term current use of insulin (HCC)  POCT Glucose    POCT glycosylated hemoglobin (Hb A1C)   2. Essential hypertension     3. Diabetic peripheral neuropathy (HCC)     4. Paroxysmal atrial fibrillation ([address]     5. Acute on chronic diastolic congestive heart failure ([address]     6. Coronary artery disease involving native coronary artery of native heart without angina pectoris     7. Primary hypertension     8. Pure hypercholesterolemia     9.  CHF (congestive heart failure), NYHA class I, acute, combined ([address]           Plan:      Current Outpatient Medications   Medication Sig Dispense Refill    potassium chloride (KLOR-CON) 10 MEQ extended release tablet Take 1 tablet by mouth daily (with breakfast) 90 tablet 1    glimepiride (AMARYL) 4 MG tablet Take 1 tablet by mouth twice daily 180 tablet 1    TRULICITY 0.63 GP/8.3YS SOPN INJECT 1  SUBCUTANEOUSLY ONCE A WEEK 4 mL 0    metoprolol tartrate (LOPRESSOR) 50 MG tablet Take 1 tablet by mouth twice daily 180 tablet 0    ELIQUIS 5 MG TABS tablet Take 1 tablet by mouth twice daily 180 tablet 1    atorvastatin (LIPITOR) 40 MG tablet Take 1 tablet by mouth once daily 90 tablet 0    amiodarone (CORDARONE) 200 MG tablet Take 1 tablet by mouth once daily 90 tablet 1    bumetanide (BUMEX) 1 MG tablet Take 1 tablet by mouth 2 times daily 180 tablet 1    gabapentin (NEURONTIN) 300 MG capsule TAKE 1 CAPSULE BY MOUTH ONCE DAILY IN THE EVENING 90 capsule 1    clopidogrel (PLAVIX) 75 MG tablet Take 1 tablet by mouth once daily 90 tablet 1    amLODIPine (NORVASC) 10 MG tablet Take 1 tablet by mouth once daily 90 tablet 1    metOLazone (ZAROXOLYN) 5 MG tablet Take 5 mg by mouth daily As  Needed 10 tablet 0    levothyroxine (SYNTHROID) 125 MCG tablet Take 1 tablet by mouth daily 90 tablet 1    Flaxseed, Linseed, (FLAX SEEDS PO) Take by mouth      nitroGLYCERIN (NITROSTAT) 0.4 MG SL tablet Place 1 tablet under the tongue every 5 minutes as needed for Chest pain up to max of 3 total doses. If no relief after 1 dose, call 911. 25 tablet 1    Glucose Blood (JOSE E BREEZE 2 TEST) DISK USE ONE STRIP TWICE DAILY, DX: E11.9 200 each 5    Ascorbic Acid (VITAMIN C) 500 MG tablet Take 500 mg by mouth 2 times daily        No current facility-administered medications for this visit. Orders Placed This Encounter   Procedures    POCT Glucose    POCT glycosylated hemoglobin (Hb A1C)     Results for orders placed or performed in visit on 05/11/22   POCT Glucose   Result Value Ref Range    Glucose 210 (A) mg/dL    QC OK? POCT glycosylated hemoglobin (Hb A1C)   Result Value Ref Range    Hemoglobin A1C 6.6 (A) %     Arden Mccain received counseling on the following healthy behaviors: nutrition and exercise    Patient given educational materials on Diabetes and Hyperlipidemia    I have instructed Arden Mccain to complete a self tracking handout on Blood Sugars  and Blood Pressures  and instructed them to bring it with them to her next appointment.      Discussed use, benefit, and side effects of prescribed medications. Barriers to medication compliance addressed. All patient questions answered. Pt voiced understanding.          Rafaela Hennessy MD

## 2022-05-17 DIAGNOSIS — E03.4 HYPOTHYROIDISM DUE TO ACQUIRED ATROPHY OF THYROID: ICD-10-CM

## 2022-05-17 NOTE — TELEPHONE ENCOUNTER
Date of last visit:  5/11/2022  Date of next visit:  8/17/2022    Requested Prescriptions     Pending Prescriptions Disp Refills    levothyroxine (SYNTHROID) 125 MCG tablet [Pharmacy Med Name: Levothyroxine Sodium 125 MCG Oral Tablet] 90 tablet 1     Sig: Take 1 tablet by mouth once daily

## 2022-05-18 RX ORDER — LEVOTHYROXINE SODIUM 0.12 MG/1
TABLET ORAL
Qty: 90 TABLET | Refills: 1 | Status: SHIPPED | OUTPATIENT
Start: 2022-05-18 | End: 2022-11-04

## 2022-05-31 NOTE — TELEPHONE ENCOUNTER
Date of last visit:  5/11/2022  Date of next visit:  8/17/2022    Requested Prescriptions     Pending Prescriptions Disp Refills    atorvastatin (LIPITOR) 40 MG tablet [Pharmacy Med Name: Atorvastatin Calcium 40 MG Oral Tablet] 90 tablet 0     Sig: Take 1 tablet by mouth once daily

## 2022-06-01 RX ORDER — ATORVASTATIN CALCIUM 40 MG/1
TABLET, FILM COATED ORAL
Qty: 90 TABLET | Refills: 0 | Status: SHIPPED | OUTPATIENT
Start: 2022-06-01 | End: 2022-09-08

## 2022-06-20 ENCOUNTER — PROCEDURE VISIT (OUTPATIENT)
Dept: CARDIOLOGY CLINIC | Age: 77
End: 2022-06-20
Payer: COMMERCIAL

## 2022-06-20 DIAGNOSIS — Z95.0 S/P CARDIAC PACEMAKER PROCEDURE: Primary | ICD-10-CM

## 2022-06-20 PROCEDURE — 93296 REM INTERROG EVL PM/IDS: CPT | Performed by: NUCLEAR MEDICINE

## 2022-06-20 PROCEDURE — 93294 REM INTERROG EVL PM/LDLS PM: CPT | Performed by: NUCLEAR MEDICINE

## 2022-06-20 NOTE — PROGRESS NOTES
[employer] medtronic dual pacer     . Laurie Garcia Battery longevity:  3.5 years   Presenting rhythm  Ap     Atrial impedance 361  RV impedance 456    P wave sensing 0.6  R wave sensing 15.3    98.6 % atrial paced  100 % RV paced     Atrial threshold not measured   RV threshold 1 V at 0.4ms  Mode switches   0

## 2022-06-27 DIAGNOSIS — I10 ESSENTIAL HYPERTENSION: ICD-10-CM

## 2022-06-27 NOTE — TELEPHONE ENCOUNTER
Date of last visit:  5/11/2022  Date of next visit:  8/17/2022    Requested Prescriptions     Pending Prescriptions Disp Refills    metoprolol tartrate (LOPRESSOR) 50 MG tablet [Pharmacy Med Name: Metoprolol Tartrate 50 MG Oral Tablet] 180 tablet 1     Sig: Take 1 tablet by mouth twice daily

## 2022-06-28 RX ORDER — METOPROLOL TARTRATE 50 MG/1
TABLET, FILM COATED ORAL
Qty: 180 TABLET | Refills: 1 | Status: SHIPPED | OUTPATIENT
Start: 2022-06-28

## 2022-07-12 DIAGNOSIS — I10 ESSENTIAL HYPERTENSION: ICD-10-CM

## 2022-07-13 RX ORDER — AMLODIPINE BESYLATE 10 MG/1
TABLET ORAL
Qty: 90 TABLET | Refills: 0 | Status: SHIPPED | OUTPATIENT
Start: 2022-07-13 | End: 2022-09-21 | Stop reason: SDUPTHER

## 2022-07-22 ENCOUNTER — NURSE ONLY (OUTPATIENT)
Dept: LAB | Age: 77
End: 2022-07-22

## 2022-07-22 DIAGNOSIS — D63.1 ANEMIA IN STAGE 4 CHRONIC KIDNEY DISEASE (HCC): ICD-10-CM

## 2022-07-22 DIAGNOSIS — N18.4 ANEMIA IN STAGE 4 CHRONIC KIDNEY DISEASE (HCC): ICD-10-CM

## 2022-07-22 DIAGNOSIS — N18.4 CKD (CHRONIC KIDNEY DISEASE) STAGE 4, GFR 15-29 ML/MIN (HCC): ICD-10-CM

## 2022-07-22 LAB
ANION GAP SERPL CALCULATED.3IONS-SCNC: 15 MEQ/L (ref 8–16)
BUN BLDV-MCNC: 31 MG/DL (ref 7–22)
CALCIUM SERPL-MCNC: 8.9 MG/DL (ref 8.5–10.5)
CHLORIDE BLD-SCNC: 97 MEQ/L (ref 98–111)
CO2: 30 MEQ/L (ref 23–33)
CREAT SERPL-MCNC: 2.1 MG/DL (ref 0.4–1.2)
CREATININE, URINE: 76.8 MG/DL
ERYTHROCYTE [DISTWIDTH] IN BLOOD BY AUTOMATED COUNT: 14.6 % (ref 11.5–14.5)
ERYTHROCYTE [DISTWIDTH] IN BLOOD BY AUTOMATED COUNT: 47.6 FL (ref 35–45)
GFR SERPL CREATININE-BSD FRML MDRD: 23 ML/MIN/1.73M2
GLUCOSE BLD-MCNC: 197 MG/DL (ref 70–108)
HCT VFR BLD CALC: 39.1 % (ref 37–47)
HEMOGLOBIN: 12.4 GM/DL (ref 12–16)
MCH RBC QN AUTO: 28.7 PG (ref 26–33)
MCHC RBC AUTO-ENTMCNC: 31.7 GM/DL (ref 32.2–35.5)
MCV RBC AUTO: 90.5 FL (ref 81–99)
MICROALBUMIN UR-MCNC: 1.5 MG/DL
MICROALBUMIN/CREAT UR-RTO: 20 MG/G (ref 0–30)
PHOSPHORUS: 4.8 MG/DL (ref 2.4–4.7)
PLATELET # BLD: 322 THOU/MM3 (ref 130–400)
PMV BLD AUTO: 11 FL (ref 9.4–12.4)
POTASSIUM SERPL-SCNC: 4.5 MEQ/L (ref 3.5–5.2)
PTH INTACT: 157.4 PG/ML (ref 15–65)
RBC # BLD: 4.32 MILL/MM3 (ref 4.2–5.4)
SODIUM BLD-SCNC: 142 MEQ/L (ref 135–145)
VITAMIN D 25-HYDROXY: 35 NG/ML (ref 30–100)
WBC # BLD: 7.1 THOU/MM3 (ref 4.8–10.8)

## 2022-07-25 ENCOUNTER — OFFICE VISIT (OUTPATIENT)
Dept: NEPHROLOGY | Age: 77
End: 2022-07-25
Payer: COMMERCIAL

## 2022-07-25 VITALS
WEIGHT: 248 LBS | HEART RATE: 80 BPM | BODY MASS INDEX: 38.84 KG/M2 | OXYGEN SATURATION: 97 % | SYSTOLIC BLOOD PRESSURE: 126 MMHG | DIASTOLIC BLOOD PRESSURE: 72 MMHG

## 2022-07-25 DIAGNOSIS — N18.4 CKD (CHRONIC KIDNEY DISEASE) STAGE 4, GFR 15-29 ML/MIN (HCC): Primary | ICD-10-CM

## 2022-07-25 PROCEDURE — 99214 OFFICE O/P EST MOD 30 MIN: CPT | Performed by: INTERNAL MEDICINE

## 2022-07-25 PROCEDURE — 1123F ACP DISCUSS/DSCN MKR DOCD: CPT | Performed by: INTERNAL MEDICINE

## 2022-07-25 NOTE — PATIENT INSTRUCTIONS
Increase bumex to 2 mg twice daily today and tomorrow  Take a dose of metolazone today and tomorrow  Take an extra potassium pill today and tomorrow  Call on Wednesday with update

## 2022-07-25 NOTE — PROGRESS NOTES
WITH STENT PLACEMENT  2005    sarah    CORONARY ANGIOPLASTY WITH STENT PLACEMENT  10/2018     lad [facility]    HEEL SPUR SURGERY Bilateral     bilat with hardware    HYSTERECTOMY (CERVIX STATUS UNKNOWN)  1984    KNEE ARTHROSCOPY  11/01; 3/02    right and left knee    KNEE ARTHROSCOPY Bilateral     bilat    PACEMAKER PLACEMENT  10/2017    THYROIDECTOMY, PARTIAL  1980's        VITALS:  /72 (Site: Right Upper Arm, Position: Sitting, Cuff Size: Large Adult)   Pulse 80   Wt 248 lb (112.5 kg)   SpO2 97%   BMI 38.84 kg/m²   Wt Readings from Last 3 Encounters:   07/25/22 248 lb (112.5 kg)   05/11/22 241 lb 6 oz (109.5 kg)   04/16/22 237 lb (107.5 kg)     Body mass index is 38.84 kg/m².      General Appearance: alert and cooperative with exam, appears comfortable, no distress  HEENT: EOMI, moist oral mucus membranes  Neck: No jugular venous distention,  Lungs: diminished  Heart: S1, S2 heard, no rub  GI: soft, non-tender, no guarding,  Extremities: 2+ LE edema B/L  Skin: warm, dry  Neurologic: no tremor, no asterixis     Medications:  Current Outpatient Medications   Medication Sig Dispense Refill    amLODIPine (NORVASC) 10 MG tablet Take 1 tablet by mouth once daily 90 tablet 0    metoprolol tartrate (LOPRESSOR) 50 MG tablet Take 1 tablet by mouth twice daily 180 tablet 1    atorvastatin (LIPITOR) 40 MG tablet Take 1 tablet by mouth once daily 90 tablet 0    levothyroxine (SYNTHROID) 125 MCG tablet Take 1 tablet by mouth once daily 90 tablet 1    potassium chloride (KLOR-CON) 10 MEQ extended release tablet Take 1 tablet by mouth daily (with breakfast) 90 tablet 1    glimepiride (AMARYL) 4 MG tablet Take 1 tablet by mouth twice daily 726 tablet 1    TRULICITY 9.35 UT/0.4WT SOPN INJECT 1  SUBCUTANEOUSLY ONCE A WEEK 4 mL 0    ELIQUIS 5 MG TABS tablet Take 1 tablet by mouth twice daily 180 tablet 1    amiodarone (CORDARONE) 200 MG tablet Take 1 tablet by mouth once daily 90 tablet 1    bumetanide (BUMEX) 1 MG tablet Take 1 tablet by mouth 2 times daily 180 tablet 1    gabapentin (NEURONTIN) 300 MG capsule TAKE 1 CAPSULE BY MOUTH ONCE DAILY IN THE EVENING 90 capsule 1    clopidogrel (PLAVIX) 75 MG tablet Take 1 tablet by mouth once daily 90 tablet 1    metOLazone (ZAROXOLYN) 5 MG tablet Take 5 mg by mouth daily As  Needed 10 tablet 0    Flaxseed, Linseed, (FLAX SEEDS PO) Take by mouth      nitroGLYCERIN (NITROSTAT) 0.4 MG SL tablet Place 1 tablet under the tongue every 5 minutes as needed for Chest pain up to max of 3 total doses. If no relief after 1 dose, call 911. 25 tablet 1    Glucose Blood (JOSE E BREEZE 2 TEST) DISK USE ONE STRIP TWICE DAILY, DX: E11.9 200 each 5    Ascorbic Acid (VITAMIN C) 500 MG tablet Take 500 mg by mouth 2 times daily        No current facility-administered medications for this visit.         Laboratory & Diagnostics:  CBC:   Lab Results   Component Value Date    WBC 7.1 07/22/2022    HGB 12.4 07/22/2022    HCT 39.1 07/22/2022    MCV 90.5 07/22/2022     07/22/2022     BMP:    Lab Results   Component Value Date     07/22/2022     03/07/2022     10/21/2021    K 4.5 07/22/2022    K 4.3 03/07/2022    K 4.5 10/21/2021    CL 97 (L) 07/22/2022    CL 94 (L) 03/07/2022     10/21/2021    CO2 30 07/22/2022    CO2 28 03/07/2022    CO2 29 10/21/2021    BUN 31 (H) 07/22/2022    BUN 28 (H) 03/07/2022    BUN 29 (H) 10/21/2021    CREATININE 2.1 (H) 07/22/2022    CREATININE 2.1 (H) 03/07/2022    CREATININE 2.3 (H) 10/21/2021    GLUCOSE 197 (H) 07/22/2022    GLUCOSE 210 (A) 05/11/2022    GLUCOSE 198 (H) 03/07/2022      Hepatic:   Lab Results   Component Value Date    AST 14 06/08/2021    AST 21 06/01/2021    AST 13 05/28/2021    ALT 14 06/08/2021    ALT 18 06/01/2021    ALT 15 05/28/2021    BILITOT 0.9 06/08/2021    BILITOT 0.7 06/01/2021    BILITOT 0.4 05/28/2021    ALKPHOS 70 06/08/2021    ALKPHOS 64 06/01/2021    ALKPHOS 64 05/28/2021     BNP: No results found for: BNP  Lipids:   Lab Results Component Value Date    CHOL 208 (H) 06/01/2021    HDL 62 06/01/2021     INR:   Lab Results   Component Value Date    INR 1.41 (H) 02/08/2019    INR 1.10 10/12/2018    INR 1.00 10/21/2017     URINE:   Lab Results   Component Value Date/Time    NAUR 110 06/09/2021 01:11 PM    PROTUR 4.9 06/09/2021 01:11 PM     Lab Results   Component Value Date/Time    NITRU NEGATIVE 06/09/2021 01:11 PM    COLORU YELLOW 06/09/2021 01:11 PM    PHUR 5.5 06/09/2021 01:11 PM    WBCUA 5-9 06/09/2021 01:11 PM    RBCUA NONE SEEN 06/09/2021 01:11 PM    YEAST NONE SEEN 06/09/2021 01:11 PM    BACTERIA NONE SEEN 06/09/2021 01:11 PM    LEUKOCYTESUR SMALL 06/09/2021 01:11 PM    UROBILINOGEN 0.2 06/09/2021 01:11 PM    BILIRUBINUR NEGATIVE 06/09/2021 01:11 PM    BLOODU NEGATIVE 06/09/2021 01:11 PM    GLUCOSEU 100 06/09/2021 01:11 PM    KETUA NEGATIVE 06/09/2021 01:11 PM      Microalbumen/Creatinine ratio:  No components found for: RUCREAT        Impression/Plan:   1. CKD IV: cardiorenal syndrome, diabetic nephropathy: stable . Goals of care include slowing rate of progression by controlling blood pressure, blood glucoses and albuminuria and by avoiding nephrotoxins such as NSAIDs and IV contrast.    2. Diastolic CHF: on hypervolemic side. Weight up 7 pounds. Increase bumex to 2 mg bid for today and tomorrow and take metolazone 5 mg x 2 days along with extra kcl. Call with update on wed. 3. Lytes: K ok on KCl 10 meq daily  4. HTN: stable  5. DM  6. Anemia, no need for DOREEN  7. CAD        Bloodwork and medications were reviewed and plan of care discussed with the patient. Return to clinic in 2 months  or sooner if the need arises.       Lucas Arzate, DO  [facility]

## 2022-07-27 ENCOUNTER — TELEPHONE (OUTPATIENT)
Dept: NEPHROLOGY | Age: 77
End: 2022-07-27

## 2022-07-27 RX ORDER — METOLAZONE 5 MG/1
5 TABLET ORAL PRN
Qty: 10 TABLET | Refills: 0 | Status: SHIPPED | OUTPATIENT
Start: 2022-07-27 | End: 2022-09-21 | Stop reason: SDUPTHER

## 2022-07-27 NOTE — TELEPHONE ENCOUNTER
Patient has been informed to take bumex 1 mg BID. Also informed her metolazone was refilled but to only take it PRN. Also told her to let us know if the swelling comes back.

## 2022-07-27 NOTE — TELEPHONE ENCOUNTER
Patient called to give an update on her fluid retention. She states it is better and she is down 6 lbs. She is out of Metolazone and if she is to continue will need it refilled at [facility] in [address] [place] [address].

## 2022-08-09 DIAGNOSIS — E11.42 DIABETIC PERIPHERAL NEUROPATHY (HCC): ICD-10-CM

## 2022-08-09 NOTE — TELEPHONE ENCOUNTER
Date of last visit:  5/11/2022  Date of next visit:  8/17/2022    Requested Prescriptions     Pending Prescriptions Disp Refills    potassium chloride (KLOR-CON) 10 MEQ extended release tablet [Pharmacy Med Name: Potassium Chloride ER 10 MEQ Oral Tablet Extended Release] 90 tablet 0     Sig: Take 1 tablet by mouth once daily with breakfast    gabapentin (NEURONTIN) 300 MG capsule [Pharmacy Med Name: Gabapentin 300 MG Oral Capsule] 90 capsule 0     Sig: TAKE 1 CAPSULE BY MOUTH ONCE DAILY IN THE EVENING    amiodarone (CORDARONE) 200 MG tablet [Pharmacy Med Name: Amiodarone HCl 200 MG Oral Tablet] 90 tablet 0     Sig: Take 1 tablet by mouth once daily

## 2022-08-10 RX ORDER — GABAPENTIN 300 MG/1
CAPSULE ORAL
Qty: 90 CAPSULE | Refills: 1 | Status: SHIPPED | OUTPATIENT
Start: 2022-08-10 | End: 2023-02-10

## 2022-08-10 RX ORDER — POTASSIUM CHLORIDE 750 MG/1
TABLET, FILM COATED, EXTENDED RELEASE ORAL
Qty: 90 TABLET | Refills: 0 | Status: SHIPPED | OUTPATIENT
Start: 2022-08-10

## 2022-08-10 RX ORDER — AMIODARONE HYDROCHLORIDE 200 MG/1
TABLET ORAL
Qty: 90 TABLET | Refills: 1 | Status: SHIPPED | OUTPATIENT
Start: 2022-08-10

## 2022-08-16 DIAGNOSIS — I50.33 ACUTE ON CHRONIC DIASTOLIC CONGESTIVE HEART FAILURE (HCC): ICD-10-CM

## 2022-08-16 RX ORDER — BUMETANIDE 1 MG/1
TABLET ORAL
Qty: 180 TABLET | Refills: 1 | Status: SHIPPED | OUTPATIENT
Start: 2022-08-16 | End: 2022-09-21 | Stop reason: SDUPTHER

## 2022-08-16 NOTE — TELEPHONE ENCOUNTER
Date of last visit:  5/11/2022  Date of next visit:  8/17/2022    Requested Prescriptions     Pending Prescriptions Disp Refills    bumetanide (BUMEX) 1 MG tablet [Pharmacy Med Name: Bumetanide 1 MG Oral Tablet] 180 tablet 1     Sig: Take 1 tablet by mouth twice daily

## 2022-08-17 ENCOUNTER — OFFICE VISIT (OUTPATIENT)
Dept: FAMILY MEDICINE CLINIC | Age: 77
End: 2022-08-17

## 2022-08-17 VITALS
HEART RATE: 84 BPM | DIASTOLIC BLOOD PRESSURE: 74 MMHG | BODY MASS INDEX: 37.98 KG/M2 | RESPIRATION RATE: 16 BRPM | HEIGHT: 67 IN | SYSTOLIC BLOOD PRESSURE: 120 MMHG | WEIGHT: 242 LBS

## 2022-08-17 DIAGNOSIS — I48.0 PAROXYSMAL ATRIAL FIBRILLATION (HCC): ICD-10-CM

## 2022-08-17 DIAGNOSIS — Z12.31 SCREENING MAMMOGRAM FOR HIGH-RISK PATIENT: ICD-10-CM

## 2022-08-17 DIAGNOSIS — E03.4 HYPOTHYROIDISM DUE TO ACQUIRED ATROPHY OF THYROID: ICD-10-CM

## 2022-08-17 DIAGNOSIS — E11.9 TYPE 2 DIABETES MELLITUS WITHOUT COMPLICATION, WITHOUT LONG-TERM CURRENT USE OF INSULIN (HCC): Primary | ICD-10-CM

## 2022-08-17 DIAGNOSIS — I10 PRIMARY HYPERTENSION: ICD-10-CM

## 2022-08-17 DIAGNOSIS — I49.5 SICK SINUS SYNDROME (HCC): ICD-10-CM

## 2022-08-17 DIAGNOSIS — Z95.0 S/P CARDIAC PACEMAKER PROCEDURE: ICD-10-CM

## 2022-08-17 DIAGNOSIS — I50.41 CHF (CONGESTIVE HEART FAILURE), NYHA CLASS I, ACUTE, COMBINED (HCC): ICD-10-CM

## 2022-08-17 LAB
CHP ED QC CHECK: ABNORMAL
GLUCOSE BLD-MCNC: 198 MG/DL
HBA1C MFR BLD: 6.9 %

## 2022-08-17 PROCEDURE — 82962 GLUCOSE BLOOD TEST: CPT | Performed by: FAMILY MEDICINE

## 2022-08-17 PROCEDURE — 99213 OFFICE O/P EST LOW 20 MIN: CPT | Performed by: FAMILY MEDICINE

## 2022-08-17 PROCEDURE — 1123F ACP DISCUSS/DSCN MKR DOCD: CPT | Performed by: FAMILY MEDICINE

## 2022-08-17 PROCEDURE — 83036 HEMOGLOBIN GLYCOSYLATED A1C: CPT | Performed by: FAMILY MEDICINE

## 2022-08-17 SDOH — ECONOMIC STABILITY: FOOD INSECURITY: WITHIN THE PAST 12 MONTHS, THE FOOD YOU BOUGHT JUST DIDN'T LAST AND YOU DIDN'T HAVE MONEY TO GET MORE.: NEVER TRUE

## 2022-08-17 SDOH — ECONOMIC STABILITY: FOOD INSECURITY: WITHIN THE PAST 12 MONTHS, YOU WORRIED THAT YOUR FOOD WOULD RUN OUT BEFORE YOU GOT MONEY TO BUY MORE.: NEVER TRUE

## 2022-08-17 ASSESSMENT — ENCOUNTER SYMPTOMS
CHEST TIGHTNESS: 0
ORTHOPNEA: 0
WHEEZING: 0
SHORTNESS OF BREATH: 0
CONSTIPATION: 0
COUGH: 0
ABDOMINAL PAIN: 0
EYE PAIN: 0
NAUSEA: 0
SORE THROAT: 0

## 2022-08-17 ASSESSMENT — SOCIAL DETERMINANTS OF HEALTH (SDOH): HOW HARD IS IT FOR YOU TO PAY FOR THE VERY BASICS LIKE FOOD, HOUSING, MEDICAL CARE, AND HEATING?: NOT HARD AT ALL

## 2022-08-17 NOTE — PROGRESS NOTES
Subjective:      Patient ID: Onesimo Pitts is a 78 y.o. female. Sick  sinus  with pacer      Ashd  stable     Diabetic   peripheral   neuropathy  no  changes       Hypothyoid   stable         Diabetes  She presents for her follow-up diabetic visit. She has type 2 diabetes mellitus. Her disease course has been stable. There are no hypoglycemic associated symptoms. Pertinent negatives for hypoglycemia include no dizziness, headaches or nervousness/anxiousness. There are no diabetic associated symptoms. Pertinent negatives for diabetes include no chest pain, no fatigue and no weakness. There are no hypoglycemic complications. Symptoms are stable. There are no diabetic complications. Current diabetic treatment includes oral agent (dual therapy). She is compliant with treatment all of the time. Her weight is stable. She is following a diabetic diet. Meal planning includes avoidance of concentrated sweets. There is no change in her home blood glucose trend. Her breakfast blood glucose is taken between 7-8 am. Her breakfast blood glucose range is generally 130-140 mg/dl. An ACE inhibitor/angiotensin II receptor blocker is being taken. She sees a podiatrist.Eye exam is current. Hypertension  This is a chronic problem. The current episode started more than 1 year ago. The problem has been resolved since onset. The problem is controlled. Associated symptoms include peripheral edema. Pertinent negatives include no chest pain, headaches, neck pain, orthopnea, palpitations or shortness of breath. The current treatment provides significant improvement. There are no compliance problems.     Past Medical History:   Diagnosis Date    Aortic aneurysm (HCC)     4.5 cm aorta    Arthritis     Atrial fibrillation, chronic ([address] 1/25/2019    CAD (coronary artery disease)     CHF NYHA class II, unspecified failure chronicity, unspecified type ([address] 10/8/2018    Colon polyps 3/01    Diabetic peripheral neuropathy ([address] 2014      feet Elbow fracture     Fluid overload 6/9/2021    Hyperlipidemia     Hypertension     Hypothyroidism     Pulmonary nodule, right 10/2017      repeat  ct of  chest  4-2018    S/P cardiac pacemaker procedure: 10/21/2017: Medtronic Dual Chamber. 10/21/2017    10/21/2017: Medtronic Dual Chamber. Dr. Ifrah Millard    Systolic CHF, acute on chronic [facility]) 6/10/2021    Type II or unspecified type diabetes mellitus without mention of complication, not stated as uncontrolled 2005      Review of Systems   Constitutional:  Negative for appetite change, fatigue and fever. HENT:  Negative for congestion, ear pain, postnasal drip and sore throat. Eyes:  Negative for pain and visual disturbance. Respiratory:  Negative for cough, chest tightness, shortness of breath and wheezing. Cardiovascular:  Negative for chest pain, palpitations, orthopnea and leg swelling. Gastrointestinal:  Negative for abdominal pain, constipation and nausea. Genitourinary:  Negative for dysuria and frequency. Musculoskeletal:  Negative for arthralgias, joint swelling, neck pain and neck stiffness. Skin:  Negative for rash. Neurological:  Negative for dizziness, weakness, numbness and headaches. Hematological:  Negative for adenopathy. Does not bruise/bleed easily. Psychiatric/Behavioral:  Negative for behavioral problems and sleep disturbance. The patient is not nervous/anxious. /74 (Site: Right Upper Arm, Position: Sitting, Cuff Size: Medium Adult)   Pulse 84   Resp 16   Ht 5' 7\" (1.702 m)   Wt 242 lb (109.8 kg)   BMI 37.90 kg/m²    Objective:   Physical Exam  Vitals and nursing note reviewed. Constitutional:       Appearance: She is well-developed. HENT:      Head: Normocephalic and atraumatic. Right Ear: External ear normal.      Left Ear: External ear normal.      Nose: Nose normal.   Eyes:      General: No scleral icterus.      Conjunctiva/sclera: Conjunctivae normal.      Pupils: Pupils are equal, round, and reactive to light. Neck:      Thyroid: No thyromegaly. Vascular: No JVD. Cardiovascular:      Rate and Rhythm: Normal rate and regular rhythm. Heart sounds: Normal heart sounds. Comments: Pacer   left  upper  chest   Pulmonary:      Effort: Pulmonary effort is normal.      Breath sounds: Normal breath sounds. No wheezing or rales. Abdominal:      General: Bowel sounds are normal. There is no distension. Palpations: Abdomen is soft. There is no mass. Tenderness: There is no abdominal tenderness. Musculoskeletal:         General: No tenderness. Normal range of motion. Cervical back: Normal range of motion and neck supple. Right lower leg: Edema present. Left lower leg: Edema present. Comments:   1  to  2 +  of  leg  edema   Lymphadenopathy:      Cervical: No cervical adenopathy. Skin:     General: Skin is warm and dry. Findings: No rash. Neurological:      Mental Status: She is alert and oriented to person, place, and time. Cranial Nerves: No cranial nerve deficit. Deep Tendon Reflexes: Reflexes are normal and symmetric. Assessment:              ICD-10-CM    1. Type 2 diabetes mellitus without complication, without long-term current use of insulin ([facility])  E11.9 POCT Glucose     POCT glycosylated hemoglobin (Hb A1C)     Lipid Panel      2. CHF (congestive heart failure), NYHA class I, acute, combined ([facility]  I50.41 Comprehensive Metabolic Panel      3. Paroxysmal atrial fibrillation (HCC)  I48.0 CBC with Auto Differential      4. Hypothyroidism due to acquired atrophy of thyroid  E03.4 TSH     T4, Free      5. Primary hypertension  I10 Lipid Panel      6. S/P cardiac pacemaker procedure: 10/21/2017: Medtronic Dual Chamber. Z95.0       7. Sick sinus syndrome (HCC)  I49.5       8.  Screening mammogram for high-risk patient  Z12.31 TAMIKO SULEIMAN DIGITAL SCREEN BILATERAL             Plan:      Current Outpatient Medications   Medication Sig Dispense Refill    bumetanide (BUMEX) 1 MG tablet Take 1 tablet by mouth twice daily 180 tablet 1    potassium chloride (KLOR-CON) 10 MEQ extended release tablet Take 1 tablet by mouth once daily with breakfast 90 tablet 0    gabapentin (NEURONTIN) 300 MG capsule TAKE 1 CAPSULE BY MOUTH ONCE DAILY IN THE EVENING 90 capsule 1    amiodarone (CORDARONE) 200 MG tablet Take 1 tablet by mouth once daily 90 tablet 1    metOLazone (ZAROXOLYN) 5 MG tablet Take 1 tablet by mouth as needed (worsening swelling) As  Needed 10 tablet 0    amLODIPine (NORVASC) 10 MG tablet Take 1 tablet by mouth once daily 90 tablet 0    metoprolol tartrate (LOPRESSOR) 50 MG tablet Take 1 tablet by mouth twice daily 180 tablet 1    atorvastatin (LIPITOR) 40 MG tablet Take 1 tablet by mouth once daily 90 tablet 0    levothyroxine (SYNTHROID) 125 MCG tablet Take 1 tablet by mouth once daily 90 tablet 1    glimepiride (AMARYL) 4 MG tablet Take 1 tablet by mouth twice daily 672 tablet 1    TRULICITY 8.89 OU/0.8ZN SOPN INJECT 1  SUBCUTANEOUSLY ONCE A WEEK 4 mL 0    ELIQUIS 5 MG TABS tablet Take 1 tablet by mouth twice daily 180 tablet 1    clopidogrel (PLAVIX) 75 MG tablet Take 1 tablet by mouth once daily 90 tablet 1    Flaxseed, Linseed, (FLAX SEEDS PO) Take by mouth      nitroGLYCERIN (NITROSTAT) 0.4 MG SL tablet Place 1 tablet under the tongue every 5 minutes as needed for Chest pain up to max of 3 total doses. If no relief after 1 dose, call 911. 25 tablet 1    Glucose Blood (JOSE E BREEZE 2 TEST) DISK USE ONE STRIP TWICE DAILY, DX: E11.9 200 each 5    Ascorbic Acid (VITAMIN C) 500 MG tablet Take 500 mg by mouth 2 times daily        No current facility-administered medications for this visit.         .  Orders Placed This Encounter   Procedures    TAMIKO SULEIMAN DIGITAL SCREEN BILATERAL     Standing Status:   Future     Standing Expiration Date:   8/17/2023     Order Specific Question:   Reason for exam:     Answer:   SCREENING    Lipid Panel     Standing Status: Future     Standing Expiration Date:   8/17/2023     Order Specific Question:   Is Patient Fasting?/# of Hours     Answer:   YES 12 HOURS    Comprehensive Metabolic Panel     Standing Status:   Future     Standing Expiration Date:   8/17/2023    CBC with Auto Differential     Standing Status:   Future     Standing Expiration Date:   8/17/2023    TSH     Standing Status:   Future     Standing Expiration Date:   8/17/2023    T4, Free     Standing Status:   Future     Standing Expiration Date:   8/17/2023    POCT Glucose    POCT glycosylated hemoglobin (Hb A1C)         Results for orders placed or performed in visit on 08/17/22   POCT Glucose   Result Value Ref Range    Glucose 198 (A) mg/dL    QC OK?           Hgba1c  6.9   see   3 mths  medicare annual   Manjeet Johnson MD

## 2022-09-07 DIAGNOSIS — I25.10 CORONARY ARTERY DISEASE INVOLVING NATIVE CORONARY ARTERY OF NATIVE HEART WITHOUT ANGINA PECTORIS: ICD-10-CM

## 2022-09-07 NOTE — TELEPHONE ENCOUNTER
Date of last visit:  8/17/2022  Date of next visit:  11/22/2022    Requested Prescriptions     Pending Prescriptions Disp Refills    clopidogrel (PLAVIX) 75 MG tablet [Pharmacy Med Name: Clopidogrel Bisulfate 75 MG Oral Tablet] 90 tablet 0     Sig: Take 1 tablet by mouth once daily    atorvastatin (LIPITOR) 40 MG tablet [Pharmacy Med Name: Atorvastatin Calcium 40 MG Oral Tablet] 90 tablet 0     Sig: Take 1 tablet by mouth once daily

## 2022-09-08 RX ORDER — ATORVASTATIN CALCIUM 40 MG/1
TABLET, FILM COATED ORAL
Qty: 90 TABLET | Refills: 0 | Status: SHIPPED | OUTPATIENT
Start: 2022-09-08

## 2022-09-08 RX ORDER — CLOPIDOGREL BISULFATE 75 MG/1
TABLET ORAL
Qty: 90 TABLET | Refills: 0 | Status: SHIPPED | OUTPATIENT
Start: 2022-09-08

## 2022-09-19 ENCOUNTER — NURSE ONLY (OUTPATIENT)
Dept: LAB | Age: 77
End: 2022-09-19

## 2022-09-19 DIAGNOSIS — N18.4 CKD (CHRONIC KIDNEY DISEASE) STAGE 4, GFR 15-29 ML/MIN (HCC): ICD-10-CM

## 2022-09-19 LAB
ANION GAP SERPL CALCULATED.3IONS-SCNC: 12 MEQ/L (ref 8–16)
BUN BLDV-MCNC: 35 MG/DL (ref 7–22)
CALCIUM SERPL-MCNC: 9 MG/DL (ref 8.5–10.5)
CHLORIDE BLD-SCNC: 102 MEQ/L (ref 98–111)
CO2: 28 MEQ/L (ref 23–33)
CREAT SERPL-MCNC: 2.1 MG/DL (ref 0.4–1.2)
GFR SERPL CREATININE-BSD FRML MDRD: 23 ML/MIN/1.73M2
GLUCOSE BLD-MCNC: 158 MG/DL (ref 70–108)
POTASSIUM SERPL-SCNC: 4.2 MEQ/L (ref 3.5–5.2)
SODIUM BLD-SCNC: 142 MEQ/L (ref 135–145)

## 2022-09-21 ENCOUNTER — OFFICE VISIT (OUTPATIENT)
Dept: NEPHROLOGY | Age: 77
End: 2022-09-21
Payer: COMMERCIAL

## 2022-09-21 VITALS
DIASTOLIC BLOOD PRESSURE: 64 MMHG | SYSTOLIC BLOOD PRESSURE: 104 MMHG | WEIGHT: 246 LBS | BODY MASS INDEX: 38.53 KG/M2 | OXYGEN SATURATION: 98 % | HEART RATE: 70 BPM

## 2022-09-21 DIAGNOSIS — N18.4 CKD (CHRONIC KIDNEY DISEASE) STAGE 4, GFR 15-29 ML/MIN (HCC): Primary | ICD-10-CM

## 2022-09-21 DIAGNOSIS — I50.33 ACUTE ON CHRONIC DIASTOLIC CONGESTIVE HEART FAILURE (HCC): ICD-10-CM

## 2022-09-21 DIAGNOSIS — I10 ESSENTIAL HYPERTENSION: ICD-10-CM

## 2022-09-21 PROCEDURE — 99214 OFFICE O/P EST MOD 30 MIN: CPT | Performed by: INTERNAL MEDICINE

## 2022-09-21 PROCEDURE — 1123F ACP DISCUSS/DSCN MKR DOCD: CPT | Performed by: INTERNAL MEDICINE

## 2022-09-21 RX ORDER — METOLAZONE 5 MG/1
5 TABLET ORAL PRN
Qty: 10 TABLET | Refills: 0 | Status: SHIPPED | OUTPATIENT
Start: 2022-09-21 | End: 2022-10-01

## 2022-09-21 RX ORDER — AMLODIPINE BESYLATE 5 MG/1
5 TABLET ORAL DAILY
Qty: 90 TABLET | Refills: 1 | Status: SHIPPED | OUTPATIENT
Start: 2022-09-21 | End: 2022-12-20

## 2022-09-21 RX ORDER — BUMETANIDE 1 MG/1
2 TABLET ORAL 2 TIMES DAILY
Qty: 360 TABLET | Refills: 1 | Status: SHIPPED | OUTPATIENT
Start: 2022-09-21 | End: 2022-12-20

## 2022-09-21 NOTE — PROGRESS NOTES
[address] [facility]  [address]  [facility] [address]  Dept: [phone]  Loc: [phone]  Progress Note  2022 11:34 AM      Pt Name:    Onesimo Pitts  YOB: 1945  Primary Care Physician:  Michelle Mosqueda MD     Chief Complaint:   Chief Complaint   Patient presents with    Follow-up     CKD IV         History of Present Illness: This is a follow up visit for CKD IV. She has hx of DM, HTN, HFpEF  (EF improved to 55%), Afib, CAD/PCI     Patient seen. Still having issues with swelling, states her weight has gone up 5 pounds in the last week. Denies SOB. Took Metolazone yesterday. BP is soft, denies dizziness. Takes bumex 1 mg bid, doesn't urinate much with it. Takes prn metolazone, maybe twice a month per pt. Pertinent items are noted in HPI. Past History:  Past Medical History:   Diagnosis Date    Aortic aneurysm (HCC)     4.5 cm aorta    Arthritis     Atrial fibrillation, chronic ([address] 2019    CAD (coronary artery disease)     CHF NYHA class II, unspecified failure chronicity, unspecified type ([address] 10/8/2018    Colon polyps 3/01    Diabetic peripheral neuropathy ([address] 2014      feet    Elbow fracture     Fluid overload 2021    Hyperlipidemia     Hypertension     Hypothyroidism     Pulmonary nodule, right 10/2017      repeat  ct of  chest      S/P cardiac pacemaker procedure: 10/21/2017: Medtronic Dual Chamber. 10/21/2017    10/21/2017: Medtronic Dual Chamber.  Dr. Dc Núñez    Systolic CHF, acute on chronic [facility]) 6/10/2021    Type II or unspecified type diabetes mellitus without mention of complication, not stated as uncontrolled      Past Surgical History:   Procedure Laterality Date    BREAST BIOPSY Right 2016     benign   [facility]    BREAST SURGERY      CARDIOVERSION  2019    atrial fib to sinus  [facility]     SECTION  over 30 years ago     COLONOSCOPY   colon polyps   [facility]    CORONARY ANGIOPLASTY  2005    [facility]    CORONARY ANGIOPLASTY WITH STENT PLACEMENT  2005    [facility]    CORONARY ANGIOPLASTY WITH STENT PLACEMENT  10/2018     [facility]    HEEL SPUR SURGERY Bilateral     bilat with hardware    HYSTERECTOMY (CERVIX STATUS UNKNOWN)  1984    KNEE ARTHROSCOPY  11/01; 3/02    right and left knee    KNEE ARTHROSCOPY Bilateral     bilat    PACEMAKER PLACEMENT  10/2017    THYROIDECTOMY, PARTIAL  1980's        VITALS:  /64 (Site: Right Upper Arm, Position: Sitting, Cuff Size: Large Adult)   Pulse 70   Wt 246 lb (111.6 kg)   SpO2 98%   BMI 38.53 kg/m²   Wt Readings from Last 3 Encounters:   09/21/22 246 lb (111.6 kg)   08/17/22 242 lb (109.8 kg)   07/25/22 248 lb (112.5 kg)     Body mass index is 38.53 kg/m².      General Appearance: alert and cooperative with exam, appears comfortable, no distress  HEENT: EOMI, moist oral mucus membranes  Neck: No jugular venous distention,  Lungs: diminished  Heart: S1, S2 heard, no rub  GI: soft, non-tender, no guarding,  Extremities: 2+ LE edema B/L  Skin: warm, dry  Neurologic: no tremor, no asterixis     Medications:  Current Outpatient Medications   Medication Sig Dispense Refill    clopidogrel (PLAVIX) 75 MG tablet Take 1 tablet by mouth once daily 90 tablet 0    atorvastatin (LIPITOR) 40 MG tablet Take 1 tablet by mouth once daily 90 tablet 0    bumetanide (BUMEX) 1 MG tablet Take 1 tablet by mouth twice daily 180 tablet 1    potassium chloride (KLOR-CON) 10 MEQ extended release tablet Take 1 tablet by mouth once daily with breakfast 90 tablet 0    gabapentin (NEURONTIN) 300 MG capsule TAKE 1 CAPSULE BY MOUTH ONCE DAILY IN THE EVENING 90 capsule 1    amiodarone (CORDARONE) 200 MG tablet Take 1 tablet by mouth once daily 90 tablet 1    metOLazone (ZAROXOLYN) 5 MG tablet Take 1 tablet by mouth as needed (worsening swelling) As  Needed 10 tablet 0    amLODIPine (NORVASC) 10 MG tablet Take 1 tablet by mouth once daily 90 tablet 0    metoprolol tartrate (LOPRESSOR) 50 MG tablet Take 1 tablet by mouth twice daily 180 tablet 1    levothyroxine (SYNTHROID) 125 MCG tablet Take 1 tablet by mouth once daily 90 tablet 1    glimepiride (AMARYL) 4 MG tablet Take 1 tablet by mouth twice daily 240 tablet 1    TRULICITY 3.14 MC/7.9RZ SOPN INJECT 1  SUBCUTANEOUSLY ONCE A WEEK 4 mL 0    ELIQUIS 5 MG TABS tablet Take 1 tablet by mouth twice daily 180 tablet 1    Flaxseed, Linseed, (FLAX SEEDS PO) Take by mouth      nitroGLYCERIN (NITROSTAT) 0.4 MG SL tablet Place 1 tablet under the tongue every 5 minutes as needed for Chest pain up to max of 3 total doses. If no relief after 1 dose, call 911. 25 tablet 1    Glucose Blood (JOSEE  BREEZE 2 TEST) DISK USE ONE STRIP TWICE DAILY, DX: E11.9 200 each 5    Ascorbic Acid (VITAMIN C) 500 MG tablet Take 500 mg by mouth 2 times daily        No current facility-administered medications for this visit.         Laboratory & Diagnostics:  CBC:   Lab Results   Component Value Date    WBC 7.1 07/22/2022    HGB 12.4 07/22/2022    HCT 39.1 07/22/2022    MCV 90.5 07/22/2022     07/22/2022     BMP:    Lab Results   Component Value Date     09/19/2022     07/22/2022     03/07/2022    K 4.2 09/19/2022    K 4.5 07/22/2022    K 4.3 03/07/2022     09/19/2022    CL 97 (L) 07/22/2022    CL 94 (L) 03/07/2022    CO2 28 09/19/2022    CO2 30 07/22/2022    CO2 28 03/07/2022    BUN 35 (H) 09/19/2022    BUN 31 (H) 07/22/2022    BUN 28 (H) 03/07/2022    CREATININE 2.1 (H) 09/19/2022    CREATININE 2.1 (H) 07/22/2022    CREATININE 2.1 (H) 03/07/2022    GLUCOSE 158 (H) 09/19/2022    GLUCOSE 198 (A) 08/17/2022    GLUCOSE 197 (H) 07/22/2022      Hepatic:   Lab Results   Component Value Date    AST 14 06/08/2021    AST 21 06/01/2021    AST 13 05/28/2021    ALT 14 06/08/2021    ALT 18 06/01/2021    ALT 15 05/28/2021    BILITOT 0.9 06/08/2021    BILITOT 0.7 06/01/2021    BILITOT 0.4 05/28/2021    ALKPHOS 70 06/08/2021    ALKPHOS 64 06/01/2021    ALKPHOS 64 05/28/2021     BNP: No results found for: BNP  Lipids:   Lab Results   Component Value Date    CHOL 208 (H) 06/01/2021    HDL 62 06/01/2021     INR:   Lab Results   Component Value Date    INR 1.41 (H) 02/08/2019    INR 1.10 10/12/2018    INR 1.00 10/21/2017     URINE:   Lab Results   Component Value Date/Time    NAUR 110 06/09/2021 01:11 PM    PROTUR 4.9 06/09/2021 01:11 PM     Lab Results   Component Value Date/Time    NITRU NEGATIVE 06/09/2021 01:11 PM    COLORU YELLOW 06/09/2021 01:11 PM    PHUR 5.5 06/09/2021 01:11 PM    WBCUA 5-9 06/09/2021 01:11 PM    RBCUA NONE SEEN 06/09/2021 01:11 PM    YEAST NONE SEEN 06/09/2021 01:11 PM    BACTERIA NONE SEEN 06/09/2021 01:11 PM    LEUKOCYTESUR SMALL 06/09/2021 01:11 PM    UROBILINOGEN 0.2 06/09/2021 01:11 PM    BILIRUBINUR NEGATIVE 06/09/2021 01:11 PM    BLOODU NEGATIVE 06/09/2021 01:11 PM    GLUCOSEU 100 06/09/2021 01:11 PM    KETUA NEGATIVE 06/09/2021 01:11 PM      Microalbumen/Creatinine ratio:  No components found for: RUCREAT        Impression/Plan:   1. CKD IV: cardiorenal syndrome, diabetic nephropathy: stable . Goals of care include slowing rate of progression by controlling blood pressure, blood glucoses and albuminuria and by avoiding nephrotoxins such as NSAIDs and IV contrast.    2. Diastolic CHF: weights up. Increase bumex to 2/1 mg BID. Reduce amlodipine to 5 mg daily in case it is contributing to her swelling. Repeat bmp in 2 weeks. Continue prn metolazone  3. Lytes: K ok on KCl 10 meq daily  4. HTN: see above  5. DM  6. Anemia, no need for DOREEN  7. CAD        Bloodwork and medications were reviewed and plan of care discussed with the patient. Return to clinic in 3 months  or sooner if the need arises.       Eloise Sutton,   [facility]

## 2022-09-23 ENCOUNTER — TELEPHONE (OUTPATIENT)
Dept: CARDIOLOGY CLINIC | Age: 77
End: 2022-09-23

## 2022-09-23 ENCOUNTER — PROCEDURE VISIT (OUTPATIENT)
Dept: CARDIOLOGY CLINIC | Age: 77
End: 2022-09-23
Payer: COMMERCIAL

## 2022-09-23 DIAGNOSIS — Z95.0 S/P CARDIAC PACEMAKER PROCEDURE: Primary | ICD-10-CM

## 2022-09-23 PROCEDURE — 93294 REM INTERROG EVL PM/LDLS PM: CPT | Performed by: NUCLEAR MEDICINE

## 2022-09-23 PROCEDURE — 93296 REM INTERROG EVL PM/IDS: CPT | Performed by: NUCLEAR MEDICINE

## 2022-09-23 NOTE — PROGRESS NOTES
[facility] medtronic dual pacer     . Christy Spence Battery longevity:  3.5 years on device   Presenting rhythm  Ap     Atrial impedance 361  RV impedance 475    P wave sensing 0.9  R wave sensing 15.3    99.3 % atrial paced  100 % RV paced     Atrial threshold not measured   RV threshold 0.875 V at 0.4ms  Mode switches   0

## 2022-09-30 ENCOUNTER — NURSE ONLY (OUTPATIENT)
Dept: LAB | Age: 77
End: 2022-09-30

## 2022-09-30 DIAGNOSIS — I10 ESSENTIAL HYPERTENSION: ICD-10-CM

## 2022-09-30 DIAGNOSIS — I50.33 ACUTE ON CHRONIC DIASTOLIC CONGESTIVE HEART FAILURE (HCC): ICD-10-CM

## 2022-09-30 DIAGNOSIS — N18.4 CKD (CHRONIC KIDNEY DISEASE) STAGE 4, GFR 15-29 ML/MIN (HCC): ICD-10-CM

## 2022-09-30 LAB
ANION GAP SERPL CALCULATED.3IONS-SCNC: 15 MEQ/L (ref 8–16)
BUN BLDV-MCNC: 41 MG/DL (ref 7–22)
CALCIUM SERPL-MCNC: 9 MG/DL (ref 8.5–10.5)
CHLORIDE BLD-SCNC: 95 MEQ/L (ref 98–111)
CO2: 29 MEQ/L (ref 23–33)
CREAT SERPL-MCNC: 2.3 MG/DL (ref 0.4–1.2)
GFR SERPL CREATININE-BSD FRML MDRD: 21 ML/MIN/1.73M2
GLUCOSE BLD-MCNC: 161 MG/DL (ref 70–108)
POTASSIUM SERPL-SCNC: 4 MEQ/L (ref 3.5–5.2)
SODIUM BLD-SCNC: 139 MEQ/L (ref 135–145)

## 2022-10-03 ENCOUNTER — TELEPHONE (OUTPATIENT)
Dept: NEPHROLOGY | Age: 77
End: 2022-10-03

## 2022-10-03 NOTE — TELEPHONE ENCOUNTER
----- Message from Angelica Neri, DO sent at 10/2/2022  3:16 PM EDT -----  Kidney function fairly stable. Swelling any better?

## 2022-10-06 DIAGNOSIS — I48.0 PAROXYSMAL ATRIAL FIBRILLATION (HCC): ICD-10-CM

## 2022-10-06 RX ORDER — APIXABAN 5 MG/1
TABLET, FILM COATED ORAL
Qty: 180 TABLET | Refills: 0 | Status: SHIPPED | OUTPATIENT
Start: 2022-10-06

## 2022-10-06 NOTE — TELEPHONE ENCOUNTER
The pharmacy is requesting a refill of the below medication which has been pended for you:     Requested Prescriptions     Pending Prescriptions Disp Refills    ELIQUIS 5 MG TABS tablet [Pharmacy Med Name: Eliquis 5 MG Oral Tablet] 180 tablet 0     Sig: Take 1 tablet by mouth twice daily       Last Appointment Date: 8/17/2022  Next Appointment Date: 11/22/2022    Allergies   Allergen Reactions    Adhesive Tape Rash

## 2022-10-26 DIAGNOSIS — E11.9 TYPE 2 DIABETES MELLITUS WITHOUT COMPLICATION, WITHOUT LONG-TERM CURRENT USE OF INSULIN (HCC): ICD-10-CM

## 2022-10-27 NOTE — TELEPHONE ENCOUNTER
Date of last visit:  8/17/2022  Date of next visit:  11/22/2022    Requested Prescriptions     Pending Prescriptions Disp Refills    glimepiride (AMARYL) 4 MG tablet [Pharmacy Med Name: Glimepiride 4 MG Oral Tablet] 180 tablet 0     Sig: Take 1 tablet by mouth twice daily

## 2022-10-28 RX ORDER — GLIMEPIRIDE 4 MG/1
TABLET ORAL
Qty: 180 TABLET | Refills: 0 | Status: SHIPPED | OUTPATIENT
Start: 2022-10-28

## 2022-11-03 DIAGNOSIS — E03.4 HYPOTHYROIDISM DUE TO ACQUIRED ATROPHY OF THYROID: ICD-10-CM

## 2022-11-03 NOTE — TELEPHONE ENCOUNTER
Date of last visit:  8/17/2022  Date of next visit:  11/22/2022    Requested Prescriptions     Pending Prescriptions Disp Refills    levothyroxine (SYNTHROID) 125 MCG tablet [Pharmacy Med Name: Levothyroxine Sodium 125 MCG Oral Tablet] 90 tablet 0     Sig: Take 1 tablet by mouth once daily

## 2022-11-04 RX ORDER — LEVOTHYROXINE SODIUM 0.12 MG/1
TABLET ORAL
Qty: 90 TABLET | Refills: 0 | Status: SHIPPED | OUTPATIENT
Start: 2022-11-04 | End: 2022-11-19 | Stop reason: ALTCHOICE

## 2022-11-18 ENCOUNTER — NURSE ONLY (OUTPATIENT)
Dept: LAB | Age: 77
End: 2022-11-18

## 2022-11-18 DIAGNOSIS — I48.0 PAROXYSMAL ATRIAL FIBRILLATION (HCC): ICD-10-CM

## 2022-11-18 DIAGNOSIS — I50.33 ACUTE ON CHRONIC DIASTOLIC CONGESTIVE HEART FAILURE (HCC): ICD-10-CM

## 2022-11-18 DIAGNOSIS — N18.4 CKD (CHRONIC KIDNEY DISEASE) STAGE 4, GFR 15-29 ML/MIN (HCC): ICD-10-CM

## 2022-11-18 DIAGNOSIS — I50.41 CHF (CONGESTIVE HEART FAILURE), NYHA CLASS I, ACUTE, COMBINED (HCC): ICD-10-CM

## 2022-11-18 DIAGNOSIS — I10 ESSENTIAL HYPERTENSION: ICD-10-CM

## 2022-11-18 DIAGNOSIS — E03.4 HYPOTHYROIDISM DUE TO ACQUIRED ATROPHY OF THYROID: ICD-10-CM

## 2022-11-18 DIAGNOSIS — E11.9 TYPE 2 DIABETES MELLITUS WITHOUT COMPLICATION, WITHOUT LONG-TERM CURRENT USE OF INSULIN (HCC): ICD-10-CM

## 2022-11-18 DIAGNOSIS — I10 PRIMARY HYPERTENSION: ICD-10-CM

## 2022-11-18 LAB
ALBUMIN SERPL-MCNC: 4.2 G/DL (ref 3.5–5.1)
ALP BLD-CCNC: 92 U/L (ref 38–126)
ALT SERPL-CCNC: 20 U/L (ref 11–66)
ANION GAP SERPL CALCULATED.3IONS-SCNC: 12 MEQ/L (ref 8–16)
AST SERPL-CCNC: 21 U/L (ref 5–40)
BASOPHILS # BLD: 1.8 %
BASOPHILS ABSOLUTE: 0.1 THOU/MM3 (ref 0–0.1)
BILIRUB SERPL-MCNC: 0.6 MG/DL (ref 0.3–1.2)
BUN BLDV-MCNC: 29 MG/DL (ref 7–22)
CALCIUM SERPL-MCNC: 8.7 MG/DL (ref 8.5–10.5)
CHLORIDE BLD-SCNC: 98 MEQ/L (ref 98–111)
CHOLESTEROL, TOTAL: 224 MG/DL (ref 100–199)
CO2: 31 MEQ/L (ref 23–33)
CREAT SERPL-MCNC: 1.9 MG/DL (ref 0.4–1.2)
EOSINOPHIL # BLD: 2.8 %
EOSINOPHILS ABSOLUTE: 0.2 THOU/MM3 (ref 0–0.4)
ERYTHROCYTE [DISTWIDTH] IN BLOOD BY AUTOMATED COUNT: 15.3 % (ref 11.5–14.5)
ERYTHROCYTE [DISTWIDTH] IN BLOOD BY AUTOMATED COUNT: 50.3 FL (ref 35–45)
GFR SERPL CREATININE-BSD FRML MDRD: 27 ML/MIN/1.73M2
GLUCOSE BLD-MCNC: 173 MG/DL (ref 70–108)
HCT VFR BLD CALC: 39.8 % (ref 37–47)
HDLC SERPL-MCNC: 60 MG/DL
HEMOGLOBIN: 12.7 GM/DL (ref 12–16)
IMMATURE GRANS (ABS): 0.01 THOU/MM3 (ref 0–0.07)
IMMATURE GRANULOCYTES: 0.2 %
LDL CHOLESTEROL CALCULATED: 122 MG/DL
LYMPHOCYTES # BLD: 17.6 %
LYMPHOCYTES ABSOLUTE: 1 THOU/MM3 (ref 1–4.8)
MCH RBC QN AUTO: 28.9 PG (ref 26–33)
MCHC RBC AUTO-ENTMCNC: 31.9 GM/DL (ref 32.2–35.5)
MCV RBC AUTO: 90.5 FL (ref 81–99)
MONOCYTES # BLD: 10 %
MONOCYTES ABSOLUTE: 0.6 THOU/MM3 (ref 0.4–1.3)
NUCLEATED RED BLOOD CELLS: 0 /100 WBC
PHOSPHORUS: 4.4 MG/DL (ref 2.4–4.7)
PLATELET # BLD: 296 THOU/MM3 (ref 130–400)
PMV BLD AUTO: 11 FL (ref 9.4–12.4)
POTASSIUM SERPL-SCNC: 4.3 MEQ/L (ref 3.5–5.2)
PTH INTACT: 93 PG/ML (ref 15–65)
RBC # BLD: 4.4 MILL/MM3 (ref 4.2–5.4)
SEG NEUTROPHILS: 67.6 %
SEGMENTED NEUTROPHILS ABSOLUTE COUNT: 3.9 THOU/MM3 (ref 1.8–7.7)
SODIUM BLD-SCNC: 141 MEQ/L (ref 135–145)
T4 FREE: 1.72 NG/DL (ref 0.93–1.76)
TOTAL PROTEIN: 6.4 G/DL (ref 6.1–8)
TRIGL SERPL-MCNC: 211 MG/DL (ref 0–199)
TSH SERPL DL<=0.05 MIU/L-ACNC: 15.84 UIU/ML (ref 0.4–4.2)
VITAMIN D 25-HYDROXY: 29 NG/ML (ref 30–100)
WBC # BLD: 5.7 THOU/MM3 (ref 4.8–10.8)

## 2022-11-19 DIAGNOSIS — E03.4 HYPOTHYROIDISM DUE TO ACQUIRED ATROPHY OF THYROID: Primary | ICD-10-CM

## 2022-11-19 RX ORDER — LEVOTHYROXINE SODIUM 137 UG/1
137 TABLET ORAL DAILY
Qty: 90 TABLET | Refills: 1 | Status: SHIPPED | OUTPATIENT
Start: 2022-11-19

## 2022-11-21 ENCOUNTER — TELEPHONE (OUTPATIENT)
Dept: FAMILY MEDICINE CLINIC | Age: 77
End: 2022-11-21

## 2022-11-21 NOTE — TELEPHONE ENCOUNTER
----- Message from Zayda Urbano MD sent at 11/19/2022  3:41 PM EST -----  Does need more synthroid  so increase to 137 mcg.  And  thyroid in 3 mths   Kidney function is better      Chol is  higher  so watch diet as 208  to 224    Keep appt      Script for synthroid to jaimee    Please call

## 2022-11-22 ENCOUNTER — OFFICE VISIT (OUTPATIENT)
Dept: FAMILY MEDICINE CLINIC | Age: 77
End: 2022-11-22

## 2022-11-22 ENCOUNTER — TELEPHONE (OUTPATIENT)
Dept: FAMILY MEDICINE CLINIC | Age: 77
End: 2022-11-22

## 2022-11-22 VITALS
WEIGHT: 243.38 LBS | DIASTOLIC BLOOD PRESSURE: 78 MMHG | BODY MASS INDEX: 38.2 KG/M2 | SYSTOLIC BLOOD PRESSURE: 136 MMHG | HEIGHT: 67 IN | RESPIRATION RATE: 16 BRPM | HEART RATE: 84 BPM

## 2022-11-22 DIAGNOSIS — I25.10 CORONARY ARTERY DISEASE INVOLVING NATIVE CORONARY ARTERY OF NATIVE HEART WITHOUT ANGINA PECTORIS: ICD-10-CM

## 2022-11-22 DIAGNOSIS — E11.9 TYPE 2 DIABETES MELLITUS WITHOUT COMPLICATION, WITHOUT LONG-TERM CURRENT USE OF INSULIN (HCC): ICD-10-CM

## 2022-11-22 DIAGNOSIS — Z00.00 MEDICARE ANNUAL WELLNESS VISIT, SUBSEQUENT: Primary | ICD-10-CM

## 2022-11-22 DIAGNOSIS — Z23 NEED FOR INFLUENZA VACCINATION: ICD-10-CM

## 2022-11-22 DIAGNOSIS — E03.4 HYPOTHYROIDISM DUE TO ACQUIRED ATROPHY OF THYROID: ICD-10-CM

## 2022-11-22 DIAGNOSIS — I71.21 ANEURYSM OF ASCENDING AORTA WITHOUT RUPTURE: ICD-10-CM

## 2022-11-22 DIAGNOSIS — E66.01 SEVERE OBESITY (BMI 35.0-39.9) WITH COMORBIDITY (HCC): ICD-10-CM

## 2022-11-22 DIAGNOSIS — I10 PRIMARY HYPERTENSION: ICD-10-CM

## 2022-11-22 DIAGNOSIS — I49.5 SICK SINUS SYNDROME (HCC): ICD-10-CM

## 2022-11-22 DIAGNOSIS — N18.4 CKD (CHRONIC KIDNEY DISEASE) STAGE 4, GFR 15-29 ML/MIN (HCC): ICD-10-CM

## 2022-11-22 LAB
CHP ED QC CHECK: ABNORMAL
GLUCOSE BLD-MCNC: 200 MG/DL
HBA1C MFR BLD: 6.5 %

## 2022-11-22 PROCEDURE — 99213 OFFICE O/P EST LOW 20 MIN: CPT | Performed by: FAMILY MEDICINE

## 2022-11-22 PROCEDURE — G0439 PPPS, SUBSEQ VISIT: HCPCS | Performed by: FAMILY MEDICINE

## 2022-11-22 PROCEDURE — 90686 IIV4 VACC NO PRSV 0.5 ML IM: CPT | Performed by: FAMILY MEDICINE

## 2022-11-22 PROCEDURE — 82962 GLUCOSE BLOOD TEST: CPT | Performed by: FAMILY MEDICINE

## 2022-11-22 PROCEDURE — 1123F ACP DISCUSS/DSCN MKR DOCD: CPT | Performed by: FAMILY MEDICINE

## 2022-11-22 PROCEDURE — G0008 ADMIN INFLUENZA VIRUS VAC: HCPCS | Performed by: FAMILY MEDICINE

## 2022-11-22 PROCEDURE — 83036 HEMOGLOBIN GLYCOSYLATED A1C: CPT | Performed by: FAMILY MEDICINE

## 2022-11-22 RX ORDER — CHLORAL HYDRATE 500 MG
CAPSULE ORAL DAILY
COMMUNITY

## 2022-11-22 ASSESSMENT — ENCOUNTER SYMPTOMS
SHORTNESS OF BREATH: 0
WHEEZING: 0
ABDOMINAL PAIN: 0
EYE PAIN: 0
CONSTIPATION: 0
COUGH: 0
NAUSEA: 0
SORE THROAT: 0
CHEST TIGHTNESS: 0

## 2022-11-22 ASSESSMENT — PATIENT HEALTH QUESTIONNAIRE - PHQ9
1. LITTLE INTEREST OR PLEASURE IN DOING THINGS: 0
SUM OF ALL RESPONSES TO PHQ QUESTIONS 1-9: 0
2. FEELING DOWN, DEPRESSED OR HOPELESS: 0
SUM OF ALL RESPONSES TO PHQ QUESTIONS 1-9: 0
SUM OF ALL RESPONSES TO PHQ QUESTIONS 1-9: 0
SUM OF ALL RESPONSES TO PHQ9 QUESTIONS 1 & 2: 0
SUM OF ALL RESPONSES TO PHQ QUESTIONS 1-9: 0

## 2022-11-22 ASSESSMENT — LIFESTYLE VARIABLES
HOW OFTEN DO YOU HAVE A DRINK CONTAINING ALCOHOL: NEVER
HOW MANY STANDARD DRINKS CONTAINING ALCOHOL DO YOU HAVE ON A TYPICAL DAY: PATIENT DOES NOT DRINK

## 2022-11-22 NOTE — PROGRESS NOTES
Immunizations Administered       Name Date Dose Route    Influenza, FLUARIX, [address], [place] (age 10 mo+) AND AFLURIA, (age 1 y+), PF, 0.5mL 11/22/2022 0.5 mL Intramuscular    Site: Deltoid- Right    Lot: [id number]    NDC: 08123-209-38

## 2022-11-22 NOTE — TELEPHONE ENCOUNTER
Pt requesting a RX for handicap placard. She said that hers expires this month.      Kandace Arce may be reached at [phone]

## 2022-11-22 NOTE — PROGRESS NOTES
Medicare Annual Wellness Visit    Isis Garcia is here for Medicare AWV    Assessment & Plan       ICD-10-CM    1. Medicare annual wellness visit, subsequent  Z00.00       2. Type 2 diabetes mellitus without complication, without long-term current use of insulin ([facility])  E11.9 POCT Glucose     POCT glycosylated hemoglobin (Hb A1C)     [place] OFFICE OUTPATIENT VISIT 15 MINUTES [83823]      3. Need for influenza vaccination  Z23 Influenza, AFLURIA QUADV, (age 1 yrs+), IM, PF, 0.5mL      4. Hypothyroidism due to acquired atrophy of thyroid  E03.4 [place] OFFICE OUTPATIENT VISIT 15 MINUTES [53818]      5. Severe obesity (BMI 35.0-39. 9) with comorbidity ([facility] [address]  E66.01       6. CKD (chronic kidney disease) stage 4, GFR 15-29 ml/min ([facility])  N18.4       7. Aneurysm of ascending aorta without rupture  I71.21 [place] OFFICE OUTPATIENT VISIT 15 MINUTES [07829]      8. Sick sinus syndrome ([facility])  I49.5       9. Primary hypertension  I10 [place] OFFICE OUTPATIENT VISIT 15 MINUTES [02682]      10. Coronary artery disease involving native coronary artery of native heart without angina pectoris  I25.10 [place] OFFICE OUTPATIENT VISIT 15 MINUTES [34599]                    Subjective       Patient's complete Health Risk Assessment and screening values have been reviewed and are found in Flowsheets. The following problems were reviewed today and where indicated follow up appointments were made and/or referrals ordered.     Positive Risk Factor Screenings with Interventions:             General Health and ACP:  General  In general, how would you say your health is?: Good  In the past 7 days, have you experienced any of the following: New or Increased Pain, New or Increased Fatigue, Loneliness, Social Isolation, Stress or Anger?: No  Do you get the social and emotional support that you need?: Yes  Do you have a Living Will?: (!) No    Advance Directives       Power of  Living Will ACP-Advance Directive ACP-Power of     Not on File Not on File Not on File Not on File        General Health Risk Interventions:    Power  of  health   lashae Bledsoe Michael Chow Habits/Nutrition:  Physical Activity: Inactive    Days of Exercise per Week: 0 days    Minutes of Exercise per Session: 0 min     Have you lost any weight without trying in the past 3 months?: No  Body mass index: (!) 38.11  Have you seen the dentist within the past year?: (!) No  Health Habits/Nutrition Interventions:  Dental exam overdue:  patient encouraged to make appointment with his/her dentist             Objective   Vitals:    11/22/22 1243   BP: 136/78   Site: Right Upper Arm   Position: Sitting   Cuff Size: Medium Adult   Pulse: 84   Resp: 16   Weight: 243 lb 6 oz (110.4 kg)   Height: 5' 7\" (1.702 m)      Body mass index is 38.12 kg/m². Allergies   Allergen Reactions    Adhesive Tape Rash     Prior to Visit Medications    Medication Sig Taking?  Authorizing Provider   Omega-3 Fatty Acids (FISH OIL) 1000 MG capsule Take by mouth daily Yes Historical Provider, MD   levothyroxine (SYNTHROID) 137 MCG tablet Take 1 tablet by mouth daily Yes Imelda Berg MD   glimepiride (AMARYL) 4 MG tablet Take 1 tablet by mouth twice daily Yes Imelda Berg MD   ELIQUIS 5 MG TABS tablet Take 1 tablet by mouth twice daily Yes Anthony Altamirano MD   metOLazone (ZAROXOLYN) 5 MG tablet Take 1 tablet by mouth as needed (worsening swelling) As  Needed Yes Aurora Cannon DO   bumetanide (BUMEX) 1 MG tablet Take 2 tablets by mouth 2 times daily TAKE 2 MG IN THE MORNING, 1 MG IN THE AFTERNOON  Patient taking differently: TAKE 2 MG IN THE MORNING, 1 MG IN THE AFTERNOON Yes Aurora Cannon DO   amLODIPine (NORVASC) 5 MG tablet Take 1 tablet by mouth daily Yes Aurora Cannon DO   clopidogrel (PLAVIX) 75 MG tablet Take 1 tablet by mouth once daily Yes Imelda Berg MD   atorvastatin (LIPITOR) 40 MG tablet Take 1 tablet by mouth once daily Yes Imelda Berg MD   potassium chloride (KLOR-CON) 10 MEQ extended release tablet Take 1 tablet by mouth once daily with breakfast Yes Jeanna Lozada MD   gabapentin (NEURONTIN) 300 MG capsule TAKE 1 CAPSULE BY MOUTH ONCE DAILY IN THE EVENING Yes Jeanna Lozada MD   amiodarone (CORDARONE) 200 MG tablet Take 1 tablet by mouth once daily Yes Jeanna Lozada MD   metoprolol tartrate (LOPRESSOR) 50 MG tablet Take 1 tablet by mouth twice daily Yes Jeanna Lozada MD   TRULICITY 2.50 CB/0.8ZM SOPN INJECT 1  SUBCUTANEOUSLY ONCE A WEEK Yes Jeanna Lozada MD   nitroGLYCERIN (NITROSTAT) 0.4 MG SL tablet Place 1 tablet under the tongue every 5 minutes as needed for Chest pain up to max of 3 total doses. If no relief after 1 dose, call 911. Yes Nechama Halsted, APRN - CNP   Glucose Blood (JOSE E BREEZE 2 TEST) DISK USE ONE STRIP TWICE DAILY, DX: E11.9 Yes Jeanna Lozada MD   Ascorbic Acid (VITAMIN C) 500 MG tablet Take 500 mg by mouth 2 times daily  Yes Historical Provider, MD Ladd (Including outside providers/suppliers regularly involved in providing care):   Patient Care Team:  Jeanna Lozada MD as PCP - General (Family Medicine)  Jeanna Lozada MD as PCP - [address] [place] [address] Dr Fu Provider     Reviewed and updated this visit:  Tobacco  Allergies  Meds  Med Hx  Surg Hx  Soc Hx  Fam Hx          J Luis Cornejo (:  1945) is a 78 y.o. female,Established patient, here for evaluation of the following chief complaint(s):  Medicare AWV         ASSESSMENT/        ICD-10-CM    1. Type 2 diabetes mellitus without complication, without long-term current use of insulin (HCC)  E11.9 POCT Glucose     POCT glycosylated hemoglobin (Hb A1C)      2. Need for influenza vaccination  Z23 Influenza, AFLURIA QUADV, (age 1 yrs+), IM, PF, 0.5mL      3. Hypothyroidism due to acquired atrophy of thyroid  E03.4       4. Severe obesity (BMI 35.0-39. 9) with comorbidity ([facility] [address]  E66.01       5.  CKD (chronic kidney disease) stage 4, GFR 15-29 ml/min ([facility]  N18.4       6. Aneurysm of ascending aorta without rupture  I71.21       7. Sick sinus syndrome (HCC)  I49.5       8. Primary hypertension  I10       9. Coronary artery disease involving native coronary artery of native heart without angina pectoris  I25.10              PLAN:    Current Outpatient Medications   Medication Sig Dispense Refill    Omega-3 Fatty Acids (FISH OIL) 1000 MG capsule Take by mouth daily      levothyroxine (SYNTHROID) 137 MCG tablet Take 1 tablet by mouth daily 90 tablet 1    glimepiride (AMARYL) 4 MG tablet Take 1 tablet by mouth twice daily 180 tablet 0    ELIQUIS 5 MG TABS tablet Take 1 tablet by mouth twice daily 180 tablet 0    metOLazone (ZAROXOLYN) 5 MG tablet Take 1 tablet by mouth as needed (worsening swelling) As  Needed 10 tablet 0    bumetanide (BUMEX) 1 MG tablet Take 2 tablets by mouth 2 times daily TAKE 2 MG IN THE MORNING, 1 MG IN THE AFTERNOON (Patient taking differently: TAKE 2 MG IN THE MORNING, 1 MG IN THE AFTERNOON) 360 tablet 1    amLODIPine (NORVASC) 5 MG tablet Take 1 tablet by mouth daily 90 tablet 1    clopidogrel (PLAVIX) 75 MG tablet Take 1 tablet by mouth once daily 90 tablet 0    atorvastatin (LIPITOR) 40 MG tablet Take 1 tablet by mouth once daily 90 tablet 0    potassium chloride (KLOR-CON) 10 MEQ extended release tablet Take 1 tablet by mouth once daily with breakfast 90 tablet 0    gabapentin (NEURONTIN) 300 MG capsule TAKE 1 CAPSULE BY MOUTH ONCE DAILY IN THE EVENING 90 capsule 1    amiodarone (CORDARONE) 200 MG tablet Take 1 tablet by mouth once daily 90 tablet 1    metoprolol tartrate (LOPRESSOR) 50 MG tablet Take 1 tablet by mouth twice daily 419 tablet 1    TRULICITY 1.04 TC/9.8GZ SOPN INJECT 1  SUBCUTANEOUSLY ONCE A WEEK 4 mL 0    nitroGLYCERIN (NITROSTAT) 0.4 MG SL tablet Place 1 tablet under the tongue every 5 minutes as needed for Chest pain up to max of 3 total doses.  If no relief after 1 dose, call 911. 25 tablet 1    Glucose Blood (JOSE E BREEZE 2 TEST) DISK USE ONE STRIP TWICE DAILY, DX: E11.9 200 each 5    Ascorbic Acid (VITAMIN C) 500 MG tablet Take 500 mg by mouth 2 times daily        No current facility-administered medications for this visit. Orders Placed This Encounter   Procedures    Influenza, Grzegorz Milan, (age 1 yrs+), IM, PF, 0.5mL    POCT Glucose    POCT glycosylated hemoglobin (Hb A1C)     Results for orders placed or performed in visit on 11/22/22   POCT Glucose   Result Value Ref Range    Glucose 200 (A) mg/dL    QC OK? POCT glycosylated hemoglobin (Hb A1C)   Result Value Ref Range    Hemoglobin A1C 6.5 (A) %     Ciara Vaughn received counseling on the following healthy behaviors: nutrition and exercise    Patient given educational materials on Diabetes and Hyperlipidemia    I have instructed Ciara Vaughn to complete a self tracking handout on Blood Sugars  and Blood Pressures  and instructed them to bring it with them to her next appointment. Discussed use, benefit, and side effects of prescribed medications. Barriers to medication compliance addressed. All patient questions answered. Pt voiced understanding. Subjective   SUBJECTIVE/OBJECTIVE:  HPI       Niddm  stab;le,    start  6.5     Mild    numbness in  feet      Pacer  stable     no  further  chf        Atrial  fib  stble          Chf      stable      Ct    aorta  at  4.6       Thyroid    to  137      Crf  stable   stage  4   dr krishnamurthy    Review of Systems   Constitutional:  Negative for appetite change, fatigue and fever. HENT:  Negative for congestion, ear pain, postnasal drip and sore throat. Eyes:  Negative for pain and visual disturbance. Respiratory:  Negative for cough, chest tightness, shortness of breath and wheezing. Cardiovascular:  Negative for chest pain, palpitations and leg swelling. Gastrointestinal:  Negative for abdominal pain, constipation and nausea. Genitourinary:  Negative for dysuria and frequency.    Musculoskeletal: Negative for arthralgias, joint swelling, neck pain and neck stiffness. Skin:  Negative for rash. Neurological:  Negative for dizziness, weakness, numbness and headaches. Hematological:  Negative for adenopathy. Does not bruise/bleed easily. Psychiatric/Behavioral:  Negative for behavioral problems and sleep disturbance. The patient is not nervous/anxious. Objective   Physical Exam     An electronic signature was used to authenticate this note.     --Ena Moreno MD

## 2022-11-22 NOTE — LETTER
[place] [address]  Phone: [phone]  Fax: [phone]    Jeanna Lozada MD         November 28, 2022     Patient: J Luis Cornejo   YOB: 1945   Date of Visit: 11/22/2022       To Whom It May Concern: It is my medical opinion that Dhiraj Iglesias requires a disability parking placard for the following reasons:  She has limited walking ability due to an arthritic and a neurologic condition. Duration of need: 5 years    If you have any questions or concerns, please don't hesitate to call.     Sincerely,        Jeanna Lozada MD

## 2022-11-22 NOTE — PATIENT INSTRUCTIONS
Personalized Preventive Plan for Avelino Rush - 11/22/2022  Medicare offers a range of preventive health benefits. Some of the tests and screenings are paid in full while other may be subject to a deductible, co-insurance, and/or copay. Some of these benefits include a comprehensive review of your medical history including lifestyle, illnesses that may run in your family, and various assessments and screenings as appropriate. After reviewing your medical record and screening and assessments performed today your provider may have ordered immunizations, labs, imaging, and/or referrals for you. A list of these orders (if applicable) as well as your Preventive Care list are included within your After Visit Summary for your review. Other Preventive Recommendations:    A preventive eye exam performed by an eye specialist is recommended every 1-2 years to screen for glaucoma; cataracts, macular degeneration, and other eye disorders. A preventive dental visit is recommended every 6 months. Try to get at least 150 minutes of exercise per week or 10,000 steps per day on a pedometer . Order or download the FREE \"Exercise & Physical Activity: Your Everyday Guide\" from [employer]. Call 2-503.830.9233 or search [employer] online. You need 5839-4253 mg of calcium and 0174-0181 IU of vitamin D per day. It is possible to meet your calcium requirement with diet alone, but a vitamin D supplement is usually necessary to meet this goal.  When exposed to the sun, use a sunscreen that protects against both UVA and UVB radiation with an SPF of 30 or greater. Reapply every 2 to 3 hours or after sweating, drying off with a towel, or swimming. Always wear a seat belt when traveling in a car. Always wear a helmet when riding a bicycle or motorcycle.

## 2022-12-01 ENCOUNTER — NURSE ONLY (OUTPATIENT)
Dept: CARDIOLOGY CLINIC | Age: 77
End: 2022-12-01
Payer: COMMERCIAL

## 2022-12-01 DIAGNOSIS — Z95.0 S/P CARDIAC PACEMAKER PROCEDURE: Primary | ICD-10-CM

## 2022-12-01 DIAGNOSIS — I25.10 CORONARY ARTERY DISEASE INVOLVING NATIVE CORONARY ARTERY OF NATIVE HEART WITHOUT ANGINA PECTORIS: ICD-10-CM

## 2022-12-01 PROCEDURE — 93280 PM DEVICE PROGR EVAL DUAL: CPT | Performed by: NUCLEAR MEDICINE

## 2022-12-01 RX ORDER — CLOPIDOGREL BISULFATE 75 MG/1
TABLET ORAL
Qty: 90 TABLET | Refills: 0 | Status: SHIPPED | OUTPATIENT
Start: 2022-12-01

## 2022-12-01 RX ORDER — ATORVASTATIN CALCIUM 40 MG/1
TABLET, FILM COATED ORAL
Qty: 90 TABLET | Refills: 0 | Status: SHIPPED | OUTPATIENT
Start: 2022-12-01

## 2022-12-01 NOTE — TELEPHONE ENCOUNTER
Date of last visit:  11/22/2022  Date of next visit:  2/24/2023    Requested Prescriptions     Pending Prescriptions Disp Refills    clopidogrel (PLAVIX) 75 MG tablet [Pharmacy Med Name: Clopidogrel Bisulfate 75 MG Oral Tablet] 90 tablet 0     Sig: Take 1 tablet by mouth once daily    atorvastatin (LIPITOR) 40 MG tablet [Pharmacy Med Name: Atorvastatin Calcium 40 MG Oral Tablet] 90 tablet 0     Sig: Take 1 tablet by mouth once daily

## 2022-12-01 NOTE — PROGRESS NOTES
In Office Medtronic Dual Pacemaker   Patient of [facility]aki    Battery 2.5-3 years    Presenting rhythm AP   Underlying dependent at 30 in A &V    A Impedance 359  RV Impedance 513    P wave sensing 0.9  R wave sensing -    A Threshold 1.0 @ 0.40  A Amplitude 1.5 @ 0.40  RV Thresholds 1.0 @ 0.40  RV Amplitude 2.0 @ 0.40      A Paced 99%  V Paced 100%    Programmed Mode DDDR       Afib [place] <0.1%    Episodes   none

## 2022-12-09 ENCOUNTER — OFFICE VISIT (OUTPATIENT)
Dept: CARDIOLOGY CLINIC | Age: 77
End: 2022-12-09

## 2022-12-09 VITALS
SYSTOLIC BLOOD PRESSURE: 151 MMHG | HEART RATE: 80 BPM | WEIGHT: 243.6 LBS | HEIGHT: 66 IN | DIASTOLIC BLOOD PRESSURE: 86 MMHG | BODY MASS INDEX: 39.15 KG/M2

## 2022-12-09 DIAGNOSIS — Z95.0 S/P CARDIAC PACEMAKER PROCEDURE: Primary | ICD-10-CM

## 2022-12-09 DIAGNOSIS — I25.10 CORONARY ARTERY DISEASE INVOLVING NATIVE CORONARY ARTERY OF NATIVE HEART WITHOUT ANGINA PECTORIS: ICD-10-CM

## 2022-12-09 DIAGNOSIS — I10 ESSENTIAL HYPERTENSION: ICD-10-CM

## 2022-12-17 DIAGNOSIS — I10 ESSENTIAL HYPERTENSION: ICD-10-CM

## 2022-12-19 NOTE — TELEPHONE ENCOUNTER
Date of last visit:  11/22/2022  Date of next visit:  2/24/2023    Requested Prescriptions     Pending Prescriptions Disp Refills    metoprolol tartrate (LOPRESSOR) 50 MG tablet [Pharmacy Med Name: Metoprolol Tartrate 50 MG Oral Tablet] 180 tablet 0     Sig: Take 1 tablet by mouth twice daily

## 2022-12-20 RX ORDER — METOPROLOL TARTRATE 50 MG/1
TABLET, FILM COATED ORAL
Qty: 180 TABLET | Refills: 0 | Status: SHIPPED | OUTPATIENT
Start: 2022-12-20

## 2023-01-02 DIAGNOSIS — I48.0 PAROXYSMAL ATRIAL FIBRILLATION (HCC): ICD-10-CM

## 2023-01-03 NOTE — TELEPHONE ENCOUNTER
Date of last visit:  11/22/2022  Date of next visit:  2/24/2023    Requested Prescriptions     Pending Prescriptions Disp Refills    ELIQUIS 5 MG TABS tablet [Pharmacy Med Name: Eliquis 5 MG Oral Tablet] 180 tablet 0     Sig: Take 1 tablet by mouth twice daily

## 2023-01-04 ENCOUNTER — TELEPHONE (OUTPATIENT)
Dept: FAMILY MEDICINE CLINIC | Age: 78
End: 2023-01-04

## 2023-01-04 RX ORDER — AMOXICILLIN AND CLAVULANATE POTASSIUM 875; 125 MG/1; MG/1
1 TABLET, FILM COATED ORAL 2 TIMES DAILY
Qty: 20 TABLET | Refills: 0 | Status: SHIPPED | OUTPATIENT
Start: 2023-01-04 | End: 2023-01-14

## 2023-01-04 RX ORDER — APIXABAN 5 MG/1
TABLET, FILM COATED ORAL
Qty: 180 TABLET | Refills: 0 | Status: SHIPPED | OUTPATIENT
Start: 2023-01-04

## 2023-01-04 NOTE — TELEPHONE ENCOUNTER
Date of last visit:  11/22/2022  Date of next visit:  2/24/2023    Requested Prescriptions     Signed Prescriptions Disp Refills    amoxicillin-clavulanate (AUGMENTIN) 875-125 MG per tablet 20 tablet 0     Sig: Take 1 tablet by mouth 2 times daily for 10 days     Authorizing Provider: Rosalind Henriquez     Ordering User: Melina Gonzales

## 2023-01-04 NOTE — TELEPHONE ENCOUNTER
Pt is calling in she is blowing her nose constantly and its clear snot. Pt is also coughing she does cough clear phlegm up. No fever. Pt states this has been going on since Saturday. She would like to know if she could get an antibiotic. Her  just passed away yesterday and she's gonna be busy the next dew days.        Zac in [facility]

## 2023-01-31 DIAGNOSIS — E11.9 TYPE 2 DIABETES MELLITUS WITHOUT COMPLICATION, WITHOUT LONG-TERM CURRENT USE OF INSULIN (HCC): ICD-10-CM

## 2023-01-31 NOTE — TELEPHONE ENCOUNTER
Date of last visit:  11/22/2022  Date of next visit:  2/24/2023    Requested Prescriptions     Pending Prescriptions Disp Refills    glimepiride (AMARYL) 4 MG tablet [Pharmacy Med Name: Glimepiride 4 MG Oral Tablet] 180 tablet 0     Sig: Take 1 tablet by mouth twice daily

## 2023-02-01 RX ORDER — GLIMEPIRIDE 4 MG/1
TABLET ORAL
Qty: 180 TABLET | Refills: 0 | Status: SHIPPED | OUTPATIENT
Start: 2023-02-01

## 2023-02-02 NOTE — TELEPHONE ENCOUNTER
Date of last visit:  11/22/2022  Date of next visit:  2/24/2023    Requested Prescriptions     Pending Prescriptions Disp Refills    amiodarone (CORDARONE) 200 MG tablet [Pharmacy Med Name: Amiodarone HCl 200 MG Oral Tablet] 90 tablet 1     Sig: Take 1 tablet by mouth once daily

## 2023-02-03 RX ORDER — AMIODARONE HYDROCHLORIDE 200 MG/1
TABLET ORAL
Qty: 90 TABLET | Refills: 1 | Status: SHIPPED | OUTPATIENT
Start: 2023-02-03

## 2023-02-13 ENCOUNTER — NURSE ONLY (OUTPATIENT)
Dept: LAB | Age: 78
End: 2023-02-13

## 2023-02-13 DIAGNOSIS — E03.4 HYPOTHYROIDISM DUE TO ACQUIRED ATROPHY OF THYROID: ICD-10-CM

## 2023-02-13 LAB
T4 FREE SERPL-MCNC: 1.82 NG/DL (ref 0.93–1.76)
TSH SERPL DL<=0.005 MIU/L-ACNC: 8.17 UIU/ML (ref 0.4–4.2)

## 2023-02-16 ENCOUNTER — TELEPHONE (OUTPATIENT)
Dept: FAMILY MEDICINE CLINIC | Age: 78
End: 2023-02-16

## 2023-02-16 DIAGNOSIS — E03.4 HYPOTHYROIDISM DUE TO ACQUIRED ATROPHY OF THYROID: Primary | ICD-10-CM

## 2023-02-16 NOTE — TELEPHONE ENCOUNTER
----- Message from Hayley Paredes MD sent at 2/16/2023  6:15 AM EST -----  Same dose as free t4 just slight up and tsh better so  no change and level in 3 mths again  and lab slip on  printer and do in may

## 2023-02-24 ENCOUNTER — NURSE ONLY (OUTPATIENT)
Dept: LAB | Age: 78
End: 2023-02-24

## 2023-02-24 ENCOUNTER — OFFICE VISIT (OUTPATIENT)
Dept: FAMILY MEDICINE CLINIC | Age: 78
End: 2023-02-24

## 2023-02-24 VITALS
HEIGHT: 66 IN | WEIGHT: 233.5 LBS | SYSTOLIC BLOOD PRESSURE: 110 MMHG | RESPIRATION RATE: 12 BRPM | DIASTOLIC BLOOD PRESSURE: 64 MMHG | HEART RATE: 96 BPM | BODY MASS INDEX: 37.52 KG/M2

## 2023-02-24 DIAGNOSIS — E11.9 TYPE 2 DIABETES MELLITUS WITHOUT COMPLICATION, WITHOUT LONG-TERM CURRENT USE OF INSULIN (HCC): Primary | ICD-10-CM

## 2023-02-24 DIAGNOSIS — E11.9 TYPE 2 DIABETES MELLITUS WITHOUT COMPLICATION, WITHOUT LONG-TERM CURRENT USE OF INSULIN (HCC): ICD-10-CM

## 2023-02-24 DIAGNOSIS — E03.4 HYPOTHYROIDISM DUE TO ACQUIRED ATROPHY OF THYROID: ICD-10-CM

## 2023-02-24 DIAGNOSIS — I48.0 PAROXYSMAL ATRIAL FIBRILLATION (HCC): ICD-10-CM

## 2023-02-24 DIAGNOSIS — I25.10 CORONARY ARTERY DISEASE INVOLVING NATIVE CORONARY ARTERY OF NATIVE HEART WITHOUT ANGINA PECTORIS: ICD-10-CM

## 2023-02-24 DIAGNOSIS — I50.41 CHF (CONGESTIVE HEART FAILURE), NYHA CLASS I, ACUTE, COMBINED (HCC): ICD-10-CM

## 2023-02-24 DIAGNOSIS — N18.4 CKD (CHRONIC KIDNEY DISEASE) STAGE 4, GFR 15-29 ML/MIN (HCC): ICD-10-CM

## 2023-02-24 DIAGNOSIS — E78.00 PURE HYPERCHOLESTEROLEMIA: ICD-10-CM

## 2023-02-24 DIAGNOSIS — E11.42 DIABETIC PERIPHERAL NEUROPATHY (HCC): ICD-10-CM

## 2023-02-24 DIAGNOSIS — E66.01 SEVERE OBESITY (BMI 35.0-39.9) WITH COMORBIDITY (HCC): ICD-10-CM

## 2023-02-24 DIAGNOSIS — I49.5 SICK SINUS SYNDROME (HCC): ICD-10-CM

## 2023-02-24 DIAGNOSIS — I10 ESSENTIAL HYPERTENSION: ICD-10-CM

## 2023-02-24 LAB
DEPRECATED MEAN GLUCOSE BLD GHB EST-ACNC: 165 MG/DL (ref 70–126)
HBA1C MFR BLD HPLC: 7.5 % (ref 4.4–6.4)

## 2023-02-24 RX ORDER — CLOPIDOGREL BISULFATE 75 MG/1
TABLET ORAL
Qty: 90 TABLET | Refills: 1 | Status: SHIPPED | OUTPATIENT
Start: 2023-02-24

## 2023-02-24 RX ORDER — ZINC SULFATE 50(220)MG
50 CAPSULE ORAL DAILY
COMMUNITY

## 2023-02-24 RX ORDER — ATORVASTATIN CALCIUM 40 MG/1
TABLET, FILM COATED ORAL
Qty: 90 TABLET | Refills: 1 | Status: SHIPPED | OUTPATIENT
Start: 2023-02-24

## 2023-02-24 RX ORDER — AMLODIPINE BESYLATE 5 MG/1
5 TABLET ORAL DAILY
Qty: 90 TABLET | Refills: 1 | Status: SHIPPED | OUTPATIENT
Start: 2023-02-24

## 2023-02-24 RX ORDER — METOPROLOL TARTRATE 50 MG/1
TABLET, FILM COATED ORAL
Qty: 180 TABLET | Refills: 1 | Status: SHIPPED | OUTPATIENT
Start: 2023-02-24

## 2023-02-24 RX ORDER — OMEGA-3S/DHA/EPA/FISH OIL/D3 300MG-1000
400 CAPSULE ORAL DAILY
COMMUNITY

## 2023-02-24 RX ORDER — GABAPENTIN 300 MG/1
CAPSULE ORAL
Qty: 90 CAPSULE | Refills: 1 | Status: SHIPPED | OUTPATIENT
Start: 2023-02-24 | End: 2023-09-10

## 2023-02-24 SDOH — ECONOMIC STABILITY: FOOD INSECURITY: WITHIN THE PAST 12 MONTHS, THE FOOD YOU BOUGHT JUST DIDN'T LAST AND YOU DIDN'T HAVE MONEY TO GET MORE.: NEVER TRUE

## 2023-02-24 SDOH — ECONOMIC STABILITY: INCOME INSECURITY: HOW HARD IS IT FOR YOU TO PAY FOR THE VERY BASICS LIKE FOOD, HOUSING, MEDICAL CARE, AND HEATING?: NOT HARD AT ALL

## 2023-02-24 SDOH — ECONOMIC STABILITY: FOOD INSECURITY: WITHIN THE PAST 12 MONTHS, YOU WORRIED THAT YOUR FOOD WOULD RUN OUT BEFORE YOU GOT MONEY TO BUY MORE.: NEVER TRUE

## 2023-02-24 SDOH — ECONOMIC STABILITY: HOUSING INSECURITY
IN THE LAST 12 MONTHS, WAS THERE A TIME WHEN YOU DID NOT HAVE A STEADY PLACE TO SLEEP OR SLEPT IN A SHELTER (INCLUDING NOW)?: NO

## 2023-02-24 ASSESSMENT — ENCOUNTER SYMPTOMS
EYE PAIN: 0
CONSTIPATION: 0
CHEST TIGHTNESS: 0
WHEEZING: 0
SHORTNESS OF BREATH: 0
COUGH: 0
ABDOMINAL PAIN: 0
SORE THROAT: 0
NAUSEA: 0

## 2023-02-24 ASSESSMENT — PATIENT HEALTH QUESTIONNAIRE - PHQ9
SUM OF ALL RESPONSES TO PHQ QUESTIONS 1-9: 1
1. LITTLE INTEREST OR PLEASURE IN DOING THINGS: 0
2. FEELING DOWN, DEPRESSED OR HOPELESS: 1
SUM OF ALL RESPONSES TO PHQ QUESTIONS 1-9: 1
SUM OF ALL RESPONSES TO PHQ9 QUESTIONS 1 & 2: 1
SUM OF ALL RESPONSES TO PHQ QUESTIONS 1-9: 1
SUM OF ALL RESPONSES TO PHQ QUESTIONS 1-9: 1

## 2023-02-24 NOTE — PROGRESS NOTES
Subjective:      Patient ID: Scottie Brunson is a 70 y.o. female. Pacer  noted        Adjustment  with  death  of      arun  loss  as  not eating       Grade  4  crf  noted      Atrial  fib  stable                  Diabetes  She presents for her follow-up diabetic visit. She has type 2 diabetes mellitus. Her disease course has been stable. There are no hypoglycemic associated symptoms. Pertinent negatives for hypoglycemia include no dizziness, headaches, nervousness/anxiousness or sweats. There are no diabetic associated symptoms. Pertinent negatives for diabetes include no chest pain, no fatigue and no weakness. There are no hypoglycemic complications. Symptoms are stable. There are no diabetic complications. Current diabetic treatment includes oral agent (monotherapy). She is compliant with treatment all of the time. When asked about meal planning, she reported none. Her breakfast blood glucose is taken between 7-8 am. Her breakfast blood glucose range is generally 110-130 mg/dl. An ACE inhibitor/angiotensin II receptor blocker is contraindicated. Hypertension  This is a chronic problem. The problem has been resolved since onset. The problem is controlled. Pertinent negatives include no chest pain, headaches, neck pain, palpitations, peripheral edema, shortness of breath or sweats. The current treatment provides significant improvement. There are no compliance problems. Hyperlipidemia  This is a chronic problem. The current episode started more than 1 year ago. The problem is controlled. Recent lipid tests were reviewed and are normal. Pertinent negatives include no chest pain or shortness of breath. Current antihyperlipidemic treatment includes statins. The current treatment provides significant improvement of lipids. There are no compliance problems.     Past Medical History:   Diagnosis Date    Aortic aneurysm (HCC)     4.5 cm aorta    Arthritis     Atrial fibrillation, chronic ([facility] 1/25/2019 CAD (coronary artery disease)     CHF NYHA class II, unspecified failure chronicity, unspecified type ([facility] [address] 10/8/2018    Colon polyps 3/01    Diabetic peripheral neuropathy ([facility] 2014      feet    Elbow fracture     Fluid overload 6/9/2021    Hyperlipidemia     Hypertension     Hypothyroidism     Pulmonary nodule, right 10/2017      repeat  ct of  chest  4-2018    S/P cardiac pacemaker procedure: 10/21/2017: Medtronic Dual Chamber. 10/21/2017    10/21/2017: Medtronic Dual Chamber. Dr. Estrada Child    Systolic CHF, acute on chronic [facility]) 6/10/2021    Type II or unspecified type diabetes mellitus without mention of complication, not stated as uncontrolled 2005      Review of Systems   Constitutional:  Negative for appetite change, fatigue and fever. HENT:  Negative for congestion, ear pain, postnasal drip and sore throat. Eyes:  Negative for pain and visual disturbance. Respiratory:  Negative for cough, chest tightness, shortness of breath and wheezing. Cardiovascular:  Negative for chest pain, palpitations and leg swelling. Gastrointestinal:  Negative for abdominal pain, constipation and nausea. Genitourinary:  Negative for dysuria and frequency. Musculoskeletal:  Negative for arthralgias, joint swelling, neck pain and neck stiffness. Skin:  Negative for rash. Neurological:  Negative for dizziness, weakness, numbness and headaches. Hematological:  Negative for adenopathy. Does not bruise/bleed easily. Psychiatric/Behavioral:  Negative for behavioral problems and sleep disturbance. The patient is not nervous/anxious. /64 (Site: Right Upper Arm, Position: Sitting, Cuff Size: Medium Adult)   Pulse 96   Resp 12   Ht 5' 6\" (1.676 m)   Wt 233 lb 8 oz (105.9 kg)   BMI 37.69 kg/m²    Objective:   Physical Exam  Vitals and nursing note reviewed. Constitutional:       Appearance: She is well-developed. HENT:      Head: Normocephalic and atraumatic.       Right Ear: External ear normal. Left Ear: External ear normal.      Nose: Nose normal.   Eyes:      General: No scleral icterus. Conjunctiva/sclera: Conjunctivae normal.      Pupils: Pupils are equal, round, and reactive to light. Neck:      Thyroid: No thyromegaly. Vascular: No JVD. Cardiovascular:      Rate and Rhythm: Normal rate and regular rhythm. Heart sounds: Normal heart sounds. Pulmonary:      Effort: Pulmonary effort is normal.      Breath sounds: Normal breath sounds. No wheezing or rales. Abdominal:      General: Bowel sounds are normal. There is no distension. Palpations: Abdomen is soft. There is no mass. Tenderness: There is no abdominal tenderness. Musculoskeletal:         General: No tenderness. Normal range of motion. Cervical back: Normal range of motion and neck supple. Lymphadenopathy:      Cervical: No cervical adenopathy. Skin:     General: Skin is warm and dry. Findings: No rash. Neurological:      Mental Status: She is alert and oriented to person, place, and time. Cranial Nerves: No cranial nerve deficit. Deep Tendon Reflexes: Reflexes are normal and symmetric. Assessment:        ICD-10-CM    1. Type 2 diabetes mellitus without complication, without long-term current use of insulin ([facility])  E11.9 Hemoglobin A1C      2. Essential hypertension  I10 metoprolol tartrate (LOPRESSOR) 50 MG tablet     amLODIPine (NORVASC) 5 MG tablet      3. Diabetic peripheral neuropathy (HCC)  E11.42 gabapentin (NEURONTIN) 300 MG capsule      4. Coronary artery disease involving native coronary artery of native heart without angina pectoris  I25.10 clopidogrel (PLAVIX) 75 MG tablet      5. Hypothyroidism due to acquired atrophy of thyroid  E03.4       6. Severe obesity (BMI 35.0-39. 9) with comorbidity ([facility] [address]  E66.01       7. Paroxysmal atrial fibrillation (HCC)  I48.0       8. Sick sinus syndrome (HCC)  I49.5       9.  CHF (congestive heart failure), NYHA class I, acute, combined ([facility]  I50.41       10. Pure hypercholesterolemia  E78.00 atorvastatin (LIPITOR) 40 MG tablet      11.  CKD (chronic kidney disease) stage 4, GFR 15-29 ml/min ([facility])  N18.4              Plan:      Current Outpatient Medications   Medication Sig Dispense Refill    zinc sulfate (ZINCATE) 220 (50 Zn) MG capsule Take 50 mg by mouth daily      vitamin D3 (CHOLECALCIFEROL) 10 MCG (400 UNIT) TABS tablet Take 400 Units by mouth daily      metoprolol tartrate (LOPRESSOR) 50 MG tablet Take 1 tablet by mouth twice daily 180 tablet 1    amLODIPine (NORVASC) 5 MG tablet Take 1 tablet by mouth daily 90 tablet 1    gabapentin (NEURONTIN) 300 MG capsule TAKE 1 CAPSULE BY MOUTH ONCE DAILY IN THE EVENING 90 capsule 1    atorvastatin (LIPITOR) 40 MG tablet Take 1 tablet by mouth once daily 90 tablet 1    clopidogrel (PLAVIX) 75 MG tablet Take 1 tablet by mouth once daily 90 tablet 1    amiodarone (CORDARONE) 200 MG tablet Take 1 tablet by mouth once daily 90 tablet 1    glimepiride (AMARYL) 4 MG tablet Take 1 tablet by mouth twice daily 180 tablet 0    ELIQUIS 5 MG TABS tablet Take 1 tablet by mouth twice daily 180 tablet 0    Omega-3 Fatty Acids (FISH OIL) 1000 MG capsule Take by mouth daily      levothyroxine (SYNTHROID) 137 MCG tablet Take 1 tablet by mouth daily 90 tablet 1    metOLazone (ZAROXOLYN) 5 MG tablet Take 1 tablet by mouth as needed (worsening swelling) As  Needed 10 tablet 0    bumetanide (BUMEX) 1 MG tablet Take 2 tablets by mouth 2 times daily TAKE 2 MG IN THE MORNING, 1 MG IN THE AFTERNOON (Patient taking differently: TAKE 2 MG IN THE MORNING, 1 MG IN THE AFTERNOON) 360 tablet 1    potassium chloride (KLOR-CON) 10 MEQ extended release tablet Take 1 tablet by mouth once daily with breakfast 90 tablet 0    TRULICITY 0.77 RD/1.7DM SOPN INJECT 1  SUBCUTANEOUSLY ONCE A WEEK 4 mL 0    nitroGLYCERIN (NITROSTAT) 0.4 MG SL tablet Place 1 tablet under the tongue every 5 minutes as needed for Chest pain up to max of 3 total doses. If no relief after 1 dose, call 911. 25 tablet 1    Glucose Blood (JOSE E BREEZE 2 TEST) DISK USE ONE STRIP TWICE DAILY, DX: E11.9 200 each 5    Ascorbic Acid (VITAMIN C) 500 MG tablet Take 500 mg by mouth 2 times daily        No current facility-administered medications for this visit. Orders Placed This Encounter   Procedures    Hemoglobin A1C     Standing Status:   Future     Standing Expiration Date:   2/24/2024     No results found for this visit on 02/24/23. Baljit Flynn received counseling on the following healthy behaviors: nutrition and exercise    Patient given educational materials on Diabetes and Hyperlipidemia    I have instructed Baljit Flynn to complete a self tracking handout on Blood Sugars  and Blood Pressures  and instructed them to bring it with them to her next appointment. Discussed use, benefit, and side effects of prescribed medications. Barriers to medication compliance addressed. All patient questions answered. Pt voiced understanding.           Need  hgba1c    see in    3 mths   Marga Holder MD

## 2023-02-27 ENCOUNTER — TELEPHONE (OUTPATIENT)
Dept: FAMILY MEDICINE CLINIC | Age: 78
End: 2023-02-27

## 2023-02-27 NOTE — TELEPHONE ENCOUNTER
----- Message from Karthikeyan Klein MD sent at 2/25/2023  4:08 PM EST -----  Hgb a1c 7.5 but with all issues feel stable and repeat in 3 mths at appointment and no changes

## 2023-03-17 ENCOUNTER — TELEPHONE (OUTPATIENT)
Dept: CARDIOLOGY CLINIC | Age: 78
End: 2023-03-17

## 2023-03-17 NOTE — LETTER
March 17, 2023       [address]  [address]  [address] [place] [address] [address] [address]      Dear Rolando Keita: Nic Madrigal We have been unable to contact you by phone. Our office did not receive your [employer] transmission which was scheduled for March 10, 2023. Please check the connections and send a manual download ASAP. If you need help sending a download, please call [employer] at [phone]. Our office is not responsible for missed transmissions. Please call our office at [phone] if you have questions for the pacemaker/ICD clinic     Thank You for your assistance!     [facility]'s Pacemaker/ICD clinic       Lenny Dennis MD

## 2023-03-25 PROCEDURE — 93296 REM INTERROG EVL PM/IDS: CPT | Performed by: NUCLEAR MEDICINE

## 2023-03-25 PROCEDURE — 93294 REM INTERROG EVL PM/LDLS PM: CPT | Performed by: NUCLEAR MEDICINE

## 2023-03-27 ENCOUNTER — PROCEDURE VISIT (OUTPATIENT)
Dept: CARDIOLOGY CLINIC | Age: 78
End: 2023-03-27
Payer: COMMERCIAL

## 2023-03-27 DIAGNOSIS — Z95.0 S/P CARDIAC PACEMAKER PROCEDURE: Primary | ICD-10-CM

## 2023-03-27 NOTE — PROGRESS NOTES
[employer] Medtronic Dual Pacemaker   Patient of [facility]    Battery 2-3 years    Presenting rhythm APVP    A Impedance 361  RV Impedance 456    P wave sensing 0.8  R wave sensing 13.4    A Threshold - @ -  A Amplitude 1.5 @ 0.40  RV Thresholds 0.875 @ 0.40  RV Amplitude 2.0 @ 0.40      A Paced 99.8%  V Paced 100%    Programmed Mode DDDR       Afib [place] 0%    Episodes   none

## 2023-03-30 DIAGNOSIS — I48.0 PAROXYSMAL ATRIAL FIBRILLATION (HCC): ICD-10-CM

## 2023-03-30 RX ORDER — APIXABAN 5 MG/1
TABLET, FILM COATED ORAL
Qty: 180 TABLET | Refills: 1 | Status: SHIPPED | OUTPATIENT
Start: 2023-03-30

## 2023-03-30 NOTE — TELEPHONE ENCOUNTER
The pharmacy is requesting a refill of the below medication which has been pended for you:     Requested Prescriptions     Pending Prescriptions Disp Refills    ELIQUIS 5 MG TABS tablet [Pharmacy Med Name: Eliquis 5 MG Oral Tablet] 180 tablet 1     Sig: Take 1 tablet by mouth twice daily       Last Appointment Date: 2/24/2023  Next Appointment Date: 5/30/2023    Allergies   Allergen Reactions    Adhesive Tape Rash

## 2023-03-31 ENCOUNTER — TELEPHONE (OUTPATIENT)
Dept: FAMILY MEDICINE CLINIC | Age: 78
End: 2023-03-31

## 2023-03-31 DIAGNOSIS — E11.9 TYPE 2 DIABETES MELLITUS WITHOUT COMPLICATION, WITHOUT LONG-TERM CURRENT USE OF INSULIN (HCC): ICD-10-CM

## 2023-03-31 DIAGNOSIS — E11.42 DIABETIC PERIPHERAL NEUROPATHY (HCC): ICD-10-CM

## 2023-03-31 NOTE — TELEPHONE ENCOUNTER
Pt stopped and dropped off form for pt to fill out for [facility] Patient assistance program application    Blank forms scanned in and placed on cart please mail once complete

## 2023-04-03 RX ORDER — DULAGLUTIDE 0.75 MG/.5ML
INJECTION, SOLUTION SUBCUTANEOUS
Qty: 12 ML | Refills: 3 | Status: SHIPPED | OUTPATIENT
Start: 2023-04-03

## 2023-04-03 NOTE — TELEPHONE ENCOUNTER
Date of last visit:  2/24/2023  Date of next visit:  5/30/2023    Requested Prescriptions     Signed Prescriptions Disp Refills    Dulaglutide (TRULICITY) 5.19 LG/7.7CW SOPN 12 mL 3     Sig: INJECT 1 SYRINGE SUBCUTANEOUSLY ONCE A WEEK     Authorizing Provider: Ahsan Cordero     Ordering User: REUBEN LY     Faxed the filled out [employer] and Patient Assistance Forms to #: [phone]. Mailed to patient as well.

## 2023-05-03 DIAGNOSIS — E11.9 TYPE 2 DIABETES MELLITUS WITHOUT COMPLICATION, WITHOUT LONG-TERM CURRENT USE OF INSULIN (HCC): ICD-10-CM

## 2023-05-03 DIAGNOSIS — E03.4 HYPOTHYROIDISM DUE TO ACQUIRED ATROPHY OF THYROID: ICD-10-CM

## 2023-05-04 NOTE — TELEPHONE ENCOUNTER
Date of last visit:  2/24/2023  Date of next visit:  5/30/2023    Requested Prescriptions     Pending Prescriptions Disp Refills    glimepiride (AMARYL) 4 MG tablet [Pharmacy Med Name: Glimepiride 4 MG Oral Tablet] 180 tablet 1     Sig: Take 1 tablet by mouth twice daily    levothyroxine (SYNTHROID) 137 MCG tablet [Pharmacy Med Name: Levothyroxine Sodium 137 MCG Oral Tablet] 90 tablet 1     Sig: Take 1 tablet by mouth once daily
[FreeTextEntry1] : patient will follow up in 3 months or if needed. Warning signs, follow up, and restrictions were discussed with the patient. \par no heavy lifting  4-6 weeks.

## 2023-05-05 RX ORDER — GLIMEPIRIDE 4 MG/1
TABLET ORAL
Qty: 180 TABLET | Refills: 1 | Status: SHIPPED | OUTPATIENT
Start: 2023-05-05

## 2023-05-05 RX ORDER — LEVOTHYROXINE SODIUM 137 UG/1
TABLET ORAL
Qty: 90 TABLET | Refills: 1 | Status: SHIPPED | OUTPATIENT
Start: 2023-05-05

## 2023-05-08 RX ORDER — POTASSIUM CHLORIDE 750 MG/1
10 TABLET, FILM COATED, EXTENDED RELEASE ORAL
Qty: 90 TABLET | Refills: 1 | Status: SHIPPED | OUTPATIENT
Start: 2023-05-08

## 2023-05-08 NOTE — TELEPHONE ENCOUNTER
Date of last visit:  2/24/2023  Date of next visit:  5/30/2023    Requested Prescriptions     Pending Prescriptions Disp Refills    potassium chloride (KLOR-CON) 10 MEQ extended release tablet 90 tablet      Sig: Take 1 tablet by mouth daily (with breakfast)

## 2023-05-09 ENCOUNTER — OFFICE VISIT (OUTPATIENT)
Dept: CARDIOLOGY CLINIC | Age: 78
End: 2023-05-09
Payer: COMMERCIAL

## 2023-05-09 VITALS
DIASTOLIC BLOOD PRESSURE: 68 MMHG | HEART RATE: 76 BPM | BODY MASS INDEX: 39.16 KG/M2 | SYSTOLIC BLOOD PRESSURE: 128 MMHG | WEIGHT: 242.6 LBS | OXYGEN SATURATION: 97 %

## 2023-05-09 DIAGNOSIS — I25.10 CORONARY ARTERY DISEASE INVOLVING NATIVE CORONARY ARTERY OF NATIVE HEART WITHOUT ANGINA PECTORIS: ICD-10-CM

## 2023-05-09 DIAGNOSIS — I50.32 CHF NYHA CLASS II, CHRONIC, DIASTOLIC (HCC): Primary | ICD-10-CM

## 2023-05-09 DIAGNOSIS — I10 ESSENTIAL HYPERTENSION: ICD-10-CM

## 2023-05-09 PROCEDURE — 3078F DIAST BP <80 MM HG: CPT | Performed by: NURSE PRACTITIONER

## 2023-05-09 PROCEDURE — 3074F SYST BP LT 130 MM HG: CPT | Performed by: NURSE PRACTITIONER

## 2023-05-09 PROCEDURE — 1123F ACP DISCUSS/DSCN MKR DOCD: CPT | Performed by: NURSE PRACTITIONER

## 2023-05-09 PROCEDURE — 99214 OFFICE O/P EST MOD 30 MIN: CPT | Performed by: NURSE PRACTITIONER

## 2023-05-09 RX ORDER — BUMETANIDE 1 MG/1
TABLET ORAL
Qty: 360 TABLET | Refills: 1
Start: 2023-05-09

## 2023-05-09 ASSESSMENT — ENCOUNTER SYMPTOMS
ABDOMINAL DISTENTION: 0
SHORTNESS OF BREATH: 0
COUGH: 0

## 2023-05-09 NOTE — PROGRESS NOTES
discussed the importance of a low sodium diet, higher sodium foods to avoid and better low sodium food options. Patient verbalizes understanding of plan of care using teach back method, and is agreeable to the treatment plan.        Electronically signed by GRANT Robert CNP on 5/9/2023 at 1:07 PM

## 2023-05-09 NOTE — PATIENT INSTRUCTIONS
You may receive a survey regarding the care you received during your visit. Your input is valuable to us. We encourage you to complete and return your survey. We hope you will choose us in the future for your healthcare needs.     Continue:  Continue current medications  Daily weights and record  Fluid restriction of 2 Liters per day  Limit sodium in diet to around 6275-5248 mg/day  Monitor BP  Activity as tolerated     Call the Heart Failure Clinic for any of the following symptoms: [phone]  Weight gain of 2-3 pounds in 1 day or 5 pounds in 1 week  Increased shortness of breath  Shortness of breath while laying down  Cough  Chest pain  Swelling in feet, ankles or legs  Tenderness or bloating in the abdomen  Fatigue   Decreased appetite or nausea   Confusion

## 2023-05-25 RX ORDER — AMIODARONE HYDROCHLORIDE 200 MG/1
TABLET ORAL
Qty: 90 TABLET | Refills: 0 | Status: SHIPPED | OUTPATIENT
Start: 2023-05-25

## 2023-05-25 NOTE — TELEPHONE ENCOUNTER
Date of last visit:  2/24/2023  Date of next visit:  5/30/2023    Requested Prescriptions     Pending Prescriptions Disp Refills    amiodarone (CORDARONE) 200 MG tablet [Pharmacy Med Name: Amiodarone HCl 200 MG Oral Tablet] 90 tablet 0     Sig: Take 1 tablet by mouth once daily

## 2023-05-30 ENCOUNTER — OFFICE VISIT (OUTPATIENT)
Dept: FAMILY MEDICINE CLINIC | Age: 78
End: 2023-05-30

## 2023-05-30 ENCOUNTER — NURSE ONLY (OUTPATIENT)
Dept: LAB | Age: 78
End: 2023-05-30

## 2023-05-30 VITALS
HEART RATE: 76 BPM | SYSTOLIC BLOOD PRESSURE: 128 MMHG | WEIGHT: 242 LBS | DIASTOLIC BLOOD PRESSURE: 66 MMHG | HEIGHT: 66 IN | BODY MASS INDEX: 38.89 KG/M2 | RESPIRATION RATE: 16 BRPM

## 2023-05-30 DIAGNOSIS — I50.41 CHF (CONGESTIVE HEART FAILURE), NYHA CLASS I, ACUTE, COMBINED (HCC): ICD-10-CM

## 2023-05-30 DIAGNOSIS — E03.4 HYPOTHYROIDISM DUE TO ACQUIRED ATROPHY OF THYROID: ICD-10-CM

## 2023-05-30 DIAGNOSIS — I10 PRIMARY HYPERTENSION: ICD-10-CM

## 2023-05-30 DIAGNOSIS — I48.0 PAROXYSMAL ATRIAL FIBRILLATION (HCC): ICD-10-CM

## 2023-05-30 DIAGNOSIS — N18.4 CKD (CHRONIC KIDNEY DISEASE) STAGE 4, GFR 15-29 ML/MIN (HCC): ICD-10-CM

## 2023-05-30 DIAGNOSIS — E11.9 TYPE 2 DIABETES MELLITUS WITHOUT COMPLICATION, WITHOUT LONG-TERM CURRENT USE OF INSULIN (HCC): ICD-10-CM

## 2023-05-30 DIAGNOSIS — E78.00 PURE HYPERCHOLESTEROLEMIA: ICD-10-CM

## 2023-05-30 DIAGNOSIS — I49.5 SICK SINUS SYNDROME (HCC): Primary | ICD-10-CM

## 2023-05-30 DIAGNOSIS — I50.32 CHF NYHA CLASS II, CHRONIC, DIASTOLIC (HCC): ICD-10-CM

## 2023-05-30 DIAGNOSIS — Z95.0 S/P CARDIAC PACEMAKER PROCEDURE: ICD-10-CM

## 2023-05-30 DIAGNOSIS — E11.42 DIABETIC PERIPHERAL NEUROPATHY (HCC): ICD-10-CM

## 2023-05-30 DIAGNOSIS — I71.21 ANEURYSM OF ASCENDING AORTA WITHOUT RUPTURE (HCC): ICD-10-CM

## 2023-05-30 LAB
ANION GAP SERPL CALC-SCNC: 15 MEQ/L (ref 8–16)
BUN SERPL-MCNC: 31 MG/DL (ref 7–22)
CALCIUM SERPL-MCNC: 9.1 MG/DL (ref 8.5–10.5)
CHLORIDE SERPL-SCNC: 97 MEQ/L (ref 98–111)
CO2 SERPL-SCNC: 27 MEQ/L (ref 23–33)
CREAT SERPL-MCNC: 2.1 MG/DL (ref 0.4–1.2)
GFR SERPL CREATININE-BSD FRML MDRD: 24 ML/MIN/1.73M2
GLUCOSE SERPL-MCNC: 149 MG/DL (ref 70–108)
POTASSIUM SERPL-SCNC: 4.7 MEQ/L (ref 3.5–5.2)
SODIUM SERPL-SCNC: 139 MEQ/L (ref 135–145)
T4 FREE SERPL-MCNC: 2.02 NG/DL (ref 0.93–1.76)
TSH SERPL DL<=0.005 MIU/L-ACNC: 8.12 UIU/ML (ref 0.4–4.2)

## 2023-05-30 PROCEDURE — 1123F ACP DISCUSS/DSCN MKR DOCD: CPT | Performed by: FAMILY MEDICINE

## 2023-05-30 PROCEDURE — 99213 OFFICE O/P EST LOW 20 MIN: CPT | Performed by: FAMILY MEDICINE

## 2023-05-30 RX ORDER — DULAGLUTIDE 0.75 MG/.5ML
INJECTION, SOLUTION SUBCUTANEOUS
Qty: 12 ML | Refills: 3 | Status: SHIPPED
Start: 2023-05-30

## 2023-05-30 ASSESSMENT — ENCOUNTER SYMPTOMS
WHEEZING: 0
ABDOMINAL PAIN: 0
CONSTIPATION: 0
CHEST TIGHTNESS: 0
SORE THROAT: 0
COUGH: 0
SHORTNESS OF BREATH: 0
NAUSEA: 0
EYE PAIN: 0

## 2023-05-30 NOTE — PROGRESS NOTES
Subjective:      Patient ID: Temo Cutler is a 70 y.o. female. Crf  noted  stage  4      Ashd  with pacer     Peripheral  neuropathy  noted       Par  atrial  fib  noted        Needs  left  total  knee and  want  hgb a1c  at  9    Diabetes  She presents for her follow-up diabetic visit. She has type 2 diabetes mellitus. Her disease course has been stable. There are no hypoglycemic associated symptoms. Pertinent negatives for hypoglycemia include no dizziness, headaches or nervousness/anxiousness. There are no diabetic associated symptoms. Pertinent negatives for diabetes include no chest pain, no fatigue and no weakness. There are no hypoglycemic complications. Symptoms are stable. Current diabetic treatment includes oral agent (dual therapy). She is compliant with treatment all of the time. She is following a diabetic diet. Meal planning includes avoidance of concentrated sweets. Her breakfast blood glucose is taken between 8-9 am. Her breakfast blood glucose range is generally 140-180 mg/dl. An ACE inhibitor/angiotensin II receptor blocker is not being taken. Hypertension  This is a chronic problem. The current episode started more than 1 year ago. Pertinent negatives include no chest pain, headaches, neck pain, palpitations or shortness of breath. The current treatment provides significant improvement. There are no compliance problems.     Past Medical History:   Diagnosis Date    Aortic aneurysm (HCC)     4.5 cm aorta    Arthritis     Atrial fibrillation, chronic ([address] 1/25/2019    CAD (coronary artery disease)     CHF NYHA class II, unspecified failure chronicity, unspecified type ([address] 10/8/2018    Colon polyps 3/01    Diabetic peripheral neuropathy ([address] 2014      feet    Elbow fracture     Fluid overload 6/9/2021    Hyperlipidemia     Hypertension     Hypothyroidism     Pulmonary nodule, right 10/2017      repeat  ct of  chest  4-2018    S/P cardiac pacemaker procedure: 10/21/2017: Medtronic Dual

## 2023-05-31 ENCOUNTER — TELEPHONE (OUTPATIENT)
Dept: FAMILY MEDICINE CLINIC | Age: 78
End: 2023-05-31

## 2023-05-31 DIAGNOSIS — E03.4 HYPOTHYROIDISM DUE TO ACQUIRED ATROPHY OF THYROID: Primary | ICD-10-CM

## 2023-05-31 NOTE — TELEPHONE ENCOUNTER
----- Message from Catherine Sheffield MD sent at 5/31/2023  7:03 AM EDT -----    No change on thyroid and repeat in 3  mths as feel numbers off due to the amiodarone

## 2023-06-20 ENCOUNTER — PROCEDURE VISIT (OUTPATIENT)
Dept: CARDIOLOGY CLINIC | Age: 78
End: 2023-06-20

## 2023-06-20 DIAGNOSIS — Z95.0 S/P CARDIAC PACEMAKER PROCEDURE: Primary | ICD-10-CM

## 2023-06-20 PROCEDURE — 93294 REM INTERROG EVL PM/LDLS PM: CPT | Performed by: NUCLEAR MEDICINE

## 2023-06-20 PROCEDURE — 93296 REM INTERROG EVL PM/IDS: CPT | Performed by: NUCLEAR MEDICINE

## 2023-06-20 NOTE — PROGRESS NOTES
[employer] Medtronic Dual Pacemaker   Patient of [facility]aki    Battery 3 years    Presenting rhythm APVP    A Impedance 380  RV Impedance 475    P wave sensing 0.9  R wave sensing 13.4    A Threshold - @ -  A Amplitude 1.75 @ 0.40  RV Thresholds 0.625 @ 0.40  RV Amplitude 2.0 @ 0.40      A Paced 99.4%  V Paced 100%    Programmed Mode DDDR       Afib [place] 0%    Episodes   none

## 2023-07-05 ENCOUNTER — HOSPITAL ENCOUNTER (OUTPATIENT)
Age: 78
Setting detail: OBSERVATION
Discharge: HOME | End: 2023-07-07
Attending: EMERGENCY MEDICINE | Admitting: STUDENT IN AN ORGANIZED HEALTH CARE EDUCATION/TRAINING PROGRAM
Payer: MEDICARE

## 2023-07-05 ENCOUNTER — APPOINTMENT (OUTPATIENT)
Dept: GENERAL RADIOLOGY | Age: 78
End: 2023-07-05
Payer: MEDICARE

## 2023-07-05 DIAGNOSIS — R07.9 CHEST PAIN, UNSPECIFIED TYPE: Primary | ICD-10-CM

## 2023-07-05 DIAGNOSIS — N18.9 ACUTE KIDNEY INJURY SUPERIMPOSED ON CKD (HCC): ICD-10-CM

## 2023-07-05 DIAGNOSIS — N17.9 ACUTE KIDNEY INJURY SUPERIMPOSED ON CKD (HCC): ICD-10-CM

## 2023-07-05 LAB
ALBUMIN SERPL BCG-MCNC: 4.2 G/DL (ref 3.5–5.1)
ALP SERPL-CCNC: 84 U/L (ref 38–126)
ALT SERPL W/O P-5'-P-CCNC: 26 U/L (ref 11–66)
ANION GAP SERPL CALC-SCNC: 14 MEQ/L (ref 8–16)
AST SERPL-CCNC: 20 U/L (ref 5–40)
BASOPHILS ABSOLUTE: 0.1 THOU/MM3 (ref 0–0.1)
BASOPHILS NFR BLD AUTO: 0.9 %
BILIRUB SERPL-MCNC: 0.7 MG/DL (ref 0.3–1.2)
BUN SERPL-MCNC: 36 MG/DL (ref 7–22)
CALCIUM SERPL-MCNC: 8.8 MG/DL (ref 8.5–10.5)
CHLORIDE SERPL-SCNC: 97 MEQ/L (ref 98–111)
CO2 SERPL-SCNC: 26 MEQ/L (ref 23–33)
CREAT SERPL-MCNC: 2.8 MG/DL (ref 0.4–1.2)
DEPRECATED RDW RBC AUTO: 47.5 FL (ref 35–45)
EOSINOPHIL NFR BLD AUTO: 2.1 %
EOSINOPHILS ABSOLUTE: 0.1 THOU/MM3 (ref 0–0.4)
ERYTHROCYTE [DISTWIDTH] IN BLOOD BY AUTOMATED COUNT: 14.4 % (ref 11.5–14.5)
GFR SERPL CREATININE-BSD FRML MDRD: 17 ML/MIN/1.73M2
GLUCOSE SERPL-MCNC: 151 MG/DL (ref 70–108)
HCT VFR BLD AUTO: 40.6 % (ref 37–47)
HGB BLD-MCNC: 13.2 GM/DL (ref 12–16)
IMM GRANULOCYTES # BLD AUTO: 0.02 THOU/MM3 (ref 0–0.07)
IMM GRANULOCYTES NFR BLD AUTO: 0.3 %
LIPASE SERPL-CCNC: 22.4 U/L (ref 5.6–51.3)
LYMPHOCYTES ABSOLUTE: 1 THOU/MM3 (ref 1–4.8)
LYMPHOCYTES NFR BLD AUTO: 15.3 %
MCH RBC QN AUTO: 29.5 PG (ref 26–33)
MCHC RBC AUTO-ENTMCNC: 32.5 GM/DL (ref 32.2–35.5)
MCV RBC AUTO: 90.8 FL (ref 81–99)
MONOCYTES ABSOLUTE: 0.8 THOU/MM3 (ref 0.4–1.3)
MONOCYTES NFR BLD AUTO: 11.9 %
NEUTROPHILS NFR BLD AUTO: 69.5 %
NRBC BLD AUTO-RTO: 0 /100 WBC
NT-PROBNP SERPL IA-MCNC: 1213 PG/ML (ref 0–449)
OSMOLALITY SERPL CALC.SUM OF ELEC: 285.1 MOSMOL/KG (ref 275–300)
PLATELET # BLD AUTO: 270 THOU/MM3 (ref 130–400)
PMV BLD AUTO: 10.6 FL (ref 9.4–12.4)
POTASSIUM SERPL-SCNC: 4.6 MEQ/L (ref 3.5–5.2)
PROT SERPL-MCNC: 7 G/DL (ref 6.1–8)
RBC # BLD AUTO: 4.47 MILL/MM3 (ref 4.2–5.4)
SEGMENTED NEUTROPHILS ABSOLUTE COUNT: 4.6 THOU/MM3 (ref 1.8–7.7)
SODIUM SERPL-SCNC: 137 MEQ/L (ref 135–145)
TROPONIN T: < 0.01 NG/ML
WBC # BLD AUTO: 6.6 THOU/MM3 (ref 4.8–10.8)

## 2023-07-05 PROCEDURE — 99285 EMERGENCY DEPT VISIT HI MDM: CPT

## 2023-07-05 PROCEDURE — 96360 HYDRATION IV INFUSION INIT: CPT

## 2023-07-05 PROCEDURE — 36415 COLL VENOUS BLD VENIPUNCTURE: CPT

## 2023-07-05 PROCEDURE — 83690 ASSAY OF LIPASE: CPT

## 2023-07-05 PROCEDURE — 83880 ASSAY OF NATRIURETIC PEPTIDE: CPT

## 2023-07-05 PROCEDURE — 71045 X-RAY EXAM CHEST 1 VIEW: CPT

## 2023-07-05 PROCEDURE — 93005 ELECTROCARDIOGRAM TRACING: CPT | Performed by: EMERGENCY MEDICINE

## 2023-07-05 PROCEDURE — 2580000003 HC RX 258: Performed by: EMERGENCY MEDICINE

## 2023-07-05 PROCEDURE — 80053 COMPREHEN METABOLIC PANEL: CPT

## 2023-07-05 PROCEDURE — 84484 ASSAY OF TROPONIN QUANT: CPT

## 2023-07-05 PROCEDURE — 85025 COMPLETE CBC W/AUTO DIFF WBC: CPT

## 2023-07-05 RX ORDER — 0.9 % SODIUM CHLORIDE 0.9 %
1000 INTRAVENOUS SOLUTION INTRAVENOUS ONCE
Status: COMPLETED | OUTPATIENT
Start: 2023-07-05 | End: 2023-07-06

## 2023-07-05 RX ADMIN — SODIUM CHLORIDE 1000 ML: 9 INJECTION, SOLUTION INTRAVENOUS at 23:14

## 2023-07-05 ASSESSMENT — PAIN - FUNCTIONAL ASSESSMENT: PAIN_FUNCTIONAL_ASSESSMENT: 0-10

## 2023-07-05 ASSESSMENT — PAIN SCALES - GENERAL: PAINLEVEL_OUTOF10: 3

## 2023-07-05 ASSESSMENT — PAIN DESCRIPTION - DESCRIPTORS: DESCRIPTORS: DULL

## 2023-07-05 ASSESSMENT — PAIN DESCRIPTION - ORIENTATION: ORIENTATION: LEFT

## 2023-07-05 ASSESSMENT — PAIN DESCRIPTION - LOCATION: LOCATION: CHEST;ARM

## 2023-07-06 ENCOUNTER — APPOINTMENT (OUTPATIENT)
Dept: NON INVASIVE DIAGNOSTICS | Age: 78
End: 2023-07-06
Payer: MEDICARE

## 2023-07-06 PROBLEM — N17.9 AKI (ACUTE KIDNEY INJURY) (HCC): Status: ACTIVE | Noted: 2023-07-06

## 2023-07-06 PROBLEM — R07.9 CHEST PAIN: Status: ACTIVE | Noted: 2023-07-06

## 2023-07-06 LAB
ALBUMIN SERPL BCG-MCNC: 3.8 G/DL (ref 3.5–5.1)
ANION GAP SERPL CALC-SCNC: 17 MEQ/L (ref 8–16)
BACTERIA: ABNORMAL
BILIRUB UR QL STRIP: NEGATIVE
BUN SERPL-MCNC: 35 MG/DL (ref 7–22)
CALCIUM SERPL-MCNC: 8.3 MG/DL (ref 8.5–10.5)
CASTS #/AREA URNS LPF: ABNORMAL /LPF
CASTS #/AREA URNS LPF: ABNORMAL /LPF
CHARACTER UR: CLEAR
CHARCOAL URNS QL MICRO: ABNORMAL
CHLORIDE SERPL-SCNC: 97 MEQ/L (ref 98–111)
CO2 SERPL-SCNC: 24 MEQ/L (ref 23–33)
COLOR UR: YELLOW
CREAT SERPL-MCNC: 2.8 MG/DL (ref 0.4–1.2)
CRYSTALS URNS QL MICRO: ABNORMAL
EPITHELIAL CELLS, UA: ABNORMAL /HPF
GFR SERPL CREATININE-BSD FRML MDRD: 17 ML/MIN/1.73M2
GLUCOSE BLD STRIP.AUTO-MCNC: 130 MG/DL (ref 70–108)
GLUCOSE BLD STRIP.AUTO-MCNC: 157 MG/DL (ref 70–108)
GLUCOSE BLD STRIP.AUTO-MCNC: 167 MG/DL (ref 70–108)
GLUCOSE BLD STRIP.AUTO-MCNC: 191 MG/DL (ref 70–108)
GLUCOSE BLD STRIP.AUTO-MCNC: 217 MG/DL (ref 70–108)
GLUCOSE SERPL-MCNC: 166 MG/DL (ref 70–108)
GLUCOSE UR QL STRIP.AUTO: NEGATIVE MG/DL
HGB UR QL STRIP.AUTO: NEGATIVE
KETONES UR QL STRIP.AUTO: NEGATIVE
LEUKOCYTE ESTERASE UR QL STRIP.AUTO: ABNORMAL
LV EF: 45 %
LVEF MODALITY: NORMAL
MAGNESIUM SERPL-MCNC: 2.2 MG/DL (ref 1.6–2.4)
NITRITE UR QL STRIP.AUTO: NEGATIVE
OSMOLALITY UR: 287 MOSMOL/KG (ref 250–750)
PH UR STRIP.AUTO: 6 [PH] (ref 5–9)
PHOSPHATE SERPL-MCNC: 4.3 MG/DL (ref 2.4–4.7)
POTASSIUM SERPL-SCNC: 4.5 MEQ/L (ref 3.5–5.2)
PROT UR STRIP.AUTO-MCNC: NEGATIVE MG/DL
RBC #/AREA URNS HPF: ABNORMAL /HPF
RENAL EPI CELLS #/AREA URNS HPF: ABNORMAL /[HPF]
SODIUM SERPL-SCNC: 138 MEQ/L (ref 135–145)
SODIUM UR-SCNC: 69 MEQ/L
SPECIFIC GRAVITY UA: 1.01 (ref 1–1.03)
TROPONIN T: < 0.01 NG/ML
UROBILINOGEN, URINE: 0.2 EU/DL (ref 0–1)
UUN 24H UR-MCNC: 282 MG/DL
WBC #/AREA URNS HPF: ABNORMAL /HPF
YEAST LIKE FUNGI URNS QL MICRO: ABNORMAL

## 2023-07-06 PROCEDURE — 2580000003 HC RX 258

## 2023-07-06 PROCEDURE — 99223 1ST HOSP IP/OBS HIGH 75: CPT | Performed by: NUCLEAR MEDICINE

## 2023-07-06 PROCEDURE — 83735 ASSAY OF MAGNESIUM: CPT

## 2023-07-06 PROCEDURE — 93017 CV STRESS TEST TRACING ONLY: CPT | Performed by: NUCLEAR MEDICINE

## 2023-07-06 PROCEDURE — 80069 RENAL FUNCTION PANEL: CPT

## 2023-07-06 PROCEDURE — 87086 URINE CULTURE/COLONY COUNT: CPT

## 2023-07-06 PROCEDURE — 83935 ASSAY OF URINE OSMOLALITY: CPT

## 2023-07-06 PROCEDURE — 84540 ASSAY OF URINE/UREA-N: CPT

## 2023-07-06 PROCEDURE — 82948 REAGENT STRIP/BLOOD GLUCOSE: CPT

## 2023-07-06 PROCEDURE — 36415 COLL VENOUS BLD VENIPUNCTURE: CPT

## 2023-07-06 PROCEDURE — 99223 1ST HOSP IP/OBS HIGH 75: CPT

## 2023-07-06 PROCEDURE — 6360000002 HC RX W HCPCS

## 2023-07-06 PROCEDURE — A9500 TC99M SESTAMIBI: HCPCS | Performed by: NUCLEAR MEDICINE

## 2023-07-06 PROCEDURE — 96361 HYDRATE IV INFUSION ADD-ON: CPT

## 2023-07-06 PROCEDURE — 3430000000 HC RX DIAGNOSTIC RADIOPHARMACEUTICAL: Performed by: NUCLEAR MEDICINE

## 2023-07-06 PROCEDURE — 6370000000 HC RX 637 (ALT 250 FOR IP)

## 2023-07-06 PROCEDURE — G0378 HOSPITAL OBSERVATION PER HR: HCPCS

## 2023-07-06 PROCEDURE — 84300 ASSAY OF URINE SODIUM: CPT

## 2023-07-06 PROCEDURE — 78452 HT MUSCLE IMAGE SPECT MULT: CPT | Performed by: NUCLEAR MEDICINE

## 2023-07-06 PROCEDURE — 81001 URINALYSIS AUTO W/SCOPE: CPT

## 2023-07-06 PROCEDURE — 93306 TTE W/DOPPLER COMPLETE: CPT

## 2023-07-06 PROCEDURE — 84484 ASSAY OF TROPONIN QUANT: CPT

## 2023-07-06 RX ORDER — IBUPROFEN 600 MG/1
1 TABLET ORAL PRN
Status: DISCONTINUED | OUTPATIENT
Start: 2023-07-06 | End: 2023-07-07 | Stop reason: HOSPADM

## 2023-07-06 RX ORDER — CLOPIDOGREL BISULFATE 75 MG/1
75 TABLET ORAL DAILY
Status: DISCONTINUED | OUTPATIENT
Start: 2023-07-06 | End: 2023-07-07 | Stop reason: HOSPADM

## 2023-07-06 RX ORDER — DEXTROSE MONOHYDRATE 100 MG/ML
INJECTION, SOLUTION INTRAVENOUS CONTINUOUS PRN
Status: DISCONTINUED | OUTPATIENT
Start: 2023-07-06 | End: 2023-07-07 | Stop reason: HOSPADM

## 2023-07-06 RX ORDER — ACETAMINOPHEN 325 MG/1
650 TABLET ORAL EVERY 6 HOURS PRN
Status: DISCONTINUED | OUTPATIENT
Start: 2023-07-06 | End: 2023-07-07 | Stop reason: HOSPADM

## 2023-07-06 RX ORDER — ACETAMINOPHEN 650 MG/1
650 SUPPOSITORY RECTAL EVERY 6 HOURS PRN
Status: DISCONTINUED | OUTPATIENT
Start: 2023-07-06 | End: 2023-07-07 | Stop reason: HOSPADM

## 2023-07-06 RX ORDER — GABAPENTIN 100 MG/1
100 CAPSULE ORAL NIGHTLY
Status: DISCONTINUED | OUTPATIENT
Start: 2023-07-06 | End: 2023-07-07 | Stop reason: HOSPADM

## 2023-07-06 RX ORDER — SODIUM CHLORIDE 9 MG/ML
INJECTION, SOLUTION INTRAVENOUS PRN
Status: DISCONTINUED | OUTPATIENT
Start: 2023-07-06 | End: 2023-07-07 | Stop reason: HOSPADM

## 2023-07-06 RX ORDER — SODIUM CHLORIDE 0.9 % (FLUSH) 0.9 %
5-40 SYRINGE (ML) INJECTION PRN
Status: DISCONTINUED | OUTPATIENT
Start: 2023-07-06 | End: 2023-07-07 | Stop reason: HOSPADM

## 2023-07-06 RX ORDER — TETRAKIS(2-METHOXYISOBUTYLISOCYANIDE)COPPER(I) TETRAFLUOROBORATE 1 MG/ML
31.9 INJECTION, POWDER, LYOPHILIZED, FOR SOLUTION INTRAVENOUS
Status: COMPLETED | OUTPATIENT
Start: 2023-07-06 | End: 2023-07-06

## 2023-07-06 RX ORDER — TETRAKIS(2-METHOXYISOBUTYLISOCYANIDE)COPPER(I) TETRAFLUOROBORATE 1 MG/ML
9.7 INJECTION, POWDER, LYOPHILIZED, FOR SOLUTION INTRAVENOUS
Status: COMPLETED | OUTPATIENT
Start: 2023-07-06 | End: 2023-07-06

## 2023-07-06 RX ORDER — BUMETANIDE 1 MG/1
2 TABLET ORAL DAILY
Status: DISCONTINUED | OUTPATIENT
Start: 2023-07-06 | End: 2023-07-07 | Stop reason: HOSPADM

## 2023-07-06 RX ORDER — ONDANSETRON 4 MG/1
4 TABLET, ORALLY DISINTEGRATING ORAL EVERY 8 HOURS PRN
Status: DISCONTINUED | OUTPATIENT
Start: 2023-07-06 | End: 2023-07-07 | Stop reason: HOSPADM

## 2023-07-06 RX ORDER — ONDANSETRON 2 MG/ML
4 INJECTION INTRAMUSCULAR; INTRAVENOUS EVERY 6 HOURS PRN
Status: DISCONTINUED | OUTPATIENT
Start: 2023-07-06 | End: 2023-07-07 | Stop reason: HOSPADM

## 2023-07-06 RX ORDER — METOPROLOL TARTRATE 50 MG/1
50 TABLET, FILM COATED ORAL 2 TIMES DAILY
Status: DISCONTINUED | OUTPATIENT
Start: 2023-07-06 | End: 2023-07-07 | Stop reason: HOSPADM

## 2023-07-06 RX ORDER — AMLODIPINE BESYLATE 5 MG/1
5 TABLET ORAL DAILY
Status: DISCONTINUED | OUTPATIENT
Start: 2023-07-06 | End: 2023-07-07 | Stop reason: HOSPADM

## 2023-07-06 RX ORDER — INSULIN LISPRO 100 [IU]/ML
0-4 INJECTION, SOLUTION INTRAVENOUS; SUBCUTANEOUS
Status: DISCONTINUED | OUTPATIENT
Start: 2023-07-06 | End: 2023-07-07 | Stop reason: HOSPADM

## 2023-07-06 RX ORDER — OMEGA-3-ACID ETHYL ESTERS 1 G/1
1 CAPSULE, LIQUID FILLED ORAL DAILY
Status: DISCONTINUED | OUTPATIENT
Start: 2023-07-06 | End: 2023-07-07 | Stop reason: HOSPADM

## 2023-07-06 RX ORDER — SODIUM CHLORIDE 9 MG/ML
INJECTION, SOLUTION INTRAVENOUS CONTINUOUS
Status: DISCONTINUED | OUTPATIENT
Start: 2023-07-06 | End: 2023-07-06

## 2023-07-06 RX ORDER — LEVOTHYROXINE SODIUM 137 UG/1
137 TABLET ORAL
Status: DISCONTINUED | OUTPATIENT
Start: 2023-07-06 | End: 2023-07-07 | Stop reason: HOSPADM

## 2023-07-06 RX ORDER — SODIUM CHLORIDE 9 MG/ML
INJECTION, SOLUTION INTRAVENOUS CONTINUOUS
Status: ACTIVE | OUTPATIENT
Start: 2023-07-06 | End: 2023-07-06

## 2023-07-06 RX ORDER — ATORVASTATIN CALCIUM 40 MG/1
40 TABLET, FILM COATED ORAL NIGHTLY
Status: DISCONTINUED | OUTPATIENT
Start: 2023-07-06 | End: 2023-07-07 | Stop reason: HOSPADM

## 2023-07-06 RX ORDER — AMIODARONE HYDROCHLORIDE 200 MG/1
200 TABLET ORAL DAILY
Status: DISCONTINUED | OUTPATIENT
Start: 2023-07-06 | End: 2023-07-07 | Stop reason: HOSPADM

## 2023-07-06 RX ORDER — POLYETHYLENE GLYCOL 3350 17 G/17G
17 POWDER, FOR SOLUTION ORAL DAILY PRN
Status: DISCONTINUED | OUTPATIENT
Start: 2023-07-06 | End: 2023-07-07 | Stop reason: HOSPADM

## 2023-07-06 RX ORDER — INSULIN LISPRO 100 [IU]/ML
0-4 INJECTION, SOLUTION INTRAVENOUS; SUBCUTANEOUS NIGHTLY
Status: DISCONTINUED | OUTPATIENT
Start: 2023-07-06 | End: 2023-07-07 | Stop reason: HOSPADM

## 2023-07-06 RX ORDER — SODIUM CHLORIDE 0.9 % (FLUSH) 0.9 %
5-40 SYRINGE (ML) INJECTION EVERY 12 HOURS SCHEDULED
Status: DISCONTINUED | OUTPATIENT
Start: 2023-07-06 | End: 2023-07-07 | Stop reason: HOSPADM

## 2023-07-06 RX ADMIN — Medication 31.9 MILLICURIE: at 13:28

## 2023-07-06 RX ADMIN — AMIODARONE HYDROCHLORIDE 200 MG: 200 TABLET ORAL at 08:12

## 2023-07-06 RX ADMIN — ATORVASTATIN CALCIUM 40 MG: 40 TABLET, FILM COATED ORAL at 21:17

## 2023-07-06 RX ADMIN — SODIUM CHLORIDE: 9 INJECTION, SOLUTION INTRAVENOUS at 04:09

## 2023-07-06 RX ADMIN — CLOPIDOGREL BISULFATE 75 MG: 75 TABLET ORAL at 08:12

## 2023-07-06 RX ADMIN — METOPROLOL TARTRATE 50 MG: 50 TABLET, FILM COATED ORAL at 21:17

## 2023-07-06 RX ADMIN — LEVOTHYROXINE SODIUM 137 MCG: 0.14 TABLET ORAL at 08:12

## 2023-07-06 RX ADMIN — OMEGA-3-ACID ETHYL ESTERS 1 CAPSULE: 1 CAPSULE, LIQUID FILLED ORAL at 08:12

## 2023-07-06 RX ADMIN — GABAPENTIN 100 MG: 100 CAPSULE ORAL at 21:17

## 2023-07-06 RX ADMIN — SODIUM CHLORIDE, PRESERVATIVE FREE 10 ML: 5 INJECTION INTRAVENOUS at 21:17

## 2023-07-06 RX ADMIN — Medication 9.7 MILLICURIE: at 12:38

## 2023-07-06 ASSESSMENT — PAIN SCALES - GENERAL
PAINLEVEL_OUTOF10: 0

## 2023-07-06 NOTE — H&P
daily 2/24/23   Azalea Claros MD   amLODIPine (NORVASC) 5 MG tablet Take 1 tablet by mouth daily 2/24/23   Azalea Claros MD   gabapentin (NEURONTIN) 300 MG capsule TAKE 1 CAPSULE BY MOUTH ONCE DAILY IN THE EVENING 2/24/23 9/10/23  Azalea Claros MD   atorvastatin (LIPITOR) 40 MG tablet Take 1 tablet by mouth once daily 2/24/23   Azalea Claros MD   clopidogrel (PLAVIX) 75 MG tablet Take 1 tablet by mouth once daily 2/24/23   Azalea Claros MD   Omega-3 Fatty Acids (FISH OIL) 1000 MG capsule Take by mouth daily    Historical Provider, MD   metOLazone (ZAROXOLYN) 5 MG tablet Take 1 tablet by mouth as needed (worsening swelling) As  Needed 9/21/22 5/30/23  Hood Cannon DO   nitroGLYCERIN (NITROSTAT) 0.4 MG SL tablet Place 1 tablet under the tongue every 5 minutes as needed for Chest pain up to max of 3 total doses. If no relief after 1 dose, call 911. 10/13/18   GRANT Swanson - CNP   Glucose Blood (JOSE E BREEZE 2 TEST) DISK USE ONE STRIP TWICE DAILY, DX: E11.9 3/22/18   Azalea Claros MD   Ascorbic Acid (VITAMIN C) 500 MG tablet Take 1 tablet by mouth daily    Historical Provider, MD       Medical History      Diagnosis Date    Aortic aneurysm (HCC)     4.5 cm aorta    Arthritis     Atrial fibrillation, chronic ([address]) 1/25/2019    CAD (coronary artery disease)     CHF NYHA class II, unspecified failure chronicity, unspecified type ([address]) 10/8/2018    Colon polyps 3/01    Diabetic peripheral neuropathy ([address]) 2014      feet    Elbow fracture     Fluid overload 6/9/2021    Hyperlipidemia     Hypertension     Hypothyroidism     Pulmonary nodule, right 10/2017      repeat  ct of  chest  4-2018    S/P cardiac pacemaker procedure: 10/21/2017: Medtronic Dual Chamber. 10/21/2017    10/21/2017: Medtronic Dual Chamber.  Dr. Manpreet Billingsley    Systolic CHF, acute on chronic [facility]) 6/10/2021    Type II or unspecified type diabetes mellitus without mention of complication, not stated as

## 2023-07-06 NOTE — ED PROVIDER NOTES
[address] ENCOUNTER        PATIENT NAME: Karishma Pena  MRN: [id number]  : 1945  CARLIN: 2023  PROVIDER: Kishore George MD    CHIEF COMPLAINT       Chief Complaint   Patient presents with    Chest Pain       Patient is seen and evaluated in a timely fashion. Nurses Notes are reviewed and I agree except as noted in the HPI. HISTORY OF PRESENT ILLNESS   Karishma Pena is a 70 y.o. female who presents to Emergency Department with Chest Pain     A 53-year-old female with past medical history of aortic aneurysm, arthritis, atrial fibrillation, pacemaker, CHF, CAD with prior PCI, diabetes, hypertension, CHF, CKD stage IV and morbid obesity presents for evaluation of chest pain since yesterday. Intermittent chest pain which she described as chest discomfort or tightness. No obvious pain now. Chest discomfort first started from yesterday, lasted 1.5 hours. Patient experienced similar pain tonight. No dizziness, no SOB, no diaphoresis. Patient reports left shoulder pain. No nausea no vomiting. No abdominal pain. No leg swelling. This HPI was provided by patient. PAST MEDICAL HISTORY    has a past medical history of Aortic aneurysm ([address]), Arthritis, Atrial fibrillation, chronic ([address]), CAD (coronary artery disease), CHF NYHA class II, unspecified failure chronicity, unspecified type ([address]), Colon polyps, Diabetic peripheral neuropathy ([address]), Elbow fracture, Fluid overload, Hyperlipidemia, Hypertension, Hypothyroidism, Pulmonary nodule, right, S/P cardiac pacemaker procedure: 10/21/2017: Medtronic Dual Chamber. , Systolic CHF, acute on chronic (HCC), and Type II or unspecified type diabetes mellitus without mention of complication, not stated as uncontrolled. SURGICAL HISTORY      has a past surgical history that includes Thyroidectomy, partial (); Coronary angioplasty with stent (); Coronary angioplasty ();   section (over 30 years ago );

## 2023-07-06 NOTE — ED NOTES
Pt transported to [facility] in stable condition. Floor called prior to transport. Spoke with Juanpablo Casillas RN.      Fariha Sanchez  07/06/23 0681

## 2023-07-06 NOTE — CARE COORDINATION
Case Management Assessment  Initial Evaluation    Date/Time of Evaluation: 7/6/2023 1:03 PM  Assessment Completed by: Sierra Rodriguez RN    If patient is discharged prior to next notation, then this note serves as note for discharge by case management. Patient Name: Lucía Burnette                   YOB: 1945  Diagnosis: THAO (acute kidney injury) [facility]) [N17.9]  Acute kidney injury superimposed on CKD ([address]) [N17.9, N18.9]  Chest pain, unspecified type [R07.9]                   Date / Time: 7/5/2023 10:47 PM  Location: [address] [place] [address]     Patient Admission Status: Observation   Readmission Risk Low 0-14, Mod 15-19), High > 20: No data recorded  Current PCP: Lidia Rosenberg MD  PCP verified by ? Yes    Chart Reviewed: Yes      History Provided by: Child/Family  Patient Orientation: Alert and Oriented    Patient Cognition: Alert    Hospitalization in the last 30 days (Readmission):  No    If yes, Readmission Assessment in  Navigator will be completed. Advance Directives:      Code Status: Full Code   Patient's Primary Decision Maker is: Legal Next of Kin    Primary Decision Maker: Swanlinda Howe - Child - [phone]    Secondary Decision Maker: Delmis Del Rosario - Child    Supplemental (Other) Decision Maker: Armenradha Haseeblindsey Child - [phone]    Discharge Planning:    Patient lives with: Alone Type of Home: House  Primary Care Giver: Self  Patient Support Systems include: Children, Family Members   Current Financial resources: Medicare  Current community resources: None  Current services prior to admission: Durable Medical Equipment            Current DME: [employer]            Type of Home Care services:  None    ADLS  Prior functional level: Independent in ADLs/IADLs  Current functional level: Independent in ADLs/IADLs    Family can provide assistance at DC: Yes  Would you like Case Management to discuss the discharge plan with any other family members/significant others, and if so, who?  No  Plans

## 2023-07-06 NOTE — CONSULTS
[facility]'s  Consult    Patient's Name/Date of Birth: Bharath Solorzano / 1945 (28 y.o.)    Date: 2023     Referring Provider: Isela Elena PA-C    CHIEF COMPLAINT:      HPI: A 70 y.o. female with PMH of Pacemaker placement () DM, HTN, AFIB, CAD, HF, CKD, Hypothyroidism presents with pressure like chest pain radiating to the the left arm. Patient states the episode lasted for about an hour and it self-resolve without any medication. Patient was admitted with no recurring events overnight. EKG shows ventricular paced rhythm and troponin within normal limits. Patient denies short of breath, chest pain, palpitation, diaphoresis, dizziness and lightheadedness. Echo: No results found for this or any previous visit. All labs, EKG's, diagnostic testing and images as well as cardiac cath, stress testing were reviewed during this encounter    Past Medical History:   Diagnosis Date    Aortic aneurysm (HCC)     4.5 cm aorta    Arthritis     Atrial fibrillation, chronic ([address]) 2019    CAD (coronary artery disease)     CHF NYHA class II, unspecified failure chronicity, unspecified type ([address]) 10/8/2018    Colon polyps 3/01    Diabetic peripheral neuropathy ([address]) 2014      feet    Elbow fracture     Fluid overload 2021    Hyperlipidemia     Hypertension     Hypothyroidism     Pulmonary nodule, right 10/2017      repeat  ct of  chest      S/P cardiac pacemaker procedure: 10/21/2017: Medtronic Dual Chamber. 10/21/2017    10/21/2017: Medtronic Dual Chamber.  Dr. Tony Ruiz    Systolic CHF, acute on chronic [facility]) 6/10/2021    Type II or unspecified type diabetes mellitus without mention of complication, not stated as uncontrolled      Past Surgical History:   Procedure Laterality Date    BREAST BIOPSY Right 2016     benign   abrahan    BREAST SURGERY      CARDIOVERSION  2019    atrial fib to sinus  baki     SECTION  over 30 years ago     COLONOSCOPY

## 2023-07-06 NOTE — ED TRIAGE NOTES
Patient presents to ED from home with complaints of chest pain. Patient states dull chest pain and left arm pain, \"not a pain but a dull pressure\". Patient states yesterday had an hour and half episode of chest pain, and reoccurred about 45 minutes prior to arrival tonight. Patient states extensive heart history with multiple stents placed and a pacemaker. Patient is alert and oriented, ambulatory, and vitals are stable upon arrival to room, EKG done. Patient denies any shortness of breath, nausea, vomiting, or diarrhea at this time.

## 2023-07-06 NOTE — ED NOTES
Patient resting in bed. Respirations easy and unlabored. No distress noted. Call light within reach.        Paty Simpson RN  07/06/23 8877

## 2023-07-06 NOTE — ED PROVIDER NOTES
[address] ENCOUNTER        PATIENT NAME: Prashant Zayas  MRN: [id number]  : 1945  CARLIN: 2023  PROVIDER: Cesar Palmer MD    CHIEF COMPLAINT       Chief Complaint   Patient presents with    Chest Pain       Patient is seen and evaluated in a timely fashion. Nurses Notes are reviewed and I agree except as noted in the HPI. HISTORY OF PRESENT ILLNESS   Prashant Zayas is a 70 y.o. female who presents to Emergency Department with Chest Pain     A 70-year-old female with past medical history of aortic aneurysm, arthritis, atrial fibrillation, CAD with prior PCI, diabetes, hypertension, and morbid obesity presents for evaluation of chest pain since yesterday. Intermittent chest pain which she described as chest discomfort or tightness. No obvious pain now. Chest discomfort first started from yesterday, lasted 1.5 hours. Patient experienced similar pain tonight. No dizziness, no SOB, no diaphoresis. Patient reports left shoulder pain. No nausea no vomiting. No abdominal pain. No leg swelling. This HPI was provided by patient. PAST MEDICAL HISTORY    has a past medical history of Aortic aneurysm ([address]), Arthritis, Atrial fibrillation, chronic ([address]), CAD (coronary artery disease), CHF NYHA class II, unspecified failure chronicity, unspecified type ([address]), Colon polyps, Diabetic peripheral neuropathy ([address]), Elbow fracture, Fluid overload, Hyperlipidemia, Hypertension, Hypothyroidism, Pulmonary nodule, right, S/P cardiac pacemaker procedure: 10/21/2017: Medtronic Dual Chamber. , Systolic CHF, acute on chronic (HCC), and Type II or unspecified type diabetes mellitus without mention of complication, not stated as uncontrolled. SURGICAL HISTORY      has a past surgical history that includes Thyroidectomy, partial (); Coronary angioplasty with stent (); Coronary angioplasty ();   section (over 30 years ago ); Knee arthroscopy (; 3/02);

## 2023-07-07 ENCOUNTER — TELEPHONE (OUTPATIENT)
Dept: PHARMACY | Age: 78
End: 2023-07-07

## 2023-07-07 VITALS
HEART RATE: 68 BPM | WEIGHT: 246.25 LBS | DIASTOLIC BLOOD PRESSURE: 59 MMHG | TEMPERATURE: 97.6 F | RESPIRATION RATE: 16 BRPM | SYSTOLIC BLOOD PRESSURE: 128 MMHG | BODY MASS INDEX: 39.58 KG/M2 | HEIGHT: 66 IN | OXYGEN SATURATION: 94 %

## 2023-07-07 PROBLEM — I20.0 UNSTABLE ANGINA (HCC): Status: ACTIVE | Noted: 2023-07-07

## 2023-07-07 LAB
ALBUMIN SERPL BCG-MCNC: 3.4 G/DL (ref 3.5–5.1)
ANION GAP SERPL CALC-SCNC: 12 MEQ/L (ref 8–16)
BACTERIA UR CULT: ABNORMAL
BUN SERPL-MCNC: 35 MG/DL (ref 7–22)
CALCIUM SERPL-MCNC: 8.5 MG/DL (ref 8.5–10.5)
CHLORIDE SERPL-SCNC: 104 MEQ/L (ref 98–111)
CO2 SERPL-SCNC: 26 MEQ/L (ref 23–33)
CREAT SERPL-MCNC: 2.2 MG/DL (ref 0.4–1.2)
EKG ATRIAL RATE: 104 BPM
EKG Q-T INTERVAL: 422 MS
EKG QRS DURATION: 182 MS
EKG QTC CALCULATION (BAZETT): 593 MS
EKG R AXIS: 11 DEGREES
EKG T AXIS: 96 DEGREES
EKG VENTRICULAR RATE: 119 BPM
GFR SERPL CREATININE-BSD FRML MDRD: 23 ML/MIN/1.73M2
GLUCOSE BLD STRIP.AUTO-MCNC: 111 MG/DL (ref 70–108)
GLUCOSE BLD STRIP.AUTO-MCNC: 193 MG/DL (ref 70–108)
GLUCOSE SERPL-MCNC: 96 MG/DL (ref 70–108)
MAGNESIUM SERPL-MCNC: 2.1 MG/DL (ref 1.6–2.4)
ORGANISM: ABNORMAL
PHOSPHATE SERPL-MCNC: 4.1 MG/DL (ref 2.4–4.7)
POTASSIUM SERPL-SCNC: 4.4 MEQ/L (ref 3.5–5.2)
SODIUM SERPL-SCNC: 142 MEQ/L (ref 135–145)

## 2023-07-07 PROCEDURE — 2580000003 HC RX 258

## 2023-07-07 PROCEDURE — 82948 REAGENT STRIP/BLOOD GLUCOSE: CPT

## 2023-07-07 PROCEDURE — 6370000000 HC RX 637 (ALT 250 FOR IP): Performed by: REGISTERED NURSE

## 2023-07-07 PROCEDURE — 6370000000 HC RX 637 (ALT 250 FOR IP)

## 2023-07-07 PROCEDURE — 36415 COLL VENOUS BLD VENIPUNCTURE: CPT

## 2023-07-07 PROCEDURE — 80069 RENAL FUNCTION PANEL: CPT

## 2023-07-07 PROCEDURE — 93010 ELECTROCARDIOGRAM REPORT: CPT | Performed by: INTERNAL MEDICINE

## 2023-07-07 PROCEDURE — 99232 SBSQ HOSP IP/OBS MODERATE 35: CPT | Performed by: REGISTERED NURSE

## 2023-07-07 PROCEDURE — 83735 ASSAY OF MAGNESIUM: CPT

## 2023-07-07 PROCEDURE — G0378 HOSPITAL OBSERVATION PER HR: HCPCS

## 2023-07-07 RX ORDER — ISOSORBIDE MONONITRATE 30 MG/1
30 TABLET, EXTENDED RELEASE ORAL DAILY
Status: DISCONTINUED | OUTPATIENT
Start: 2023-07-07 | End: 2023-07-07 | Stop reason: HOSPADM

## 2023-07-07 RX ORDER — ISOSORBIDE MONONITRATE 10 MG/1
10 TABLET ORAL 2 TIMES DAILY
Qty: 60 TABLET | Refills: 1 | Status: SHIPPED | OUTPATIENT
Start: 2023-07-07

## 2023-07-07 RX ADMIN — ISOSORBIDE MONONITRATE 30 MG: 30 TABLET, EXTENDED RELEASE ORAL at 14:59

## 2023-07-07 RX ADMIN — LEVOTHYROXINE SODIUM 137 MCG: 0.14 TABLET ORAL at 08:35

## 2023-07-07 RX ADMIN — AMIODARONE HYDROCHLORIDE 200 MG: 200 TABLET ORAL at 08:35

## 2023-07-07 RX ADMIN — METOPROLOL TARTRATE 50 MG: 50 TABLET, FILM COATED ORAL at 08:35

## 2023-07-07 RX ADMIN — AMLODIPINE BESYLATE 5 MG: 5 TABLET ORAL at 08:35

## 2023-07-07 RX ADMIN — CLOPIDOGREL BISULFATE 75 MG: 75 TABLET ORAL at 08:35

## 2023-07-07 RX ADMIN — SODIUM CHLORIDE, PRESERVATIVE FREE 10 ML: 5 INJECTION INTRAVENOUS at 08:38

## 2023-07-07 RX ADMIN — OMEGA-3-ACID ETHYL ESTERS 1 CAPSULE: 1 CAPSULE, LIQUID FILLED ORAL at 08:35

## 2023-07-07 ASSESSMENT — PAIN SCALES - GENERAL
PAINLEVEL_OUTOF10: 0
PAINLEVEL_OUTOF10: 0

## 2023-07-07 NOTE — TELEPHONE ENCOUNTER
Spoke with Babcock Jany regarding referral I received from Annia Torres RN Case Mgr for patient having trouble affording cost of Eliquis. Patient informed that her cost of Eliquis went from $47.00/month to $146.00/month and is no longer affordable. I informed patient that I am able to assist with getting her Eliquis for free via the  if she meets certain criteria (i.e. spending 3% of total household income on prescription costs for the 2023 year). Informed Yoko Carmichael that once she is discharged from the hospital and has settled, she will need to contact her pharmacy [facility] in [facility] per pt.) and ask for a printout of 2023 prescription costs so we can see if she meets the 3% required expenditure for Medicare Part D patients. Yoko Carmichael said she will call me with the information once she is home and has settled after discharge. If Yoko Carmichael qualifies, I will be pursuing assistance for her through [facility] patient assistance program. Babcock Mulartemio voiced understanding and confirmed she will contact me.  Aliza Gonzalez, Margarito - Prescription Assistance (ext. [phone]) 7/7/2023,9:55 AM

## 2023-07-07 NOTE — PROGRESS NOTES
Echo completed, Dr Lori Sage to read.
Pt discharge home with family transporting; Discharge packet reviewed with pt regarding: medications, follow ups, discharge instructions, and pt education with no further questions or concerns voiced at this time. Heart attack teaching covered with patient and/or family or significant other:  Signs and symptoms of a heart attack. When to call 911 and the importance of calling 911. Personal risk factors and ways to lower their risk. 4.   Importance of quitting smoking if applicable. Heart attack booklet given to the patient and/or family or significant other. Reviewed: How to take Nitroglycerin. The importance of participating in Cardiac Rehab and hours of operation. Heart Healthy Diet. Risk factor modification. (Overweight, Obesity, Diabetes, Hypertension, Smoking, High Cholesterol, Stress)  Discharge instructions for Cath/Intervention procedure site if applicable. Packet reviewed & given prior to discharge.
heart  failure presentation. I exchanged out the 5-[place] sheath for a 6-[place] sheath. I  cannulated the left main using a 6-[place] EBU 3.75 guiding catheter. Heparin IV was given. ACT was confirmed to be above 250 seconds. I  wired the OM1 with a Runthrough wire. I predilated using a 2.5 x 12  NC balloon at 12 atmospheres. I then stented the lesion using 3.0 x  15 Xience Karen ABDELRAHMAN landing it just distal to the takeoff of the AV  groove which is large and supplying OM2. I then postdilated the  lesion using a 3.5 x 12 NC balloon. Post PCI angiography demonstrated  FRED-3 flow without any perforation, dissection, distal embolization,  side branch loss, guide or guidewire-induced trauma. All equipments  were removed from the patient. The patient tolerated the procedure  well. ACCESS:  Angio-Seal was used for hemostasis. IMMEDIATE COMPLICATIONS:  None. MEDICATIONS:  See EMR. SUMMARY:  Successful PCI of the proximal OM1 with a second  drug-eluting stent. Lab Data:    Cardiac Enzymes:  No results for input(s): CKTOTAL, CKMB, CKMBINDEX, TROPONINI in the last 72 hours.     CBC:   Lab Results   Component Value Date/Time    WBC 6.6 07/05/2023 11:00 PM    RBC 4.47 07/05/2023 11:00 PM    HGB 13.2 07/05/2023 11:00 PM    HCT 40.6 07/05/2023 11:00 PM     07/05/2023 11:00 PM       CMP:    Lab Results   Component Value Date/Time     07/07/2023 05:29 AM    K 4.4 07/07/2023 05:29 AM    K 4.7 05/22/2021 06:38 AM     07/07/2023 05:29 AM    CO2 26 07/07/2023 05:29 AM    BUN 35 07/07/2023 05:29 AM    CREATININE 2.2 07/07/2023 05:29 AM    LABGLOM 23 07/07/2023 05:29 AM    GLUCOSE 96 07/07/2023 05:29 AM    CALCIUM 8.5 07/07/2023 05:29 AM       Hepatic Function Panel:    Lab Results   Component Value Date/Time    ALKPHOS 84 07/05/2023 11:00 PM    ALT 26 07/05/2023 11:00 PM    AST 20 07/05/2023 11:00 PM    PROT 7.0 07/05/2023 11:00 PM    BILITOT 0.7 07/05/2023 11:00 PM    BILIDIR <0.2

## 2023-07-07 NOTE — PLAN OF CARE
Problem: Discharge Planning  Goal: Discharge to home or other facility with appropriate resources  Outcome: Progressing  Flowsheets  Taken 7/6/2023 0432  Discharge to home or other facility with appropriate resources:   Identify barriers to discharge with patient and caregiver   Arrange for needed discharge resources and transportation as appropriate   Identify discharge learning needs (meds, wound care, etc)  Taken 7/6/2023 0336  Discharge to home or other facility with appropriate resources:   Identify barriers to discharge with patient and caregiver   Arrange for needed discharge resources and transportation as appropriate     Problem: Pain  Goal: Verbalizes/displays adequate comfort level or baseline comfort level  Outcome: Progressing
appropriate   Fluid restriction as ordered   Instruct patient on fluid and nutrition restrictions as appropriate  Goal: Hemodynamic stability and optimal renal function maintained  Outcome: Progressing  Flowsheets (Taken 7/7/2023 1348)  Hemodynamic stability and optimal renal function maintained:   Monitor labs and assess for signs and symptoms of volume excess or deficit   Monitor intake, output and patient weight   Monitor urine specific gravity, serum osmolarity and serum sodium as indicated or ordered   Monitor response to interventions for patient's volume status, including labs, urine output, blood pressure (other measures as available)   Encourage oral intake as appropriate   Instruct patient on fluid and nutrition restrictions as appropriate  Goal: Glucose maintained within prescribed range  Outcome: Progressing  Flowsheets (Taken 7/7/2023 1348)  Glucose maintained within prescribed range:   Monitor blood glucose as ordered   Assess for signs and symptoms of hyperglycemia and hypoglycemia   Administer ordered medications to maintain glucose within target range   Assess barriers to adequate nutritional intake and initiate nutrition consult as needed   Instruct patient on self management of diabetes and initiate consult as needed     Problem: Hematologic - Adult  Goal: Maintains hematologic stability  Outcome: Progressing  Flowsheets (Taken 7/7/2023 1348)  Maintains hematologic stability:   Assess for signs and symptoms of bleeding or hemorrhage   Monitor labs for bleeding or clotting disorders   Administer blood products/factors as ordered   Care plan reviewed with patient. Patient verbalize understanding of the plan of care and contribute to goal setting.

## 2023-07-07 NOTE — DISCHARGE INSTRUCTIONS
Follow up with nephrology (Dr. José Miguel Willard) in 1-2 weeks. Follow up with Dr. Marysol Barrera in 3-4 weeks. DO NOT BUMEX AND POTASSIUM UNTIL SEEN BY KIDNEY DOCTOR.     Instructed patient to notify HF clinic if gains >2 lbs in one day of >5 lbs in one week, or develops increased work of breathing/shortness of breath    To continue 2L fluid restriction; avoid extreme heat/physical activity

## 2023-07-07 NOTE — CARE COORDINATION
7/7/23, 2:33 PM EDT    DISCHARGE ON GOING [address] day: 0  Location: [facility]/035-A Reason for admit: HTAO (acute kidney injury) ([address]) [N17.9]  Acute kidney injury superimposed on CKD ([address]) [N17.9, N18.9]  Chest pain, unspecified type [R07.9]   Procedure: 7-6-23 Stress Test  Barriers to Discharge: Cardiology ok for discharge. May require ischemic workup as OP. Imdur added. Cont Metoprolol, plavix, eliquis and amiodarone. PCP: Zulema Chow MD  Patient Goals/Plan/Treatment Preferences: Plans return home alone with much supportive family in the area. 7/7/23, 2:35 PM EDT    Patient goals/plan/ treatment preferences discussed by  and . Patient goals/plan/ treatment preferences reviewed with patient/ family. Patient/ family verbalize understanding of discharge plan and are in agreement with goal/plan/treatment preferences. Understanding was demonstrated using the teach back method. AVS provided by RN at time of discharge, which includes all necessary medical information pertaining to the patients current course of illness, treatment, post-discharge goals of care, and treatment preferences. Services At/After Discharge: None at present time. IMM Letter  Observation Status Letter date given[de-identified] 07/06/23  Observation Status Letter time given[de-identified] 0229  Observation Status Letter given to Patient/Family/Significant other/Guardian/POA/by[de-identified] Pt Access     Potential discharge in next 24 hours. Plans to return home alone with much support.

## 2023-07-10 ENCOUNTER — CARE COORDINATION (OUTPATIENT)
Dept: CASE MANAGEMENT | Age: 78
End: 2023-07-10

## 2023-07-10 DIAGNOSIS — N17.9 AKI (ACUTE KIDNEY INJURY) (HCC): Primary | ICD-10-CM

## 2023-07-10 PROCEDURE — 1111F DSCHRG MED/CURRENT MED MERGE: CPT | Performed by: INTERNAL MEDICINE

## 2023-07-10 NOTE — CARE COORDINATION
[facility] Care Transitions Initial Follow Up Call    Call within 2 business days of discharge: Yes    Patient Current Location:  Home: [phone]    Care Transition Nurse contacted the patient by telephone to perform post hospital discharge assessment. Verified name and  with patient as identifiers. Provided introduction to self, and explanation of the Care Transition Nurse role. Patient: Hannah Pitts Patient : 1945   MRN: [id number]  Reason for Admission: chest pain  Discharge Date: 23 RARS: No data recorded    Last Discharge [address] [facility]       Date Complaint Diagnosis Description Type Department Provider    23 Chest Pain Chest pain, unspecified type . .. ED to Hosp-Admission (Discharged) (ADMITTED) 288 Jere Salazar PA-C; Kadie Willis,... Was this an external facility discharge? No Discharge Facility: [address] [place] [address] to be reviewed by the provider   Additional needs identified to be addressed with provider: No  none               Method of communication with provider: none. Contacted pt for initial care transition follow up. Suhail Villa states that she is doing pretty good so far. Denies having any c/o chest pain/discomfort, palpitations. Reports breathing has been \"fine\". Does not feel short of breath but states not exerting herself much. Denies having any swelling or weight gain. Reports weight has been stable. Discussed when to contact HF clinic with weight gain of 2 lbs overnight or 5 lbs in one week. She verbalized understanding. She is eating and drinking fluids w/o issues. She has not been monitoring her diet and states she does not add any salt to her foods. She confirmed she is following a fluid restriction of 2 L per day. Discussed referral to RD. She declines and not interested at this time. Reports -150. Continues to take her Trulicity and Glimepiride. Denies having any urinary or bowel issues currently.   Reviewed

## 2023-07-12 ENCOUNTER — OFFICE VISIT (OUTPATIENT)
Dept: NEPHROLOGY | Age: 78
End: 2023-07-12
Payer: MEDICARE

## 2023-07-12 VITALS
OXYGEN SATURATION: 98 % | DIASTOLIC BLOOD PRESSURE: 72 MMHG | SYSTOLIC BLOOD PRESSURE: 137 MMHG | WEIGHT: 245 LBS | BODY MASS INDEX: 39.54 KG/M2 | HEART RATE: 72 BPM

## 2023-07-12 DIAGNOSIS — N18.4 CKD (CHRONIC KIDNEY DISEASE) STAGE 4, GFR 15-29 ML/MIN (HCC): Primary | ICD-10-CM

## 2023-07-12 PROCEDURE — 1123F ACP DISCUSS/DSCN MKR DOCD: CPT | Performed by: INTERNAL MEDICINE

## 2023-07-12 PROCEDURE — 3075F SYST BP GE 130 - 139MM HG: CPT | Performed by: INTERNAL MEDICINE

## 2023-07-12 PROCEDURE — 99214 OFFICE O/P EST MOD 30 MIN: CPT | Performed by: INTERNAL MEDICINE

## 2023-07-12 PROCEDURE — 3078F DIAST BP <80 MM HG: CPT | Performed by: INTERNAL MEDICINE

## 2023-07-12 NOTE — PROGRESS NOTES
Dr. Lianne Esposito wanting clearance for heart cath
Lab Results   Component Value Date     07/07/2023     07/06/2023     07/05/2023    K 4.4 07/07/2023    K 4.5 07/06/2023    K 4.6 07/05/2023     07/07/2023    CL 97 (L) 07/06/2023    CL 97 (L) 07/05/2023    CO2 26 07/07/2023    CO2 24 07/06/2023    CO2 26 07/05/2023    BUN 35 (H) 07/07/2023    BUN 35 (H) 07/06/2023    BUN 36 (H) 07/05/2023    CREATININE 2.2 (H) 07/07/2023    CREATININE 2.8 (H) 07/06/2023    CREATININE 2.8 (H) 07/05/2023    GLUCOSE 96 07/07/2023    GLUCOSE 166 (H) 07/06/2023    GLUCOSE 151 (H) 07/05/2023      Hepatic:   Lab Results   Component Value Date    AST 20 07/05/2023    AST 21 11/18/2022    AST 14 06/08/2021    ALT 26 07/05/2023    ALT 20 11/18/2022    ALT 14 06/08/2021    BILITOT 0.7 07/05/2023    BILITOT 0.6 11/18/2022    BILITOT 0.9 06/08/2021    ALKPHOS 84 07/05/2023    ALKPHOS 92 11/18/2022    ALKPHOS 70 06/08/2021     BNP: No results found for: BNP  Lipids:   Lab Results   Component Value Date    CHOL 224 (H) 11/18/2022    HDL 60 11/18/2022     INR:   Lab Results   Component Value Date    INR 1.41 (H) 02/08/2019    INR 1.10 10/12/2018    INR 1.00 10/21/2017     URINE:   Lab Results   Component Value Date/Time    NAUR 69 07/06/2023 03:30 AM    PROTUR 4.9 06/09/2021 01:11 PM     Lab Results   Component Value Date/Time    NITRU NEGATIVE 07/06/2023 03:30 AM    COLORU YELLOW 07/06/2023 03:30 AM    PHUR 6.0 07/06/2023 03:30 AM    LABCAST NONE SEEN 07/06/2023 03:30 AM    LABCAST NONE SEEN 07/06/2023 03:30 AM    WBCUA 10-15 07/06/2023 03:30 AM    RBCUA NONE SEEN 07/06/2023 03:30 AM    YEAST NONE SEEN 07/06/2023 03:30 AM    BACTERIA NONE SEEN 07/06/2023 03:30 AM    SPECGRAV 1.008 07/06/2023 03:30 AM    LEUKOCYTESUR MODERATE 07/06/2023 03:30 AM    UROBILINOGEN 0.2 07/06/2023 03:30 AM    BILIRUBINUR NEGATIVE 07/06/2023 03:30 AM    BLOODU NEGATIVE 07/06/2023 03:30 AM    GLUCOSEU 100 06/09/2021 01:11 PM    KETUA NEGATIVE 07/06/2023 03:30 AM      Microalbumen/Creatinine

## 2023-07-17 ENCOUNTER — TELEPHONE (OUTPATIENT)
Dept: FAMILY MEDICINE CLINIC | Age: 78
End: 2023-07-17

## 2023-07-17 ENCOUNTER — TELEPHONE (OUTPATIENT)
Dept: CARDIOLOGY CLINIC | Age: 78
End: 2023-07-17

## 2023-07-17 ENCOUNTER — OFFICE VISIT (OUTPATIENT)
Dept: FAMILY MEDICINE CLINIC | Age: 78
End: 2023-07-17

## 2023-07-17 ENCOUNTER — CARE COORDINATION (OUTPATIENT)
Dept: CASE MANAGEMENT | Age: 78
End: 2023-07-17

## 2023-07-17 VITALS
SYSTOLIC BLOOD PRESSURE: 110 MMHG | WEIGHT: 242 LBS | HEART RATE: 84 BPM | DIASTOLIC BLOOD PRESSURE: 66 MMHG | RESPIRATION RATE: 16 BRPM | BODY MASS INDEX: 38.89 KG/M2 | HEIGHT: 66 IN

## 2023-07-17 DIAGNOSIS — I48.0 PAROXYSMAL ATRIAL FIBRILLATION (HCC): ICD-10-CM

## 2023-07-17 DIAGNOSIS — I25.10 CORONARY ARTERY DISEASE INVOLVING NATIVE CORONARY ARTERY OF NATIVE HEART WITHOUT ANGINA PECTORIS: ICD-10-CM

## 2023-07-17 DIAGNOSIS — I49.5 SICK SINUS SYNDROME (HCC): ICD-10-CM

## 2023-07-17 DIAGNOSIS — N18.4 CKD (CHRONIC KIDNEY DISEASE) STAGE 4, GFR 15-29 ML/MIN (HCC): ICD-10-CM

## 2023-07-17 DIAGNOSIS — N18.32 TYPE 2 DIABETES MELLITUS WITH STAGE 3B CHRONIC KIDNEY DISEASE, WITHOUT LONG-TERM CURRENT USE OF INSULIN (HCC): ICD-10-CM

## 2023-07-17 DIAGNOSIS — I10 ESSENTIAL HYPERTENSION: Primary | ICD-10-CM

## 2023-07-17 DIAGNOSIS — I20.8 STABLE ANGINA PECTORIS (HCC): Primary | ICD-10-CM

## 2023-07-17 DIAGNOSIS — Z09 HOSPITAL DISCHARGE FOLLOW-UP: ICD-10-CM

## 2023-07-17 DIAGNOSIS — E11.22 TYPE 2 DIABETES MELLITUS WITH STAGE 3B CHRONIC KIDNEY DISEASE, WITHOUT LONG-TERM CURRENT USE OF INSULIN (HCC): ICD-10-CM

## 2023-07-17 DIAGNOSIS — E11.9 TYPE 2 DIABETES MELLITUS WITHOUT COMPLICATION, WITHOUT LONG-TERM CURRENT USE OF INSULIN (HCC): ICD-10-CM

## 2023-07-17 LAB
CHP ED QC CHECK: ABNORMAL
GLUCOSE BLD-MCNC: 143 MG/DL
HBA1C MFR BLD: 7.5 %

## 2023-07-17 PROCEDURE — 83036 HEMOGLOBIN GLYCOSYLATED A1C: CPT | Performed by: FAMILY MEDICINE

## 2023-07-17 PROCEDURE — 82962 GLUCOSE BLOOD TEST: CPT | Performed by: FAMILY MEDICINE

## 2023-07-17 PROCEDURE — 99495 TRANSJ CARE MGMT MOD F2F 14D: CPT | Performed by: FAMILY MEDICINE

## 2023-07-17 RX ORDER — DULAGLUTIDE 1.5 MG/.5ML
1.5 INJECTION, SOLUTION SUBCUTANEOUS WEEKLY
Qty: 12 ADJUSTABLE DOSE PRE-FILLED PEN SYRINGE | Refills: 1 | Status: SHIPPED | OUTPATIENT
Start: 2023-07-17

## 2023-07-17 ASSESSMENT — ENCOUNTER SYMPTOMS
SHORTNESS OF BREATH: 0
ABDOMINAL PAIN: 0
SORE THROAT: 0
NAUSEA: 0
CONSTIPATION: 0
CHEST TIGHTNESS: 0
WHEEZING: 0
EYE PAIN: 0
COUGH: 0

## 2023-07-17 NOTE — TELEPHONE ENCOUNTER
Faxed the filled out [employer] and Patient Assistance Forms to #: [phone].        Date of last visit:  7/17/2023  Date of next visit:  05/64/0461    RX: Trulicity 0.2JT/4.3XT

## 2023-07-17 NOTE — PROGRESS NOTES
at this time  Outpatient Medications Marked as Taking for the 7/17/23 encounter (Office Visit) with Rudolph Romo MD   Medication Sig Dispense Refill    isosorbide mononitrate (ISMO;MONOKET) 10 MG tablet Take 1 tablet by mouth 2 times daily Please take at 0800 and 1600 (4PM) during the day 60 tablet 1    Dulaglutide (TRULICITY) 0.70 BF/1.5WG SOPN INJECT  2 SYRINGE SUBCUTANEOUSLY ONCE A WEEK 12 mL 3    amiodarone (CORDARONE) 200 MG tablet Take 1 tablet by mouth once daily 90 tablet 0    bumetanide (BUMEX) 1 MG tablet TAKE 2 MG IN THE MORNING, 1 MG IN THE AFTERNOON (Patient taking differently: 1 tablet daily Indications: Do NOT RESUME UNTIL CLEARED BY KIDNEY DOCTOR Bumex 1 mg daily) 360 tablet 1    glimepiride (AMARYL) 4 MG tablet Take 1 tablet by mouth twice daily 180 tablet 1    levothyroxine (SYNTHROID) 137 MCG tablet Take 1 tablet by mouth once daily 90 tablet 1    ELIQUIS 5 MG TABS tablet Take 1 tablet by mouth twice daily 180 tablet 1    zinc sulfate (ZINCATE) 220 (50 Zn) MG capsule Take 1 capsule by mouth daily      vitamin D3 (CHOLECALCIFEROL) 10 MCG (400 UNIT) TABS tablet Take 1 tablet by mouth daily      metoprolol tartrate (LOPRESSOR) 50 MG tablet Take 1 tablet by mouth twice daily 180 tablet 1    amLODIPine (NORVASC) 5 MG tablet Take 1 tablet by mouth daily 90 tablet 1    gabapentin (NEURONTIN) 300 MG capsule TAKE 1 CAPSULE BY MOUTH ONCE DAILY IN THE EVENING 90 capsule 1    atorvastatin (LIPITOR) 40 MG tablet Take 1 tablet by mouth once daily 90 tablet 1    clopidogrel (PLAVIX) 75 MG tablet Take 1 tablet by mouth once daily 90 tablet 1    Omega-3 Fatty Acids (FISH OIL) 1000 MG capsule Take by mouth daily      nitroGLYCERIN (NITROSTAT) 0.4 MG SL tablet Place 1 tablet under the tongue every 5 minutes as needed for Chest pain up to max of 3 total doses.  If no relief after 1 dose, call 911. 25 tablet 1    Glucose Blood (JOSE E BREEZE 2 TEST) DISK USE ONE STRIP TWICE DAILY, DX: E11.9 200 each 5    Ascorbic

## 2023-07-17 NOTE — CARE COORDINATION
Care Transitions Outreach Attempt-chest pain    Attempted to reach patient for transitions of care follow up. Unable to reach patient. Patient: Ginger Treviño Patient : 1945 MRN: [id number]    Last Discharge [address]       Date Complaint Diagnosis Description Type Department Provider    23 Chest Pain Chest pain, unspecified type . .. ED to Hosp-Admission (Discharged) (ADMITTED) [facility]; Renita Preston,. .. Noted following upcoming appointments from discharge chart review:   [facility] follow up appointment(s):   Future Appointments   Date Time Provider [address]   2023  9:00 AM Steffanie Vasquez PA-C [address] [facility],  [address]   2023  1:40 PM DO LIDA Anderson   10/24/2023 11:00 AM Catalino Sharma MD [facility]   2023 11:00 AM SCHEDULE, SRPX PACER NURSE N SRPX PACER [facility]   2023 11:30 AM Stephanie Evans MD N SR[facility] Heart [facility]   2024 11:30 AM Dannielle Lombard, APRN - CNP N SRPX CHF [facility]     Attempted to contact pt for care transition follow up. Unable to reach pt. Left message with contact information and request for call back.       Rickie Cabrera RN  Care Transition Nurse

## 2023-07-18 ENCOUNTER — TELEPHONE (OUTPATIENT)
Dept: CARDIOLOGY CLINIC | Age: 78
End: 2023-07-18

## 2023-07-18 NOTE — TELEPHONE ENCOUNTER
Spoke with patient to schedule left heart cath for Friday, 8/11/23, with arrival on 8/10/23 at 8:00 pm for prehydration. Patient to hold Eliquis for two days prior, beginning on 8/09/23, hold diabetic meds and water pills on day of procedure, take all other meds as usual. All instructions reviewed with patient via phone and also mailed to patient this date, patient verbalized understanding. Case scheduled with Mae in cath lab. Voicemail left for admitting () regarding pre-admission for hydration, requested callback to confirm.

## 2023-07-18 NOTE — TELEPHONE ENCOUNTER
PROCEDURE: cardiac cath     DATE OF SERVICE: 08/11/2023    SERVICE LOCATION: [facility]    CPT CODE: [id number]    PHYSICIAN: DR. Linda Banks      DATE PRIOR AUTH SUBMITTED: 07/18/2023    STATUS: APPROVED.      AUTH NUMBER: [id number]    VALID: 07/18/2023-10/15/2023

## 2023-07-20 ENCOUNTER — CARE COORDINATION (OUTPATIENT)
Dept: CASE MANAGEMENT | Age: 78
End: 2023-07-20

## 2023-07-20 NOTE — CARE COORDINATION
[facility] Care Transitions Follow Up Call    Patient Current Location:  Home: [phone]    Care Transition Nurse contacted the patient by telephone to follow up after admission on 23. Verified name and  with patient as identifiers. Patient: Padmini Chiu  Patient : 1945   MRN: [id number]  Reason for Admission: Chest pain  Discharge Date: 23 RARS: No data recorded    Needs to be reviewed by the provider   Additional needs identified to be addressed with provider: No  none             Method of communication with provider: none. Contacted pt for care transition follow up. Spoke very briefly with pt. Vasquez Maldonado states doing fine. Denies having any c/o chest pain/discomfort, palpitations, shortness of breath, swelling, weight gain. Reports FBS running between 115-145. Had follow up with PCP and nephrology. She is scheduled for a heart cath on 23. No questions or concerns at this time. Addressed changes since last contact:   will be having a heart cath on 23  Discussed follow-up appointments. If no appointment was previously scheduled, appointment scheduling offered: Yes. Is follow up appointment scheduled within 7 days of discharge? Yes.     Follow Up  Future Appointments   Date Time Provider [address] [place] [address]   2023  9:00 AM [facility]   2023 11:00 AM STR CARDIAC CATH RM3 STRZ CATH [facility] HOD   2023  1:40 PM DO LIDA Anderson [facility]   10/24/2023 11:00 AM Addie Monique MD [facility]   2023 11:00 AM SCHEDULE, SRPX PACER NURSE N SRPX PACER [facility]   2023 11:30 AM Stephanie Owens MD N [facility] Heart [facility]   2024 11:30 AM GRANT Wolf - CNP N [facility] CHF [facility]       Patients top risk factors for readmission: medical condition-Chest pain, CKD, CHF, DM, HTN,  and polypharmacy  Interventions to address risk factors:  heart cath on

## 2023-07-27 ENCOUNTER — CARE COORDINATION (OUTPATIENT)
Dept: CASE MANAGEMENT | Age: 78
End: 2023-07-27

## 2023-07-27 NOTE — CARE COORDINATION
[facility] Care Transitions Follow Up Call    Patient Current Location:  Home: [id number]    Care Transition Nurse contacted the patient by telephone to follow up after admission on 23. Verified name and  with patient as identifiers. Patient: Alva Hodgkins  Patient : 1945   MRN: [id number]  Reason for Admission: chest pain  Discharge Date: 23 RARS: No data recorded    Needs to be reviewed by the provider   Additional needs identified to be addressed with provider: No  none             Method of communication with provider: none. Contacted pt for care transition follow up. Nando Paris states she is doing fine. Denies having any c/o chest pain/discomfort, palpitations, increased shortness of breath, swelling or weight gain. Reports she is \"just tired\". Has follow up with cardiology on 23 and will be having heart cath on 23. She informed CTN that her granddaughter spoke with someone regarding having a list of meds that she has paid for to see if she will qualify for medication assistance. She is requesting the contact information. CTN will call her back with contact information. No other questions or concerns at this time. CTN called pt back. Informed her that Lacho Fisher with Prescription Assistance contacted her. Gave pt Aliza's contact information [phone]. Pt does not have any other questions or concerns at this time.       Addressed changes since last contact:  none    Follow Up  Future Appointments   Date Time Provider [facility] [address]   2023  9:00 AM Neri Siddiqui PA-C N SRPX Heart [facility]   2023 11:00 AM STR CARDIAC CATH RM3 STRZ CATH [facility]   2023  1:40 PM DO LIDA [facility], [facility]. [facility]   10/24/2023 11:00 AM Azalea Claros MD [facility] [address]   2023 11:00 AM SCHEDULE, SRPX PACER NURSE N SRPX PACER [facility]   2023 11:30 AM Stephanie Cordero MD N SRPX Heart [facility]   2024

## 2023-08-01 ENCOUNTER — TELEPHONE (OUTPATIENT)
Dept: PHARMACY | Age: 78
End: 2023-08-01

## 2023-08-01 NOTE — TELEPHONE ENCOUNTER
Daniela Camarillo called to give me the total she has spent on prescription costs for 2023 to see if she qualifies for assistance for the Eliquis free drug program. Daniela Camarillo is still short approximately $72.17. She will need to spend that before being able to apply to the Eliquis program. Daniela Camarillo said she has 3 prescriptions coming up that she will need to purchase and she should be able to meet requirement after purchase. I told her that when she goes to [facility] and picks up her prescriptions to have them reprint her statement of expenditure for 2023 year and then we should be able to apply for assistance. Daniela Camarillo voiced understanding and said she will reach out to me once her scripts are purchased.  Everett Salinas, [facility] - Prescription Assistance (ext. [phone]) 8/1/2023,9:00 AM

## 2023-08-02 ENCOUNTER — CARE COORDINATION (OUTPATIENT)
Dept: CASE MANAGEMENT | Age: 78
End: 2023-08-02

## 2023-08-02 NOTE — CARE COORDINATION
[facility] Care Transitions Follow Up Call    Patient Current Location:  Home: [phone]    Care Transition Nurse contacted the patient by telephone to follow up after admission on 23. Verified name and  with patient as identifiers. Patient: Amalia Wasserman  Patient : 1945   MRN: [id number]  Reason for Admission: chest pain  Discharge Date: 23 RARS: No data recorded    Needs to be reviewed by the provider   Additional needs identified to be addressed with provider: No  none             Method of communication with provider: none. Contacted pt for care transition follow up. Ace Lunch states she is doing pretty good. Denies having any chest pain/discomfort, palpitations, shortness of breath, dizziness/lightheadedness, headaches, swelling or weight gain. Reports having blurry vision currently d/t writing so many invitations. Eating and drinking WNL. Urinary and bowels WNL. She is taking her medications as prescribed. Briefly discussed when to contact providers. She verbalized understanding. She has spoken to [facility] regarding her Rx. No questions or concerns at this time. Final call. No further outreach. Addressed changes since last contact:  none    Follow Up  Future Appointments   Date Time Provider [address]   2023 11:00 AM STR CARDIAC CATH RM3 STRZ CATH  [facility]   2023  1:40 PM DO LIDA [facility]   10/24/2023 11:00 AM Kina Samano MD [facility]   2023 11:00 AM VINNIE, SRPX PACER NURSE N SRPX PACER [facility]   2023 11:30 AM Myazeinab Newsome [facility]   2024 11:30 AM Pearl Philip, APRN - CNP N SRPX CHF [address] Transition Nurse reviewed medical action plan with patient and discussed any barriers to care and/or understanding of plan of care after discharge.  Discussed appropriate site of care based on symptoms and resources available to patient

## 2023-08-10 ENCOUNTER — PREP FOR PROCEDURE (OUTPATIENT)
Dept: CARDIOLOGY | Age: 78
End: 2023-08-10

## 2023-08-10 ENCOUNTER — HOSPITAL ENCOUNTER (OUTPATIENT)
Age: 78
Discharge: HOME | End: 2023-08-11
Attending: NUCLEAR MEDICINE | Admitting: NUCLEAR MEDICINE
Payer: MEDICARE

## 2023-08-10 PROBLEM — N28.9 RENAL INSUFFICIENCY: Status: ACTIVE | Noted: 2023-08-10

## 2023-08-10 PROCEDURE — 2580000003 HC RX 258: Performed by: NURSE PRACTITIONER

## 2023-08-10 RX ORDER — ISOSORBIDE MONONITRATE 20 MG/1
10 TABLET ORAL 2 TIMES DAILY
Status: DISCONTINUED | OUTPATIENT
Start: 2023-08-10 | End: 2023-08-11 | Stop reason: HOSPADM

## 2023-08-10 RX ORDER — ASCORBIC ACID 500 MG
500 TABLET ORAL DAILY
Status: DISCONTINUED | OUTPATIENT
Start: 2023-08-11 | End: 2023-08-11 | Stop reason: HOSPADM

## 2023-08-10 RX ORDER — LEVOTHYROXINE SODIUM 137 UG/1
137 TABLET ORAL DAILY
Status: DISCONTINUED | OUTPATIENT
Start: 2023-08-11 | End: 2023-08-11 | Stop reason: HOSPADM

## 2023-08-10 RX ORDER — SODIUM CHLORIDE 0.9 % (FLUSH) 0.9 %
5-40 SYRINGE (ML) INJECTION EVERY 12 HOURS SCHEDULED
Status: CANCELLED | OUTPATIENT
Start: 2023-08-10

## 2023-08-10 RX ORDER — GABAPENTIN 300 MG/1
300 CAPSULE ORAL NIGHTLY
Status: DISCONTINUED | OUTPATIENT
Start: 2023-08-10 | End: 2023-08-11 | Stop reason: HOSPADM

## 2023-08-10 RX ORDER — CLOPIDOGREL BISULFATE 75 MG/1
75 TABLET ORAL DAILY
Status: DISCONTINUED | OUTPATIENT
Start: 2023-08-11 | End: 2023-08-11 | Stop reason: HOSPADM

## 2023-08-10 RX ORDER — SODIUM CHLORIDE 9 MG/ML
INJECTION, SOLUTION INTRAVENOUS PRN
Status: CANCELLED | OUTPATIENT
Start: 2023-08-10

## 2023-08-10 RX ORDER — ASPIRIN 325 MG
325 TABLET ORAL ONCE
Status: CANCELLED | OUTPATIENT
Start: 2023-08-10 | End: 2023-08-10

## 2023-08-10 RX ORDER — SODIUM CHLORIDE 0.9 % (FLUSH) 0.9 %
5-40 SYRINGE (ML) INJECTION PRN
Status: CANCELLED | OUTPATIENT
Start: 2023-08-10

## 2023-08-10 RX ORDER — AMLODIPINE BESYLATE 5 MG/1
5 TABLET ORAL DAILY
Status: DISCONTINUED | OUTPATIENT
Start: 2023-08-11 | End: 2023-08-11 | Stop reason: HOSPADM

## 2023-08-10 RX ORDER — GLIMEPIRIDE 4 MG/1
4 TABLET ORAL 2 TIMES DAILY
Status: DISCONTINUED | OUTPATIENT
Start: 2023-08-10 | End: 2023-08-10

## 2023-08-10 RX ORDER — AMIODARONE HYDROCHLORIDE 200 MG/1
200 TABLET ORAL DAILY
Status: DISCONTINUED | OUTPATIENT
Start: 2023-08-11 | End: 2023-08-11 | Stop reason: HOSPADM

## 2023-08-10 RX ORDER — ATORVASTATIN CALCIUM 40 MG/1
40 TABLET, FILM COATED ORAL DAILY
Status: DISCONTINUED | OUTPATIENT
Start: 2023-08-11 | End: 2023-08-11 | Stop reason: HOSPADM

## 2023-08-10 RX ORDER — NITROGLYCERIN 0.4 MG/1
0.4 TABLET SUBLINGUAL EVERY 5 MIN PRN
Status: CANCELLED | OUTPATIENT
Start: 2023-08-10

## 2023-08-10 RX ORDER — METOPROLOL TARTRATE 50 MG/1
50 TABLET, FILM COATED ORAL 2 TIMES DAILY
Status: DISCONTINUED | OUTPATIENT
Start: 2023-08-10 | End: 2023-08-11 | Stop reason: HOSPADM

## 2023-08-10 RX ORDER — SODIUM CHLORIDE 9 MG/ML
INJECTION, SOLUTION INTRAVENOUS CONTINUOUS
Status: CANCELLED | OUTPATIENT
Start: 2023-08-10

## 2023-08-10 RX ORDER — GLIPIZIDE 10 MG/1
10 TABLET ORAL
Status: DISCONTINUED | OUTPATIENT
Start: 2023-08-11 | End: 2023-08-11

## 2023-08-10 RX ADMIN — SODIUM CHLORIDE: 9 INJECTION, SOLUTION INTRAVENOUS at 22:30

## 2023-08-11 VITALS
BODY MASS INDEX: 38.73 KG/M2 | TEMPERATURE: 98.4 F | SYSTOLIC BLOOD PRESSURE: 123 MMHG | WEIGHT: 241 LBS | HEIGHT: 66 IN | OXYGEN SATURATION: 95 % | DIASTOLIC BLOOD PRESSURE: 64 MMHG | HEART RATE: 60 BPM | RESPIRATION RATE: 16 BRPM

## 2023-08-11 PROBLEM — I20.89 ANGINA OF EFFORT: Status: ACTIVE | Noted: 2023-08-11

## 2023-08-11 PROBLEM — I20.8 ANGINA OF EFFORT (HCC): Status: ACTIVE | Noted: 2023-08-11

## 2023-08-11 LAB
ABO: NORMAL
ANION GAP SERPL CALC-SCNC: 10 MEQ/L (ref 8–16)
ANTIBODY SCREEN: NORMAL
BUN SERPL-MCNC: 21 MG/DL (ref 7–22)
CALCIUM SERPL-MCNC: 8.5 MG/DL (ref 8.5–10.5)
CHLORIDE SERPL-SCNC: 103 MEQ/L (ref 98–111)
CHOLEST SERPL-MCNC: 201 MG/DL (ref 100–199)
CO2 SERPL-SCNC: 30 MEQ/L (ref 23–33)
CREAT SERPL-MCNC: 1.7 MG/DL (ref 0.4–1.2)
DEPRECATED MEAN GLUCOSE BLD GHB EST-ACNC: 147 MG/DL (ref 70–126)
DEPRECATED RDW RBC AUTO: 52.1 FL (ref 35–45)
ERYTHROCYTE [DISTWIDTH] IN BLOOD BY AUTOMATED COUNT: 15.7 % (ref 11.5–14.5)
GFR SERPL CREATININE-BSD FRML MDRD: 31 ML/MIN/1.73M2
GLUCOSE SERPL-MCNC: 95 MG/DL (ref 70–108)
HBA1C MFR BLD HPLC: 6.9 % (ref 4.4–6.4)
HCT VFR BLD AUTO: 35.5 % (ref 37–47)
HDLC SERPL-MCNC: 59 MG/DL
HGB BLD-MCNC: 11.3 GM/DL (ref 12–16)
INR PPP: 1.07 (ref 0.85–1.13)
LDLC SERPL CALC-MCNC: 103 MG/DL
MCH RBC QN AUTO: 29.3 PG (ref 26–33)
MCHC RBC AUTO-ENTMCNC: 31.8 GM/DL (ref 32.2–35.5)
MCV RBC AUTO: 92 FL (ref 81–99)
PLATELET # BLD AUTO: 256 THOU/MM3 (ref 130–400)
PMV BLD AUTO: 10.7 FL (ref 9.4–12.4)
POTASSIUM SERPL-SCNC: 4 MEQ/L (ref 3.5–5.2)
RBC # BLD AUTO: 3.86 MILL/MM3 (ref 4.2–5.4)
RH FACTOR: NORMAL
SODIUM SERPL-SCNC: 143 MEQ/L (ref 135–145)
TRIGL SERPL-MCNC: 193 MG/DL (ref 0–199)
WBC # BLD AUTO: 5.4 THOU/MM3 (ref 4.8–10.8)

## 2023-08-11 PROCEDURE — 36415 COLL VENOUS BLD VENIPUNCTURE: CPT

## 2023-08-11 PROCEDURE — 85027 COMPLETE CBC AUTOMATED: CPT

## 2023-08-11 PROCEDURE — 6360000002 HC RX W HCPCS

## 2023-08-11 PROCEDURE — 2500000003 HC RX 250 WO HCPCS

## 2023-08-11 PROCEDURE — 86901 BLOOD TYPING SEROLOGIC RH(D): CPT

## 2023-08-11 PROCEDURE — 93005 ELECTROCARDIOGRAM TRACING: CPT | Performed by: NURSE PRACTITIONER

## 2023-08-11 PROCEDURE — 80048 BASIC METABOLIC PNL TOTAL CA: CPT

## 2023-08-11 PROCEDURE — 85610 PROTHROMBIN TIME: CPT

## 2023-08-11 PROCEDURE — 93458 L HRT ARTERY/VENTRICLE ANGIO: CPT

## 2023-08-11 PROCEDURE — 86900 BLOOD TYPING SEROLOGIC ABO: CPT

## 2023-08-11 PROCEDURE — 80061 LIPID PANEL: CPT

## 2023-08-11 PROCEDURE — C1769 GUIDE WIRE: HCPCS

## 2023-08-11 PROCEDURE — 6370000000 HC RX 637 (ALT 250 FOR IP): Performed by: NURSE PRACTITIONER

## 2023-08-11 PROCEDURE — 86850 RBC ANTIBODY SCREEN: CPT

## 2023-08-11 PROCEDURE — 83036 HEMOGLOBIN GLYCOSYLATED A1C: CPT

## 2023-08-11 PROCEDURE — 6370000000 HC RX 637 (ALT 250 FOR IP): Performed by: STUDENT IN AN ORGANIZED HEALTH CARE EDUCATION/TRAINING PROGRAM

## 2023-08-11 PROCEDURE — C1894 INTRO/SHEATH, NON-LASER: HCPCS

## 2023-08-11 PROCEDURE — 6360000004 HC RX CONTRAST MEDICATION: Performed by: NUCLEAR MEDICINE

## 2023-08-11 PROCEDURE — 93010 ELECTROCARDIOGRAM REPORT: CPT | Performed by: INTERNAL MEDICINE

## 2023-08-11 PROCEDURE — 93458 L HRT ARTERY/VENTRICLE ANGIO: CPT | Performed by: NUCLEAR MEDICINE

## 2023-08-11 RX ORDER — NITROGLYCERIN 0.4 MG/1
0.4 TABLET SUBLINGUAL EVERY 5 MIN PRN
Status: DISCONTINUED | OUTPATIENT
Start: 2023-08-11 | End: 2023-08-11 | Stop reason: HOSPADM

## 2023-08-11 RX ORDER — SODIUM CHLORIDE 0.9 % (FLUSH) 0.9 %
5-40 SYRINGE (ML) INJECTION PRN
Status: DISCONTINUED | OUTPATIENT
Start: 2023-08-11 | End: 2023-08-11 | Stop reason: HOSPADM

## 2023-08-11 RX ORDER — GLIMEPIRIDE 4 MG/1
4 TABLET ORAL 2 TIMES DAILY
Status: DISCONTINUED | OUTPATIENT
Start: 2023-08-11 | End: 2023-08-11 | Stop reason: HOSPADM

## 2023-08-11 RX ORDER — SODIUM CHLORIDE 9 MG/ML
INJECTION, SOLUTION INTRAVENOUS PRN
Status: DISCONTINUED | OUTPATIENT
Start: 2023-08-11 | End: 2023-08-11 | Stop reason: HOSPADM

## 2023-08-11 RX ORDER — ACETAMINOPHEN 325 MG/1
650 TABLET ORAL EVERY 4 HOURS PRN
OUTPATIENT
Start: 2023-08-11

## 2023-08-11 RX ORDER — SODIUM CHLORIDE 9 MG/ML
INJECTION, SOLUTION INTRAVENOUS CONTINUOUS
OUTPATIENT
Start: 2023-08-11

## 2023-08-11 RX ORDER — SODIUM CHLORIDE 0.9 % (FLUSH) 0.9 %
5-40 SYRINGE (ML) INJECTION PRN
OUTPATIENT
Start: 2023-08-11

## 2023-08-11 RX ORDER — ATROPINE SULFATE 0.4 MG/ML
0.5 INJECTION, SOLUTION INTRAVENOUS
OUTPATIENT
Start: 2023-08-11 | End: 2023-08-12

## 2023-08-11 RX ORDER — SODIUM CHLORIDE 9 MG/ML
INJECTION, SOLUTION INTRAVENOUS CONTINUOUS
Status: DISCONTINUED | OUTPATIENT
Start: 2023-08-11 | End: 2023-08-11 | Stop reason: HOSPADM

## 2023-08-11 RX ORDER — SODIUM CHLORIDE 0.9 % (FLUSH) 0.9 %
5-40 SYRINGE (ML) INJECTION EVERY 12 HOURS SCHEDULED
OUTPATIENT
Start: 2023-08-11

## 2023-08-11 RX ORDER — SODIUM CHLORIDE 0.9 % (FLUSH) 0.9 %
5-40 SYRINGE (ML) INJECTION EVERY 12 HOURS SCHEDULED
Status: DISCONTINUED | OUTPATIENT
Start: 2023-08-11 | End: 2023-08-11 | Stop reason: HOSPADM

## 2023-08-11 RX ORDER — SODIUM CHLORIDE 9 MG/ML
INJECTION, SOLUTION INTRAVENOUS PRN
OUTPATIENT
Start: 2023-08-11

## 2023-08-11 RX ORDER — ASPIRIN 325 MG
325 TABLET ORAL ONCE
Status: COMPLETED | OUTPATIENT
Start: 2023-08-11 | End: 2023-08-11

## 2023-08-11 RX ADMIN — CLOPIDOGREL BISULFATE 75 MG: 75 TABLET ORAL at 06:17

## 2023-08-11 RX ADMIN — AMIODARONE HYDROCHLORIDE 200 MG: 200 TABLET ORAL at 08:29

## 2023-08-11 RX ADMIN — GLIMEPIRIDE 4 MG: 4 TABLET ORAL at 08:29

## 2023-08-11 RX ADMIN — ISOSORBIDE MONONITRATE 10 MG: 20 TABLET ORAL at 08:29

## 2023-08-11 RX ADMIN — METOPROLOL TARTRATE 50 MG: 50 TABLET, FILM COATED ORAL at 08:29

## 2023-08-11 RX ADMIN — AMLODIPINE BESYLATE 5 MG: 5 TABLET ORAL at 08:29

## 2023-08-11 RX ADMIN — LEVOTHYROXINE SODIUM 137 MCG: 137 TABLET ORAL at 06:15

## 2023-08-11 RX ADMIN — IOPAMIDOL 40 ML: 755 INJECTION, SOLUTION INTRAVENOUS at 11:03

## 2023-08-11 RX ADMIN — METOPROLOL TARTRATE 50 MG: 50 TABLET, FILM COATED ORAL at 00:03

## 2023-08-11 RX ADMIN — ISOSORBIDE MONONITRATE 10 MG: 20 TABLET ORAL at 00:03

## 2023-08-11 RX ADMIN — GABAPENTIN 300 MG: 300 CAPSULE ORAL at 00:03

## 2023-08-11 RX ADMIN — ASPIRIN 325 MG: 325 TABLET ORAL at 06:15

## 2023-08-11 NOTE — PROCEDURES
[place] [address]                            CARDIAC CATHETERIZATION    PATIENT NAME: Octavio Reza                     :        1945  MED REC NO:   [id number]                           ROOM:       0032  ACCOUNT NO:   [de-identified]                           ADMIT DATE: 08/10/2023  PROVIDER:     Kim Rosas M.D.    DATE OF PROCEDURE:  2023    CLINICAL HISTORY AND INDICATION:  This is a patient with worsening  symptoms of dyspnea, multiple risk factors for coronary artery disease,  previous angioplasty and stenting, referred for cardiac cath to evaluate  coronary anatomy due to recurrent symptoms. PROCEDURE PERFORMED:  1. Left heart cath with left ventriculogram.  2.  Coronary angiogram, right and left. 3.  Sedation, 2 of Versed, 25 of fentanyl between 11:00 and 11:30 a.m.  in my presence under my supervision. 4.  Blood loss less than 10 mL. PROCEDURE DETAILS:  Please refer to my catheterization detailed note. HEMODYNAMIC RESULTS AND LEFT VENTRICULOGRAM:  Left ventricular  end-diastolic pressure was 12 mmHg. No significant change before and  after contrast injection. No significant gradient across the aortic  valve to signify aortic stenosis. Left ventricular function was  globally hypokinetic.  EF 45%. CORONARY ARTERIOGRAM RESULTS:  1. Left main is patent, gives rise to left anterior descending and left  circumflex artery. 2.  Left anterior descending artery has diffuse disease with a very  severe disease in the distal segment starting in the mid to the distal  area ranging between 70% to 90% with the vessel becomes very small. 3.  Left circumflex artery has a stent has diffuse 50% stenosis. 4.  Right coronary artery is dominant, has a 50% ostial stenosis and  diffuse mid 50% stenosis. CONCLUSION:  1. Diffuse coronary artery disease, mostly in the distal LAD. 2.  Mild LV dysfunction.   3.

## 2023-08-11 NOTE — PROGRESS NOTES
Patient discharged home in stable condition. Discharge instructions, follow up appointments, and medications reviewed with patient and daughter. Both deny any questions or concerns at this time.

## 2023-08-11 NOTE — H&P
Romie  Sedation/Analgesia History & Physical    Pt Name: Osman Carmichael  Account number: [de-identified]  MRN: [id number]  YOB: 1945  Provider Performing Procedure: Chantel Box [place] [address] [facility]  Primary Care Physician: Pako Dick MD  Date: 2023    PRE-PROCEDURE    Code Status: FULL CODE  Brief History/Pre-Procedure Diagnosis:   Angina      Consent: : I have discussed with the patient risks, benefits, and alternatives (and relevant risks, benefits, and side effects related to alternatives or not receiving care), and likelihood of the success. The patient and/or representative appear to understand and agree to proceed. The discussion encompasses risks, benefits, and side effects related to the alternatives and the risks related to not receiving the proposed care, treatment, and services. MEDICAL HISTORY  []ASHD/ANGINA/MI/CHF   [x]Hypertension  []Diabetes  []Hyperlipidemia  []Smoking  []Family Hx of ASHD  []Additional information:       has a past medical history of Aortic aneurysm ([address]), Arthritis, Atrial fibrillation, chronic ([address]), CAD (coronary artery disease), CHF NYHA class II, unspecified failure chronicity, unspecified type ([address]), Colon polyps, Diabetic peripheral neuropathy ([address]), Elbow fracture, Fluid overload, Hyperlipidemia, Hypertension, Hypothyroidism, Pulmonary nodule, right, S/P cardiac pacemaker procedure: 10/21/2017: Medtronic Dual Chamber. , Systolic CHF, acute on chronic (HCC), and Type II or unspecified type diabetes mellitus without mention of complication, not stated as uncontrolled. SURGICAL HISTORY   has a past surgical history that includes Thyroidectomy, partial (); Coronary angioplasty with stent (); Coronary angioplasty ();  section (over 30 years ago ); Knee arthroscopy (; 3/02); Hysterectomy (); Heel spur surgery (Bilateral); Knee arthroscopy (Bilateral); Colonoscopy ();  Breast biopsy (Right, disorders that are already life threatening. Class 5: Moribund pt with little chances of survival, for more than 24 hours. Mallampati I Airway Classification:   []1 [x]2 []3 []4    [x]Pre-procedure diagnostic studies complete and results available. Comment:    [x]Previous sedation/anesthesia experiences assessed. Comment:    [x]The patient is an appropriate candidate to undergo the planned procedure sedation and anesthesia. (Refer to nursing sedation/analgesia documentation record)  [x]Formulation and discussion of sedation/procedure plan, risks, and expectations with patient and/or responsible adult completed. [x]Patient examined immediately prior to the procedure.  (Refer to nursing sedation/analgesia documentation record)    Veverly MD TALA Coello [facility]  Electronically signed 8/11/2023 at 9:11 AM

## 2023-08-11 NOTE — CARE COORDINATION
Case Management Assessment  Initial Evaluation    Date/Time of Evaluation: 8/11/2023 11:10 AM  Assessment Completed by: Dillon Garcia RN    If patient is discharged prior to next notation, then this note serves as note for discharge by case management. Patient Name: Jenniffer Prior                   YOB: 1945  Diagnosis: Renal insufficiency [N28.9]                   Date / Time: 8/10/2023  8:36 PM  Location: [address] [place] [address]     Patient Admission Status: Outpatient in a bed   Readmission Risk Low 0-14, Mod 15-19), High > 20: No data recorded  Current PCP: Julián Sheriff MD  PCP verified by CM? Yes    Chart Reviewed: Yes      History Provided by: Patient  Patient Orientation: Alert and Oriented    Patient Cognition: Alert    Hospitalization in the last 30 days (Readmission):  No    If yes, Readmission Assessment in  Navigator will be completed. Advance Directives:      Code Status: Full Code   Patient's Primary Decision Maker is: Legal Next of Kin    Primary Decision Maker: Alcira Pool - Child - [phone]    Secondary Decision Maker: Jyotsna Chung - Child    Supplemental (Other) Decision Maker: Florina Auguste Child - [phone]    Discharge Planning:    Patient lives with: Spouse/Significant Other Type of Home: House  Primary Care Giver: Self  Patient Support Systems include: Family Members, Children   Current Financial resources: Medicare  Current community resources: None  Current services prior to admission: None            Current DME:              Type of Home Care services:  None    ADLS  Prior functional level: Independent in ADLs/IADLs  Current functional level: Independent in ADLs/IADLs    Family can provide assistance at DC: Yes  Would you like Case Management to discuss the discharge plan with any other family members/significant others, and if so, who?  No  Plans to Return to Present Housing: Yes  Other Identified Issues/Barriers to RETURNING to current housing: n/a  Potential Assistance needed at discharge: N/A            Potential DME:    Patient expects to discharge to: [id number] [place] [address] for transportation at discharge: Family    Financial    Payor: Juan Arteaga / Plan: Vesna Fisher / Product Type: *No Product type* /     Does insurance require precert for SNF: Yes    Potential assistance Purchasing Medications: No  Meds-to-Beds request: Yes      [id number] - [place], [place] - [address] [facility]  [address] [facility] [address]  Phone: [phone] Fax: [phone]    [address] [facility] [address] [facility], [address] [facility]  [address]  Phone: [phone] Fax: [phone] [facility][id number] - [place], [place] - [address]  [address] [facility] [address]  [address] [facility] [address] [address]  Phone: [phone] Fax: [phone] [id number] [place], [place] - [address] [facility]  [address] [facility] [address]  Phone: [phone] Fax: [phone]      Notes:    Factors facilitating achievement of predicted outcomes: Family support, Motivated, Cooperative, Pleasant, and Sense of humor    Barriers to discharge: n/a    Additional Case Management Notes: Presented for pre-hydration. Plan cardiac cath today. Creatinine 1.7. Procedure:   8/11 Planned cardiac cath    The Plan for Transition of Care is related to the following treatment goals of Renal insufficiency [N28.9]    Patient Goals/Plan/Treatment Preferences: Plans to return home at discharge. Spoke with Arabella Gu and family, denies needs. Plan d/c later today if no intervention needed vs tomorrow if stent required. Transportation/Food Security/Housekeeping Addressed: No issues identified.      Demian Haile RN  Case Management Department

## 2023-08-12 DIAGNOSIS — I10 ESSENTIAL HYPERTENSION: ICD-10-CM

## 2023-08-12 DIAGNOSIS — E11.42 DIABETIC PERIPHERAL NEUROPATHY (HCC): ICD-10-CM

## 2023-08-12 LAB
EKG ATRIAL RATE: 62 BPM
EKG P-R INTERVAL: 176 MS
EKG Q-T INTERVAL: 546 MS
EKG QRS DURATION: 178 MS
EKG QTC CALCULATION (BAZETT): 554 MS
EKG R AXIS: 49 DEGREES
EKG T AXIS: 87 DEGREES
EKG VENTRICULAR RATE: 62 BPM

## 2023-08-14 NOTE — TELEPHONE ENCOUNTER
Date of last visit:  7/17/2023  Date of next visit:  10/24/2023    Requested Prescriptions     Pending Prescriptions Disp Refills    amLODIPine (NORVASC) 5 MG tablet [Pharmacy Med Name: amLODIPine Besylate 5 MG Oral Tablet] 90 tablet 1     Sig: Take 1 tablet by mouth once daily    gabapentin (NEURONTIN) 300 MG capsule [Pharmacy Med Name: Gabapentin 300 MG Oral Capsule] 90 capsule 0     Sig: TAKE 1 CAPSULE BY MOUTH ONCE DAILY IN THE EVENING

## 2023-08-15 RX ORDER — GABAPENTIN 300 MG/1
CAPSULE ORAL
Qty: 90 CAPSULE | Refills: 0 | Status: SHIPPED | OUTPATIENT
Start: 2023-08-15 | End: 2023-11-13

## 2023-08-15 RX ORDER — AMLODIPINE BESYLATE 5 MG/1
TABLET ORAL
Qty: 90 TABLET | Refills: 1 | Status: SHIPPED | OUTPATIENT
Start: 2023-08-15

## 2023-08-21 ENCOUNTER — NURSE ONLY (OUTPATIENT)
Dept: LAB | Age: 78
End: 2023-08-21

## 2023-08-21 DIAGNOSIS — E03.4 HYPOTHYROIDISM DUE TO ACQUIRED ATROPHY OF THYROID: ICD-10-CM

## 2023-08-21 DIAGNOSIS — N18.4 CKD (CHRONIC KIDNEY DISEASE) STAGE 4, GFR 15-29 ML/MIN (HCC): ICD-10-CM

## 2023-08-21 LAB
25(OH)D3 SERPL-MCNC: 39 NG/ML (ref 30–100)
ANION GAP SERPL CALC-SCNC: 15 MEQ/L (ref 8–16)
BUN SERPL-MCNC: 21 MG/DL (ref 7–22)
CALCIUM SERPL-MCNC: 9.3 MG/DL (ref 8.5–10.5)
CHLORIDE SERPL-SCNC: 96 MEQ/L (ref 98–111)
CO2 SERPL-SCNC: 30 MEQ/L (ref 23–33)
CREAT SERPL-MCNC: 1.7 MG/DL (ref 0.4–1.2)
CREAT UR-MCNC: 68.4 MG/DL
DEPRECATED RDW RBC AUTO: 51.8 FL (ref 35–45)
ERYTHROCYTE [DISTWIDTH] IN BLOOD BY AUTOMATED COUNT: 15.3 % (ref 11.5–14.5)
GFR SERPL CREATININE-BSD FRML MDRD: 31 ML/MIN/1.73M2
GLUCOSE SERPL-MCNC: 158 MG/DL (ref 70–108)
HCT VFR BLD AUTO: 42.4 % (ref 37–47)
HGB BLD-MCNC: 13.6 GM/DL (ref 12–16)
MCH RBC QN AUTO: 29.8 PG (ref 26–33)
MCHC RBC AUTO-ENTMCNC: 32.1 GM/DL (ref 32.2–35.5)
MCV RBC AUTO: 93 FL (ref 81–99)
MICROALBUMIN UR-MCNC: < 1.2 MG/DL
MICROALBUMIN/CREAT RATIO PNL UR: 18 MG/G (ref 0–30)
PHOSPHATE SERPL-MCNC: 4.4 MG/DL (ref 2.4–4.7)
PLATELET # BLD AUTO: 341 THOU/MM3 (ref 130–400)
PMV BLD AUTO: 10.7 FL (ref 9.4–12.4)
POTASSIUM SERPL-SCNC: 4.3 MEQ/L (ref 3.5–5.2)
PTH-INTACT SERPL-MCNC: 152.5 PG/ML (ref 15–65)
RBC # BLD AUTO: 4.56 MILL/MM3 (ref 4.2–5.4)
SODIUM SERPL-SCNC: 141 MEQ/L (ref 135–145)
T4 FREE SERPL-MCNC: 1.96 NG/DL (ref 0.93–1.76)
TSH SERPL DL<=0.005 MIU/L-ACNC: 10.94 UIU/ML (ref 0.4–4.2)
WBC # BLD AUTO: 8.1 THOU/MM3 (ref 4.8–10.8)

## 2023-08-22 ENCOUNTER — TELEPHONE (OUTPATIENT)
Dept: FAMILY MEDICINE CLINIC | Age: 78
End: 2023-08-22

## 2023-08-22 DIAGNOSIS — E03.4 HYPOTHYROIDISM DUE TO ACQUIRED ATROPHY OF THYROID: Primary | ICD-10-CM

## 2023-08-22 NOTE — TELEPHONE ENCOUNTER
----- Message from Jefferson Cowart MD sent at 8/22/2023  5:57 AM EDT -----  Thyroid level says getting too much however her TSH is high which would indicate she needs more thyroid and the reason for the conflicting information as the amiodarone  For dosing probably best to see endocrinology and suggest either  in [facility] or Dr. Edward Kanner in [address] [place] [address] to help with the dosing

## 2023-08-22 NOTE — TELEPHONE ENCOUNTER
Mel Rivera informed by Phone. She would like to be referred to Dr Ralph Stallworth in [facility]. Internal Referral complete, they will contact the patient with an appt day and time.

## 2023-08-24 ENCOUNTER — OFFICE VISIT (OUTPATIENT)
Dept: CARDIOLOGY CLINIC | Age: 78
End: 2023-08-24
Payer: MEDICARE

## 2023-08-24 VITALS
WEIGHT: 242.2 LBS | HEIGHT: 66 IN | BODY MASS INDEX: 38.92 KG/M2 | HEART RATE: 97 BPM | DIASTOLIC BLOOD PRESSURE: 76 MMHG | SYSTOLIC BLOOD PRESSURE: 125 MMHG

## 2023-08-24 DIAGNOSIS — I25.10 CORONARY ARTERY DISEASE INVOLVING NATIVE CORONARY ARTERY OF NATIVE HEART WITHOUT ANGINA PECTORIS: Primary | ICD-10-CM

## 2023-08-24 DIAGNOSIS — I48.0 PAROXYSMAL ATRIAL FIBRILLATION (HCC): ICD-10-CM

## 2023-08-24 DIAGNOSIS — I50.42 CHRONIC COMBINED SYSTOLIC AND DIASTOLIC CONGESTIVE HEART FAILURE (HCC): ICD-10-CM

## 2023-08-24 PROCEDURE — 1123F ACP DISCUSS/DSCN MKR DOCD: CPT | Performed by: STUDENT IN AN ORGANIZED HEALTH CARE EDUCATION/TRAINING PROGRAM

## 2023-08-24 PROCEDURE — 3078F DIAST BP <80 MM HG: CPT | Performed by: STUDENT IN AN ORGANIZED HEALTH CARE EDUCATION/TRAINING PROGRAM

## 2023-08-24 PROCEDURE — 99214 OFFICE O/P EST MOD 30 MIN: CPT | Performed by: STUDENT IN AN ORGANIZED HEALTH CARE EDUCATION/TRAINING PROGRAM

## 2023-08-24 PROCEDURE — 3074F SYST BP LT 130 MM HG: CPT | Performed by: STUDENT IN AN ORGANIZED HEALTH CARE EDUCATION/TRAINING PROGRAM

## 2023-08-26 DIAGNOSIS — E11.42 DIABETIC PERIPHERAL NEUROPATHY (HCC): ICD-10-CM

## 2023-08-28 ENCOUNTER — OFFICE VISIT (OUTPATIENT)
Dept: NEPHROLOGY | Age: 78
End: 2023-08-28
Payer: MEDICARE

## 2023-08-28 VITALS
DIASTOLIC BLOOD PRESSURE: 77 MMHG | WEIGHT: 240 LBS | BODY MASS INDEX: 38.74 KG/M2 | SYSTOLIC BLOOD PRESSURE: 129 MMHG | OXYGEN SATURATION: 98 % | HEART RATE: 78 BPM

## 2023-08-28 DIAGNOSIS — N18.4 CKD (CHRONIC KIDNEY DISEASE) STAGE 4, GFR 15-29 ML/MIN (HCC): Primary | ICD-10-CM

## 2023-08-28 DIAGNOSIS — N25.81 SECONDARY HYPERPARATHYROIDISM OF RENAL ORIGIN (HCC): ICD-10-CM

## 2023-08-28 PROCEDURE — 99214 OFFICE O/P EST MOD 30 MIN: CPT | Performed by: INTERNAL MEDICINE

## 2023-08-28 PROCEDURE — 3078F DIAST BP <80 MM HG: CPT | Performed by: INTERNAL MEDICINE

## 2023-08-28 PROCEDURE — 1123F ACP DISCUSS/DSCN MKR DOCD: CPT | Performed by: INTERNAL MEDICINE

## 2023-08-28 PROCEDURE — 3074F SYST BP LT 130 MM HG: CPT | Performed by: INTERNAL MEDICINE

## 2023-08-28 RX ORDER — CALCITRIOL 0.25 UG/1
0.25 CAPSULE, LIQUID FILLED ORAL
Qty: 24 CAPSULE | Refills: 1 | Status: SHIPPED | OUTPATIENT
Start: 2023-08-28 | End: 2023-11-26

## 2023-08-28 NOTE — PROGRESS NOTES
Secondary hyperparathyroidism: start Calcitriol 0.25 mcg twice weekly        Bloodwork and medications were reviewed and plan of care discussed with the patient. Return to clinic in 3 months  or sooner if the need arises.       Oralia Devoid, DO  [facility]

## 2023-08-28 NOTE — TELEPHONE ENCOUNTER
Date of last visit:  7/17/2023  Date of next visit:  10/24/2023    Requested Prescriptions     Pending Prescriptions Disp Refills    gabapentin (NEURONTIN) 300 MG capsule [Pharmacy Med Name: Gabapentin 300 MG Oral Capsule] 90 capsule 0     Sig: TAKE 1 CAPSULE BY MOUTH ONCE DAILY IN THE EVENING

## 2023-08-29 RX ORDER — GABAPENTIN 300 MG/1
CAPSULE ORAL
Qty: 90 CAPSULE | Refills: 0 | OUTPATIENT
Start: 2023-08-29

## 2023-09-01 RX ORDER — ISOSORBIDE MONONITRATE 10 MG/1
10 TABLET ORAL 2 TIMES DAILY
Qty: 180 TABLET | Refills: 3 | Status: SHIPPED | OUTPATIENT
Start: 2023-09-01

## 2023-09-08 DIAGNOSIS — I25.10 CORONARY ARTERY DISEASE INVOLVING NATIVE CORONARY ARTERY OF NATIVE HEART WITHOUT ANGINA PECTORIS: ICD-10-CM

## 2023-09-11 RX ORDER — CLOPIDOGREL BISULFATE 75 MG/1
TABLET ORAL
Qty: 90 TABLET | Refills: 1 | Status: SHIPPED | OUTPATIENT
Start: 2023-09-11

## 2023-09-11 NOTE — TELEPHONE ENCOUNTER
The pharmacy is requesting a refill of the below medication which has been pended for you:     Requested Prescriptions     Pending Prescriptions Disp Refills    clopidogrel (PLAVIX) 75 MG tablet [Pharmacy Med Name: Clopidogrel Bisulfate 75 MG Oral Tablet] 90 tablet 1     Sig: Take 1 tablet by mouth once daily       Last Appointment Date: 7/17/2023  Next Appointment Date: 10/24/2023    Allergies   Allergen Reactions    Adhesive Tape Rash

## 2023-09-20 ENCOUNTER — TELEPHONE (OUTPATIENT)
Dept: PHARMACY | Age: 78
End: 2023-09-20

## 2023-09-20 NOTE — TELEPHONE ENCOUNTER
Hieu Ozzy reached out to me stating she had her printout of medication costs for the 2023 year. I instructed Hieu Preciado to bring that along with a copy of her most recent SSI award letter so I can begin process for Eliquis application. I informed patient that I would not be able to begin process until I return from vacation on 10/3/23. Patient voiced understanding and said she would bring required documentation to [facility] for me to receive when I return.    Aliza Gonzalez, [facility] - Prescription Assistance (ext. [phone]) 9/20/2023,10:30 AM

## 2023-09-25 ENCOUNTER — PROCEDURE VISIT (OUTPATIENT)
Dept: CARDIOLOGY CLINIC | Age: 78
End: 2023-09-25

## 2023-09-25 ENCOUNTER — PROCEDURE VISIT (OUTPATIENT)
Dept: CARDIOLOGY CLINIC | Age: 78
End: 2023-09-25
Payer: MEDICARE

## 2023-09-25 ENCOUNTER — TELEPHONE (OUTPATIENT)
Dept: CARDIOLOGY CLINIC | Age: 78
End: 2023-09-25

## 2023-09-25 DIAGNOSIS — I10 PRIMARY HYPERTENSION: Primary | ICD-10-CM

## 2023-09-25 DIAGNOSIS — Z95.0 S/P CARDIAC PACEMAKER PROCEDURE: Primary | ICD-10-CM

## 2023-09-25 PROCEDURE — 93294 REM INTERROG EVL PM/LDLS PM: CPT | Performed by: NUCLEAR MEDICINE

## 2023-09-25 PROCEDURE — 93296 REM INTERROG EVL PM/IDS: CPT | Performed by: NUCLEAR MEDICINE

## 2023-09-25 NOTE — PROGRESS NOTES
[facility] Medtronic Dual Pacemaker   Patient of [place]    Battery 2.5 years    Presenting rhythm APVP    A Impedance 361  RV Impedance 456    P wave sensing 0.5  R wave sensing 13.4    A Threshold 1.125 @ 0.40  A Amplitude 1.75 @ 0.40  RV Thresholds 0.75 @ 0.40  RV Amplitude 2.0 @ 0.40      A Paced 99.2%  V Paced 100%    Programmed Mode DDDR       Afib [place] <0.1%    Episodes   none

## 2023-10-02 DIAGNOSIS — E78.00 PURE HYPERCHOLESTEROLEMIA: ICD-10-CM

## 2023-10-03 NOTE — TELEPHONE ENCOUNTER
Date of last visit:  7/17/2023  Date of next visit:  10/24/2023    Requested Prescriptions     Pending Prescriptions Disp Refills    atorvastatin (LIPITOR) 40 MG tablet [Pharmacy Med Name: Atorvastatin Calcium 40 MG Oral Tablet] 90 tablet 1     Sig: Take 1 tablet by mouth once daily

## 2023-10-04 RX ORDER — ATORVASTATIN CALCIUM 40 MG/1
TABLET, FILM COATED ORAL
Qty: 90 TABLET | Refills: 1 | Status: SHIPPED | OUTPATIENT
Start: 2023-10-04

## 2023-10-06 ENCOUNTER — NURSE ONLY (OUTPATIENT)
Dept: LAB | Age: 78
End: 2023-10-06

## 2023-10-06 DIAGNOSIS — E03.9 ACQUIRED HYPOTHYROIDISM: ICD-10-CM

## 2023-10-06 DIAGNOSIS — I10 ESSENTIAL HYPERTENSION: ICD-10-CM

## 2023-10-06 LAB
T4 FREE SERPL-MCNC: 1.71 NG/DL (ref 0.93–1.76)
TSH SERPL DL<=0.005 MIU/L-ACNC: 7.94 UIU/ML (ref 0.4–4.2)

## 2023-10-09 RX ORDER — METOPROLOL TARTRATE 50 MG/1
TABLET, FILM COATED ORAL
Qty: 180 TABLET | Refills: 1 | Status: SHIPPED | OUTPATIENT
Start: 2023-10-09

## 2023-10-09 NOTE — TELEPHONE ENCOUNTER
Date of last visit:  7/17/2023  Date of next visit:  10/24/2023    Requested Prescriptions     Pending Prescriptions Disp Refills    metoprolol tartrate (LOPRESSOR) 50 MG tablet [Pharmacy Med Name: Metoprolol Tartrate 50 MG Oral Tablet] 180 tablet 1     Sig: Take 1 tablet by mouth twice daily

## 2023-10-10 ENCOUNTER — TELEPHONE (OUTPATIENT)
Dept: PHARMACY | Age: 78
End: 2023-10-10

## 2023-10-10 NOTE — TELEPHONE ENCOUNTER
Spoke with patient daughter Reba Zaldivar about receiving copy of patient pharmacy expenditure but did not receive copy of SSI award letter needed for application. At this time of year, the patient is not likely to be approved until November and then will receive up to 3 months of Eliquis at no cost IF approved and IF meeting income requirements. Informed that patient would only be approved through 12/31/23 since she has Medicare Part D and then would have to wait to re-enroll next year until Steven Monaco again meets the 3% of annual income spent on prescription costs. Daughter Reba Zaldivar stated \"This seems like a lot of work to get medicine for one month, we aren't going to proceed. Don't worry about this, she has kids and grand kids and we will make sure she doesn't go without medication. \" Remington Townsend to call me back if problems arise or there were additional questions.  Aliza Gonzalez, [facility] - Prescription Assistance (ext. [phone]) 10/10/2023,3:24 PM

## 2023-10-11 LAB — MISC. #1 REFERENCE GROUP TEST: ABNORMAL

## 2023-10-20 DIAGNOSIS — I48.0 PAROXYSMAL ATRIAL FIBRILLATION (HCC): ICD-10-CM

## 2023-10-20 LAB — MISC. #1 REFERENCE GROUP TEST: NORMAL

## 2023-10-20 RX ORDER — APIXABAN 5 MG/1
TABLET, FILM COATED ORAL
Qty: 60 TABLET | Refills: 0 | Status: SHIPPED | OUTPATIENT
Start: 2023-10-20

## 2023-10-20 RX ORDER — BUMETANIDE 1 MG/1
TABLET ORAL
Qty: 90 TABLET | Refills: 0 | OUTPATIENT
Start: 2023-10-20

## 2023-10-20 NOTE — TELEPHONE ENCOUNTER
I called Deena Martínez it is suppose to be Bumex 1 mg 1 qd. When she called it in she picked up the wrong bottle and gave them the wrong prescription number. She said she was sorry.

## 2023-10-20 NOTE — TELEPHONE ENCOUNTER
The pharmacy is  requesting a refill of the below medication which has been pended for you:     Requested Prescriptions     Pending Prescriptions Disp Refills    ELIQUIS 5 MG TABS tablet [Pharmacy Med Name: Eliquis 5 MG Oral Tablet] 60 tablet 0     Sig: Take 1 tablet by mouth twice daily       Last Appointment Date: 7/17/2023  Next Appointment Date: 10/24/2023    Allergies   Allergen Reactions    Adhesive Tape Rash       Per verbal order Dr Gerardo Cantrell sent over 30 day to pharmacy.

## 2023-10-23 RX ORDER — BUMETANIDE 1 MG/1
1 TABLET ORAL DAILY
Qty: 90 TABLET | Refills: 1 | Status: SHIPPED | OUTPATIENT
Start: 2023-10-23

## 2023-10-24 ENCOUNTER — OFFICE VISIT (OUTPATIENT)
Dept: FAMILY MEDICINE CLINIC | Age: 78
End: 2023-10-24

## 2023-10-24 VITALS
HEIGHT: 66 IN | SYSTOLIC BLOOD PRESSURE: 132 MMHG | DIASTOLIC BLOOD PRESSURE: 82 MMHG | BODY MASS INDEX: 39.21 KG/M2 | RESPIRATION RATE: 16 BRPM | WEIGHT: 244 LBS | HEART RATE: 76 BPM

## 2023-10-24 DIAGNOSIS — E78.00 PURE HYPERCHOLESTEROLEMIA: ICD-10-CM

## 2023-10-24 DIAGNOSIS — N18.32 TYPE 2 DIABETES MELLITUS WITH STAGE 3B CHRONIC KIDNEY DISEASE, WITHOUT LONG-TERM CURRENT USE OF INSULIN (HCC): Primary | ICD-10-CM

## 2023-10-24 DIAGNOSIS — I49.5 SICK SINUS SYNDROME (HCC): ICD-10-CM

## 2023-10-24 DIAGNOSIS — I50.41 CHF (CONGESTIVE HEART FAILURE), NYHA CLASS I, ACUTE, COMBINED (HCC): ICD-10-CM

## 2023-10-24 DIAGNOSIS — E11.42 DIABETIC PERIPHERAL NEUROPATHY (HCC): ICD-10-CM

## 2023-10-24 DIAGNOSIS — E11.22 TYPE 2 DIABETES MELLITUS WITH STAGE 3B CHRONIC KIDNEY DISEASE, WITHOUT LONG-TERM CURRENT USE OF INSULIN (HCC): Primary | ICD-10-CM

## 2023-10-24 DIAGNOSIS — I25.10 CORONARY ARTERY DISEASE INVOLVING NATIVE CORONARY ARTERY OF NATIVE HEART WITHOUT ANGINA PECTORIS: ICD-10-CM

## 2023-10-24 DIAGNOSIS — N18.4 CKD (CHRONIC KIDNEY DISEASE) STAGE 4, GFR 15-29 ML/MIN (HCC): ICD-10-CM

## 2023-10-24 DIAGNOSIS — Z95.0 S/P CARDIAC PACEMAKER PROCEDURE: ICD-10-CM

## 2023-10-24 DIAGNOSIS — Z23 NEED FOR INFLUENZA VACCINATION: ICD-10-CM

## 2023-10-24 DIAGNOSIS — I10 PRIMARY HYPERTENSION: ICD-10-CM

## 2023-10-24 DIAGNOSIS — Z91.89 STREPTOCOCCUS PNEUMONIAE VACCINATION INDICATED: ICD-10-CM

## 2023-10-24 DIAGNOSIS — I48.0 PAROXYSMAL ATRIAL FIBRILLATION (HCC): ICD-10-CM

## 2023-10-24 DIAGNOSIS — D68.69 SECONDARY HYPERCOAGULABLE STATE (HCC): ICD-10-CM

## 2023-10-24 PROCEDURE — 90677 PCV20 VACCINE IM: CPT | Performed by: FAMILY MEDICINE

## 2023-10-24 PROCEDURE — 99213 OFFICE O/P EST LOW 20 MIN: CPT | Performed by: FAMILY MEDICINE

## 2023-10-24 PROCEDURE — G0008 ADMIN INFLUENZA VIRUS VAC: HCPCS | Performed by: FAMILY MEDICINE

## 2023-10-24 PROCEDURE — G0009 ADMIN PNEUMOCOCCAL VACCINE: HCPCS | Performed by: FAMILY MEDICINE

## 2023-10-24 PROCEDURE — 1123F ACP DISCUSS/DSCN MKR DOCD: CPT | Performed by: FAMILY MEDICINE

## 2023-10-24 PROCEDURE — 90688 IIV4 VACCINE SPLT 0.5 ML IM: CPT | Performed by: FAMILY MEDICINE

## 2023-10-24 RX ORDER — DIMENHYDRINATE 50 MG
1000 TABLET ORAL DAILY
COMMUNITY

## 2023-10-24 ASSESSMENT — ENCOUNTER SYMPTOMS
NAUSEA: 0
ABDOMINAL PAIN: 0
CHEST TIGHTNESS: 0
WHEEZING: 0
SHORTNESS OF BREATH: 0
SORE THROAT: 0
CONSTIPATION: 0
COUGH: 0
EYE PAIN: 0

## 2023-10-24 NOTE — PROGRESS NOTES
Immunizations Administered       Name Date Dose Route    Influenza, AFLURIA (age 1 yrs+), FLUZONE, (age 10 mo+), MDV, 0.5mL 10/24/2023 0.5 mL Intramuscular    Site: Deltoid- Left    Lot: [id number]    NDC: 20091-347-30    Pneumococcal, PCV20, PREVNAR 20, (age 6w+), IM, 0.5mL 10/24/2023 0.5 mL Intramuscular    Site: Deltoid- Right    Lot: [id number]    NDC: 4676-6877-54

## 2023-10-24 NOTE — PROGRESS NOTES
Subjective:      Patient ID: Razia Gutierrez is a 70 y.o. female. Par  atrial    fib  with pacer       Crf    stage  4      Vivian  neuropathy   noted        Combined   chf       Ckd   stage  4      Thyroid  noted      Diabetes  She presents for her follow-up diabetic visit. She has type 2 diabetes mellitus. Her disease course has been stable. There are no hypoglycemic associated symptoms. Pertinent negatives for hypoglycemia include no dizziness, headaches or nervousness/anxiousness. There are no diabetic associated symptoms. Pertinent negatives for diabetes include no chest pain, no fatigue and no weakness. There are no hypoglycemic complications. Symptoms are stable. Current diabetic treatment includes oral agent (triple therapy). She is compliant with treatment all of the time. Meal planning includes avoidance of concentrated sweets. She participates in exercise daily. Her breakfast blood glucose is taken between 7-8 am. Her breakfast blood glucose range is generally 110-130 mg/dl. An ACE inhibitor/angiotensin II receptor blocker is being taken. Hypertension  This is a chronic problem. The current episode started more than 1 year ago. The problem is controlled. Pertinent negatives include no chest pain, headaches, neck pain, palpitations or shortness of breath. The current treatment provides significant improvement. Compliance problems include exercise.       Past Medical History:   Diagnosis Date    Aortic aneurysm (HCC)     4.5 cm aorta    Arthritis     Atrial fibrillation, chronic ([address]) 1/25/2019    CAD (coronary artery disease)     CHF NYHA class II, unspecified failure chronicity, unspecified type ([address]) 10/8/2018    Colon polyps 3/01    Diabetic peripheral neuropathy ([address]) 2014      feet    Elbow fracture     Fluid overload 6/9/2021    Hyperlipidemia     Hypertension     Hypothyroidism     Pulmonary nodule, right 10/2017      repeat  ct of  chest  4-2018    S/P cardiac pacemaker procedure: 10/21/2017:

## 2023-10-31 DIAGNOSIS — E03.4 HYPOTHYROIDISM DUE TO ACQUIRED ATROPHY OF THYROID: ICD-10-CM

## 2023-10-31 NOTE — TELEPHONE ENCOUNTER
Date of last visit:  10/24/2023  Date of next visit:  2/23/2024    Requested Prescriptions     Pending Prescriptions Disp Refills    levothyroxine (SYNTHROID) 137 MCG tablet [Pharmacy Med Name: Levothyroxine Sodium 137 MCG Oral Tablet] 90 tablet 1     Sig: Take 1 tablet by mouth once daily

## 2023-11-01 RX ORDER — LEVOTHYROXINE SODIUM 137 UG/1
TABLET ORAL
Qty: 90 TABLET | Refills: 1 | Status: SHIPPED | OUTPATIENT
Start: 2023-11-01

## 2023-11-10 DIAGNOSIS — E11.9 TYPE 2 DIABETES MELLITUS WITHOUT COMPLICATION, WITHOUT LONG-TERM CURRENT USE OF INSULIN (HCC): ICD-10-CM

## 2023-11-10 NOTE — TELEPHONE ENCOUNTER
Date of last visit:  10/24/2023  Date of next visit:  2/23/2024    Requested Prescriptions     Pending Prescriptions Disp Refills    glimepiride (AMARYL) 4 MG tablet [Pharmacy Med Name: Glimepiride 4 MG Oral Tablet] 180 tablet 0     Sig: Take 1 tablet by mouth twice daily

## 2023-11-11 RX ORDER — GLIMEPIRIDE 4 MG/1
TABLET ORAL
Qty: 180 TABLET | Refills: 1 | Status: SHIPPED | OUTPATIENT
Start: 2023-11-11

## 2023-11-17 RX ORDER — AMIODARONE HYDROCHLORIDE 200 MG/1
TABLET ORAL
Qty: 90 TABLET | Refills: 1 | Status: SHIPPED | OUTPATIENT
Start: 2023-11-17

## 2023-11-22 ENCOUNTER — TELEPHONE (OUTPATIENT)
Dept: FAMILY MEDICINE CLINIC | Age: 78
End: 2023-11-22

## 2023-11-22 DIAGNOSIS — E11.9 TYPE 2 DIABETES MELLITUS WITHOUT COMPLICATION, WITHOUT LONG-TERM CURRENT USE OF INSULIN (HCC): ICD-10-CM

## 2023-11-22 RX ORDER — DULAGLUTIDE 1.5 MG/.5ML
1.5 INJECTION, SOLUTION SUBCUTANEOUS WEEKLY
Qty: 12 ADJUSTABLE DOSE PRE-FILLED PEN SYRINGE | Refills: 3 | Status: ON HOLD
Start: 2023-11-22

## 2023-11-22 NOTE — TELEPHONE ENCOUNTER
Received Blank Patient Assistance Forms for Trulicity from [facility].  Forms completed and faxed back to Fax #: [phone]    Date of last visit:  10/24/2023  Date of next visit:  2/23/2024    Requested Prescriptions     Signed Prescriptions Disp Refills    dulaglutide (TRULICITY) 1.5 WE/1.3QC SC injection 12 Adjustable Dose Pre-filled Pen Syringe 3     Sig: Inject 0.5 mLs into the skin once a week     Authorizing Provider: Kina Samano     Ordering User: MALACHI, [address] [place] [address]

## 2023-11-24 ENCOUNTER — HOSPITAL ENCOUNTER (INPATIENT)
Age: 78
LOS: 3 days | Discharge: HOME | DRG: 291 | End: 2023-11-27
Attending: EMERGENCY MEDICINE | Admitting: INTERNAL MEDICINE
Payer: MEDICARE

## 2023-11-24 ENCOUNTER — APPOINTMENT (OUTPATIENT)
Dept: GENERAL RADIOLOGY | Age: 78
DRG: 291 | End: 2023-11-24
Payer: MEDICARE

## 2023-11-24 ENCOUNTER — APPOINTMENT (OUTPATIENT)
Dept: ULTRASOUND IMAGING | Age: 78
DRG: 291 | End: 2023-11-24
Payer: MEDICARE

## 2023-11-24 DIAGNOSIS — I50.9 ACUTE ON CHRONIC CONGESTIVE HEART FAILURE, UNSPECIFIED HEART FAILURE TYPE (HCC): ICD-10-CM

## 2023-11-24 DIAGNOSIS — N17.9 ACUTE KIDNEY INJURY (HCC): ICD-10-CM

## 2023-11-24 DIAGNOSIS — I50.23 ACUTE ON CHRONIC SYSTOLIC HEART FAILURE (HCC): ICD-10-CM

## 2023-11-24 DIAGNOSIS — I13.10 CARDIORENAL SYNDROME WITH RENAL FAILURE: Primary | ICD-10-CM

## 2023-11-24 PROBLEM — I50.33 HEART FAILURE, DIASTOLIC, WITH ACUTE DECOMPENSATION (HCC): Status: ACTIVE | Noted: 2018-10-08

## 2023-11-24 PROBLEM — N18.9 ACUTE KIDNEY INJURY SUPERIMPOSED ON CKD (HCC): Status: ACTIVE | Noted: 2023-07-06

## 2023-11-24 LAB
ANION GAP SERPL CALC-SCNC: 15 MEQ/L (ref 8–16)
BASOPHILS ABSOLUTE: 0.1 THOU/MM3 (ref 0–0.1)
BASOPHILS NFR BLD AUTO: 1.2 %
BUN SERPL-MCNC: 27 MG/DL (ref 7–22)
CALCIUM SERPL-MCNC: 8.9 MG/DL (ref 8.5–10.5)
CHLORIDE SERPL-SCNC: 100 MEQ/L (ref 98–111)
CO2 SERPL-SCNC: 23 MEQ/L (ref 23–33)
CREAT SERPL-MCNC: 2.5 MG/DL (ref 0.4–1.2)
DEPRECATED RDW RBC AUTO: 47.6 FL (ref 35–45)
EOSINOPHIL NFR BLD AUTO: 1.4 %
EOSINOPHILS ABSOLUTE: 0.1 THOU/MM3 (ref 0–0.4)
ERYTHROCYTE [DISTWIDTH] IN BLOOD BY AUTOMATED COUNT: 14.4 % (ref 11.5–14.5)
FLUAV RNA RESP QL NAA+PROBE: NOT DETECTED
FLUBV RNA RESP QL NAA+PROBE: NOT DETECTED
GFR SERPL CREATININE-BSD FRML MDRD: 19 ML/MIN/1.73M2
GLUCOSE BLD STRIP.AUTO-MCNC: 155 MG/DL (ref 70–108)
GLUCOSE BLD STRIP.AUTO-MCNC: 175 MG/DL (ref 70–108)
GLUCOSE SERPL-MCNC: 217 MG/DL (ref 70–108)
HCT VFR BLD AUTO: 36.3 % (ref 37–47)
HGB BLD-MCNC: 11.5 GM/DL (ref 12–16)
IMM GRANULOCYTES # BLD AUTO: 0.03 THOU/MM3 (ref 0–0.07)
IMM GRANULOCYTES NFR BLD AUTO: 0.4 %
LYMPHOCYTES ABSOLUTE: 0.9 THOU/MM3 (ref 1–4.8)
LYMPHOCYTES NFR BLD AUTO: 12.6 %
MCH RBC QN AUTO: 28.6 PG (ref 26–33)
MCHC RBC AUTO-ENTMCNC: 31.7 GM/DL (ref 32.2–35.5)
MCV RBC AUTO: 90.3 FL (ref 81–99)
MONOCYTES ABSOLUTE: 0.7 THOU/MM3 (ref 0.4–1.3)
MONOCYTES NFR BLD AUTO: 9.2 %
NEUTROPHILS NFR BLD AUTO: 75.2 %
NRBC BLD AUTO-RTO: 0 /100 WBC
NT-PROBNP SERPL IA-MCNC: 2621 PG/ML (ref 0–449)
OSMOLALITY SERPL CALC.SUM OF ELEC: 287.4 MOSMOL/KG (ref 275–300)
PLATELET # BLD AUTO: 397 THOU/MM3 (ref 130–400)
PMV BLD AUTO: 10.6 FL (ref 9.4–12.4)
POTASSIUM SERPL-SCNC: 4.7 MEQ/L (ref 3.5–5.2)
RBC # BLD AUTO: 4.02 MILL/MM3 (ref 4.2–5.4)
SARS-COV-2 RNA RESP QL NAA+PROBE: NOT DETECTED
SEGMENTED NEUTROPHILS ABSOLUTE COUNT: 5.5 THOU/MM3 (ref 1.8–7.7)
SODIUM SERPL-SCNC: 138 MEQ/L (ref 135–145)
TROPONIN, HIGH SENSITIVITY: 28 NG/L (ref 0–12)
TROPONIN, HIGH SENSITIVITY: 29 NG/L (ref 0–12)
TSH SERPL DL<=0.005 MIU/L-ACNC: 3.25 UIU/ML (ref 0.4–4.2)
WBC # BLD AUTO: 7.3 THOU/MM3 (ref 4.8–10.8)

## 2023-11-24 PROCEDURE — 71045 X-RAY EXAM CHEST 1 VIEW: CPT

## 2023-11-24 PROCEDURE — 84484 ASSAY OF TROPONIN QUANT: CPT

## 2023-11-24 PROCEDURE — 83880 ASSAY OF NATRIURETIC PEPTIDE: CPT

## 2023-11-24 PROCEDURE — 82948 REAGENT STRIP/BLOOD GLUCOSE: CPT

## 2023-11-24 PROCEDURE — 99285 EMERGENCY DEPT VISIT HI MDM: CPT

## 2023-11-24 PROCEDURE — 2500000003 HC RX 250 WO HCPCS

## 2023-11-24 PROCEDURE — 85025 COMPLETE CBC W/AUTO DIFF WBC: CPT

## 2023-11-24 PROCEDURE — 99222 1ST HOSP IP/OBS MODERATE 55: CPT | Performed by: INTERNAL MEDICINE

## 2023-11-24 PROCEDURE — 93010 ELECTROCARDIOGRAM REPORT: CPT | Performed by: NUCLEAR MEDICINE

## 2023-11-24 PROCEDURE — 1200000000 HC SEMI PRIVATE

## 2023-11-24 PROCEDURE — 1200000003 HC TELEMETRY R&B

## 2023-11-24 PROCEDURE — 76770 US EXAM ABDO BACK WALL COMP: CPT

## 2023-11-24 PROCEDURE — 96374 THER/PROPH/DIAG INJ IV PUSH: CPT

## 2023-11-24 PROCEDURE — 93005 ELECTROCARDIOGRAM TRACING: CPT | Performed by: EMERGENCY MEDICINE

## 2023-11-24 PROCEDURE — 80048 BASIC METABOLIC PNL TOTAL CA: CPT

## 2023-11-24 PROCEDURE — 36415 COLL VENOUS BLD VENIPUNCTURE: CPT

## 2023-11-24 PROCEDURE — 87636 SARSCOV2 & INF A&B AMP PRB: CPT

## 2023-11-24 PROCEDURE — 6370000000 HC RX 637 (ALT 250 FOR IP)

## 2023-11-24 PROCEDURE — 84443 ASSAY THYROID STIM HORMONE: CPT

## 2023-11-24 RX ORDER — ONDANSETRON 4 MG/1
4 TABLET, ORALLY DISINTEGRATING ORAL EVERY 8 HOURS PRN
Status: DISCONTINUED | OUTPATIENT
Start: 2023-11-24 | End: 2023-11-27 | Stop reason: HOSPADM

## 2023-11-24 RX ORDER — CLOPIDOGREL BISULFATE 75 MG/1
75 TABLET ORAL DAILY
Status: DISCONTINUED | OUTPATIENT
Start: 2023-11-24 | End: 2023-11-27 | Stop reason: HOSPADM

## 2023-11-24 RX ORDER — SODIUM CHLORIDE 0.9 % (FLUSH) 0.9 %
5-40 SYRINGE (ML) INJECTION EVERY 12 HOURS SCHEDULED
Status: DISCONTINUED | OUTPATIENT
Start: 2023-11-24 | End: 2023-11-27 | Stop reason: HOSPADM

## 2023-11-24 RX ORDER — GABAPENTIN 300 MG/1
300 CAPSULE ORAL NIGHTLY
Status: DISCONTINUED | OUTPATIENT
Start: 2023-11-24 | End: 2023-11-27 | Stop reason: HOSPADM

## 2023-11-24 RX ORDER — ONDANSETRON 2 MG/ML
4 INJECTION INTRAMUSCULAR; INTRAVENOUS EVERY 6 HOURS PRN
Status: DISCONTINUED | OUTPATIENT
Start: 2023-11-24 | End: 2023-11-27 | Stop reason: HOSPADM

## 2023-11-24 RX ORDER — CALCITRIOL 0.25 UG/1
0.25 CAPSULE, LIQUID FILLED ORAL
Status: DISCONTINUED | OUTPATIENT
Start: 2023-11-25 | End: 2023-11-27 | Stop reason: HOSPADM

## 2023-11-24 RX ORDER — IBUPROFEN 600 MG/1
1 TABLET ORAL PRN
Status: DISCONTINUED | OUTPATIENT
Start: 2023-11-24 | End: 2023-11-27 | Stop reason: HOSPADM

## 2023-11-24 RX ORDER — METOPROLOL TARTRATE 50 MG/1
50 TABLET, FILM COATED ORAL 2 TIMES DAILY
Status: DISCONTINUED | OUTPATIENT
Start: 2023-11-24 | End: 2023-11-27 | Stop reason: HOSPADM

## 2023-11-24 RX ORDER — SODIUM CHLORIDE 0.9 % (FLUSH) 0.9 %
5-40 SYRINGE (ML) INJECTION PRN
Status: DISCONTINUED | OUTPATIENT
Start: 2023-11-24 | End: 2023-11-27 | Stop reason: HOSPADM

## 2023-11-24 RX ORDER — LEVOTHYROXINE SODIUM 137 UG/1
137 TABLET ORAL DAILY
Status: DISCONTINUED | OUTPATIENT
Start: 2023-11-24 | End: 2023-11-27 | Stop reason: HOSPADM

## 2023-11-24 RX ORDER — POLYETHYLENE GLYCOL 3350 17 G/17G
17 POWDER, FOR SOLUTION ORAL DAILY PRN
Status: DISCONTINUED | OUTPATIENT
Start: 2023-11-24 | End: 2023-11-27 | Stop reason: HOSPADM

## 2023-11-24 RX ORDER — INSULIN LISPRO 100 [IU]/ML
0-4 INJECTION, SOLUTION INTRAVENOUS; SUBCUTANEOUS NIGHTLY
Status: DISCONTINUED | OUTPATIENT
Start: 2023-11-24 | End: 2023-11-27 | Stop reason: HOSPADM

## 2023-11-24 RX ORDER — AMIODARONE HYDROCHLORIDE 200 MG/1
200 TABLET ORAL DAILY
Status: DISCONTINUED | OUTPATIENT
Start: 2023-11-24 | End: 2023-11-27 | Stop reason: HOSPADM

## 2023-11-24 RX ORDER — DEXTROSE MONOHYDRATE 100 MG/ML
INJECTION, SOLUTION INTRAVENOUS CONTINUOUS PRN
Status: DISCONTINUED | OUTPATIENT
Start: 2023-11-24 | End: 2023-11-27 | Stop reason: HOSPADM

## 2023-11-24 RX ORDER — BUMETANIDE 0.25 MG/ML
2 INJECTION INTRAMUSCULAR; INTRAVENOUS ONCE
Status: COMPLETED | OUTPATIENT
Start: 2023-11-24 | End: 2023-11-24

## 2023-11-24 RX ORDER — ACETAMINOPHEN 650 MG/1
650 SUPPOSITORY RECTAL EVERY 6 HOURS PRN
Status: DISCONTINUED | OUTPATIENT
Start: 2023-11-24 | End: 2023-11-27 | Stop reason: HOSPADM

## 2023-11-24 RX ORDER — BUMETANIDE 0.25 MG/ML
1 INJECTION INTRAMUSCULAR; INTRAVENOUS 2 TIMES DAILY
Status: DISCONTINUED | OUTPATIENT
Start: 2023-11-25 | End: 2023-11-25

## 2023-11-24 RX ORDER — ISOSORBIDE MONONITRATE 30 MG/1
30 TABLET, EXTENDED RELEASE ORAL DAILY
Status: DISCONTINUED | OUTPATIENT
Start: 2023-11-24 | End: 2023-11-27 | Stop reason: HOSPADM

## 2023-11-24 RX ORDER — AMLODIPINE BESYLATE 5 MG/1
5 TABLET ORAL DAILY
Status: DISCONTINUED | OUTPATIENT
Start: 2023-11-24 | End: 2023-11-27 | Stop reason: HOSPADM

## 2023-11-24 RX ORDER — ACETAMINOPHEN 325 MG/1
650 TABLET ORAL EVERY 6 HOURS PRN
Status: DISCONTINUED | OUTPATIENT
Start: 2023-11-24 | End: 2023-11-27 | Stop reason: HOSPADM

## 2023-11-24 RX ORDER — ATORVASTATIN CALCIUM 40 MG/1
40 TABLET, FILM COATED ORAL DAILY
Status: DISCONTINUED | OUTPATIENT
Start: 2023-11-24 | End: 2023-11-27 | Stop reason: HOSPADM

## 2023-11-24 RX ORDER — CALCITRIOL 0.25 UG/1
0.25 CAPSULE, LIQUID FILLED ORAL
Status: DISCONTINUED | OUTPATIENT
Start: 2023-11-24 | End: 2023-11-24 | Stop reason: ALTCHOICE

## 2023-11-24 RX ORDER — INSULIN LISPRO 100 [IU]/ML
0-8 INJECTION, SOLUTION INTRAVENOUS; SUBCUTANEOUS
Status: DISCONTINUED | OUTPATIENT
Start: 2023-11-24 | End: 2023-11-27 | Stop reason: HOSPADM

## 2023-11-24 RX ORDER — SODIUM CHLORIDE 9 MG/ML
INJECTION, SOLUTION INTRAVENOUS PRN
Status: DISCONTINUED | OUTPATIENT
Start: 2023-11-24 | End: 2023-11-27 | Stop reason: HOSPADM

## 2023-11-24 RX ADMIN — APIXABAN 5 MG: 5 TABLET, FILM COATED ORAL at 21:28

## 2023-11-24 RX ADMIN — METOPROLOL TARTRATE 50 MG: 50 TABLET, FILM COATED ORAL at 21:28

## 2023-11-24 RX ADMIN — BUMETANIDE 2 MG: 0.25 INJECTION INTRAMUSCULAR; INTRAVENOUS at 15:27

## 2023-11-24 RX ADMIN — CLOPIDOGREL BISULFATE 75 MG: 75 TABLET ORAL at 21:28

## 2023-11-24 RX ADMIN — ATORVASTATIN CALCIUM 40 MG: 40 TABLET, FILM COATED ORAL at 21:28

## 2023-11-24 RX ADMIN — GABAPENTIN 300 MG: 300 CAPSULE ORAL at 21:28

## 2023-11-24 ASSESSMENT — PAIN - FUNCTIONAL ASSESSMENT: PAIN_FUNCTIONAL_ASSESSMENT: NONE - DENIES PAIN

## 2023-11-24 NOTE — ED TRIAGE NOTES
Pt comes to ED with c/o SOB that has been going on for the last week. Pt states that the SOB has just gotten worse over the last week as well. Pt states she takes her bumex as she should. Pt states she has noticed an increase in her weight of about 4-5 pounds this past week. PT denies chest pain. EKG completed. Pt denies fevers, chills, nausea vomiting.

## 2023-11-24 NOTE — ED NOTES
Pt transported to [facility] in stable condition. Floor called prior to transport. Spoke with Thuy.      Makenzie Mena  11/24/23 4136

## 2023-11-24 NOTE — ED NOTES
Patient in bed and talking with Dr. Jose Luis Palomares at the bedside. Patient provided education regarding admission from provider. Patient family at the bedside. Patient VSS and patient does not appear to be in any current distress at this time. Call light within reach.        Carlos Bates RN  11/24/23 [id number]

## 2023-11-24 NOTE — H&P
Internal Medicine Resident History and Physical          Name: Bharath Solorzano, female, : 1945, MRN: [id number]    PCP: Andrzej Yang MD    Date of Admission: 2023  Date of Service: Pt seen/examined on 23  and Admitted to Inpatient with expected LOS greater than two midnights due to medical therapy. Assessment and Plan:    Acute on chronic HFmrEF: TTE 2023: EF 45%. Mildly dilated LA. Mild AS. GDMT: Bumex 1 mg,   proBNP 2600, above BL. CXR demonstrating cardiomegaly and increased vascular congestion. PE notable for +1 pitting edema of bilateral ankles/feet. Lungs CTA bilaterally. Patient endorses 5 pound weight gain in the last 3 days. Patient given 2 mg IV Bumex in ED. 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 BL Cr of 1.7. Follows Dr. Cecelia Zafar. Last OV 2023. On Vitamin D3 + calcitriol, continue. Patient endorses decreased urine output at home. Strict I's and O's  Diuresis as above  Urine studies  Renal ultrasound  Repeat BMP in a.m. Chronic:   pAF on Eliquis: [id number] 7, has bled 2. Rate controlled on Lopressor 50 mg BID. On amiodarone. S/p DCCV 2019. On amiodarone, will order LFTs and TSH. KVBK7 w/ complications: E3K : 6.9. Home med of Trulicity + glimepiride 4 mg. - On gabapentin, continue. - Will start on medium dose SSI. Hypoglycemia protocol in place. Accu-Chek X4. Hypoglycemia protocol in place. Diffuse CAD s/p PCI to OM1 10/2018: Follows Dr. Gwen Duran  [facility] 2023: Diffuse CAD, in the distal LAD. LCx 50% stenosis, RCA dominant, diffuse mid 50% stenosis. LAD distal 70 to 90% stenosis. No intervention done, medical management only. - On BB, ASA, statin, Plavix, continue. Anemia of CKD:  On

## 2023-11-25 LAB
ALBUMIN SERPL BCG-MCNC: 3.8 G/DL (ref 3.5–5.1)
ALP SERPL-CCNC: 82 U/L (ref 38–126)
ALT SERPL W/O P-5'-P-CCNC: 11 U/L (ref 11–66)
ANION GAP SERPL CALC-SCNC: 11 MEQ/L (ref 8–16)
AST SERPL-CCNC: 13 U/L (ref 5–40)
BILIRUB CONJ SERPL-MCNC: < 0.2 MG/DL (ref 0–0.3)
BILIRUB SERPL-MCNC: 0.3 MG/DL (ref 0.3–1.2)
BUN SERPL-MCNC: 28 MG/DL (ref 7–22)
CALCIUM SERPL-MCNC: 8.5 MG/DL (ref 8.5–10.5)
CHLORIDE SERPL-SCNC: 101 MEQ/L (ref 98–111)
CO2 SERPL-SCNC: 27 MEQ/L (ref 23–33)
CREAT SERPL-MCNC: 2.4 MG/DL (ref 0.4–1.2)
DEPRECATED RDW RBC AUTO: 47.3 FL (ref 35–45)
ERYTHROCYTE [DISTWIDTH] IN BLOOD BY AUTOMATED COUNT: 14.3 % (ref 11.5–14.5)
GFR SERPL CREATININE-BSD FRML MDRD: 20 ML/MIN/1.73M2
GLUCOSE BLD STRIP.AUTO-MCNC: 111 MG/DL (ref 70–108)
GLUCOSE BLD STRIP.AUTO-MCNC: 132 MG/DL (ref 70–108)
GLUCOSE BLD STRIP.AUTO-MCNC: 147 MG/DL (ref 70–108)
GLUCOSE BLD STRIP.AUTO-MCNC: 231 MG/DL (ref 70–108)
GLUCOSE SERPL-MCNC: 123 MG/DL (ref 70–108)
HCT VFR BLD AUTO: 32.9 % (ref 37–47)
HGB BLD-MCNC: 10.3 GM/DL (ref 12–16)
MCH RBC QN AUTO: 28.6 PG (ref 26–33)
MCHC RBC AUTO-ENTMCNC: 31.3 GM/DL (ref 32.2–35.5)
MCV RBC AUTO: 91.4 FL (ref 81–99)
PLATELET # BLD AUTO: 331 THOU/MM3 (ref 130–400)
PMV BLD AUTO: 10.7 FL (ref 9.4–12.4)
POTASSIUM SERPL-SCNC: 4.8 MEQ/L (ref 3.5–5.2)
PROT SERPL-MCNC: 6.2 G/DL (ref 6.1–8)
RBC # BLD AUTO: 3.6 MILL/MM3 (ref 4.2–5.4)
SODIUM SERPL-SCNC: 139 MEQ/L (ref 135–145)
WBC # BLD AUTO: 6.3 THOU/MM3 (ref 4.8–10.8)

## 2023-11-25 PROCEDURE — 2580000003 HC RX 258

## 2023-11-25 PROCEDURE — 80048 BASIC METABOLIC PNL TOTAL CA: CPT

## 2023-11-25 PROCEDURE — 2500000003 HC RX 250 WO HCPCS: Performed by: INTERNAL MEDICINE

## 2023-11-25 PROCEDURE — 99232 SBSQ HOSP IP/OBS MODERATE 35: CPT | Performed by: INTERNAL MEDICINE

## 2023-11-25 PROCEDURE — 82948 REAGENT STRIP/BLOOD GLUCOSE: CPT

## 2023-11-25 PROCEDURE — 96376 TX/PRO/DX INJ SAME DRUG ADON: CPT

## 2023-11-25 PROCEDURE — 85027 COMPLETE CBC AUTOMATED: CPT

## 2023-11-25 PROCEDURE — 36415 COLL VENOUS BLD VENIPUNCTURE: CPT

## 2023-11-25 PROCEDURE — 1200000003 HC TELEMETRY R&B

## 2023-11-25 PROCEDURE — 1200000000 HC SEMI PRIVATE

## 2023-11-25 PROCEDURE — 6370000000 HC RX 637 (ALT 250 FOR IP)

## 2023-11-25 PROCEDURE — 80076 HEPATIC FUNCTION PANEL: CPT

## 2023-11-25 RX ORDER — BUMETANIDE 0.25 MG/ML
2 INJECTION INTRAMUSCULAR; INTRAVENOUS 2 TIMES DAILY
Status: DISCONTINUED | OUTPATIENT
Start: 2023-11-25 | End: 2023-11-26

## 2023-11-25 RX ADMIN — CALCITRIOL CAPSULES 0.25 MCG 0.25 MCG: 0.25 CAPSULE ORAL at 09:07

## 2023-11-25 RX ADMIN — SODIUM CHLORIDE, PRESERVATIVE FREE 10 ML: 5 INJECTION INTRAVENOUS at 05:42

## 2023-11-25 RX ADMIN — BUMETANIDE 2 MG: 0.25 INJECTION INTRAMUSCULAR; INTRAVENOUS at 20:08

## 2023-11-25 RX ADMIN — ISOSORBIDE MONONITRATE 30 MG: 30 TABLET, EXTENDED RELEASE ORAL at 09:07

## 2023-11-25 RX ADMIN — METOPROLOL TARTRATE 50 MG: 50 TABLET, FILM COATED ORAL at 09:07

## 2023-11-25 RX ADMIN — SODIUM CHLORIDE, PRESERVATIVE FREE 10 ML: 5 INJECTION INTRAVENOUS at 20:15

## 2023-11-25 RX ADMIN — SODIUM CHLORIDE, PRESERVATIVE FREE 10 ML: 5 INJECTION INTRAVENOUS at 09:07

## 2023-11-25 RX ADMIN — ATORVASTATIN CALCIUM 40 MG: 40 TABLET, FILM COATED ORAL at 09:07

## 2023-11-25 RX ADMIN — METOPROLOL TARTRATE 50 MG: 50 TABLET, FILM COATED ORAL at 20:09

## 2023-11-25 RX ADMIN — AMIODARONE HYDROCHLORIDE 200 MG: 200 TABLET ORAL at 09:07

## 2023-11-25 RX ADMIN — CLOPIDOGREL BISULFATE 75 MG: 75 TABLET ORAL at 09:07

## 2023-11-25 RX ADMIN — INSULIN LISPRO 2 UNITS: 100 INJECTION, SOLUTION INTRAVENOUS; SUBCUTANEOUS at 13:30

## 2023-11-25 RX ADMIN — APIXABAN 5 MG: 5 TABLET, FILM COATED ORAL at 20:09

## 2023-11-25 RX ADMIN — APIXABAN 5 MG: 5 TABLET, FILM COATED ORAL at 09:07

## 2023-11-25 RX ADMIN — GABAPENTIN 300 MG: 300 CAPSULE ORAL at 20:09

## 2023-11-25 RX ADMIN — LEVOTHYROXINE SODIUM 137 MCG: 0.14 TABLET ORAL at 09:07

## 2023-11-25 RX ADMIN — BUMETANIDE 2 MG: 0.25 INJECTION INTRAMUSCULAR; INTRAVENOUS at 09:08

## 2023-11-25 NOTE — PLAN OF CARE
Problem: Safety - Adult  Goal: Free from fall injury  Outcome: Progressing     Problem: Discharge Planning  Goal: Discharge to home or other facility with appropriate resources  Outcome: Progressing  Flowsheets (Taken 11/24/2023 7128 by Adrienne Stewart RN)  Discharge to home or other facility with appropriate resources: Identify barriers to discharge with patient and caregiver

## 2023-11-26 LAB
ANION GAP SERPL CALC-SCNC: 12 MEQ/L (ref 8–16)
BUN SERPL-MCNC: 30 MG/DL (ref 7–22)
CALCIUM SERPL-MCNC: 8.8 MG/DL (ref 8.5–10.5)
CHLORIDE SERPL-SCNC: 98 MEQ/L (ref 98–111)
CO2 SERPL-SCNC: 28 MEQ/L (ref 23–33)
CREAT SERPL-MCNC: 2.5 MG/DL (ref 0.4–1.2)
DEPRECATED RDW RBC AUTO: 48.3 FL (ref 35–45)
ERYTHROCYTE [DISTWIDTH] IN BLOOD BY AUTOMATED COUNT: 14.3 % (ref 11.5–14.5)
GFR SERPL CREATININE-BSD FRML MDRD: 19 ML/MIN/1.73M2
GLUCOSE BLD STRIP.AUTO-MCNC: 139 MG/DL (ref 70–108)
GLUCOSE BLD STRIP.AUTO-MCNC: 160 MG/DL (ref 70–108)
GLUCOSE BLD STRIP.AUTO-MCNC: 198 MG/DL (ref 70–108)
GLUCOSE BLD STRIP.AUTO-MCNC: 222 MG/DL (ref 70–108)
GLUCOSE SERPL-MCNC: 128 MG/DL (ref 70–108)
HCT VFR BLD AUTO: 36.2 % (ref 37–47)
HGB BLD-MCNC: 11.6 GM/DL (ref 12–16)
MCH RBC QN AUTO: 29.7 PG (ref 26–33)
MCHC RBC AUTO-ENTMCNC: 32 GM/DL (ref 32.2–35.5)
MCV RBC AUTO: 92.6 FL (ref 81–99)
PLATELET # BLD AUTO: 328 THOU/MM3 (ref 130–400)
PMV BLD AUTO: 10.6 FL (ref 9.4–12.4)
POTASSIUM SERPL-SCNC: 4.5 MEQ/L (ref 3.5–5.2)
RBC # BLD AUTO: 3.91 MILL/MM3 (ref 4.2–5.4)
SODIUM SERPL-SCNC: 138 MEQ/L (ref 135–145)
WBC # BLD AUTO: 5.3 THOU/MM3 (ref 4.8–10.8)

## 2023-11-26 PROCEDURE — 85027 COMPLETE CBC AUTOMATED: CPT

## 2023-11-26 PROCEDURE — 80048 BASIC METABOLIC PNL TOTAL CA: CPT

## 2023-11-26 PROCEDURE — 2580000003 HC RX 258

## 2023-11-26 PROCEDURE — 99232 SBSQ HOSP IP/OBS MODERATE 35: CPT | Performed by: INTERNAL MEDICINE

## 2023-11-26 PROCEDURE — 6370000000 HC RX 637 (ALT 250 FOR IP)

## 2023-11-26 PROCEDURE — 2500000003 HC RX 250 WO HCPCS

## 2023-11-26 PROCEDURE — 82948 REAGENT STRIP/BLOOD GLUCOSE: CPT

## 2023-11-26 PROCEDURE — 2500000003 HC RX 250 WO HCPCS: Performed by: INTERNAL MEDICINE

## 2023-11-26 PROCEDURE — 1200000000 HC SEMI PRIVATE

## 2023-11-26 PROCEDURE — 96376 TX/PRO/DX INJ SAME DRUG ADON: CPT

## 2023-11-26 PROCEDURE — 36415 COLL VENOUS BLD VENIPUNCTURE: CPT

## 2023-11-26 RX ORDER — BUMETANIDE 0.25 MG/ML
1 INJECTION INTRAMUSCULAR; INTRAVENOUS 2 TIMES DAILY
Status: DISCONTINUED | OUTPATIENT
Start: 2023-11-26 | End: 2023-11-27 | Stop reason: HOSPADM

## 2023-11-26 RX ADMIN — AMIODARONE HYDROCHLORIDE 200 MG: 200 TABLET ORAL at 08:30

## 2023-11-26 RX ADMIN — ISOSORBIDE MONONITRATE 30 MG: 30 TABLET, EXTENDED RELEASE ORAL at 08:29

## 2023-11-26 RX ADMIN — BUMETANIDE 1 MG: 0.25 INJECTION INTRAMUSCULAR; INTRAVENOUS at 20:26

## 2023-11-26 RX ADMIN — SODIUM CHLORIDE, PRESERVATIVE FREE 10 ML: 5 INJECTION INTRAVENOUS at 20:20

## 2023-11-26 RX ADMIN — CLOPIDOGREL BISULFATE 75 MG: 75 TABLET ORAL at 08:30

## 2023-11-26 RX ADMIN — SODIUM CHLORIDE, PRESERVATIVE FREE 10 ML: 5 INJECTION INTRAVENOUS at 08:30

## 2023-11-26 RX ADMIN — ATORVASTATIN CALCIUM 40 MG: 40 TABLET, FILM COATED ORAL at 08:30

## 2023-11-26 RX ADMIN — APIXABAN 5 MG: 5 TABLET, FILM COATED ORAL at 20:20

## 2023-11-26 RX ADMIN — INSULIN LISPRO 2 UNITS: 100 INJECTION, SOLUTION INTRAVENOUS; SUBCUTANEOUS at 12:53

## 2023-11-26 RX ADMIN — GABAPENTIN 300 MG: 300 CAPSULE ORAL at 20:19

## 2023-11-26 RX ADMIN — BUMETANIDE 2 MG: 0.25 INJECTION INTRAMUSCULAR; INTRAVENOUS at 08:30

## 2023-11-26 RX ADMIN — METOPROLOL TARTRATE 50 MG: 50 TABLET, FILM COATED ORAL at 08:30

## 2023-11-26 RX ADMIN — METOPROLOL TARTRATE 50 MG: 50 TABLET, FILM COATED ORAL at 20:20

## 2023-11-26 RX ADMIN — LEVOTHYROXINE SODIUM 137 MCG: 0.14 TABLET ORAL at 08:30

## 2023-11-26 RX ADMIN — APIXABAN 5 MG: 5 TABLET, FILM COATED ORAL at 08:30

## 2023-11-27 VITALS
SYSTOLIC BLOOD PRESSURE: 132 MMHG | DIASTOLIC BLOOD PRESSURE: 71 MMHG | HEART RATE: 64 BPM | BODY MASS INDEX: 38.24 KG/M2 | OXYGEN SATURATION: 94 % | RESPIRATION RATE: 18 BRPM | WEIGHT: 243.6 LBS | TEMPERATURE: 97.8 F | HEIGHT: 67 IN

## 2023-11-27 LAB
ANION GAP SERPL CALC-SCNC: 12 MEQ/L (ref 8–16)
BUN SERPL-MCNC: 32 MG/DL (ref 7–22)
CALCIUM SERPL-MCNC: 8.8 MG/DL (ref 8.5–10.5)
CHLORIDE 24H UR-SRATE: 64 MEQ/L
CHLORIDE SERPL-SCNC: 97 MEQ/L (ref 98–111)
CO2 SERPL-SCNC: 27 MEQ/L (ref 23–33)
CREAT SERPL-MCNC: 2.3 MG/DL (ref 0.4–1.2)
DEPRECATED RDW RBC AUTO: 47.3 FL (ref 35–45)
ERYTHROCYTE [DISTWIDTH] IN BLOOD BY AUTOMATED COUNT: 14.3 % (ref 11.5–14.5)
GFR SERPL CREATININE-BSD FRML MDRD: 21 ML/MIN/1.73M2
GLUCOSE BLD STRIP.AUTO-MCNC: 154 MG/DL (ref 70–108)
GLUCOSE BLD STRIP.AUTO-MCNC: 161 MG/DL (ref 70–108)
GLUCOSE SERPL-MCNC: 147 MG/DL (ref 70–108)
HCT VFR BLD AUTO: 38.2 % (ref 37–47)
HGB BLD-MCNC: 11.9 GM/DL (ref 12–16)
MCH RBC QN AUTO: 28.4 PG (ref 26–33)
MCHC RBC AUTO-ENTMCNC: 31.2 GM/DL (ref 32.2–35.5)
MCV RBC AUTO: 91.2 FL (ref 81–99)
PLATELET # BLD AUTO: 362 THOU/MM3 (ref 130–400)
PMV BLD AUTO: 10.7 FL (ref 9.4–12.4)
POTASSIUM SERPL-SCNC: 4.2 MEQ/L (ref 3.5–5.2)
POTASSIUM UR-SCNC: 15.8 MEQ/L
RBC # BLD AUTO: 4.19 MILL/MM3 (ref 4.2–5.4)
SODIUM SERPL-SCNC: 136 MEQ/L (ref 135–145)
SODIUM UR-SCNC: 75 MEQ/L
UUN 24H UR-MCNC: 287 MG/DL
WBC # BLD AUTO: 5.9 THOU/MM3 (ref 4.8–10.8)

## 2023-11-27 PROCEDURE — 84300 ASSAY OF URINE SODIUM: CPT

## 2023-11-27 PROCEDURE — 99239 HOSP IP/OBS DSCHRG MGMT >30: CPT | Performed by: INTERNAL MEDICINE

## 2023-11-27 PROCEDURE — 84133 ASSAY OF URINE POTASSIUM: CPT

## 2023-11-27 PROCEDURE — 6370000000 HC RX 637 (ALT 250 FOR IP)

## 2023-11-27 PROCEDURE — 84540 ASSAY OF URINE/UREA-N: CPT

## 2023-11-27 PROCEDURE — 36415 COLL VENOUS BLD VENIPUNCTURE: CPT

## 2023-11-27 PROCEDURE — 2580000003 HC RX 258

## 2023-11-27 PROCEDURE — 2500000003 HC RX 250 WO HCPCS

## 2023-11-27 PROCEDURE — 80048 BASIC METABOLIC PNL TOTAL CA: CPT

## 2023-11-27 PROCEDURE — 82948 REAGENT STRIP/BLOOD GLUCOSE: CPT

## 2023-11-27 PROCEDURE — 96376 TX/PRO/DX INJ SAME DRUG ADON: CPT

## 2023-11-27 PROCEDURE — 85027 COMPLETE CBC AUTOMATED: CPT

## 2023-11-27 PROCEDURE — 97166 OT EVAL MOD COMPLEX 45 MIN: CPT

## 2023-11-27 PROCEDURE — 97530 THERAPEUTIC ACTIVITIES: CPT

## 2023-11-27 PROCEDURE — 82436 ASSAY OF URINE CHLORIDE: CPT

## 2023-11-27 RX ORDER — BUMETANIDE 1 MG/1
2 TABLET ORAL DAILY
Qty: 90 TABLET | Refills: 1 | Status: SHIPPED | OUTPATIENT
Start: 2023-11-27

## 2023-11-27 RX ADMIN — LEVOTHYROXINE SODIUM 137 MCG: 0.14 TABLET ORAL at 08:06

## 2023-11-27 RX ADMIN — ATORVASTATIN CALCIUM 40 MG: 40 TABLET, FILM COATED ORAL at 08:05

## 2023-11-27 RX ADMIN — APIXABAN 5 MG: 5 TABLET, FILM COATED ORAL at 08:04

## 2023-11-27 RX ADMIN — CLOPIDOGREL BISULFATE 75 MG: 75 TABLET ORAL at 08:06

## 2023-11-27 RX ADMIN — SODIUM CHLORIDE, PRESERVATIVE FREE 10 ML: 5 INJECTION INTRAVENOUS at 08:12

## 2023-11-27 RX ADMIN — BUMETANIDE 1 MG: 0.25 INJECTION INTRAMUSCULAR; INTRAVENOUS at 08:11

## 2023-11-27 RX ADMIN — AMIODARONE HYDROCHLORIDE 200 MG: 200 TABLET ORAL at 08:04

## 2023-11-27 RX ADMIN — METOPROLOL TARTRATE 50 MG: 50 TABLET, FILM COATED ORAL at 08:07

## 2023-11-27 RX ADMIN — ISOSORBIDE MONONITRATE 30 MG: 30 TABLET, EXTENDED RELEASE ORAL at 08:07

## 2023-11-27 NOTE — PLAN OF CARE
Problem: Safety - Adult  Goal: Free from fall injury  11/26/2023 2209 by Jam Ray RN  Outcome: Progressing  11/26/2023 0954 by Marlena Stock RN  Outcome: Progressing     Problem: Chronic Conditions and Co-morbidities  Goal: Patient's chronic conditions and co-morbidity symptoms are monitored and maintained or improved  11/26/2023 2209 by Jam Ray RN  Outcome: Progressing  11/26/2023 0954 by Marlena Stock RN  Outcome: Progressing

## 2023-11-27 NOTE — DISCHARGE INSTRUCTIONS
Please weight yourself daily. Proper weighing includes:   -first thing in the morning  -before breakfast and without any heavy clothing on. If you gain more than 3-5 pounds in a 2-3 day stretch, please call your doctor. -If this occurs, you may take an extra dose of bumex 2mg total in the evening. Remember to restrict your sodium intake to less than 2 grams per day. Limit your total fluid intake to less than 2 liters per day (this is about five 12 oz portions of liquid).

## 2023-11-27 NOTE — PROGRESS NOTES
Advance Care Planning     Advance Care Planning Inpatient Note  [facility] Department    Today's Date: 11/25/2023  Unit: [facility] SURG 8A    Received request from IDT Member. Upon review of chart and communication with care team, patient's decision making abilities are not in question. . Patient and Child/Children was/were present in the room during visit. Goals of ACP Conversation:  Discuss advance care planning documents  Facilitate a discussion related to patient's goals of care as they align with the patient's values and beliefs. Health Care Decision Makers:       Primary Decision Maker: Reyes Pompa Child - [phone]    Secondary Decision Maker: Ben López - Child    Supplemental (Other) Decision Maker: Abraham Ormond Child - [phone]  Summary:  No Decision Maker named by patient at this time    Advance Care Planning Documents (Patient Wishes):  Healthcare Power of /Advance Directive [address]   Living Will/Advance Directive  Legal Guardianship Document     Assessment:  The patient was in her bed at the time of this encounter. The patient welcomed an explanation of the [place] Advanced care planning document. However she did not with to fill it in at this point and she preferred explanations to happen in commercial breaks for the [employer] game. Interventions:  Provided education on documents for clarity and greater understanding  Assisted in the completion of documents according to patient's wishes at this time  Encouraged ongoing ACP conversation with future decision makers and loved ones  Reviewed but did not complete ACP document    Care Preferences Communicated:     Hospitalization:  If the patient's health worsens and it becomes clear that the chance of recovery is unlikely,     the patient wants hospitalization.     Ventilation:   If the patient, in their present state of health, suddenly became very ill and unable to breathe on their
Demographic information:  Linda Ocampo  1945  [id number]      Assessment/Plan:    Acute on chronic HFmrEF: TTE 07/2023: EF 45%. Mildly dilated LA. Mild AS. GDMT: Bumex 1 mg,   proBNP 2600, above BL. CXR demonstrating cardiomegaly and increased vascular congestion. PE notable for +1 pitting edema of bilateral ankles/feet. Lungs CTA bilaterally. Patient endorses 5 pound weight gain in the last 3 days. Patient given 2 mg IV Bumex in ED. Bumex 2 mg IV twice daily  Strict I's and O's  1.5 L fluid restriction, 2 g sodium restriction  Daily weight  Outpatient follow-up in CHF clinic  Consider repeat echo pending response. 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 BL Cr of 1.7. Follows Dr. George Cowart. Last OV 8/2023. On Vitamin D3 + calcitriol, continue. Patient endorses decreased urine output at home. Strict I's and O's  Diuresis as above  Creatinine 2.5 -> 2.4  Renal ultrasound negative for hydronephrosis  Follow  BMP in a.m. Chronic:   pAF on Eliquis: [id number] 7, has bled 2. Rate controlled on Lopressor 50 mg BID. On amiodarone. S/p DCCV 02/2019. On amiodarone, will order LFTs and TSH. UJYR6 w/ complications: M2C 3/89: 6.9. Home med of Trulicity + glimepiride 4 mg. - On gabapentin, continue. - Will start on medium dose SSI. Hypoglycemia protocol in place. Accu-Chek X4. Hypoglycemia protocol in place. Diffuse CAD s/p PCI to OM1 10/2018: Follows Dr. Corinna Vivas  Recent [address] 08/2023: Diffuse CAD, in the distal LAD. LCx 50% stenosis, RCA dominant, diffuse mid 50% stenosis. LAD distal 70 to 90% stenosis. No intervention done, medical management only. - On BB, ASA, statin, Plavix, continue. Anemia of CKD: On arrival [facility] 11.5/36.3. Will continue to monitor. Secondary hyperparathyroidism: On calcitriol, continue. HTN: Home medication of Lopressor, Bumex, Imdur, Norvasc 5 mg. Hx of symptomatic bradycardia s/p PPM 10/2017.    Hypothyroidism s/p partial thyroidectomy:
Discharge teaching and instructions for diagnosis/procedure of acute on chronic systolic heart failure completed with patient using teachback method. AVS reviewed. Printed prescriptions given to patient. Patient voiced understanding regarding prescriptions, follow up appointments, and care of self at home.  Discharged in a wheelchair to home with support per family
He was trying to donate blood and they told him he was anemic so he couldn't do it. He is now starting iron supplement 325 nature made otc  Takes 1 a day and also takes regular vitamin but it has no iron. He was told he can try again giving blood tomorrow and he will she if hemoglobin is up now.   
Physician Progress Note      PATIENTDajossie HEARD #:                  [id number]  :                       1945  ADMIT DATE:       2023 1:46 PM  DISCH DATE:  RESPONDING  PROVIDER #:        Hang Rosen          QUERY TEXT:    Patient admitted with Acute on chronic HFmrEF, noted to have paroxysmal atrial   fibrillation and is maintained on Eliquis. If possible, please document in   progress notes and discharge summary if you are evaluating and/or treating any   of the following: The medical record reflects the following:  Risk Factors: the 78 yrs. old female, htn, chf.  Clinical Indicators: H&P -pAF on Eliquis  Treatment: Eliquis. Thank you,  G[facility]mez Jeffries, [facility]  [phone]  Options provided:  -- Secondary hypercoagulable state in a patient with atrial fibrillation  -- Other - I will add my own diagnosis  -- Disagree - Not applicable / Not valid  -- Disagree - Clinically unable to determine / Unknown  -- Refer to Clinical Documentation Reviewer    PROVIDER RESPONSE TEXT:    Provider disagreed with this query. Afib does not cause a true hypercoagulable state.     Query created by: Graham Salinas on 2023 8:16 AM      Electronically signed by:  Hang Rosen 2023 11:16 AM
included review of current medical information, gathering information related to past medical, social and functional history, completion of standardized testing, formal and informal observation of tasks, assessment of data and development of plan of care and goals. Treatment time included skilled education and facilitation of tasks to increase safety and independence with ADL's for improved functional independence and quality of life. Discharge Recommendations:  Continue to assess pending progress, Home with assist PRN    Patient Education:     Patient Education  Education Given To: Patient  Education Provided: Role of Therapy, Plan of Care, ADL Adaptive Strategies, Transfer Training, Energy Conservation, IADL Safety  Education Method: Demonstration  Barriers to Learning: None  Education Outcome: Verbalized understanding, Demonstrated understanding    Equipment Recommendations:  Equipment Needed: No    Plan:  Times Per Week: 3-5x  Current Treatment Recommendations: Strengthening, Balance training, Functional mobility training, Endurance training, Self-Care / ADL, Return to work related activity, Safety education & training, Patient/Caregiver education & training. See long-term goal time frame for expected duration of plan of care. If no long-term goals established, a short length of stay is anticipated.     Goals:  Patient goals : return home  Short Term Goals  Time Frame for Short Term Goals: by discharge  Short Term Goal 1: Pt will safely navigate HH distances with SBA and no LOB or safety cues to increase indep with ADLs  Short Term Goal 2: Pt will tolerate dynamic standing x8-10min with SBA and B hand release to increase indep with grooming  Short Term Goal 3: Pt will complete LB ADLs with SBA and adaptations prn to increase indep with dressing  Short Term Goal 4: Pt will utilize 3 ECT during BADL routine and Min A, min VC for safety to increase indep with self care    AM-PAC Inpatient Daily Activity Raw
Final report electronically signed by DR Lucrecia Ramos on 11/24/2023 2:47 PM   see assessment and plan for discussion of pertinent imaging. (Use [id number] dot phrase for last 24 hrs)      DVT prophylaxis:    [] Lovenox  [] SCDs  [] SQ Heparin  [] Encourage ambulation   [x] Already on Anticoagulation, Eliquis  Diet: ADULT DIET; Regular; 4 carb choices (60 gm/meal);  Low Sodium (2 gm); 1500 ml  Code Status: Full Code  PT/OT: yes  Tele: yes  IVF: no    Electronically signed by Irvin Palencia DO PGY 1 on 11/26/2023 at 11:10 AM    Case was discussed with Attending, Dr. Prashant Ernandez

## 2023-11-27 NOTE — CARE COORDINATION
11/27/23, 1:12 PM EST    Patient goals/plan/ treatment preferences discussed by  and . Patient goals/plan/ treatment preferences reviewed with patient/ family. Patient/ family verbalize understanding of discharge plan and are in agreement with goal/plan/treatment preferences. Understanding was demonstrated using the teach back method. AVS provided by RN at time of discharge, which includes all necessary medical information pertaining to the patients current course of illness, treatment, post-discharge goals of care, and treatment preferences. Services At/After Discharge: None       IMM Letter  IMM Letter given to Patient/Family/Significant other/Guardian/POA/by[de-identified] Cathleen Marie RN CM  IMM Letter date given[de-identified] 11/27/23  IMM Letter time given[de-identified] 26     Pt verbalized understanding and gave permission for possible discharge within 4 hours of receiving IMM.
Spoke with patient and daughter who advised do not have ACP documents at home. Would like to complete and have attached to chart this admission. Denied concerns or needs.
Other Identified Issues/Barriers to RETURNING to current housing: no  Potential Assistance needed at discharge: N/A            Potential DME:    Patient expects to discharge to: [facility] for transportation at discharge:      Financial    Payor: Gayle Frey / Plan: Rosendo Olivera ESSENTIAL/PLUS / Product Type: *No Product type* /     Does insurance require precert for SNF: Yes    Potential assistance Purchasing Medications: No  Meds-to-Beds request: No      [id number] - [place], [place] - [address] [facility]  [address] [facility] [address]  Phone: [phone] Fax: [phone]    [address] [facility] [address] - [place], [address] [facility]  [address]  Phone: [phone] Fax: [address] [place] [address] #[id number] - [place], [place] - [address]  [address] [facility] [address] [facility] [address]  [address] [facility] [address] [address]  Phone: [phone] Fax: [address] [facility] #[id number] - [place], [place] - [address] [facility] [phone] Tommy Schaefer [phone]  [address] [place] [address]  Phone: [phone] Fax: [phone]      Notes:    Factors facilitating achievement of predicted outcomes: Family support, Motivated, Cooperative, and Pleasant    Barriers to discharge: Medical complications    Additional Case Management Notes: Admit from ER with SOB. Acute on chronic CHF. Diuresed. The Plan for Transition of Care is related to the following treatment goals of Acute on chronic systolic heart failure (HCC) [I50.23]  Cardiorenal syndrome with renal failure [I13.10]  Acute kidney injury ([address]) [N17.9]  Acute on chronic congestive heart failure, unspecified heart failure type [facility]) [I50.9]    Patient Goals/Plan/Treatment Preferences: Planning to return home alone. Denies any discharge needs. Transportation/Food Security/Housekeeping Addressed: No issues identified.      Berny Scales RN  Case Management

## 2023-11-28 ENCOUNTER — CARE COORDINATION (OUTPATIENT)
Dept: CASE MANAGEMENT | Age: 78
End: 2023-11-28

## 2023-11-28 DIAGNOSIS — I50.23 ACUTE ON CHRONIC SYSTOLIC HEART FAILURE (HCC): Primary | ICD-10-CM

## 2023-11-28 PROCEDURE — 1111F DSCHRG MED/CURRENT MED MERGE: CPT

## 2023-11-28 NOTE — CARE COORDINATION
Care Transitions Initial Follow Up Call    Call within 2 business days of discharge: Yes    Patient Current Location:  Home: [address] [id number]    Care Transition Nurse contacted the patient by telephone to perform post hospital discharge assessment. Verified name and  with patient as identifiers. Provided introduction to self, and explanation of the Care Transition Nurse role. Patient: Bharath Solorzano Patient : 1945   MRN: [id number]  Reason for Admission: Acute on chronic CHF   Discharge Date: 23 RARS: Readmission Risk Score: 11.3      Last Discharge Facility       Date Complaint Diagnosis Description Type Department Provider    23 Shortness of Breath Cardiorenal syndrome with renal failure . .. ED to Hosp-Admission (Discharged) (ADMITTED) ANG[facility] Keller; Shea Márquez MD            Challenges to be reviewed by the provider   Additional needs identified to be addressed with provider: No  none               Method of communication with provider: none. Spoke with Dante Mejia, said she is not too good right now, came home with a head cold. Denies fever, chills, chest pain, increased SOB/CARLIN. Occasional cough with phlegm, taking Coricidin cough med. Reviewed other medications/changes. Has not checked BS yet today but usually 130-150. Monitors wt daily, aware of wt gain parameters with CHF, denies edema of LE's. Appetite and fluid intake is good. Fluid restriction 2000ml/day. Will see PCP next week, she will drive. No other issues to report. Denies any other needs. No other questions or concerns at this time. Will continue to follow. Care Transition Nurse reviewed discharge instructions, medical action plan, and red flags with patient who verbalized understanding. The patient was given an opportunity to ask questions and does not have any further questions or concerns at this time. Were discharge instructions available to patient? Yes.  Reviewed appropriate site

## 2023-11-29 ENCOUNTER — TELEPHONE (OUTPATIENT)
Dept: FAMILY MEDICINE CLINIC | Age: 78
End: 2023-11-29

## 2023-11-29 LAB
EKG ATRIAL RATE: 76 BPM
EKG Q-T INTERVAL: 464 MS
EKG QRS DURATION: 166 MS
EKG QTC CALCULATION (BAZETT): 525 MS
EKG R AXIS: 43 DEGREES
EKG T AXIS: 142 DEGREES
EKG VENTRICULAR RATE: 77 BPM

## 2023-11-29 RX ORDER — CEFDINIR 300 MG/1
300 CAPSULE ORAL 2 TIMES DAILY
Qty: 20 CAPSULE | Refills: 0 | Status: SHIPPED | OUTPATIENT
Start: 2023-11-29 | End: 2023-12-09

## 2023-11-29 NOTE — DISCHARGE SUMMARY
bumetanide 1 MG tablet  Commonly known as: Bumex  Take 2 tablets by mouth daily Take an extra dose in the evening if you notice increased swelling in the legs, worse shortness of breath, or weight gain >5lb in 2-3 days. What changed:   how much to take  additional instructions     gabapentin 300 MG capsule  Commonly known as: NEURONTIN  TAKE 1 CAPSULE BY MOUTH ONCE DAILY IN THE EVENING  What changed: See the new instructions. CONTINUE taking these medications      amiodarone 200 MG tablet  Commonly known as: CORDARONE  Take 1 tablet by mouth once daily     amLODIPine 5 MG tablet  Commonly known as: NORVASC  Take 1 tablet by mouth once daily     atorvastatin 40 MG tablet  Commonly known as: LIPITOR  Take 1 tablet by mouth once daily     calcitRIOL 0.25 MCG capsule  Commonly known as: Rocaltrol  Take 1 capsule by mouth Twice a Week     clopidogrel 75 MG tablet  Commonly known as: PLAVIX  Take 1 tablet by mouth once daily     Eliquis 5 MG Tabs tablet  Generic drug: apixaban  Take 1 tablet by mouth twice daily     Flax Seed Oil 1000 MG Caps     glimepiride 4 MG tablet  Commonly known as: AMARYL  Take 1 tablet by mouth twice daily     Glucose Blood Disk  Commonly known as: Stacey Breeze 2 Test  USE ONE STRIP TWICE DAILY, DX: E11.9     isosorbide mononitrate 10 MG tablet  Commonly known as: ISMO;MONOKET  Take 1 tablet by mouth 2 times daily     levothyroxine 137 MCG tablet  Commonly known as: SYNTHROID  Take 1 tablet by mouth once daily     metoprolol tartrate 50 MG tablet  Commonly known as: LOPRESSOR  Take 1 tablet by mouth twice daily     nitroGLYCERIN 0.4 MG SL tablet  Commonly known as: Nitrostat  Place 1 tablet under the tongue every 5 minutes as needed for Chest pain up to max of 3 total doses. If no relief after 1 dose, call 911.      Trulicity 1.5 KT/5.7WQ SC injection  Generic drug: dulaglutide  Inject 0.5 mLs into the skin once a week     vitamin C 500 MG tablet  Commonly known as: ASCORBIC ACID

## 2023-11-29 NOTE — TELEPHONE ENCOUNTER
Date of last visit:  10/24/2023  Date of next visit:  12/5/2023    Requested Prescriptions     Signed Prescriptions Disp Refills    cefdinir (OMNICEF) 300 MG capsule 20 capsule 0     Sig: Take 1 capsule by mouth 2 times daily for 10 days     Authorizing Provider: Doreen Caldera     Ordering User: Coty Lou

## 2023-11-29 NOTE — TELEPHONE ENCOUNTER
Pt called to [facility] she is having sore throat sinus drainage mucus is yellow     Please send to [facility]

## 2023-12-01 ENCOUNTER — TELEMEDICINE (OUTPATIENT)
Dept: FAMILY MEDICINE CLINIC | Age: 78
End: 2023-12-01

## 2023-12-01 DIAGNOSIS — I10 PRIMARY HYPERTENSION: ICD-10-CM

## 2023-12-01 DIAGNOSIS — J06.9 UPPER RESPIRATORY TRACT INFECTION DUE TO COVID-19 VIRUS: Primary | ICD-10-CM

## 2023-12-01 DIAGNOSIS — N18.4 CKD (CHRONIC KIDNEY DISEASE) STAGE 4, GFR 15-29 ML/MIN (HCC): ICD-10-CM

## 2023-12-01 DIAGNOSIS — U07.1 UPPER RESPIRATORY TRACT INFECTION DUE TO COVID-19 VIRUS: Primary | ICD-10-CM

## 2023-12-01 DIAGNOSIS — I48.0 PAROXYSMAL ATRIAL FIBRILLATION (HCC): ICD-10-CM

## 2023-12-01 DIAGNOSIS — D68.69 SECONDARY HYPERCOAGULABLE STATE (HCC): ICD-10-CM

## 2023-12-01 DIAGNOSIS — Z95.0 S/P CARDIAC PACEMAKER PROCEDURE: ICD-10-CM

## 2023-12-01 ASSESSMENT — ENCOUNTER SYMPTOMS
CONSTIPATION: 0
SHORTNESS OF BREATH: 0
SORE THROAT: 0
COUGH: 0
CHEST TIGHTNESS: 0
NAUSEA: 0
ABDOMINAL PAIN: 0
WHEEZING: 0
EYE PAIN: 0

## 2023-12-01 NOTE — PROGRESS NOTES
Mike Rai agreed to Video Chat/Exam in presence of Dr Wesley Baron and myself. Verified who was present in room with Mike Rai. Mike Rai informed the e-mail address used to [facility] cannot be used to contact the Provider, if they are any questions or concerns they need to call the office directly. Mike Rai stated understanding.
Psychiatric/Behavioral:  Negative for behavioral problems and sleep disturbance. The patient is not nervous/anxious. Objective   Patient-Reported Vitals  No data recorded     Physical Exam  [INSTRUCTIONS:  \"[x]\" Indicates a positive item  \"[]\" Indicates a negative item  -- DELETE ALL ITEMS NOT EXAMINED]    Constitutional: [x] Appears well-developed and well-nourished [x] No apparent distress      [] Abnormal -     Mental status: [x] Alert and awake  [x] Oriented to person/place/time [x] Able to follow commands    [] Abnormal -     Eyes:   EOM    [x]  Normal    [] Abnormal -   Sclera  [x]  Normal    [] Abnormal -          Discharge [x]  None visible   [] Abnormal -     HENT: [x] Normocephalic, atraumatic  [] Abnormal -   [x] Mouth/Throat: Mucous membranes are moist    External Ears [x] Normal  [] Abnormal -    Neck: [x] No visualized mass [] Abnormal -     Pulmonary/Chest: [x] Respiratory effort normal   [x] No visualized signs of difficulty breathing or respiratory distress        [] Abnormal -      Musculoskeletal:   [x] Normal gait with no signs of ataxia         [x] Normal range of motion of neck        [] Abnormal -     Neurological:        [x] No Facial Asymmetry (Cranial nerve 7 motor function) (limited exam due to video visit)          [x] No gaze palsy        [] Abnormal -          Skin:        [x] No significant exanthematous lesions or discoloration noted on facial skin         [] Abnormal -            Psychiatric:       [x] Normal Affect [] Abnormal -        [x] No Hallucinations    Other pertinent observable physical exam findings:-         On this date 12/1/2023 I have spent 20 minutes reviewing previous notes, test results and face to face (virtual) with the patient discussing the diagnosis and importance of compliance with the treatment plan as well as documenting on the day of the visit.     --Sudha Ochoa MD

## 2023-12-05 DIAGNOSIS — E11.9 TYPE 2 DIABETES MELLITUS WITHOUT COMPLICATION, WITHOUT LONG-TERM CURRENT USE OF INSULIN (HCC): ICD-10-CM

## 2023-12-06 ENCOUNTER — CARE COORDINATION (OUTPATIENT)
Dept: CASE MANAGEMENT | Age: 78
End: 2023-12-06

## 2023-12-06 NOTE — CARE COORDINATION
Care Transitions Outreach Attempt-1st attempt    Call within 2 business days of discharge: Yes   Attempted to reach patient for subsequent transitional call. Left HIPPA compliant VM to return call directly to [phone]. Patient: Agustin Ascencio Patient : 1945 MRN: [id number]    Last Discharge Facility       Date Complaint Diagnosis Description Type Department Provider    23 Shortness of Breath Cardiorenal syndrome with renal failure . .. ED to Hosp-Admission (Discharged) (ADMITTED) ANGUS Guajardo; Santo Curiel MD                Noted following upcoming appointments from discharge chart review:   [facility]):   Future Appointments   Date Time Provider [address]   2023 12:10 PM Hannah Keller MD [facility]   2023  1:20 PM Laci Cannon DO [facility], [facility]. [facility]   2024  1:00 PM SCHEDULE, SRPX PACER NURSE N SRPX PACER [facility]   2024  3:15 PM Sean Esparza MD ENDO [facility]   2024 10:30 AM Hannah Keller MD [facility]   2024 11:30 AM Michael Roman, APRN - CNP N SRPX CHF [facility]     Non-[facility]  follow up appointment(s): na

## 2023-12-07 ENCOUNTER — CARE COORDINATION (OUTPATIENT)
Dept: CASE MANAGEMENT | Age: 78
End: 2023-12-07

## 2023-12-07 NOTE — CARE COORDINATION
including: PCP  Specialist  Urgent care clinics  When to call 911. The patient agrees to contact the PCP office for questions related to their healthcare. Advance Care Planning:   not on file. Patients top risk factors for readmission: lack of knowledge about disease, medical condition-CHF, CAD, Afib, DM, HTN, CKD, and polypharmacy  Interventions to address risk factors: Scheduled appointment with PCP-12/18 and Scheduled appointment with Specialist-12/20 2/12/24,     Offered patient enrollment in the Remote Patient Monitoring (RPM) program for in-home monitoring: Patient is not eligible for RPM program.     Care Transitions Subsequent and Final Call    Subsequent and Final Calls  Do you have any ongoing symptoms?: No  Have your medications changed?: No  Do you have any questions related to your medications?: No  Do you currently have any active services?: No  Do you have any needs or concerns that I can assist you with?: No  Identified Barriers: Lack of Education  Care Transitions Interventions     Transportation Support: Declined     Registered Dietician: Declined                            Other Interventions:             Care Transition Nurse provided contact information for future needs. Plan for follow-up call in 5-7 days based on severity of symptoms and risk factors.   Plan for next call: symptom management-new or worsening symptoms, CHF, DM    Rowan Ramires RN

## 2023-12-11 DIAGNOSIS — I48.0 PAROXYSMAL ATRIAL FIBRILLATION (HCC): ICD-10-CM

## 2023-12-11 DIAGNOSIS — E11.42 DIABETIC PERIPHERAL NEUROPATHY (HCC): ICD-10-CM

## 2023-12-11 RX ORDER — APIXABAN 5 MG/1
TABLET, FILM COATED ORAL
Qty: 60 TABLET | Refills: 5 | Status: SHIPPED | OUTPATIENT
Start: 2023-12-11

## 2023-12-11 RX ORDER — GABAPENTIN 300 MG/1
CAPSULE ORAL
Qty: 90 CAPSULE | Refills: 1 | Status: SHIPPED | OUTPATIENT
Start: 2023-12-11 | End: 2024-06-10

## 2023-12-11 NOTE — TELEPHONE ENCOUNTER
Date of last visit:  12/1/2023  Date of next visit:  12/18/2023    Requested Prescriptions     Pending Prescriptions Disp Refills    ELIQUIS 5 MG TABS tablet [Pharmacy Med Name: Eliquis 5 MG Oral Tablet] 60 tablet 5     Sig: Take 1 tablet by mouth twice daily    gabapentin (NEURONTIN) 300 MG capsule [Pharmacy Med Name: Gabapentin 300 MG Oral Capsule] 90 capsule 0     Sig: TAKE 1 CAPSULE BY MOUTH ONCE DAILY IN THE EVENING

## 2023-12-14 ENCOUNTER — CARE COORDINATION (OUTPATIENT)
Dept: CASE MANAGEMENT | Age: 78
End: 2023-12-14

## 2023-12-14 NOTE — CARE COORDINATION
Care Transitions Follow Up Call    Patient Current Location:  Home: [address] [id number]    Care Transition Nurse contacted the patient by telephone to follow up after admission on 23. Verified name and  with patient as identifiers. Patient: Agustin Ascencio  Patient : 1945   MRN: [id number]  Reason for Admission: CHF  Discharge Date: 23 RARS: Readmission Risk Score: 11.3      Needs to be reviewed by the provider   Additional needs identified to be addressed with provider: No  none             Method of communication with provider: none. Spoke with Trent Espinosa, said she is feeling well. Denies fever, chills, chest pain, dyspnea, cough, edema. Wt, BS stable. Eating, drinking, sleeping good. Will see PCP and nephro next week. No other issues to report. Denies any other needs. No other questions or concerns at this time. Will continue to follow. Addressed changes since last contact:  none  Discussed follow-up appointments. If no appointment was previously scheduled, appointment scheduling offered: Yes. Is follow up appointment scheduled within 7 days of discharge? Yes. Follow Up  Future Appointments   Date Time Provider [address] [place] [address]   [facility] 12[facility]   2023  1:20 PM Laci Cannon [place] [facility]   2024  1:00 PM SCHEDULE, SRPX PACER NURSE N SRPX PACER [facility]   2024  3:15 PM Sean Esparza MD ENDO [facility]   2024 10:30 AM Hannah Keller MD [facility]   2024 11:30 AM Michael Roman, APRN - CNP N SRPX CHF [facility]     External follow up appointment(s): анна    Care Transition Nurse reviewed medical action plan and red flags with patient and discussed any barriers to care and/or understanding of plan of care after discharge.  Discussed appropriate site of care based on symptoms and resources available to patient including: PCP  Specialist  Urgent care

## 2023-12-16 ENCOUNTER — NURSE ONLY (OUTPATIENT)
Dept: LAB | Age: 78
End: 2023-12-16

## 2023-12-16 DIAGNOSIS — N25.81 SECONDARY HYPERPARATHYROIDISM OF RENAL ORIGIN (HCC): ICD-10-CM

## 2023-12-16 DIAGNOSIS — N18.4 CKD (CHRONIC KIDNEY DISEASE) STAGE 4, GFR 15-29 ML/MIN (HCC): ICD-10-CM

## 2023-12-16 LAB
ANION GAP SERPL CALC-SCNC: 15 MEQ/L (ref 8–16)
BUN SERPL-MCNC: 27 MG/DL (ref 7–22)
CALCIUM SERPL-MCNC: 8.9 MG/DL (ref 8.5–10.5)
CHLORIDE SERPL-SCNC: 100 MEQ/L (ref 98–111)
CO2 SERPL-SCNC: 27 MEQ/L (ref 23–33)
CREAT SERPL-MCNC: 1.9 MG/DL (ref 0.4–1.2)
GFR SERPL CREATININE-BSD FRML MDRD: 27 ML/MIN/1.73M2
GLUCOSE SERPL-MCNC: 197 MG/DL (ref 70–108)
POTASSIUM SERPL-SCNC: 4 MEQ/L (ref 3.5–5.2)
PTH-INTACT SERPL-MCNC: 116.6 PG/ML (ref 15–65)
SODIUM SERPL-SCNC: 142 MEQ/L (ref 135–145)

## 2023-12-28 ENCOUNTER — CARE COORDINATION (OUTPATIENT)
Dept: CASE MANAGEMENT | Age: 78
End: 2023-12-28

## 2023-12-28 NOTE — CARE COORDINATION
Care Transitions Outreach Attempt-2nd attempt    Call within 2 business days of discharge: Yes   Attempted to reach patient for subsequent transitional call. Left HIPPA compliant VM to return call directly to [phone]. No Google assistance this time. If no return call, CTN will sign off-2nd attempt. Unable to reach letter not sent, end of program.    Patient: Cortes Isidro Patient : 1945 MRN: [id number]    Last Discharge Facility       Date Complaint Diagnosis Description Type Department Provider    23 Shortness of Breath Cardiorenal syndrome with renal failure . .. ED to Hosp-Admission (Discharged) (ADMITTED) [facility] 8[facility] Rodrigo Connors MD              Noted following upcoming appointments from discharge chart review:   [facility] follow up appointment(s):   Future Appointments   Date Time Provider [address]   2024  1:00 PM SCHEDULE, [address] [facility] [place]   2024  3:15 PM Carmelita Esparza MD [facility] [place]   2024 10:30 AM Barbar Mortimer, MD [facility] [address]   3/18/2024 12:20 PM Barbar Mortimer, MD [facility] [address]   2024  1:40 PM Fariha Cannon DO N [facility] [place]   2024 11:30 AM Aury Roman, APRN - CNP N SRPX CHF [facility] [place]     Non-[facility]  follow up appointment(s): анна

## 2024-01-11 ENCOUNTER — NURSE ONLY (OUTPATIENT)
Dept: CARDIOLOGY CLINIC | Age: 79
End: 2024-01-11

## 2024-01-11 DIAGNOSIS — Z95.0 S/P CARDIAC PACEMAKER PROCEDURE: Primary | ICD-10-CM

## 2024-01-11 NOTE — PROGRESS NOTES
In Office Medtronic Dual Pacemaker   Patient of [facility]    Battery 1-2 years    Presenting rhythm AF   Underlying AF  30 - dependent    A Impedance 399  RV Impedance 513    P wave sensing 1.5  R wave sensing -    A Threshold - @ -  A Amplitude 1.75 @ 0.40  RV Thresholds 0.75 @ 0.40  RV Amplitude 2.0 @ 0.40      A Paced 61.2%  V Paced 100%    Programmed Mode DDDR     Hx AF - taking eliquis  Afib Morrison 39.9%    Episodes   AF

## 2024-01-29 RX ORDER — CALCITRIOL 0.25 UG/1
0.25 CAPSULE, LIQUID FILLED ORAL
Qty: 24 CAPSULE | Refills: 0 | Status: SHIPPED | OUTPATIENT
Start: 2024-01-29 | End: 2024-04-28

## 2024-02-12 ENCOUNTER — OFFICE VISIT (OUTPATIENT)
Age: 79
End: 2024-02-12
Payer: MEDICARE

## 2024-02-12 VITALS
HEIGHT: 67 IN | BODY MASS INDEX: 37.48 KG/M2 | WEIGHT: 238.8 LBS | HEART RATE: 69 BPM | DIASTOLIC BLOOD PRESSURE: 82 MMHG | SYSTOLIC BLOOD PRESSURE: 140 MMHG | RESPIRATION RATE: 18 BRPM

## 2024-02-12 DIAGNOSIS — E11.42 DIABETIC PERIPHERAL NEUROPATHY (HCC): ICD-10-CM

## 2024-02-12 DIAGNOSIS — E03.9 ACQUIRED HYPOTHYROIDISM: ICD-10-CM

## 2024-02-12 DIAGNOSIS — E66.01 SEVERE OBESITY (BMI 35.0-39.9) WITH COMORBIDITY (HCC): ICD-10-CM

## 2024-02-12 DIAGNOSIS — N18.4 CKD (CHRONIC KIDNEY DISEASE) STAGE 4, GFR 15-29 ML/MIN (HCC): ICD-10-CM

## 2024-02-12 DIAGNOSIS — N25.81 SECONDARY HYPERPARATHYROIDISM (HCC): Primary | ICD-10-CM

## 2024-02-12 PROCEDURE — 3079F DIAST BP 80-89 MM HG: CPT | Performed by: INTERNAL MEDICINE

## 2024-02-12 PROCEDURE — 99214 OFFICE O/P EST MOD 30 MIN: CPT | Performed by: INTERNAL MEDICINE

## 2024-02-12 PROCEDURE — 1123F ACP DISCUSS/DSCN MKR DOCD: CPT | Performed by: INTERNAL MEDICINE

## 2024-02-12 PROCEDURE — 3077F SYST BP >= 140 MM HG: CPT | Performed by: INTERNAL MEDICINE

## 2024-02-12 NOTE — PROGRESS NOTES
[facility] [place] [facility]  [facility] ENDOCRINOLOGY  [address]  [facility] [address]  Dept: [phone]  Loc: [phone]     Visit Date: 2/12/2024    Barbara Zelaya is a 79 y.o. female who presents today for:  Chief Complaint   Patient presents with    Follow-up     Acquired hypothyroidism         Subjective:      HPI     Barbara Zealya is a 79 y.o. , female who comes for Follow up for hypothyroidism.  Xavier Last MD  Barbara Zelaya was diagnosed with hypothyroidism 43 year(s) ago.   Etiology of hypothyroidism is partial thyroidectomy. Pathology was benign.  Current therapy includes levothyroxine 137 mcg daily .  Current symptoms include : denies Cold intolerance, Depression, Weight Gain, Fatigue, Dry Skin, and Brittle Hair.    During the last visit, it was deemed that her abnormal thyroid studies may, in part, be due to amiodarone use. Her levothyroxine supplementation was increased from 100 mcg daily to 137 mcg daily.     Latest Reference Range & Units 02/06/17 00:00 10/20/17 02:17 10/08/18 11:56 10/12/20 14:51 05/28/21 14:42 06/01/21 11:40 06/08/21 23:50 11/18/22 10:19 02/13/23 13:39 05/30/23 12:48 08/21/23 14:53 10/06/23 15:26 11/24/23 15:27   TSH 0.400 - 4.200 uIU/mL 4.640 2.120 0.813 6.830 (H) 4.050 4.680 (H) 2.960 15.840 (H) 8.170 (H) 8.120 (H) 10.940 (H) 7.940 (H) 3.250   T4 Free 0.93 - 1.76 ng/dL 1.35   1.96 (H) 2.22 (H) 2.08 (H)  1.72 1.82 (H) 2.02 (H) 1.96 (H) 1.71    (H): Data is abnormally high     Latest Reference Range & Units Most Recent   Glucose, Random 70 - 108 mg/dL 197 (H)  12/16/23 11:40   Hemoglobin A1C % 7.6 !  12/18/23 13:06   eAG (mg/dL) 70 - 126 mg/dL 147 (H)  8/11/23 05:36   POC Glucose  165 !  12/18/23 12:57   (H): Data is abnormally high  !: Data is abnormal    Diabetes managed by Dr. Last         Past Medical History:   Diagnosis Date    Aortic aneurysm (HCC)     4.5 cm aorta    Arthritis     Atrial fibrillation, chronic (HCC)

## 2024-02-27 DIAGNOSIS — I10 ESSENTIAL HYPERTENSION: ICD-10-CM

## 2024-02-27 NOTE — TELEPHONE ENCOUNTER
Date of last visit:  12/18/2023  Date of next visit:  3/8/2024    Requested Prescriptions     Pending Prescriptions Disp Refills    amLODIPine (NORVASC) 5 MG tablet [Pharmacy Med Name: amLODIPine Besylate 5 MG Oral Tablet] 90 tablet 0     Sig: Take 1 tablet by mouth once daily

## 2024-02-28 RX ORDER — AMLODIPINE BESYLATE 5 MG/1
TABLET ORAL
Qty: 90 TABLET | Refills: 0 | Status: SHIPPED | OUTPATIENT
Start: 2024-02-28

## 2024-03-07 ENCOUNTER — NURSE ONLY (OUTPATIENT)
Dept: LAB | Age: 79
End: 2024-03-07

## 2024-03-07 DIAGNOSIS — N25.81 SECONDARY HYPERPARATHYROIDISM (HCC): ICD-10-CM

## 2024-03-07 DIAGNOSIS — E03.9 ACQUIRED HYPOTHYROIDISM: ICD-10-CM

## 2024-03-07 DIAGNOSIS — N18.4 CKD (CHRONIC KIDNEY DISEASE) STAGE 4, GFR 15-29 ML/MIN (HCC): ICD-10-CM

## 2024-03-07 LAB
25(OH)D3 SERPL-MCNC: 48 NG/ML (ref 30–100)
T4 FREE SERPL-MCNC: 1.75 NG/DL (ref 0.93–1.68)
TSH SERPL DL<=0.005 MIU/L-ACNC: 17.08 UIU/ML (ref 0.4–4.2)

## 2024-03-08 ENCOUNTER — OFFICE VISIT (OUTPATIENT)
Dept: FAMILY MEDICINE CLINIC | Age: 79
End: 2024-03-08

## 2024-03-08 VITALS
WEIGHT: 239.6 LBS | HEIGHT: 67 IN | DIASTOLIC BLOOD PRESSURE: 70 MMHG | SYSTOLIC BLOOD PRESSURE: 130 MMHG | BODY MASS INDEX: 37.61 KG/M2 | HEART RATE: 72 BPM | RESPIRATION RATE: 16 BRPM

## 2024-03-08 DIAGNOSIS — I48.0 PAROXYSMAL ATRIAL FIBRILLATION (HCC): ICD-10-CM

## 2024-03-08 DIAGNOSIS — Z00.00 MEDICARE ANNUAL WELLNESS VISIT, SUBSEQUENT: Primary | ICD-10-CM

## 2024-03-08 DIAGNOSIS — D12.6 ADENOMATOUS POLYP OF COLON, UNSPECIFIED PART OF COLON: ICD-10-CM

## 2024-03-08 DIAGNOSIS — E11.9 TYPE 2 DIABETES MELLITUS WITHOUT COMPLICATION, WITHOUT LONG-TERM CURRENT USE OF INSULIN (HCC): ICD-10-CM

## 2024-03-08 DIAGNOSIS — I50.42 CHRONIC COMBINED SYSTOLIC AND DIASTOLIC CONGESTIVE HEART FAILURE (HCC): ICD-10-CM

## 2024-03-08 DIAGNOSIS — I71.21 ANEURYSM OF ASCENDING AORTA WITHOUT RUPTURE (HCC): ICD-10-CM

## 2024-03-08 DIAGNOSIS — I10 ESSENTIAL HYPERTENSION: ICD-10-CM

## 2024-03-08 DIAGNOSIS — I25.10 CORONARY ARTERY DISEASE INVOLVING NATIVE CORONARY ARTERY OF NATIVE HEART WITHOUT ANGINA PECTORIS: ICD-10-CM

## 2024-03-08 DIAGNOSIS — D68.69 SECONDARY HYPERCOAGULABLE STATE (HCC): ICD-10-CM

## 2024-03-08 DIAGNOSIS — E03.4 HYPOTHYROIDISM DUE TO ACQUIRED ATROPHY OF THYROID: ICD-10-CM

## 2024-03-08 DIAGNOSIS — N18.4 CKD (CHRONIC KIDNEY DISEASE) STAGE 4, GFR 15-29 ML/MIN (HCC): ICD-10-CM

## 2024-03-08 DIAGNOSIS — E11.42 DIABETIC PERIPHERAL NEUROPATHY (HCC): ICD-10-CM

## 2024-03-08 DIAGNOSIS — Z12.31 SCREENING MAMMOGRAM FOR HIGH-RISK PATIENT: ICD-10-CM

## 2024-03-08 PROBLEM — I50.23 ACUTE ON CHRONIC SYSTOLIC HEART FAILURE (HCC): Status: RESOLVED | Noted: 2023-11-24 | Resolved: 2024-03-08

## 2024-03-08 PROBLEM — I50.33 HEART FAILURE, DIASTOLIC, WITH ACUTE DECOMPENSATION (HCC): Status: RESOLVED | Noted: 2018-10-08 | Resolved: 2024-03-08

## 2024-03-08 SDOH — ECONOMIC STABILITY: FOOD INSECURITY: WITHIN THE PAST 12 MONTHS, THE FOOD YOU BOUGHT JUST DIDN'T LAST AND YOU DIDN'T HAVE MONEY TO GET MORE.: NEVER TRUE

## 2024-03-08 SDOH — ECONOMIC STABILITY: FOOD INSECURITY: WITHIN THE PAST 12 MONTHS, YOU WORRIED THAT YOUR FOOD WOULD RUN OUT BEFORE YOU GOT MONEY TO BUY MORE.: NEVER TRUE

## 2024-03-08 SDOH — ECONOMIC STABILITY: INCOME INSECURITY: HOW HARD IS IT FOR YOU TO PAY FOR THE VERY BASICS LIKE FOOD, HOUSING, MEDICAL CARE, AND HEATING?: SOMEWHAT HARD

## 2024-03-08 ASSESSMENT — PATIENT HEALTH QUESTIONNAIRE - PHQ9
SUM OF ALL RESPONSES TO PHQ QUESTIONS 1-9: 0
SUM OF ALL RESPONSES TO PHQ QUESTIONS 1-9: 0
2. FEELING DOWN, DEPRESSED OR HOPELESS: 0
1. LITTLE INTEREST OR PLEASURE IN DOING THINGS: 0
SUM OF ALL RESPONSES TO PHQ QUESTIONS 1-9: 0
SUM OF ALL RESPONSES TO PHQ QUESTIONS 1-9: 0
SUM OF ALL RESPONSES TO PHQ9 QUESTIONS 1 & 2: 0

## 2024-03-08 ASSESSMENT — ENCOUNTER SYMPTOMS
CONSTIPATION: 0
EYE PAIN: 0
SORE THROAT: 0
ABDOMINAL PAIN: 0
CHEST TIGHTNESS: 0
COUGH: 0
NAUSEA: 0
SHORTNESS OF BREATH: 0
WHEEZING: 0

## 2024-03-08 NOTE — PATIENT INSTRUCTIONS
provide support. Talk to them about why you are trying to lose weight, and ask them to help. They can help by participating in exercise and having meals with you, even if they may be eating something different.  Find what works best for you. If you do not have time or do not like to cook, a program that offers meal replacement bars or shakes may be better for you. Or if you like to prepare meals, finding a plan that includes daily menus and recipes may be best.  Ask your doctor about other health professionals who can help you achieve your weight loss goals.  A dietitian can help you make healthy changes in your diet.  An exercise specialist or  can help you develop a safe and effective exercise program.  A counselor or psychiatrist can help you cope with issues such as depression, anxiety, or family problems that can make it hard to focus on weight loss.  Consider joining a support group for people who are trying to lose weight. Your doctor can suggest groups in your area.  Where can you learn more?  Go to https://www.[employer].net/patientEd and enter U357 to learn more about \"Starting a Weight Loss Plan: Care Instructions.\"  Current as of: September 20, 2023               Content Version: 14.0  © 4186-6142 [employer].   Care instructions adapted under license by [employer]. If you have questions about a medical condition or this instruction, always ask your healthcare professional. [employer] disclaims any warranty or liability for your use of this information.           A Healthy Heart: Care Instructions  Overview     Coronary artery disease, also called heart disease, occurs when a substance called plaque builds up in the vessels that supply oxygen-rich blood to your heart muscle. This can narrow the blood vessels and reduce blood flow. A heart attack happens when blood flow is completely blocked. A high-fat diet, smoking, and other factors increase the risk of

## 2024-03-08 NOTE — PROGRESS NOTES
Medicare Annual Wellness Visit    Barbara Zelaya is here for Medicare AWV, Diabetes, and Hypothyroidism    Assessment & Plan         ICD-10-CM    1. Medicare annual wellness visit, subsequent  Z00.00       2. Coronary artery disease involving native coronary artery of native heart without angina pectoris  I25.10       3. Essential hypertension  I10 IN OFFICE OUTPATIENT VISIT 15 MINUTES [56989]      4. Type 2 diabetes mellitus without complication, without long-term current use of insulin (HCC)  E11.9 IN OFFICE OUTPATIENT VISIT 15 MINUTES [94096]      5. CKD (chronic kidney disease) stage 4, GFR 15-29 ml/min (HCC)  N18.4 IN OFFICE OUTPATIENT VISIT 15 MINUTES [78530]      6. Chronic combined systolic and diastolic congestive heart failure (HCC)  I50.42 IN OFFICE OUTPATIENT VISIT 15 MINUTES [00722]      7. Paroxysmal atrial fibrillation (HCC)  I48.0 IN OFFICE OUTPATIENT VISIT 15 MINUTES [53926]      8. Hypothyroidism due to acquired atrophy of thyroid  E03.4 T4, Free     TSH     T3      9. Diabetic peripheral neuropathy (HCC)  E11.42       10. Adenomatous polyp of colon, unspecified part of colon  D12.6 Demario Villela MD, Gastroenterology, [place]      11. Screening mammogram for high-risk patient  Z12.31 TAMIKO SULEIMAN DIGITAL SCREEN BILATERAL      12. Aneurysm of ascending aorta without rupture (HCC)  I71.21       13. Secondary hypercoagulable state ([facility])  D68.69              PLAn    Current Outpatient Medications   Medication Sig Dispense Refill    amLODIPine (NORVASC) 5 MG tablet Take 1 tablet by mouth once daily 90 tablet 0    calcitRIOL (ROCALTROL) 0.25 MCG capsule TAKE 1 CAPSULE BY MOUTH TWICE A WEEK 24 capsule 0    ELIQUIS 5 MG TABS tablet Take 1 tablet by mouth twice daily 60 tablet 5    gabapentin (NEURONTIN) 300 MG capsule TAKE 1 CAPSULE BY MOUTH ONCE DAILY IN THE EVENING 90 capsule 1    bumetanide (BUMEX) 1 MG tablet Take 2 tablets by mouth daily Take an extra dose in the evening if you notice increased

## 2024-03-12 ENCOUNTER — TELEPHONE (OUTPATIENT)
Dept: CARDIOLOGY CLINIC | Age: 79
End: 2024-03-12

## 2024-03-21 ENCOUNTER — TELEPHONE (OUTPATIENT)
Dept: FAMILY MEDICINE CLINIC | Age: 79
End: 2024-03-21

## 2024-03-21 DIAGNOSIS — I25.10 CORONARY ARTERY DISEASE INVOLVING NATIVE CORONARY ARTERY OF NATIVE HEART WITHOUT ANGINA PECTORIS: Primary | ICD-10-CM

## 2024-03-21 RX ORDER — ISOSORBIDE DINITRATE 10 MG/1
10 TABLET ORAL 3 TIMES DAILY
Qty: 90 TABLET | Refills: 3 | Status: SHIPPED | OUTPATIENT
Start: 2024-03-21

## 2024-03-21 NOTE — TELEPHONE ENCOUNTER
Pt called said that she is out of isosorbide mononitrate 10 mg one tablet 2 times daily (said she took her last one today).  She said she called [facility] to get the refill and she was told that they are out and so are the other pharmacies in the area.  She asked what she should do?    [facility]

## 2024-03-22 NOTE — TELEPHONE ENCOUNTER
[facility] has a few of the Isordil Nitriate they can give her until a shipment comes in on Monday.

## 2024-03-30 ENCOUNTER — CLINICAL DOCUMENTATION (OUTPATIENT)
Age: 79
End: 2024-03-30

## 2024-03-30 DIAGNOSIS — E03.9 ACQUIRED HYPOTHYROIDISM: Primary | ICD-10-CM

## 2024-03-30 NOTE — PROGRESS NOTES
Abnormal thyroid labs.  I have ordered TSH alpha subunit and human antimouse antibody.  Please coordinate.  She may require testing for thyroid hormone resistance syndrome.

## 2024-04-09 ENCOUNTER — PROCEDURE VISIT (OUTPATIENT)
Dept: CARDIOLOGY CLINIC | Age: 79
End: 2024-04-09

## 2024-04-09 DIAGNOSIS — Z95.0 PACEMAKER: Primary | ICD-10-CM

## 2024-04-09 NOTE — PROGRESS NOTES
Dr smith pt     Breeze Techtronic dual pacer remote   Battery 1-2 yrs remaining    Dddr     A paced 3.3%  V paced 100%    P waves 0.4  Rv waves 12.5    Atrial impedence 342  Vent impedence 437    No atrial threshold measured per the device   Vent threshold 0.75 @ 0.5    Vent amplitude 2 @ 0.4    Known paf/ eliquis   Afib burden 11.2%  Atrial fib therapies are on / pt did receive atp without effectiveness    N/c d/t less than 90 days

## 2024-04-15 DIAGNOSIS — I10 ESSENTIAL HYPERTENSION: ICD-10-CM

## 2024-04-15 DIAGNOSIS — E78.00 PURE HYPERCHOLESTEROLEMIA: ICD-10-CM

## 2024-04-15 DIAGNOSIS — I25.10 CORONARY ARTERY DISEASE INVOLVING NATIVE CORONARY ARTERY OF NATIVE HEART WITHOUT ANGINA PECTORIS: ICD-10-CM

## 2024-04-15 NOTE — TELEPHONE ENCOUNTER
Date of last visit:  3/8/2024  Date of next visit:  6/27/2024    Requested Prescriptions     Pending Prescriptions Disp Refills    clopidogrel (PLAVIX) 75 MG tablet [Pharmacy Med Name: Clopidogrel Bisulfate 75 MG Oral Tablet] 90 tablet 1     Sig: Take 1 tablet by mouth once daily

## 2024-04-15 NOTE — TELEPHONE ENCOUNTER
Date of last visit:  3/8/2024  Date of next visit:  6/27/2024    Requested Prescriptions     Pending Prescriptions Disp Refills    atorvastatin (LIPITOR) 40 MG tablet [Pharmacy Med Name: Atorvastatin Calcium 40 MG Oral Tablet] 90 tablet 1     Sig: Take 1 tablet by mouth once daily    metoprolol tartrate (LOPRESSOR) 50 MG tablet [Pharmacy Med Name: Metoprolol Tartrate 50 MG Oral Tablet] 180 tablet 1     Sig: Take 1 tablet by mouth twice daily

## 2024-04-16 RX ORDER — CLOPIDOGREL BISULFATE 75 MG/1
TABLET ORAL
Qty: 90 TABLET | Refills: 1 | Status: SHIPPED | OUTPATIENT
Start: 2024-04-16

## 2024-04-16 RX ORDER — ATORVASTATIN CALCIUM 40 MG/1
TABLET, FILM COATED ORAL
Qty: 90 TABLET | Refills: 1 | Status: SHIPPED | OUTPATIENT
Start: 2024-04-16

## 2024-04-16 RX ORDER — BUMETANIDE 1 MG/1
TABLET ORAL
Qty: 180 TABLET | Refills: 1 | Status: SHIPPED | OUTPATIENT
Start: 2024-04-16

## 2024-04-16 RX ORDER — METOPROLOL TARTRATE 50 MG/1
TABLET, FILM COATED ORAL
Qty: 180 TABLET | Refills: 1 | Status: SHIPPED | OUTPATIENT
Start: 2024-04-16

## 2024-04-19 ENCOUNTER — NURSE ONLY (OUTPATIENT)
Dept: FAMILY MEDICINE CLINIC | Age: 79
End: 2024-04-19

## 2024-04-19 ENCOUNTER — NURSE ONLY (OUTPATIENT)
Dept: LAB | Age: 79
End: 2024-04-19

## 2024-04-19 DIAGNOSIS — N18.4 CKD (CHRONIC KIDNEY DISEASE) STAGE 4, GFR 15-29 ML/MIN (HCC): ICD-10-CM

## 2024-04-19 DIAGNOSIS — N25.81 SECONDARY HYPERPARATHYROIDISM OF RENAL ORIGIN (HCC): ICD-10-CM

## 2024-04-19 DIAGNOSIS — E03.4 HYPOTHYROIDISM DUE TO ACQUIRED ATROPHY OF THYROID: ICD-10-CM

## 2024-04-19 DIAGNOSIS — E11.9 TYPE 2 DIABETES MELLITUS WITHOUT COMPLICATION, WITHOUT LONG-TERM CURRENT USE OF INSULIN (HCC): Primary | ICD-10-CM

## 2024-04-19 LAB
ANION GAP SERPL CALC-SCNC: 12 MEQ/L (ref 8–16)
BUN SERPL-MCNC: 35 MG/DL (ref 7–22)
CALCIUM SERPL-MCNC: 8.4 MG/DL (ref 8.5–10.5)
CHLORIDE SERPL-SCNC: 98 MEQ/L (ref 98–111)
CHP ED QC CHECK: ABNORMAL
CO2 SERPL-SCNC: 31 MEQ/L (ref 23–33)
CREAT SERPL-MCNC: 1.8 MG/DL (ref 0.4–1.2)
CREAT UR-MCNC: 40.3 MG/DL
DEPRECATED RDW RBC AUTO: 50.2 FL (ref 35–45)
ERYTHROCYTE [DISTWIDTH] IN BLOOD BY AUTOMATED COUNT: 15.4 % (ref 11.5–14.5)
GFR SERPL CREATININE-BSD FRML MDRD: 29 ML/MIN/1.73M2
GLUCOSE BLD-MCNC: 208 MG/DL
GLUCOSE SERPL-MCNC: 186 MG/DL (ref 70–108)
HBA1C MFR BLD: 8.6 %
HCT VFR BLD AUTO: 41.4 % (ref 37–47)
HGB BLD-MCNC: 13.2 GM/DL (ref 12–16)
MCH RBC QN AUTO: 29.1 PG (ref 26–33)
MCHC RBC AUTO-ENTMCNC: 31.9 GM/DL (ref 32.2–35.5)
MCV RBC AUTO: 91.2 FL (ref 81–99)
MICROALBUMIN UR-MCNC: < 1.2 MG/DL
MICROALBUMIN/CREAT RATIO PNL UR: 30 MG/G (ref 0–30)
PLATELET # BLD AUTO: 304 THOU/MM3 (ref 130–400)
PMV BLD AUTO: 10.8 FL (ref 9.4–12.4)
POTASSIUM SERPL-SCNC: 4.5 MEQ/L (ref 3.5–5.2)
PTH-INTACT SERPL-MCNC: 164.6 PG/ML (ref 15–65)
RBC # BLD AUTO: 4.54 MILL/MM3 (ref 4.2–5.4)
SODIUM SERPL-SCNC: 141 MEQ/L (ref 135–145)
T4 FREE SERPL-MCNC: 1.59 NG/DL (ref 0.93–1.68)
TSH SERPL DL<=0.005 MIU/L-ACNC: 24.02 UIU/ML (ref 0.4–4.2)
WBC # BLD AUTO: 8 THOU/MM3 (ref 4.8–10.8)

## 2024-04-20 LAB — T3 TOTAL: 30 NG/DL (ref 80–200)

## 2024-04-21 DIAGNOSIS — E03.4 HYPOTHYROIDISM DUE TO ACQUIRED ATROPHY OF THYROID: Primary | ICD-10-CM

## 2024-04-21 RX ORDER — LEVOTHYROXINE SODIUM 175 UG/1
175 TABLET ORAL DAILY
Qty: 90 TABLET | Refills: 1 | Status: SHIPPED | OUTPATIENT
Start: 2024-04-21

## 2024-04-22 ENCOUNTER — OFFICE VISIT (OUTPATIENT)
Dept: NEPHROLOGY | Age: 79
End: 2024-04-22
Payer: MEDICARE

## 2024-04-22 ENCOUNTER — TELEPHONE (OUTPATIENT)
Dept: FAMILY MEDICINE CLINIC | Age: 79
End: 2024-04-22

## 2024-04-22 VITALS
SYSTOLIC BLOOD PRESSURE: 120 MMHG | BODY MASS INDEX: 37.68 KG/M2 | OXYGEN SATURATION: 98 % | WEIGHT: 237 LBS | HEART RATE: 74 BPM | DIASTOLIC BLOOD PRESSURE: 71 MMHG

## 2024-04-22 DIAGNOSIS — N18.4 CKD (CHRONIC KIDNEY DISEASE) STAGE 4, GFR 15-29 ML/MIN (HCC): Primary | ICD-10-CM

## 2024-04-22 DIAGNOSIS — N25.81 SECONDARY HYPERPARATHYROIDISM OF RENAL ORIGIN (HCC): ICD-10-CM

## 2024-04-22 PROCEDURE — 1123F ACP DISCUSS/DSCN MKR DOCD: CPT | Performed by: INTERNAL MEDICINE

## 2024-04-22 PROCEDURE — 3074F SYST BP LT 130 MM HG: CPT | Performed by: INTERNAL MEDICINE

## 2024-04-22 PROCEDURE — 3078F DIAST BP <80 MM HG: CPT | Performed by: INTERNAL MEDICINE

## 2024-04-22 PROCEDURE — 99214 OFFICE O/P EST MOD 30 MIN: CPT | Performed by: INTERNAL MEDICINE

## 2024-04-22 RX ORDER — CALCITRIOL 0.25 UG/1
0.25 CAPSULE, LIQUID FILLED ORAL
Qty: 24 CAPSULE | Refills: 1 | Status: SHIPPED | OUTPATIENT
Start: 2024-04-23 | End: 2024-07-22

## 2024-04-22 NOTE — PROGRESS NOTES
[facility] [place] [facility]  [facility] KIDNEY AND HYPERTENSION  [address]  [address]  [place] [address]  Dept: [phone]  Loc: [phone]  Progress Note  2024 1:49 PM      Pt Name:    Barbara Zelaya  YOB: 1945  Primary Care Physician:  Xavier Last MD     Chief Complaint:   Chief Complaint   Patient presents with    Follow-up     CKD IV         History of Present Illness:   This is a follow up visit for CKD IV.  She has hx of DM, HTN, HFmrEF (45%), Afib, CAD/PCI     Feels ok.   No swelling. No SOB. On bumex 2 mg daily.   A1C up to 8. Trulicity increased.       Pertinent items are noted in HPI.         Past History:  Past Medical History:   Diagnosis Date    Aortic aneurysm (HCC)     4.5 cm aorta    Arthritis     Atrial fibrillation, chronic (HCC) 2019    CAD (coronary artery disease)     CHF NYHA class II, unspecified failure chronicity, unspecified type ([facility]) 10/8/2018    Colon polyps 3/01    Diabetic peripheral neuropathy ([facility]) 2014      feet    Elbow fracture     Fluid overload 2021    Hyperlipidemia     Hypertension     Hypothyroidism     Pulmonary nodule, right 10/2017      repeat  ct of  chest      S/P cardiac pacemaker procedure: 10/21/2017: Medtronic Dual Chamber. 10/21/2017    10/21/2017: Medtronic Dual Chamber. Dr. Waters    Systolic CHF, acute on chronic (HCC) 6/10/2021    Type II or unspecified type diabetes mellitus without mention of complication, not stated as uncontrolled      Past Surgical History:   Procedure Laterality Date    BREAST BIOPSY Right 2016     benign   [facility]    BREAST SURGERY      CARDIOVERSION  2019    atrial fib to sinus  [facility]     SECTION  over 30 years ago     COLONOSCOPY  2012    colon polyps   [place]    CORONARY ANGIOPLASTY      [facility]    CORONARY ANGIOPLASTY WITH STENT PLACEMENT      [facility]    CORONARY ANGIOPLASTY WITH STENT PLACEMENT  10/2018     [facility]    HEEL SPUR SURGERY

## 2024-04-22 NOTE — TELEPHONE ENCOUNTER
----- Message from Xavier Last MD sent at 4/21/2024  3:43 PM EDT -----  Hemoglobin A1c up to 8.6 so need to watch diet.  We can increase Trulicity although does she have adequate supply as could go to 3 mg weekly    Please call

## 2024-04-22 NOTE — TELEPHONE ENCOUNTER
Barbara informed by Phone. She stated she has Trulicity 0.75 and 1.5 so she will take both of those and when she needs a refill she will increase to 3mg.     Lab order faxed to [facility] per request.

## 2024-04-22 NOTE — TELEPHONE ENCOUNTER
----- Message from Xavier Last MD sent at 4/21/2024  3:50 PM EDT -----  Other labs note tsh still up  so  some effect  of the amiadarone on the thyroid    Will increase synthyroid to 150 and  sent  to [facility] and lab in 3  mths  as mid  july and stop the 137 mcg dose    Please call

## 2024-04-22 NOTE — PATIENT INSTRUCTIONS
Increase Calcitriol to 4 days a week (Mon, Wed, Fri, Sat).  Start Jardiance 10 mg daily. Once you start it then reduce bumex to 1 mg daily.   Labs 1 month after starting it.

## 2024-05-09 ENCOUNTER — OFFICE VISIT (OUTPATIENT)
Dept: CARDIOLOGY CLINIC | Age: 79
End: 2024-05-09
Payer: MEDICARE

## 2024-05-09 VITALS
DIASTOLIC BLOOD PRESSURE: 76 MMHG | BODY MASS INDEX: 36.49 KG/M2 | OXYGEN SATURATION: 95 % | HEIGHT: 67 IN | SYSTOLIC BLOOD PRESSURE: 144 MMHG | WEIGHT: 232.5 LBS | HEART RATE: 65 BPM

## 2024-05-09 DIAGNOSIS — I50.22 CHF NYHA CLASS II, CHRONIC, SYSTOLIC (HCC): Primary | ICD-10-CM

## 2024-05-09 DIAGNOSIS — I48.0 PAROXYSMAL ATRIAL FIBRILLATION (HCC): ICD-10-CM

## 2024-05-09 DIAGNOSIS — I10 ESSENTIAL HYPERTENSION: ICD-10-CM

## 2024-05-09 PROCEDURE — 99214 OFFICE O/P EST MOD 30 MIN: CPT | Performed by: NURSE PRACTITIONER

## 2024-05-09 PROCEDURE — 3077F SYST BP >= 140 MM HG: CPT | Performed by: NURSE PRACTITIONER

## 2024-05-09 PROCEDURE — 3078F DIAST BP <80 MM HG: CPT | Performed by: NURSE PRACTITIONER

## 2024-05-09 PROCEDURE — 1123F ACP DISCUSS/DSCN MKR DOCD: CPT | Performed by: NURSE PRACTITIONER

## 2024-05-09 ASSESSMENT — ENCOUNTER SYMPTOMS
ABDOMINAL DISTENTION: 0
SHORTNESS OF BREATH: 0
COUGH: 0

## 2024-05-09 NOTE — PATIENT INSTRUCTIONS
You may receive a survey regarding the care you received during your visit.  Your input is valuable to us.  We encourage you to complete and return your survey.  We hope you will choose us in the future for your healthcare needs.    Your nurses today were Sunny.  Office hours:   Mon-Thurs 8-4:30  Friday 8-12  Phone: [phone]    Continue:  Continue current medications  Daily weights and record  Fluid restriction of 2 Liters per day  Limit sodium in diet to around 8173-8619 mg/day  Monitor BP  Activity as tolerated     Call the Heart Failure Clinic for any of the following symptoms:   Weight gain of 3 pounds in 1 day or 5 pounds in 1 week  Increased shortness of breath  Shortness of breath while laying down  Cough  Chest pain  Swelling in feet, ankles or legs  Bloating in abdomen  Fatigue

## 2024-05-09 NOTE — PROGRESS NOTES
Heart Failure Clinic       Visit Date: 5/9/2024  Cardiologist:  Dr. Johnson  Primary Care Physician: Xavier Gibosn MD    Barbara Zelaya is a 79 y.o. female who presents today for:  Chief Complaint   Patient presents with    Congestive Heart Failure       HPI:   Barbara Zelaya is a 79 y.o. female who presents to the office for a follow up patient visit in the heart failure clinic.  Accompanied by no one    TYPE HF: HFpEF (EF 55%)  Device: Pacemaker (2017, bradycardia)  HX: HTN, HLD, Afib s/p DCCV (Eliquis), CAD-PCI LAD, PCI OMx2, CKD ([facility])      3/2022 -  240#.  feeling good - no concerns voiced.   No fluid on exam.  Not needed Metolazone in months.   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     is pt here - currently in NH - she visits often, very involved in his care.     5/2023:  242#  Feeling good - no concerns voiced.    passed few months ago.  Staying active.    No fluid on exam      5/2024 TODAY - 232#  Doing well - no fluid issues.  No fluid on exam.  On lower dose Bumex since last OV - just started Jardiance, by [place] - decreased Bumex furhter then  120/60s at home.    More active going to ball games, traveling.    Looks great today.       Past Medical History:   Diagnosis Date    Aortic aneurysm ([facility])     4.5 cm aorta    Arthritis     Atrial fibrillation, chronic ([facility]) 1/25/2019    CAD (coronary artery disease)     CHF NYHA class II, unspecified failure chronicity, unspecified type ([facility]) 10/8/2018    Colon polyps 3/01    Diabetic peripheral neuropathy ([facility]) 2014      feet    Elbow fracture     Fluid overload 6/9/2021    Hyperlipidemia     Hypertension     Hypothyroidism     Pulmonary nodule, right 10/2017      repeat  ct of  chest  4-2018    S/P cardiac pacemaker procedure: 10/21/2017: Medtronic Dual Chamber. 10/21/2017    10/21/2017: Medtronic Dual Chamber. Dr. Waters    Systolic CHF, acute on chronic (HCC) 6/10/2021    Type II or unspecified type diabetes mellitus

## 2024-05-10 NOTE — PROGRESS NOTES
Patient reports feeling well since her heart cath, but reports ongoing fatigue that she feels is just \"the way it is\" now. Is currently taking a lesser dose of Bumex until she sees Dr Eren Kee for dosage adjustment/clearance.
Please confirm with patient although I thought we did - I think she had alternated but now may be on one so once confirmed update med list to reflect how she takes and when/what she takes   
Outpatient Medications   Medication Sig Dispense Refill    amLODIPine (NORVASC) 5 MG tablet Take 1 tablet by mouth once daily 90 tablet 1    gabapentin (NEURONTIN) 300 MG capsule TAKE 1 CAPSULE BY MOUTH ONCE DAILY IN THE EVENING 90 capsule 0    dulaglutide (TRULICITY) 1.5 NQ/1.5GG SC injection Inject 0.5 mLs into the skin once a week 12 Adjustable Dose Pre-filled Pen Syringe 1    isosorbide mononitrate (ISMO;MONOKET) 10 MG tablet Take 1 tablet by mouth 2 times daily Please take at 0800 and 1600 (4PM) during the day 60 tablet 1    Dulaglutide (TRULICITY) 7.41 QM/2.2AH SOPN INJECT  2 SYRINGE SUBCUTANEOUSLY ONCE A WEEK 12 mL 3    amiodarone (CORDARONE) 200 MG tablet Take 1 tablet by mouth once daily 90 tablet 0    bumetanide (BUMEX) 1 MG tablet TAKE 2 MG IN THE MORNING, 1 MG IN THE AFTERNOON (Patient taking differently: 1 tablet daily Indications: Do NOT RESUME UNTIL CLEARED BY KIDNEY DOCTOR Bumex 1 mg daily) 360 tablet 1    glimepiride (AMARYL) 4 MG tablet Take 1 tablet by mouth twice daily 180 tablet 1    levothyroxine (SYNTHROID) 137 MCG tablet Take 1 tablet by mouth once daily 90 tablet 1    ELIQUIS 5 MG TABS tablet Take 1 tablet by mouth twice daily 180 tablet 1    zinc sulfate (ZINCATE) 220 (50 Zn) MG capsule Take 1 capsule by mouth daily      vitamin D3 (CHOLECALCIFEROL) 10 MCG (400 UNIT) TABS tablet Take 1 tablet by mouth daily      metoprolol tartrate (LOPRESSOR) 50 MG tablet Take 1 tablet by mouth twice daily 180 tablet 1    atorvastatin (LIPITOR) 40 MG tablet Take 1 tablet by mouth once daily 90 tablet 1    clopidogrel (PLAVIX) 75 MG tablet Take 1 tablet by mouth once daily 90 tablet 1    Omega-3 Fatty Acids (FISH OIL) 1000 MG capsule Take by mouth daily      Glucose Blood (JOSE E BREEZE 2 TEST) DISK USE ONE STRIP TWICE DAILY, DX: E11.9 200 each 5    Ascorbic Acid (VITAMIN C) 500 MG tablet Take 1 tablet by mouth daily      nitroGLYCERIN (NITROSTAT) 0.4 MG SL tablet Place 1 tablet under the tongue every 5

## 2024-05-15 ENCOUNTER — OFFICE VISIT (OUTPATIENT)
Age: 79
End: 2024-05-15
Payer: MEDICARE

## 2024-05-15 VITALS
HEIGHT: 67 IN | BODY MASS INDEX: 36.41 KG/M2 | HEART RATE: 82 BPM | SYSTOLIC BLOOD PRESSURE: 142 MMHG | DIASTOLIC BLOOD PRESSURE: 78 MMHG | WEIGHT: 232 LBS

## 2024-05-15 DIAGNOSIS — E11.65 TYPE 2 DIABETES MELLITUS WITH HYPERGLYCEMIA, WITHOUT LONG-TERM CURRENT USE OF INSULIN (HCC): ICD-10-CM

## 2024-05-15 DIAGNOSIS — E03.9 ACQUIRED HYPOTHYROIDISM: Primary | ICD-10-CM

## 2024-05-15 PROCEDURE — 1123F ACP DISCUSS/DSCN MKR DOCD: CPT | Performed by: INTERNAL MEDICINE

## 2024-05-15 PROCEDURE — 99214 OFFICE O/P EST MOD 30 MIN: CPT | Performed by: INTERNAL MEDICINE

## 2024-05-15 PROCEDURE — 3052F HG A1C>EQUAL 8.0%<EQUAL 9.0%: CPT | Performed by: INTERNAL MEDICINE

## 2024-05-15 PROCEDURE — 3077F SYST BP >= 140 MM HG: CPT | Performed by: INTERNAL MEDICINE

## 2024-05-15 PROCEDURE — 3078F DIAST BP <80 MM HG: CPT | Performed by: INTERNAL MEDICINE

## 2024-05-15 NOTE — PROGRESS NOTES
vaccine  Aged Out    Diabetic foot exam  Discontinued    A1C test (Diabetic or Prediabetic)  Discontinued    Diabetic Alb to Cr ratio (uACR) test  Discontinued    Diabetic retinal exam  Discontinued    Breast cancer screen  Discontinued    Colorectal Cancer Screen  Discontinued         Review of Systems    Constitutional: negative for chills and fevers  Eyes: negative for irritation, redness and visual disturbance  Respiratory: negative for cough, shortness of breath and wheezing  Cardiovascular: negative for chest pain, irregular heart beat and palpitations   The remainder of systems were reviewed and negative.       Objective:    BP (!) 142/78 (Site: Left Upper Arm, Position: Sitting, Cuff Size: Medium Adult)   Pulse 82   Ht 1.689 m (5' 6.5\")   Wt 105.2 kg (232 lb)   BMI 36.88 kg/m²   Body surface area is 2.22 meters squared.      Physical Exam   General: alert, appears stated age, cooperative and no distress   Eyes: negative findings: lids and lashes normal, conjunctivae and sclerae normal, corneas clear and pupils equal, round, reactive to light and accomodation  Neck:no adenopathy, no carotid bruit, no JVD and supple, symmetrical, trachea midline  Thyroid: s/p partial thyroidectomy  Lung:clear to auscultation bilaterally  Heart: regular rate and rhythm, S1, S2 normal, no murmur, click, rub or gallop and normal apical impulse  Abdomen:soft, non-tender; bowel sounds normal; no masses,  no organomegaly  Extremities:extremities normal, atraumatic, no cyanosis or edema and Homans sign is negative, no sign of DVT  Pulses:2+ and symmetric  Skin:warm and dry, no hyperpigmentation, vitiligo, or suspicious lesions  Neuro:normal without focal findings, mental status, speech normal, alert and oriented x3, OMID, cranial nerves 2-12 intact, muscle tone and strength normal and symmetric.  Lymph nodes: There is no cervical, supraclavicular or submental adenopathy.  Musculoskeletal:  No joint swelling or

## 2024-05-20 ENCOUNTER — TELEPHONE (OUTPATIENT)
Dept: FAMILY MEDICINE CLINIC | Age: 79
End: 2024-05-20

## 2024-05-20 DIAGNOSIS — E11.9 TYPE 2 DIABETES MELLITUS WITHOUT COMPLICATION, WITHOUT LONG-TERM CURRENT USE OF INSULIN (HCC): ICD-10-CM

## 2024-05-20 NOTE — TELEPHONE ENCOUNTER
Saw where she saw dr allele for thyroid      Ar sugars better as if not increase trulicity  to 3 mg a week    Please call

## 2024-05-21 NOTE — TELEPHONE ENCOUNTER
Barbara informed by Phone.     She stated her sugars are better and Trulicity was already increased to 3mg on 4-

## 2024-05-23 DIAGNOSIS — E11.9 TYPE 2 DIABETES MELLITUS WITHOUT COMPLICATION, WITHOUT LONG-TERM CURRENT USE OF INSULIN (HCC): ICD-10-CM

## 2024-05-23 RX ORDER — GLIMEPIRIDE 4 MG/1
TABLET ORAL
Qty: 180 TABLET | Refills: 1 | Status: SHIPPED | OUTPATIENT
Start: 2024-05-23

## 2024-05-23 RX ORDER — DULAGLUTIDE 1.5 MG/.5ML
3 INJECTION, SOLUTION SUBCUTANEOUS WEEKLY
Qty: 1 ADJUSTABLE DOSE PRE-FILLED PEN SYRINGE | Refills: 0
Start: 2024-05-23 | End: 2024-05-24 | Stop reason: ALTCHOICE

## 2024-05-23 RX ORDER — AMIODARONE HYDROCHLORIDE 200 MG/1
TABLET ORAL
Qty: 90 TABLET | Refills: 1 | Status: SHIPPED | OUTPATIENT
Start: 2024-05-23

## 2024-05-23 NOTE — TELEPHONE ENCOUNTER
The pharmacy is  requesting a refill of the below medication which has been pended for you:     Requested Prescriptions     Pending Prescriptions Disp Refills    amiodarone (CORDARONE) 200 MG tablet [Pharmacy Med Name: Amiodarone HCl 200 MG Oral Tablet] 90 tablet 1     Sig: Take 1 tablet by mouth once daily    glimepiride (AMARYL) 4 MG tablet [Pharmacy Med Name: Glimepiride 4 MG Oral Tablet] 180 tablet 1     Sig: Take 1 tablet by mouth twice daily       Last Appointment Date: 3/8/2024  Next Appointment Date: 6/27/2024    Allergies   Allergen Reactions    Adhesive Tape Rash

## 2024-05-23 NOTE — TELEPHONE ENCOUNTER
Patient stated that her FBS in the morning have been running 160-180.  She has been using 3mg of the Trulicity for about a month.  Patient checked FBS while on the phone and it was 162.    Please send new RX to [facility].    Please call her with any recommendations.

## 2024-05-23 NOTE — TELEPHONE ENCOUNTER
Ok  need  more  pens as think use  up  1.5 mg pens and then there is a 3 mg pen     Please  inform

## 2024-05-24 RX ORDER — DULAGLUTIDE 4.5 MG/.5ML
4.5 INJECTION, SOLUTION SUBCUTANEOUS WEEKLY
Qty: 4 ADJUSTABLE DOSE PRE-FILLED PEN SYRINGE | Refills: 3 | Status: SHIPPED | OUTPATIENT
Start: 2024-05-24

## 2024-05-24 NOTE — TELEPHONE ENCOUNTER
Will increase the  trulicity  to  4.5 mg  once a week       4  pens as one weekly and 3  refills      Apparent script to [facility]     Please inform increase of dose and to [facility]

## 2024-05-28 DIAGNOSIS — I10 ESSENTIAL HYPERTENSION: ICD-10-CM

## 2024-05-28 RX ORDER — AMLODIPINE BESYLATE 5 MG/1
TABLET ORAL
Qty: 90 TABLET | Refills: 1 | Status: SHIPPED | OUTPATIENT
Start: 2024-05-28

## 2024-05-28 NOTE — TELEPHONE ENCOUNTER
Date of last visit:  3/8/2024  Date of next visit:  6/27/2024    Requested Prescriptions     Pending Prescriptions Disp Refills    amLODIPine (NORVASC) 5 MG tablet [Pharmacy Med Name: amLODIPine Besylate 5 MG Oral Tablet] 90 tablet 1     Sig: Take 1 tablet by mouth once daily

## 2024-06-04 ENCOUNTER — OFFICE VISIT (OUTPATIENT)
Dept: CARDIOLOGY CLINIC | Age: 79
End: 2024-06-04
Payer: MEDICARE

## 2024-06-04 VITALS
DIASTOLIC BLOOD PRESSURE: 80 MMHG | WEIGHT: 226.4 LBS | SYSTOLIC BLOOD PRESSURE: 158 MMHG | BODY MASS INDEX: 35.53 KG/M2 | HEIGHT: 67 IN | HEART RATE: 80 BPM

## 2024-06-04 DIAGNOSIS — I48.0 PAROXYSMAL ATRIAL FIBRILLATION (HCC): Primary | ICD-10-CM

## 2024-06-04 DIAGNOSIS — I10 ESSENTIAL HYPERTENSION: ICD-10-CM

## 2024-06-04 DIAGNOSIS — I50.22 CHF NYHA CLASS II, CHRONIC, SYSTOLIC (HCC): ICD-10-CM

## 2024-06-04 DIAGNOSIS — I25.10 CORONARY ARTERY DISEASE INVOLVING NATIVE CORONARY ARTERY OF NATIVE HEART WITHOUT ANGINA PECTORIS: ICD-10-CM

## 2024-06-04 PROCEDURE — 1123F ACP DISCUSS/DSCN MKR DOCD: CPT | Performed by: PHYSICIAN ASSISTANT

## 2024-06-04 PROCEDURE — 3077F SYST BP >= 140 MM HG: CPT | Performed by: PHYSICIAN ASSISTANT

## 2024-06-04 PROCEDURE — 99214 OFFICE O/P EST MOD 30 MIN: CPT | Performed by: PHYSICIAN ASSISTANT

## 2024-06-04 PROCEDURE — 3079F DIAST BP 80-89 MM HG: CPT | Performed by: PHYSICIAN ASSISTANT

## 2024-06-04 RX ORDER — AMLODIPINE BESYLATE 10 MG/1
10 TABLET ORAL DAILY
Qty: 90 TABLET | Refills: 3 | Status: SHIPPED | OUTPATIENT
Start: 2024-06-04

## 2024-06-04 RX ORDER — AMLODIPINE BESYLATE 10 MG/1
10 TABLET ORAL DAILY
COMMUNITY
End: 2024-06-04 | Stop reason: SDUPTHER

## 2024-06-04 NOTE — PROGRESS NOTES
tablet Take 1 tablet by mouth daily      Dulaglutide (TRULICITY) 4.5 MG/0.5ML SOPN Inject 4.5 mg into the skin once a week 4 Adjustable Dose Pre-filled Pen Syringe 3    amiodarone (CORDARONE) 200 MG tablet Take 1 tablet by mouth once daily 90 tablet 1    glimepiride (AMARYL) 4 MG tablet Take 1 tablet by mouth twice daily 180 tablet 1    empagliflozin (JARDIANCE) 10 MG tablet Take 1 tablet by mouth daily 30 tablet 3    calcitRIOL (ROCALTROL) 0.25 MCG capsule Take 1 capsule by mouth four times a week 24 capsule 1    levothyroxine (SYNTHROID) 175 MCG tablet Take 1 tablet by mouth daily 90 tablet 1    atorvastatin (LIPITOR) 40 MG tablet Take 1 tablet by mouth once daily 90 tablet 1    metoprolol tartrate (LOPRESSOR) 50 MG tablet Take 1 tablet by mouth twice daily 180 tablet 1    bumetanide (BUMEX) 1 MG tablet 2 tabs every day (Patient taking differently: 1 tablet 2 tabs every day) 180 tablet 1    clopidogrel (PLAVIX) 75 MG tablet Take 1 tablet by mouth once daily 90 tablet 1    isosorbide dinitrate (ISORDIL) 10 MG tablet Take 1 tablet by mouth 3 times daily 90 tablet 3    ELIQUIS 5 MG TABS tablet Take 1 tablet by mouth twice daily 60 tablet 5    gabapentin (NEURONTIN) 300 MG capsule TAKE 1 CAPSULE BY MOUTH ONCE DAILY IN THE EVENING 90 capsule 1    Flaxseed, Linseed, (FLAX SEED OIL) 1000 MG CAPS Take 1,000 mg by mouth daily      zinc sulfate (ZINCATE) 220 (50 Zn) MG capsule Take 1 capsule by mouth daily as needed      vitamin D3 (CHOLECALCIFEROL) 10 MCG (400 UNIT) TABS tablet Take 1 tablet by mouth daily as needed      nitroGLYCERIN (NITROSTAT) 0.4 MG SL tablet Place 1 tablet under the tongue every 5 minutes as needed for Chest pain up to max of 3 total doses. If no relief after 1 dose, call 911. 25 tablet 1    Glucose Blood (JOSE E BREEZE 2 TEST) DISK USE ONE STRIP TWICE DAILY, DX: E11.9 200 each 5    Ascorbic Acid (VITAMIN C) 500 MG tablet Take 1 tablet by mouth daily       No current facility-administered medications

## 2024-06-07 DIAGNOSIS — E11.42 DIABETIC PERIPHERAL NEUROPATHY (HCC): ICD-10-CM

## 2024-06-07 NOTE — TELEPHONE ENCOUNTER
The pharmacy is  requesting a refill of the below medication which has been pended for you:     Requested Prescriptions     Pending Prescriptions Disp Refills    gabapentin (NEURONTIN) 300 MG capsule [Pharmacy Med Name: Gabapentin 300 MG Oral Capsule] 90 capsule 0     Sig: TAKE 1 CAPSULE BY MOUTH ONCE DAILY IN THE EVENING       Last Appointment Date: 3/8/2024  Next Appointment Date: 6/27/2024    Allergies   Allergen Reactions    Adhesive Tape Rash

## 2024-06-09 RX ORDER — GABAPENTIN 300 MG/1
CAPSULE ORAL
Qty: 90 CAPSULE | Refills: 1 | Status: SHIPPED | OUTPATIENT
Start: 2024-06-09 | End: 2024-12-09

## 2024-06-18 DIAGNOSIS — I48.0 PAROXYSMAL ATRIAL FIBRILLATION (HCC): ICD-10-CM

## 2024-06-18 RX ORDER — APIXABAN 5 MG/1
TABLET, FILM COATED ORAL
Qty: 60 TABLET | Refills: 5 | Status: SHIPPED | OUTPATIENT
Start: 2024-06-18

## 2024-06-18 NOTE — TELEPHONE ENCOUNTER
Date of last visit:  3/8/2024  Date of next visit:  6/27/2024    Requested Prescriptions     Pending Prescriptions Disp Refills    ELIQUIS 5 MG TABS tablet [Pharmacy Med Name: Eliquis 5 MG Oral Tablet] 60 tablet 5     Sig: Take 1 tablet by mouth twice daily

## 2024-06-27 ENCOUNTER — OFFICE VISIT (OUTPATIENT)
Dept: FAMILY MEDICINE CLINIC | Age: 79
End: 2024-06-27

## 2024-06-27 VITALS
HEART RATE: 84 BPM | RESPIRATION RATE: 16 BRPM | BODY MASS INDEX: 36.02 KG/M2 | DIASTOLIC BLOOD PRESSURE: 64 MMHG | SYSTOLIC BLOOD PRESSURE: 118 MMHG | WEIGHT: 229.5 LBS | HEIGHT: 67 IN

## 2024-06-27 DIAGNOSIS — I48.0 PAROXYSMAL ATRIAL FIBRILLATION (HCC): ICD-10-CM

## 2024-06-27 DIAGNOSIS — N18.4 CKD (CHRONIC KIDNEY DISEASE) STAGE 4, GFR 15-29 ML/MIN (HCC): ICD-10-CM

## 2024-06-27 DIAGNOSIS — E03.4 HYPOTHYROIDISM DUE TO ACQUIRED ATROPHY OF THYROID: ICD-10-CM

## 2024-06-27 DIAGNOSIS — E11.42 DIABETIC PERIPHERAL NEUROPATHY (HCC): ICD-10-CM

## 2024-06-27 DIAGNOSIS — E11.9 TYPE 2 DIABETES MELLITUS WITHOUT COMPLICATION, WITHOUT LONG-TERM CURRENT USE OF INSULIN (HCC): ICD-10-CM

## 2024-06-27 DIAGNOSIS — Z95.0 S/P CARDIAC PACEMAKER PROCEDURE: ICD-10-CM

## 2024-06-27 DIAGNOSIS — I49.5 SICK SINUS SYNDROME (HCC): ICD-10-CM

## 2024-06-27 DIAGNOSIS — I20.89 ANGINA OF EFFORT (HCC): ICD-10-CM

## 2024-06-27 DIAGNOSIS — E78.00 PURE HYPERCHOLESTEROLEMIA: ICD-10-CM

## 2024-06-27 DIAGNOSIS — E11.22 TYPE 2 DIABETES MELLITUS WITH STAGE 3B CHRONIC KIDNEY DISEASE, WITHOUT LONG-TERM CURRENT USE OF INSULIN (HCC): Primary | ICD-10-CM

## 2024-06-27 DIAGNOSIS — I10 PRIMARY HYPERTENSION: ICD-10-CM

## 2024-06-27 DIAGNOSIS — N18.32 TYPE 2 DIABETES MELLITUS WITH STAGE 3B CHRONIC KIDNEY DISEASE, WITHOUT LONG-TERM CURRENT USE OF INSULIN (HCC): Primary | ICD-10-CM

## 2024-06-27 DIAGNOSIS — I25.10 CORONARY ARTERY DISEASE INVOLVING NATIVE CORONARY ARTERY OF NATIVE HEART WITHOUT ANGINA PECTORIS: ICD-10-CM

## 2024-06-27 PROCEDURE — 99213 OFFICE O/P EST LOW 20 MIN: CPT | Performed by: FAMILY MEDICINE

## 2024-06-27 PROCEDURE — 1123F ACP DISCUSS/DSCN MKR DOCD: CPT | Performed by: FAMILY MEDICINE

## 2024-06-27 ASSESSMENT — ENCOUNTER SYMPTOMS
SORE THROAT: 0
COUGH: 0
ABDOMINAL PAIN: 0
EYE PAIN: 0
CHEST TIGHTNESS: 0
NAUSEA: 0
CONSTIPATION: 0
SHORTNESS OF BREATH: 0
WHEEZING: 0

## 2024-07-22 RX ORDER — CALCITRIOL 0.25 UG/1
0.25 CAPSULE, LIQUID FILLED ORAL
Qty: 52 CAPSULE | Refills: 3 | Status: SHIPPED | OUTPATIENT
Start: 2024-07-23 | End: 2024-10-21

## 2024-07-26 ENCOUNTER — LAB (OUTPATIENT)
Dept: LAB | Age: 79
End: 2024-07-26

## 2024-07-26 DIAGNOSIS — E03.9 ACQUIRED HYPOTHYROIDISM: ICD-10-CM

## 2024-07-26 DIAGNOSIS — E03.4 HYPOTHYROIDISM DUE TO ACQUIRED ATROPHY OF THYROID: ICD-10-CM

## 2024-07-26 DIAGNOSIS — E11.42 DIABETIC PERIPHERAL NEUROPATHY (HCC): ICD-10-CM

## 2024-07-26 DIAGNOSIS — E11.22 TYPE 2 DIABETES MELLITUS WITH STAGE 3B CHRONIC KIDNEY DISEASE, WITHOUT LONG-TERM CURRENT USE OF INSULIN (HCC): ICD-10-CM

## 2024-07-26 DIAGNOSIS — N18.4 CKD (CHRONIC KIDNEY DISEASE) STAGE 4, GFR 15-29 ML/MIN (HCC): ICD-10-CM

## 2024-07-26 DIAGNOSIS — N18.32 TYPE 2 DIABETES MELLITUS WITH STAGE 3B CHRONIC KIDNEY DISEASE, WITHOUT LONG-TERM CURRENT USE OF INSULIN (HCC): ICD-10-CM

## 2024-07-26 LAB
ANION GAP SERPL CALC-SCNC: 14 MEQ/L (ref 8–16)
BUN SERPL-MCNC: 19 MG/DL (ref 7–22)
CALCIUM SERPL-MCNC: 8.9 MG/DL (ref 8.5–10.5)
CHLORIDE SERPL-SCNC: 98 MEQ/L (ref 98–111)
CO2 SERPL-SCNC: 28 MEQ/L (ref 23–33)
CREAT SERPL-MCNC: 1.7 MG/DL (ref 0.4–1.2)
DEPRECATED MEAN GLUCOSE BLD GHB EST-ACNC: 135 MG/DL (ref 70–126)
GFR SERPL CREATININE-BSD FRML MDRD: 31 ML/MIN/1.73M2
GLUCOSE SERPL-MCNC: 121 MG/DL (ref 70–108)
HBA1C MFR BLD HPLC: 6.5 % (ref 4.4–6.4)
POTASSIUM SERPL-SCNC: 4.2 MEQ/L (ref 3.5–5.2)
PTH-INTACT SERPL-MCNC: 110.9 PG/ML (ref 15–65)
SODIUM SERPL-SCNC: 140 MEQ/L (ref 135–145)
T4 FREE SERPL-MCNC: 2.08 NG/DL (ref 0.93–1.68)
TSH SERPL DL<=0.005 MIU/L-ACNC: 1.96 UIU/ML (ref 0.4–4.2)

## 2024-07-27 LAB — T3FREE SERPL-MCNC: 2 PG/ML (ref 2–4.4)

## 2024-07-29 ENCOUNTER — TELEPHONE (OUTPATIENT)
Dept: FAMILY MEDICINE CLINIC | Age: 79
End: 2024-07-29

## 2024-07-29 DIAGNOSIS — I25.10 CORONARY ARTERY DISEASE INVOLVING NATIVE CORONARY ARTERY OF NATIVE HEART WITHOUT ANGINA PECTORIS: ICD-10-CM

## 2024-07-29 RX ORDER — ISOSORBIDE DINITRATE 10 MG/1
10 TABLET ORAL 3 TIMES DAILY
Qty: 90 TABLET | Refills: 3 | Status: SHIPPED | OUTPATIENT
Start: 2024-07-29

## 2024-07-29 NOTE — TELEPHONE ENCOUNTER
Date of last visit:  6/27/2024  Date of next visit:  10/28/2024    Requested Prescriptions     Pending Prescriptions Disp Refills    isosorbide dinitrate (ISORDIL) 10 MG tablet [Pharmacy Med Name: Isosorbide Dinitrate 10 MG Oral Tablet] 90 tablet 0     Sig: TAKE 1 TABLET BY MOUTH THREE TIMES DAILY

## 2024-07-29 NOTE — TELEPHONE ENCOUNTER
Pt was informed. States she just picked up another bottle of 175 synthroid. Can she take every other day instead of picking up new script ?

## 2024-07-29 NOTE — TELEPHONE ENCOUNTER
----- Message from Xavier Last MD sent at 7/29/2024  6:01 AM EDT -----  Hgb A1c is better as great  at 6.5   too much thyroid         Drop thyroid dose from 175  to 150  mcg dose  and level in 3 mths .  I know seeing dr laurent  as check with him on the  dosing     Please call

## 2024-07-30 NOTE — TELEPHONE ENCOUNTER
I would have her  check with  dr allele office  on what he wants her to do as probably more like take every day but none on Sunday

## 2024-08-01 LAB — T4 FREE SERPL DIALY-MCNC: 3.8 NG/DL (ref 1.1–2.4)

## 2024-08-02 ENCOUNTER — TELEPHONE (OUTPATIENT)
Dept: FAMILY MEDICINE CLINIC | Age: 79
End: 2024-08-02

## 2024-08-02 NOTE — TELEPHONE ENCOUNTER
----- Message from Xavier Last MD sent at 8/1/2024 11:52 PM EDT -----  T4 Level high   and make sure dr george does see

## 2024-08-05 ENCOUNTER — OFFICE VISIT (OUTPATIENT)
Dept: NEPHROLOGY | Age: 79
End: 2024-08-05
Payer: MEDICARE

## 2024-08-05 VITALS
SYSTOLIC BLOOD PRESSURE: 130 MMHG | HEART RATE: 88 BPM | BODY MASS INDEX: 36.25 KG/M2 | WEIGHT: 228 LBS | OXYGEN SATURATION: 98 % | DIASTOLIC BLOOD PRESSURE: 68 MMHG

## 2024-08-05 DIAGNOSIS — N25.81 SECONDARY HYPERPARATHYROIDISM OF RENAL ORIGIN (HCC): ICD-10-CM

## 2024-08-05 DIAGNOSIS — N18.32 STAGE 3B CHRONIC KIDNEY DISEASE (HCC): Primary | ICD-10-CM

## 2024-08-05 PROCEDURE — 1123F ACP DISCUSS/DSCN MKR DOCD: CPT | Performed by: INTERNAL MEDICINE

## 2024-08-05 PROCEDURE — 99214 OFFICE O/P EST MOD 30 MIN: CPT | Performed by: INTERNAL MEDICINE

## 2024-08-05 PROCEDURE — 3078F DIAST BP <80 MM HG: CPT | Performed by: INTERNAL MEDICINE

## 2024-08-05 PROCEDURE — G2211 COMPLEX E/M VISIT ADD ON: HCPCS | Performed by: INTERNAL MEDICINE

## 2024-08-05 PROCEDURE — 3075F SYST BP GE 130 - 139MM HG: CPT | Performed by: INTERNAL MEDICINE

## 2024-08-05 RX ORDER — BUMETANIDE 1 MG/1
1 TABLET ORAL DAILY
COMMUNITY

## 2024-08-05 NOTE — PROGRESS NOTES
a week        Bloodwork and medications were reviewed and plan of care discussed with the patient.  Return to clinic in 6 months or sooner if the need arises.      Aurora Cannon DO  [facility]

## 2024-08-07 LAB — T3FREE SERPL DIALY-MCNC: 2.42 PG/ML

## 2024-08-08 ENCOUNTER — TELEPHONE (OUTPATIENT)
Dept: FAMILY MEDICINE CLINIC | Age: 79
End: 2024-08-08

## 2024-08-08 LAB — MISC. #1 REFERENCE GROUP TEST: NORMAL

## 2024-08-08 NOTE — TELEPHONE ENCOUNTER
----- Message from Fredo Desai MD sent at 8/8/2024  4:01 PM EDT -----  Please call patient Free T3 is normal, follow-up with Dr. Last if having more concern or problem

## 2024-08-15 ENCOUNTER — PROCEDURE VISIT (OUTPATIENT)
Dept: CARDIOLOGY CLINIC | Age: 79
End: 2024-08-15

## 2024-08-15 DIAGNOSIS — Z95.0 PACEMAKER: Primary | ICD-10-CM

## 2024-08-15 NOTE — PROGRESS NOTES
[employer] Medtronic Dual Pacemaker   Patient of [facility]    Battery 1.5 years    Presenting rhythm AF     A Impedance 342  RV Impedance 437    P wave sensing 0.4  R wave sensing >20    A Threshold - @ -  A Amplitude 1.75 @ 0.4  RV Thresholds 0.75 @ 0.4  RV Amplitude 2 @ 0.4      A Paced 1.2%  V Paced 100%    Programmed Mode DDDR       Afib Lynn 100% - known AF on Eliquis    Episodes  AF RVR

## 2024-08-19 ENCOUNTER — TELEPHONE (OUTPATIENT)
Age: 79
End: 2024-08-19

## 2024-08-19 NOTE — TELEPHONE ENCOUNTER
----- Message from Dr. Dmitriy Esparza MD sent at 8/16/2024  5:08 PM EDT -----  Human Anti-MOUSE antibody was normal.  Keep existing appointment

## 2024-08-20 ENCOUNTER — TELEPHONE (OUTPATIENT)
Dept: FAMILY MEDICINE CLINIC | Age: 79
End: 2024-08-20

## 2024-08-20 NOTE — TELEPHONE ENCOUNTER
Pt's daughter stopped in drop off forms for pt for [facility] for assistance on pt's trulicity I called pt and let hr know we needed proof of income she will bring up proof of income    Blank forms scanned in and placed on cart to be completed

## 2024-08-31 ENCOUNTER — CLINICAL DOCUMENTATION (OUTPATIENT)
Age: 79
End: 2024-08-31

## 2024-09-06 DIAGNOSIS — E78.00 PURE HYPERCHOLESTEROLEMIA: ICD-10-CM

## 2024-09-06 NOTE — TELEPHONE ENCOUNTER
Date of last visit:  6/27/2024  Date of next visit:  10/28/2024    Requested Prescriptions     Pending Prescriptions Disp Refills    atorvastatin (LIPITOR) 40 MG tablet [Pharmacy Med Name: Atorvastatin Calcium 40 MG Oral Tablet] 90 tablet 1     Sig: Take 1 tablet by mouth once daily

## 2024-09-08 RX ORDER — ATORVASTATIN CALCIUM 40 MG/1
TABLET, FILM COATED ORAL
Qty: 90 TABLET | Refills: 1 | Status: SHIPPED | OUTPATIENT
Start: 2024-09-08

## 2024-09-20 RX ORDER — EMPAGLIFLOZIN 10 MG/1
10 TABLET, FILM COATED ORAL DAILY
Qty: 30 TABLET | Refills: 5 | Status: SHIPPED | OUTPATIENT
Start: 2024-09-20

## 2024-10-12 ENCOUNTER — APPOINTMENT (OUTPATIENT)
Dept: GENERAL RADIOLOGY | Age: 79
End: 2024-10-12
Payer: MEDICARE

## 2024-10-12 ENCOUNTER — APPOINTMENT (OUTPATIENT)
Dept: CT IMAGING | Age: 79
End: 2024-10-12
Payer: MEDICARE

## 2024-10-12 ENCOUNTER — HOSPITAL ENCOUNTER (EMERGENCY)
Age: 79
Discharge: HOME | End: 2024-10-12
Attending: EMERGENCY MEDICINE
Payer: MEDICARE

## 2024-10-12 VITALS
RESPIRATION RATE: 16 BRPM | HEART RATE: 60 BPM | DIASTOLIC BLOOD PRESSURE: 74 MMHG | SYSTOLIC BLOOD PRESSURE: 146 MMHG | TEMPERATURE: 98.9 F | BODY MASS INDEX: 36.88 KG/M2 | WEIGHT: 232 LBS | OXYGEN SATURATION: 93 %
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
## 2024-10-12 PROCEDURE — 71046 X-RAY EXAM CHEST 2 VIEWS: CPT

## 2024-10-12 PROCEDURE — 99284 EMERGENCY DEPT VISIT MOD MDM: CPT

## 2024-10-12 PROCEDURE — 72125 CT NECK SPINE W/O DYE: CPT

## 2024-10-12 PROCEDURE — 70450 CT HEAD/BRAIN W/O DYE: CPT

## 2024-10-12 PROCEDURE — 72170 X-RAY EXAM OF PELVIS: CPT

## 2024-10-12 PROCEDURE — 93005 ELECTROCARDIOGRAM TRACING: CPT | Performed by: EMERGENCY MEDICINE

## 2024-10-12 ASSESSMENT — ENCOUNTER SYMPTOMS
GASTROINTESTINAL NEGATIVE: 1
EYES NEGATIVE: 1
RESPIRATORY NEGATIVE: 1

## 2024-10-13 LAB
EKG ATRIAL RATE: 57 BPM
EKG Q-T INTERVAL: 490 MS
EKG QRS DURATION: 158 MS
EKG QTC CALCULATION (BAZETT): 501 MS
EKG R AXIS: 21 DEGREES
EKG T AXIS: 99 DEGREES
EKG VENTRICULAR RATE: 63 BPM

## 2024-10-13 PROCEDURE — 93010 ELECTROCARDIOGRAM REPORT: CPT | Performed by: INTERNAL MEDICINE

## 2024-10-13 NOTE — ED PROVIDER NOTES
I performed a history and physical examination of the patient and discussed management with the resident. I reviewed the resident’s note and agree with the documented findings and plan of care. Any areas of disagreement are noted on the chart. I was personally present for the key portions of any procedures. I have documented in the chart those procedures where I was not present during the key portions. I have reviewed the emergency nurses triage note. I agree with the chief complaint, past medical history, past surgical history, allergies, medications, social and family history as documented unless otherwise noted below. Documentation of the HPI, Physical Exam and Medical Decision Making performed by medical students or scribes is based on my personal performance of the HPI, PE and MDM. For Phys Assistant/ Nurse Practitioner cases/documentation I have personally evaluated this patient and have completed at least one if not all key elements of the E/M (history, physical exam, and MDM). My findings are as noted below.    In other words, I personally saw and examined the patient I have reviewed and agreed with the resident findings including all diagnostic interpretations and treatment plans as written.  I was present for the key portion of any procedures performed and the inclusive time noted in any critical care statement.    Patient presents for contusion on her head after a fall.  Apparently the patient was entering the garage when her foot got caught on the ledge.  Causing her to fall forward.  She did hit her head.  She had no loss of consciousness.  She also says some mild stiffness in her neck.  Here today physical examination reveals a patient that is awake alert and oriented.  Minor skin abrasion to the forehead.  No instability of the facial bones.  No pain to palpation on the spinous processes of the cervical spine or upper thoracic spine.  Mild bilateral muscular tenderness.  Patient is otherwise resting

## 2024-10-13 NOTE — ED PROVIDER NOTES
[facility] EMERGENCY DEPT  EMERGENCY DEPARTMENT ENCOUNTER          Pt Name: Barbara Zelaya  MRN: [id number]  Birthdate 1945  Date of evaluation: 10/12/2024  Physician: Misti Horton MD  Supervising Attending Physician: Carlos Fuentes DO       CHIEF COMPLAINT       Chief Complaint   Patient presents with    Fall         HISTORY OF PRESENT ILLNESS    HPI  Barbara Zelaya is a 79 y.o. female who presents to the emergency department from home, as a walk in to the ED lobby for evaluation of after fall. PMHx CKD, Aortic aneurysm, AF, CAD, Type 2 DM, Hyperlipidemia, Hypothyroidism, Pacemaker insitu. About 7 pm was going outside to feed cats and was stepping down off about 3 inches high step and shoe got caught and fell on forehead. Called out for help and grand daughter came. They got her up and changed into clothes and brought here. Notes neck and some shoulder pain. Forehead pain. Minimal amount of bleeding noted. No laceration. Able to move limbs. Denies any back pain. No SOB. No chest pain. No leg pain. No vision concerns. No dizziness or numbness noted. Some bruising under right eye.     The patient has no other acute complaints at this time.      REVIEW OF SYSTEMS   Review of Systems   Constitutional: Negative.    HENT: Negative.     Eyes: Negative.    Respiratory: Negative.     Cardiovascular: Negative.    Gastrointestinal: Negative.    Endocrine: Negative.    Genitourinary: Negative.    Musculoskeletal:  Positive for neck pain.   Neurological:  Positive for headaches. Negative for dizziness, speech difficulty, weakness, light-headedness and numbness.   Psychiatric/Behavioral: Negative.        PAST MEDICAL AND SURGICAL HISTORY     Past Medical History:   Diagnosis Date    Aortic aneurysm (HCC)     4.5 cm aorta    Arthritis     Atrial fibrillation, chronic ([facility]) 1/25/2019    CAD (coronary artery disease)     CHF NYHA class II, unspecified failure chronicity, unspecified type ([facility]) 10/8/2018

## 2024-10-13 NOTE — ED TRIAGE NOTES
Pt presents to the ED subsequent to a fall. She reports catching her foot on a ledge when entering garage causing her to fall forward onto her head. There is a small skin avulsion below her hairline with a surrounding raised area. Pt reports \"stiffness\" r/t her neck and tenderness when the cervical spine is palpated. Pt placed in C-collar. GCS15. NIH 0.

## 2024-10-13 NOTE — ED NOTES
This RN in to round. Pt repositioned for comfort. RR regular and unlabored. Family at bedside. Unmet needs denied.

## 2024-10-14 DIAGNOSIS — I25.10 CORONARY ARTERY DISEASE INVOLVING NATIVE CORONARY ARTERY OF NATIVE HEART WITHOUT ANGINA PECTORIS: ICD-10-CM

## 2024-10-14 RX ORDER — CLOPIDOGREL BISULFATE 75 MG/1
TABLET ORAL
Qty: 90 TABLET | Refills: 1 | Status: SHIPPED | OUTPATIENT
Start: 2024-10-14

## 2024-10-14 NOTE — TELEPHONE ENCOUNTER
Date of last visit:  6/27/2024  Date of next visit:  10/28/2024    Requested Prescriptions     Pending Prescriptions Disp Refills    clopidogrel (PLAVIX) 75 MG tablet [Pharmacy Med Name: Clopidogrel Bisulfate 75 MG Oral Tablet] 90 tablet 1     Sig: Take 1 tablet by mouth once daily

## 2024-10-28 ENCOUNTER — OFFICE VISIT (OUTPATIENT)
Dept: FAMILY MEDICINE CLINIC | Age: 79
End: 2024-10-28

## 2024-10-28 VITALS
HEIGHT: 67 IN | SYSTOLIC BLOOD PRESSURE: 130 MMHG | HEART RATE: 86 BPM | WEIGHT: 232 LBS | DIASTOLIC BLOOD PRESSURE: 78 MMHG | BODY MASS INDEX: 36.41 KG/M2 | RESPIRATION RATE: 18 BRPM

## 2024-10-28 DIAGNOSIS — N18.32 TYPE 2 DIABETES MELLITUS WITH STAGE 3B CHRONIC KIDNEY DISEASE, WITHOUT LONG-TERM CURRENT USE OF INSULIN (HCC): Primary | ICD-10-CM

## 2024-10-28 DIAGNOSIS — I10 ESSENTIAL HYPERTENSION: ICD-10-CM

## 2024-10-28 DIAGNOSIS — I50.41 CHF (CONGESTIVE HEART FAILURE), NYHA CLASS I, ACUTE, COMBINED (HCC): ICD-10-CM

## 2024-10-28 DIAGNOSIS — I49.5 SICK SINUS SYNDROME (HCC): ICD-10-CM

## 2024-10-28 DIAGNOSIS — E78.00 PURE HYPERCHOLESTEROLEMIA: ICD-10-CM

## 2024-10-28 DIAGNOSIS — E11.22 TYPE 2 DIABETES MELLITUS WITH STAGE 3B CHRONIC KIDNEY DISEASE, WITHOUT LONG-TERM CURRENT USE OF INSULIN (HCC): Primary | ICD-10-CM

## 2024-10-28 DIAGNOSIS — N18.4 CKD (CHRONIC KIDNEY DISEASE) STAGE 4, GFR 15-29 ML/MIN (HCC): ICD-10-CM

## 2024-10-28 DIAGNOSIS — I25.10 CORONARY ARTERY DISEASE INVOLVING NATIVE CORONARY ARTERY OF NATIVE HEART WITHOUT ANGINA PECTORIS: ICD-10-CM

## 2024-10-28 DIAGNOSIS — I10 PRIMARY HYPERTENSION: ICD-10-CM

## 2024-10-28 DIAGNOSIS — E03.4 HYPOTHYROIDISM DUE TO ACQUIRED ATROPHY OF THYROID: ICD-10-CM

## 2024-10-28 DIAGNOSIS — E11.42 DIABETIC PERIPHERAL NEUROPATHY (HCC): ICD-10-CM

## 2024-10-28 DIAGNOSIS — I50.42 CHRONIC COMBINED SYSTOLIC AND DIASTOLIC CONGESTIVE HEART FAILURE (HCC): ICD-10-CM

## 2024-10-28 DIAGNOSIS — Z23 NEED FOR INFLUENZA VACCINATION: ICD-10-CM

## 2024-10-28 DIAGNOSIS — Z95.0 S/P CARDIAC PACEMAKER PROCEDURE: ICD-10-CM

## 2024-10-28 LAB
CHP ED QC CHECK: ABNORMAL
GLUCOSE BLD-MCNC: 248 MG/DL
HBA1C MFR BLD: 6.4 %

## 2024-10-28 PROCEDURE — 1123F ACP DISCUSS/DSCN MKR DOCD: CPT | Performed by: FAMILY MEDICINE

## 2024-10-28 PROCEDURE — 99214 OFFICE O/P EST MOD 30 MIN: CPT | Performed by: FAMILY MEDICINE

## 2024-10-28 PROCEDURE — 83036 HEMOGLOBIN GLYCOSYLATED A1C: CPT | Performed by: FAMILY MEDICINE

## 2024-10-28 PROCEDURE — 82962 GLUCOSE BLOOD TEST: CPT | Performed by: FAMILY MEDICINE

## 2024-10-28 PROCEDURE — G0008 ADMIN INFLUENZA VIRUS VAC: HCPCS | Performed by: FAMILY MEDICINE

## 2024-10-28 ASSESSMENT — ENCOUNTER SYMPTOMS
ABDOMINAL PAIN: 0
EYE PAIN: 0
NAUSEA: 0
WHEEZING: 0
COUGH: 0
CHEST TIGHTNESS: 0
CONSTIPATION: 0
SHORTNESS OF BREATH: 0
SORE THROAT: 0

## 2024-10-28 ASSESSMENT — PATIENT HEALTH QUESTIONNAIRE - PHQ9
SUM OF ALL RESPONSES TO PHQ QUESTIONS 1-9: 0
SUM OF ALL RESPONSES TO PHQ QUESTIONS 1-9: 0
1. LITTLE INTEREST OR PLEASURE IN DOING THINGS: NOT AT ALL
SUM OF ALL RESPONSES TO PHQ9 QUESTIONS 1 & 2: 0
2. FEELING DOWN, DEPRESSED OR HOPELESS: NOT AT ALL
SUM OF ALL RESPONSES TO PHQ QUESTIONS 1-9: 0
SUM OF ALL RESPONSES TO PHQ QUESTIONS 1-9: 0

## 2024-10-28 NOTE — PROGRESS NOTES
Immunizations Administered       Name Date Dose Route    Influenza, AFLURIA, FLUZONE, (age2 y+), IM, Trivalent MDV, 0.5mL 10/28/2024 0.5 mL Intramuscular    Site: Deltoid- Right    Lot: [id number]    NDC: 53932-281-07          
Take 1 tablet by mouth daily as needed      nitroGLYCERIN (NITROSTAT) 0.4 MG SL tablet Place 1 tablet under the tongue every 5 minutes as needed for Chest pain up to max of 3 total doses. If no relief after 1 dose, call 911. 25 tablet 1    Glucose Blood (JOSE E BREEZE 2 TEST) DISK USE ONE STRIP TWICE DAILY, DX: E11.9 200 each 5    Ascorbic Acid (VITAMIN C) 500 MG tablet Take 1 tablet by mouth daily       No current facility-administered medications for this visit.     Orders Placed This Encounter   Procedures    Influenza, FLUZONE Trivalent, (age 3 y+), IM, Multi-Dose Vial, 0.5mL    POCT Glucose    POCT glycosylated hemoglobin (Hb A1C)     Results for orders placed or performed in visit on 10/28/24   POCT Glucose   Result Value Ref Range    POC Glucose 248 (A)     QC OK?     POCT glycosylated hemoglobin (Hb A1C)   Result Value Ref Range    Hemoglobin A1C 6.4 %     Barbara received counseling on the following healthy behaviors: nutrition and exercise    Patient given educational materials on Diabetes and Hyperlipidemia    I have instructed Barbara to complete a self tracking handout on Blood Sugars  and Blood Pressures  and instructed them to bring it with them to her next appointment.     Discussed use, benefit, and side effects of prescribed medications.  Barriers to medication compliance addressed.  All patient questions answered.  Pt voiced understanding.          In general doing well.  Hemoglobin A1c although his sugars sometimes 1 elevated we will keep at the present dosing as her hemoglobin A1c is still 6.4.  Labs and kidney function all stable.  See back 4 months time     See in  4  mths   Xavier Last MD

## 2024-10-28 NOTE — TELEPHONE ENCOUNTER
Date of last visit:  10/28/2024  Date of next visit:  3/12/2025    Requested Prescriptions     Pending Prescriptions Disp Refills    metoprolol tartrate (LOPRESSOR) 50 MG tablet [Pharmacy Med Name: METOPROLOL TART 50MG TAB] 180 tablet 1     Sig: Take 1 tablet by mouth twice daily

## 2024-10-29 RX ORDER — METOPROLOL TARTRATE 50 MG
TABLET ORAL
Qty: 180 TABLET | Refills: 1 | Status: SHIPPED | OUTPATIENT
Start: 2024-10-29

## 2024-11-01 ENCOUNTER — TELEPHONE (OUTPATIENT)
Dept: CARDIOLOGY CLINIC | Age: 79
End: 2024-11-01

## 2024-11-14 ENCOUNTER — TELEPHONE (OUTPATIENT)
Dept: FAMILY MEDICINE CLINIC | Age: 79
End: 2024-11-14

## 2024-11-14 DIAGNOSIS — E03.4 HYPOTHYROIDISM DUE TO ACQUIRED ATROPHY OF THYROID: ICD-10-CM

## 2024-11-14 RX ORDER — AMIODARONE HYDROCHLORIDE 200 MG/1
TABLET ORAL
Qty: 90 TABLET | Refills: 1 | Status: SHIPPED | OUTPATIENT
Start: 2024-11-14

## 2024-11-14 RX ORDER — LEVOTHYROXINE SODIUM 175 UG/1
175 TABLET ORAL DAILY
Qty: 90 TABLET | Refills: 1 | Status: SHIPPED | OUTPATIENT
Start: 2024-11-14

## 2024-11-14 NOTE — TELEPHONE ENCOUNTER
Date of last visit:  10/28/2024  Date of next visit:  3/12/2025    Requested Prescriptions     Pending Prescriptions Disp Refills    amiodarone (CORDARONE) 200 MG tablet [Pharmacy Med Name: Amiodarone HCl 200 MG Oral Tablet] 90 tablet 1     Sig: Take 1 tablet by mouth once daily    levothyroxine (SYNTHROID) 175 MCG tablet [Pharmacy Med Name: Levothyroxine Sodium 175 MCG Oral Tablet] 90 tablet 1     Sig: Take 1 tablet by mouth once daily

## 2024-11-14 NOTE — TELEPHONE ENCOUNTER
Pt called asking for samples of Jardiance 10 mg and Eliquis 5 mg.    Barbara may be reached at [phone]

## 2024-11-25 DIAGNOSIS — E11.9 TYPE 2 DIABETES MELLITUS WITHOUT COMPLICATION, WITHOUT LONG-TERM CURRENT USE OF INSULIN (HCC): ICD-10-CM

## 2024-11-25 RX ORDER — GLIMEPIRIDE 4 MG/1
TABLET ORAL
Qty: 180 TABLET | Refills: 1 | Status: SHIPPED | OUTPATIENT
Start: 2024-11-25

## 2024-11-25 NOTE — TELEPHONE ENCOUNTER
Date of last visit:  10/28/2024  Date of next visit:  3/12/2025    Requested Prescriptions     Pending Prescriptions Disp Refills    glimepiride (AMARYL) 4 MG tablet [Pharmacy Med Name: Glimepiride 4 MG Oral Tablet] 180 tablet 1     Sig: Take 1 tablet by mouth twice daily

## 2024-12-17 ENCOUNTER — TELEPHONE (OUTPATIENT)
Dept: FAMILY MEDICINE CLINIC | Age: 79
End: 2024-12-17

## 2024-12-23 DIAGNOSIS — E11.42 DIABETIC PERIPHERAL NEUROPATHY (HCC): ICD-10-CM

## 2024-12-23 NOTE — TELEPHONE ENCOUNTER
Date of last visit:  10/28/2024  Date of next visit:  3/12/2025    Requested Prescriptions     Pending Prescriptions Disp Refills    gabapentin (NEURONTIN) 300 MG capsule [Pharmacy Med Name: GABAPENTIN 300MG    CAP] 90 capsule 0     Sig: TAKE 1 CAPSULE BY MOUTH ONCE DAILY IN THE EVENING

## 2024-12-24 RX ORDER — GABAPENTIN 300 MG/1
CAPSULE ORAL
Qty: 90 CAPSULE | Refills: 1 | Status: SHIPPED | OUTPATIENT
Start: 2024-12-24 | End: 2025-06-23

## 2025-01-15 ENCOUNTER — NURSE ONLY (OUTPATIENT)
Dept: CARDIOLOGY CLINIC | Age: 80
End: 2025-01-15

## 2025-01-15 DIAGNOSIS — Z95.0 PACEMAKER: Primary | ICD-10-CM

## 2025-01-30 ENCOUNTER — LAB (OUTPATIENT)
Dept: LAB | Age: 80
End: 2025-01-30

## 2025-01-30 DIAGNOSIS — N18.32 STAGE 3B CHRONIC KIDNEY DISEASE (HCC): ICD-10-CM

## 2025-01-30 LAB
25(OH)D3 SERPL-MCNC: 51 NG/ML (ref 30–100)
ANION GAP SERPL CALC-SCNC: 20 MEQ/L (ref 8–16)
BUN SERPL-MCNC: 22 MG/DL (ref 7–22)
CALCIUM SERPL-MCNC: 9 MG/DL (ref 8.5–10.5)
CHLORIDE SERPL-SCNC: 98 MEQ/L (ref 98–111)
CO2 SERPL-SCNC: 23 MEQ/L (ref 23–33)
CREAT SERPL-MCNC: 2 MG/DL (ref 0.4–1.2)
CREAT UR-MCNC: 127.5 MG/DL
DEPRECATED RDW RBC AUTO: 47.4 FL (ref 35–45)
ERYTHROCYTE [DISTWIDTH] IN BLOOD BY AUTOMATED COUNT: 15 % (ref 11.5–14.5)
GFR SERPL CREATININE-BSD FRML MDRD: 25 ML/MIN/1.73M2
GLUCOSE SERPL-MCNC: 269 MG/DL (ref 70–108)
HCT VFR BLD AUTO: 47.2 % (ref 37–47)
HGB BLD-MCNC: 15.2 GM/DL (ref 12–16)
MCH RBC QN AUTO: 27.9 PG (ref 26–33)
MCHC RBC AUTO-ENTMCNC: 32.2 GM/DL (ref 32.2–35.5)
MCV RBC AUTO: 86.6 FL (ref 81–99)
MICROALBUMIN UR-MCNC: 1.38 MG/DL
MICROALBUMIN/CREAT RATIO PNL UR: 11 MG/G (ref 0–30)
PHOSPHATE SERPL-MCNC: 4.5 MG/DL (ref 2.4–4.7)
PLATELET # BLD AUTO: 337 THOU/MM3 (ref 130–400)
PMV BLD AUTO: 11.1 FL (ref 9.4–12.4)
POTASSIUM SERPL-SCNC: 4.3 MEQ/L (ref 3.5–5.2)
PTH-INTACT SERPL-MCNC: 151.5 PG/ML (ref 15–65)
RBC # BLD AUTO: 5.45 MILL/MM3 (ref 4.2–5.4)
SODIUM SERPL-SCNC: 141 MEQ/L (ref 135–145)
WBC # BLD AUTO: 8.8 THOU/MM3 (ref 4.8–10.8)

## 2025-02-05 ENCOUNTER — OFFICE VISIT (OUTPATIENT)
Dept: NEPHROLOGY | Age: 80
End: 2025-02-05
Payer: MEDICARE

## 2025-02-05 ENCOUNTER — TELEPHONE (OUTPATIENT)
Dept: FAMILY MEDICINE CLINIC | Age: 80
End: 2025-02-05

## 2025-02-05 VITALS
BODY MASS INDEX: 36.88 KG/M2 | DIASTOLIC BLOOD PRESSURE: 60 MMHG | WEIGHT: 232 LBS | SYSTOLIC BLOOD PRESSURE: 128 MMHG | HEART RATE: 76 BPM | OXYGEN SATURATION: 98 %

## 2025-02-05 DIAGNOSIS — N18.4 CKD (CHRONIC KIDNEY DISEASE) STAGE 4, GFR 15-29 ML/MIN (HCC): Primary | ICD-10-CM

## 2025-02-05 DIAGNOSIS — E11.42 DIABETIC PERIPHERAL NEUROPATHY (HCC): ICD-10-CM

## 2025-02-05 PROCEDURE — 99214 OFFICE O/P EST MOD 30 MIN: CPT | Performed by: INTERNAL MEDICINE

## 2025-02-05 PROCEDURE — 1160F RVW MEDS BY RX/DR IN RCRD: CPT | Performed by: INTERNAL MEDICINE

## 2025-02-05 PROCEDURE — 3074F SYST BP LT 130 MM HG: CPT | Performed by: INTERNAL MEDICINE

## 2025-02-05 PROCEDURE — 1090F PRES/ABSN URINE INCON ASSESS: CPT | Performed by: INTERNAL MEDICINE

## 2025-02-05 PROCEDURE — 1123F ACP DISCUSS/DSCN MKR DOCD: CPT | Performed by: INTERNAL MEDICINE

## 2025-02-05 PROCEDURE — 3078F DIAST BP <80 MM HG: CPT | Performed by: INTERNAL MEDICINE

## 2025-02-05 PROCEDURE — G8427 DOCREV CUR MEDS BY ELIG CLIN: HCPCS | Performed by: INTERNAL MEDICINE

## 2025-02-05 PROCEDURE — G2211 COMPLEX E/M VISIT ADD ON: HCPCS | Performed by: INTERNAL MEDICINE

## 2025-02-05 PROCEDURE — G8400 PT W/DXA NO RESULTS DOC: HCPCS | Performed by: INTERNAL MEDICINE

## 2025-02-05 PROCEDURE — 1036F TOBACCO NON-USER: CPT | Performed by: INTERNAL MEDICINE

## 2025-02-05 PROCEDURE — 1159F MED LIST DOCD IN RCRD: CPT | Performed by: INTERNAL MEDICINE

## 2025-02-05 PROCEDURE — G8417 CALC BMI ABV UP PARAM F/U: HCPCS | Performed by: INTERNAL MEDICINE

## 2025-02-05 RX ORDER — CALCITRIOL 0.25 UG/1
0.25 CAPSULE, LIQUID FILLED ORAL
Qty: 48 CAPSULE | Refills: 3 | Status: SHIPPED | OUTPATIENT
Start: 2025-02-06 | End: 2025-05-07

## 2025-02-05 NOTE — TELEPHONE ENCOUNTER
Pt stopped by [facility] for help with her Jardiance, please fill out and fax to [phone]    Scanned and put on cart.

## 2025-02-05 NOTE — PROGRESS NOTES
[facility] [place] [facility]  [facility]  [address]  [address]  [place] [place] [address]  Dept: [phone]  Loc: [phone]  Progress Note  2025 1:34 PM      Pt Name:    Barbara Zelaya  YOB: 1945  Primary Care Physician:  Xavier Last MD     Chief Complaint:   Chief Complaint   Patient presents with    Follow-up     CKD III        History of Present Illness:   This is a follow up visit for CKD  IIIb/IV.  She has hx of DM, HTN, HFmrEF (45%), Afib, CAD/PCI     Doing well.   No significant swelling or SOB on bumex 1 mg daily.       Pertinent items are noted in HPI.         Past History:  Past Medical History:   Diagnosis Date    Aortic aneurysm (HCC)     4.5 cm aorta    Arthritis     Atrial fibrillation, chronic (HCC) 2019    CAD (coronary artery disease)     CHF NYHA class II, unspecified failure chronicity, unspecified type ([facility]) 10/8/2018    Colon polyps 3/01    Diabetic peripheral neuropathy ([facility]) 2014      feet    Elbow fracture     Fluid overload 2021    Hyperlipidemia     Hypertension     Hypothyroidism     Pulmonary nodule, right 10/2017      repeat  ct of  chest      S/P cardiac pacemaker procedure: 10/21/2017: Medtronic Dual Chamber. 10/21/2017    10/21/2017: Medtronic Dual Chamber. Dr. Waters    Systolic CHF, acute on chronic (HCC) 6/10/2021    Type II or unspecified type diabetes mellitus without mention of complication, not stated as uncontrolled      Past Surgical History:   Procedure Laterality Date    BREAST BIOPSY Right 2016     benign   [place]    BREAST SURGERY      CARDIOVERSION  2019    atrial fib to sinus  [facility]     SECTION  over 30 years ago     COLONOSCOPY      colon polyps   [place]    CORONARY ANGIOPLASTY      [facility]    CORONARY ANGIOPLASTY WITH STENT PLACEMENT      [facility]    CORONARY ANGIOPLASTY WITH STENT PLACEMENT  10/2018     [facility]    HEEL SPUR SURGERY Bilateral     bilat

## 2025-02-19 RX ORDER — BUMETANIDE 1 MG/1
1 TABLET ORAL DAILY
Qty: 90 TABLET | Refills: 3 | Status: SHIPPED | OUTPATIENT
Start: 2025-02-19

## 2025-02-24 DIAGNOSIS — I48.0 PAROXYSMAL ATRIAL FIBRILLATION (HCC): ICD-10-CM

## 2025-02-24 RX ORDER — APIXABAN 5 MG/1
TABLET, FILM COATED ORAL
Qty: 60 TABLET | Refills: 0 | Status: SHIPPED | OUTPATIENT
Start: 2025-02-24

## 2025-02-24 NOTE — TELEPHONE ENCOUNTER
The pharmacy is requesting a refill of the below medication which has been pended for you:     Requested Prescriptions     Pending Prescriptions Disp Refills    ELIQUIS 5 MG TABS tablet [Pharmacy Med Name: Eliquis 5 MG Oral Tablet] 60 tablet 0     Sig: Take 1 tablet by mouth twice daily       Last Appointment Date: 10/28/2024  Next Appointment Date: 3/12/2025    Allergies   Allergen Reactions    Bee Pollen      Runny Nose    Adhesive Tape Rash       Per verbal order Dr Kumar sent Rx to the pharmacy.

## 2025-03-12 ENCOUNTER — OFFICE VISIT (OUTPATIENT)
Dept: FAMILY MEDICINE CLINIC | Age: 80
End: 2025-03-12

## 2025-03-12 VITALS
HEIGHT: 67 IN | WEIGHT: 230.5 LBS | SYSTOLIC BLOOD PRESSURE: 116 MMHG | DIASTOLIC BLOOD PRESSURE: 72 MMHG | HEART RATE: 96 BPM | RESPIRATION RATE: 18 BRPM | BODY MASS INDEX: 36.18 KG/M2

## 2025-03-12 DIAGNOSIS — E11.42 DIABETIC PERIPHERAL NEUROPATHY (HCC): ICD-10-CM

## 2025-03-12 DIAGNOSIS — I50.42 CHRONIC COMBINED SYSTOLIC AND DIASTOLIC CONGESTIVE HEART FAILURE (HCC): ICD-10-CM

## 2025-03-12 DIAGNOSIS — E03.4 HYPOTHYROIDISM DUE TO ACQUIRED ATROPHY OF THYROID: ICD-10-CM

## 2025-03-12 DIAGNOSIS — I49.5 SICK SINUS SYNDROME (HCC): ICD-10-CM

## 2025-03-12 DIAGNOSIS — I10 PRIMARY HYPERTENSION: ICD-10-CM

## 2025-03-12 DIAGNOSIS — Z00.00 MEDICARE ANNUAL WELLNESS VISIT, SUBSEQUENT: Primary | ICD-10-CM

## 2025-03-12 DIAGNOSIS — E66.01 MORBID (SEVERE) OBESITY DUE TO EXCESS CALORIES: ICD-10-CM

## 2025-03-12 DIAGNOSIS — N18.4 CKD (CHRONIC KIDNEY DISEASE) STAGE 4, GFR 15-29 ML/MIN (HCC): ICD-10-CM

## 2025-03-12 DIAGNOSIS — E11.9 TYPE 2 DIABETES MELLITUS WITHOUT COMPLICATION, WITHOUT LONG-TERM CURRENT USE OF INSULIN (HCC): ICD-10-CM

## 2025-03-12 DIAGNOSIS — I10 ESSENTIAL HYPERTENSION: ICD-10-CM

## 2025-03-12 DIAGNOSIS — E78.00 PURE HYPERCHOLESTEROLEMIA: ICD-10-CM

## 2025-03-12 DIAGNOSIS — Z95.0 S/P CARDIAC PACEMAKER PROCEDURE: ICD-10-CM

## 2025-03-12 DIAGNOSIS — I44.39 HIGH-GRADE ATRIOVENTRICULAR BLOCK: ICD-10-CM

## 2025-03-12 LAB
CHP ED QC CHECK: ABNORMAL
GLUCOSE BLD-MCNC: 291 MG/DL
HBA1C MFR BLD: 6.2 %

## 2025-03-12 RX ORDER — GABAPENTIN 300 MG/1
CAPSULE ORAL
Qty: 90 CAPSULE | Refills: 1 | Status: SHIPPED | OUTPATIENT
Start: 2025-03-12 | End: 2025-09-09

## 2025-03-12 SDOH — ECONOMIC STABILITY: FOOD INSECURITY: WITHIN THE PAST 12 MONTHS, THE FOOD YOU BOUGHT JUST DIDN'T LAST AND YOU DIDN'T HAVE MONEY TO GET MORE.: NEVER TRUE

## 2025-03-12 SDOH — ECONOMIC STABILITY: FOOD INSECURITY: WITHIN THE PAST 12 MONTHS, YOU WORRIED THAT YOUR FOOD WOULD RUN OUT BEFORE YOU GOT MONEY TO BUY MORE.: NEVER TRUE

## 2025-03-12 ASSESSMENT — ENCOUNTER SYMPTOMS
NAUSEA: 0
COUGH: 0
ABDOMINAL PAIN: 0
SORE THROAT: 0
CONSTIPATION: 0
VISUAL CHANGE: 0
WHEEZING: 0
EYE PAIN: 0
BLURRED VISION: 0
CHEST TIGHTNESS: 0
SHORTNESS OF BREATH: 0

## 2025-03-12 ASSESSMENT — LIFESTYLE VARIABLES
HOW MANY STANDARD DRINKS CONTAINING ALCOHOL DO YOU HAVE ON A TYPICAL DAY: PATIENT DOES NOT DRINK
HOW OFTEN DO YOU HAVE A DRINK CONTAINING ALCOHOL: NEVER

## 2025-03-12 ASSESSMENT — PATIENT HEALTH QUESTIONNAIRE - PHQ9
SUM OF ALL RESPONSES TO PHQ QUESTIONS 1-9: 1
2. FEELING DOWN, DEPRESSED OR HOPELESS: NOT AT ALL
SUM OF ALL RESPONSES TO PHQ QUESTIONS 1-9: 1
SUM OF ALL RESPONSES TO PHQ QUESTIONS 1-9: 1
1. LITTLE INTEREST OR PLEASURE IN DOING THINGS: SEVERAL DAYS
SUM OF ALL RESPONSES TO PHQ QUESTIONS 1-9: 1

## 2025-03-12 NOTE — PATIENT INSTRUCTIONS
done to check for color blindness.  Color vision is often tested as part of a routine exam. You may also have this test when you apply for a job where recognizing different colors is important, such as , electronics, or the .  How are vision tests done?  Visual acuity test   You cover one eye at a time.  You read aloud from a wall chart across the room.  You read aloud from a small card that you hold in your hand.  Refraction   You look into a special device.  The device puts lenses of different strengths in front of each eye to see how strong your glasses or contact lenses need to be.  Visual field tests   Your doctor may have you look through special machines.  Or your doctor may simply have you stare straight ahead while they move a finger into and out of your field of vision.  Color vision test   You look at pieces of printed test patterns in various colors. You say what number or symbol you see.  Your doctor may have you trace the number or symbol using a pointer.  How do these tests feel?  There is very little chance of having a problem from this test. If dilating drops are used for a vision test, they may make the eyes sting and cause a medicine taste in the mouth.  Follow-up care is a key part of your treatment and safety. Be sure to make and go to all appointments, and call your doctor if you are having problems. It's also a good idea to know your test results and keep a list of the medicines you take.  Where can you learn more?  Go to https://[email or web address].net/patientEd and enter G551 to learn more about \"Learning About Vision Tests.\"  Current as of: July 31, 2024  Content Version: 14.3  © 2024 [employer].   Care instructions adapted under license by [employer]. If you have questions about a medical condition or this instruction, always ask your healthcare professional. [employer] disclaims any warranty or liability for your use of this information.

## 2025-03-12 NOTE — PROGRESS NOTES
(NEURONTIN) 300 MG capsule     ESTABLISHED, LOW MDM, 20-29 MIN [55582]      4. High-grade atrioventricular block  I44.39 ESTABLISHED, LOW MDM, 20-29 MIN [[id number]]      5. Chronic combined systolic and diastolic congestive heart failure (HCC)  I50.42 ESTABLISHED, LOW MDM, 20-29 MIN [[id number]]      6. Sick sinus syndrome (HCC)  I49.5 ESTABLISHED, LOW MDM, 20-29 MIN [[id number]]      7. S/P cardiac pacemaker procedure: 10/21/2017: Medtronic Dual Chamber.  Z95.0       8. Hypothyroidism due to acquired atrophy of thyroid  E03.4 ESTABLISHED, LOW MDM, 20-29 MIN [44928]      9. Essential hypertension  I10 ESTABLISHED, LOW MDM, 20-29 MIN [[id number]]      10. Primary hypertension  I10       11. CKD (chronic kidney disease) stage 4, GFR 15-29 ml/min ([facility])  N18.4 ESTABLISHED, LOW MDM, 20-29 MIN [[id number]]      12. Pure hypercholesterolemia  E78.00 ESTABLISHED, LOW MDM, 20-29 MIN [[id number]]      13. Morbid (severe) obesity due to excess calories (E66.01)  E66.01       14. Body mass index [BMI] 36.0-36.9, adult (Z68.36)  Z68.36               PLAn    Current Outpatient Medications   Medication Sig Dispense Refill    gabapentin (NEURONTIN) 300 MG capsule TAKE 1 CAPSULE BY MOUTH ONCE DAILY IN THE EVENING 90 capsule 1    ELIQUIS 5 MG TABS tablet Take 1 tablet by mouth twice daily 60 tablet 0    bumetanide (BUMEX) 1 MG tablet Take 1 tablet by mouth once daily 90 tablet 3    calcitRIOL (ROCALTROL) 0.25 MCG capsule Take 1 capsule by mouth four times a week Take on Mon, Wed, Fri and Sat each week 48 capsule 3    glimepiride (AMARYL) 4 MG tablet Take 1 tablet by mouth twice daily 180 tablet 1    amiodarone (CORDARONE) 200 MG tablet Take 1 tablet by mouth once daily 90 tablet 1    levothyroxine (SYNTHROID) 175 MCG tablet Take 1 tablet by mouth once daily 90 tablet 1    metoprolol tartrate (LOPRESSOR) 50 MG tablet Take 1 tablet by mouth twice daily 180 tablet 1    clopidogrel (PLAVIX) 75 MG tablet Take 1 tablet by mouth once daily 90 tablet 1    JARDIANCE 10

## 2025-03-18 DIAGNOSIS — I48.0 PAROXYSMAL ATRIAL FIBRILLATION (HCC): ICD-10-CM

## 2025-03-18 DIAGNOSIS — I25.10 CORONARY ARTERY DISEASE INVOLVING NATIVE CORONARY ARTERY OF NATIVE HEART WITHOUT ANGINA PECTORIS: ICD-10-CM

## 2025-03-18 RX ORDER — ISOSORBIDE DINITRATE 10 MG/1
10 TABLET ORAL 3 TIMES DAILY
Qty: 90 TABLET | Refills: 5 | Status: SHIPPED | OUTPATIENT
Start: 2025-03-18

## 2025-03-18 RX ORDER — APIXABAN 5 MG/1
5 TABLET, FILM COATED ORAL 2 TIMES DAILY
Qty: 60 TABLET | Refills: 5 | Status: SHIPPED | OUTPATIENT
Start: 2025-03-18

## 2025-03-18 NOTE — TELEPHONE ENCOUNTER
Date of last visit:  3/12/2025  Date of next visit:  6/26/2025    Requested Prescriptions     Pending Prescriptions Disp Refills    apixaban (ELIQUIS) 5 MG TABS tablet [Pharmacy Med Name: Eliquis 5 MG Oral Tablet] 60 tablet 5     Sig: Take 1 tablet by mouth twice daily    isosorbide dinitrate (ISORDIL) 10 MG tablet [Pharmacy Med Name: Isosorbide Dinitrate 10 MG Oral Tablet] 90 tablet 5     Sig: TAKE 1 TABLET BY MOUTH THREE TIMES DAILY

## 2025-05-13 DIAGNOSIS — I25.10 CORONARY ARTERY DISEASE INVOLVING NATIVE CORONARY ARTERY OF NATIVE HEART WITHOUT ANGINA PECTORIS: ICD-10-CM

## 2025-05-13 DIAGNOSIS — I10 ESSENTIAL HYPERTENSION: ICD-10-CM

## 2025-05-13 DIAGNOSIS — E78.00 PURE HYPERCHOLESTEROLEMIA: ICD-10-CM

## 2025-05-14 RX ORDER — CLOPIDOGREL BISULFATE 75 MG/1
75 TABLET ORAL DAILY
Qty: 90 TABLET | Refills: 1 | Status: SHIPPED | OUTPATIENT
Start: 2025-05-14

## 2025-05-14 RX ORDER — ATORVASTATIN CALCIUM 40 MG/1
40 TABLET, FILM COATED ORAL DAILY
Qty: 90 TABLET | Refills: 1 | Status: SHIPPED | OUTPATIENT
Start: 2025-05-14

## 2025-05-14 RX ORDER — AMIODARONE HYDROCHLORIDE 200 MG/1
200 TABLET ORAL DAILY
Qty: 90 TABLET | Refills: 1 | Status: SHIPPED | OUTPATIENT
Start: 2025-05-14

## 2025-05-14 RX ORDER — METOPROLOL TARTRATE 50 MG
50 TABLET ORAL 2 TIMES DAILY
Qty: 180 TABLET | Refills: 1 | Status: SHIPPED | OUTPATIENT
Start: 2025-05-14

## 2025-05-20 ENCOUNTER — TELEPHONE (OUTPATIENT)
Dept: CARDIOLOGY CLINIC | Age: 80
End: 2025-05-20

## 2025-05-27 RX ORDER — AMLODIPINE BESYLATE 10 MG/1
10 TABLET ORAL DAILY
Qty: 90 TABLET | Refills: 3 | Status: SHIPPED | OUTPATIENT
Start: 2025-05-27

## 2025-06-02 ENCOUNTER — LAB (OUTPATIENT)
Dept: LAB | Age: 80
End: 2025-06-02

## 2025-06-02 DIAGNOSIS — E03.4 HYPOTHYROIDISM DUE TO ACQUIRED ATROPHY OF THYROID: ICD-10-CM

## 2025-06-02 DIAGNOSIS — E78.00 PURE HYPERCHOLESTEROLEMIA: ICD-10-CM

## 2025-06-02 LAB
ALBUMIN SERPL BCG-MCNC: 4.2 G/DL (ref 3.4–4.9)
ALP SERPL-CCNC: 85 U/L (ref 38–126)
ALT SERPL W/O P-5'-P-CCNC: 23 U/L (ref 10–35)
AST SERPL-CCNC: 24 U/L (ref 10–35)
BILIRUB CONJ SERPL-MCNC: 0.3 MG/DL (ref 0–0.2)
BILIRUB SERPL-MCNC: 0.6 MG/DL (ref 0.3–1.2)
CHOLEST SERPL-MCNC: 214 MG/DL (ref 100–199)
HDLC SERPL-MCNC: 63 MG/DL
LDLC SERPL CALC-MCNC: 118 MG/DL
PROT SERPL-MCNC: 7.1 G/DL (ref 6.4–8.3)
T4 FREE SERPL-MCNC: 1.5 NG/DL (ref 0.92–1.68)
TRIGL SERPL-MCNC: 167 MG/DL (ref 0–199)
TSH SERPL DL<=0.05 MIU/L-ACNC: 21.2 UIU/ML (ref 0.27–4.2)

## 2025-06-03 ENCOUNTER — RESULTS FOLLOW-UP (OUTPATIENT)
Dept: FAMILY MEDICINE CLINIC | Age: 80
End: 2025-06-03

## 2025-06-03 DIAGNOSIS — E03.4 HYPOTHYROIDISM DUE TO ACQUIRED ATROPHY OF THYROID: ICD-10-CM

## 2025-06-03 DIAGNOSIS — E78.00 PURE HYPERCHOLESTEROLEMIA: Primary | ICD-10-CM

## 2025-06-03 RX ORDER — EZETIMIBE 10 MG/1
10 TABLET ORAL DAILY
Qty: 90 TABLET | Refills: 1 | Status: SHIPPED | OUTPATIENT
Start: 2025-06-03

## 2025-06-03 RX ORDER — LEVOTHYROXINE SODIUM 200 UG/1
200 TABLET ORAL DAILY
Qty: 90 TABLET | Refills: 1 | Status: SHIPPED | OUTPATIENT
Start: 2025-06-03

## 2025-06-03 NOTE — TELEPHONE ENCOUNTER
----- Message from Dr. Xavier Last MD sent at 6/3/2025  6:37 AM EDT -----  Liver  functions  are  fine      Amiodarone  causing  hypothyroid  so increase synthroid 200mcg and repeat levels  in August  as stop the 175 mg dose      Chol still up so  if  ok  add zetia in addition to the lipitor and  check  chol in August    Please  call

## 2025-06-10 DIAGNOSIS — E11.9 TYPE 2 DIABETES MELLITUS WITHOUT COMPLICATION, WITHOUT LONG-TERM CURRENT USE OF INSULIN (HCC): ICD-10-CM

## 2025-06-10 RX ORDER — GLIMEPIRIDE 4 MG/1
4 TABLET ORAL 2 TIMES DAILY
Qty: 180 TABLET | Refills: 1 | Status: SHIPPED | OUTPATIENT
Start: 2025-06-10

## 2025-06-10 NOTE — TELEPHONE ENCOUNTER
Date of last visit:  3/12/2025  Date of next visit:  6/26/2025    Requested Prescriptions     Pending Prescriptions Disp Refills    glimepiride (AMARYL) 4 MG tablet [Pharmacy Med Name: Glimepiride 4 MG Oral Tablet] 180 tablet 1     Sig: Take 1 tablet by mouth twice daily

## 2025-06-13 NOTE — TELEPHONE ENCOUNTER
Date of last visit:  3/12/2025  Date of next visit:  6/26/2025    Requested Prescriptions     Pending Prescriptions Disp Refills    TRULICITY 4.5 MG/0.5ML SOAJ [Pharmacy Med Name: Trulicity Subcutaneous Solution Auto-injector 4.5 MG/0.5ML] 8 mL 0     Sig: INJECT 4.5MG (0.5ML) UNDER THE SKIN ONCE A WEEK

## 2025-06-14 RX ORDER — DULAGLUTIDE 4.5 MG/.5ML
INJECTION, SOLUTION SUBCUTANEOUS
Qty: 8 ML | Refills: 0 | Status: SHIPPED | OUTPATIENT
Start: 2025-06-14

## 2025-06-24 ENCOUNTER — LAB (OUTPATIENT)
Dept: LAB | Age: 80
End: 2025-06-24

## 2025-06-26 ENCOUNTER — OFFICE VISIT (OUTPATIENT)
Dept: FAMILY MEDICINE CLINIC | Age: 80
End: 2025-06-26

## 2025-06-26 VITALS
BODY MASS INDEX: 35.34 KG/M2 | HEIGHT: 67 IN | SYSTOLIC BLOOD PRESSURE: 106 MMHG | HEART RATE: 76 BPM | DIASTOLIC BLOOD PRESSURE: 60 MMHG | RESPIRATION RATE: 20 BRPM | WEIGHT: 225.13 LBS

## 2025-06-26 DIAGNOSIS — N18.4 CKD (CHRONIC KIDNEY DISEASE) STAGE 4, GFR 15-29 ML/MIN (HCC): ICD-10-CM

## 2025-06-26 DIAGNOSIS — E78.00 PURE HYPERCHOLESTEROLEMIA: ICD-10-CM

## 2025-06-26 DIAGNOSIS — I50.42 CHRONIC COMBINED SYSTOLIC AND DIASTOLIC CONGESTIVE HEART FAILURE (HCC): ICD-10-CM

## 2025-06-26 DIAGNOSIS — I25.10 CORONARY ARTERY DISEASE INVOLVING NATIVE CORONARY ARTERY OF NATIVE HEART WITHOUT ANGINA PECTORIS: ICD-10-CM

## 2025-06-26 DIAGNOSIS — I10 PRIMARY HYPERTENSION: ICD-10-CM

## 2025-06-26 DIAGNOSIS — E11.9 TYPE 2 DIABETES MELLITUS WITHOUT COMPLICATION, WITHOUT LONG-TERM CURRENT USE OF INSULIN (HCC): Primary | ICD-10-CM

## 2025-06-26 DIAGNOSIS — I44.39 HIGH-GRADE ATRIOVENTRICULAR BLOCK: ICD-10-CM

## 2025-06-26 LAB
CHP ED QC CHECK: ABNORMAL
GLUCOSE BLD-MCNC: 151 MG/DL
HBA1C MFR BLD: 6.4 %

## 2025-06-26 ASSESSMENT — ENCOUNTER SYMPTOMS
ABDOMINAL PAIN: 0
CHEST TIGHTNESS: 0
EYE PAIN: 0
COUGH: 0
WHEEZING: 0
SHORTNESS OF BREATH: 0
NAUSEA: 0
CONSTIPATION: 0
SORE THROAT: 0

## 2025-06-26 NOTE — PROGRESS NOTES
Subjective   Patient ID: Barbara Zelaya is a 80 y.o. female.    Pacer   stable       Ashd  stable       Crf   stable     hemmelgarn       Eliquis   for  atrial  fib    Diabetes  She presents for her follow-up diabetic visit. She has type 2 diabetes mellitus. Pertinent negatives for hypoglycemia include no dizziness, headaches, nervousness/anxiousness or sweats. Pertinent negatives for diabetes include no chest pain, no fatigue and no weakness. Current diabetic treatment includes oral agent (triple therapy). She is compliant with treatment all of the time. She is following a diabetic diet. Her breakfast blood glucose is taken between 7-8 am. Her breakfast blood glucose range is generally 130-140 mg/dl. An ACE inhibitor/angiotensin II receptor blocker is being taken.   Hypertension  This is a chronic problem. The current episode started more than 1 year ago. The problem has been resolved since onset. The problem is controlled. Pertinent negatives include no chest pain, headaches, neck pain, palpitations, peripheral edema, shortness of breath or sweats. There are no known risk factors for coronary artery disease. The current treatment provides significant improvement. There are no compliance problems.      Past Medical History:   Diagnosis Date    Aortic aneurysm     4.5 cm aorta    Arthritis     Atrial fibrillation, chronic ([facility]) 1/25/2019    CAD (coronary artery disease)     CHF NYHA class II, unspecified failure chronicity, unspecified type ([facility]) 10/8/2018    Colon polyps 3/01    Diabetic peripheral neuropathy ([facility]) 2014      feet    Elbow fracture     Fluid overload 6/9/2021    Hyperlipidemia     Hypertension     Hypothyroidism     Pulmonary nodule, right 10/2017      repeat  ct of  chest  4-2018    S/P cardiac pacemaker procedure: 10/21/2017: Medtronic Dual Chamber. 10/21/2017    10/21/2017: Medtronic Dual Chamber. Dr. Waters    Systolic CHF, acute on chronic ([facility]) 6/10/2021    Type II or unspecified type

## 2025-07-23 ENCOUNTER — OFFICE VISIT (OUTPATIENT)
Dept: CARDIOLOGY CLINIC | Age: 80
End: 2025-07-23
Payer: MEDICARE

## 2025-07-23 VITALS
WEIGHT: 224.8 LBS | DIASTOLIC BLOOD PRESSURE: 60 MMHG | HEART RATE: 88 BPM | BODY MASS INDEX: 35.74 KG/M2 | SYSTOLIC BLOOD PRESSURE: 124 MMHG

## 2025-07-23 DIAGNOSIS — Z95.0 PACEMAKER: ICD-10-CM

## 2025-07-23 DIAGNOSIS — I10 PRIMARY HYPERTENSION: ICD-10-CM

## 2025-07-23 DIAGNOSIS — E78.01 FAMILIAL HYPERCHOLESTEROLEMIA: ICD-10-CM

## 2025-07-23 DIAGNOSIS — I48.0 PAROXYSMAL ATRIAL FIBRILLATION (HCC): Primary | ICD-10-CM

## 2025-07-23 PROCEDURE — 99214 OFFICE O/P EST MOD 30 MIN: CPT | Performed by: NUCLEAR MEDICINE

## 2025-07-23 PROCEDURE — 3074F SYST BP LT 130 MM HG: CPT | Performed by: NUCLEAR MEDICINE

## 2025-07-23 PROCEDURE — G8427 DOCREV CUR MEDS BY ELIG CLIN: HCPCS | Performed by: NUCLEAR MEDICINE

## 2025-07-23 PROCEDURE — G8417 CALC BMI ABV UP PARAM F/U: HCPCS | Performed by: NUCLEAR MEDICINE

## 2025-07-23 PROCEDURE — G8400 PT W/DXA NO RESULTS DOC: HCPCS | Performed by: NUCLEAR MEDICINE

## 2025-07-23 PROCEDURE — 3078F DIAST BP <80 MM HG: CPT | Performed by: NUCLEAR MEDICINE

## 2025-07-23 PROCEDURE — 1159F MED LIST DOCD IN RCRD: CPT | Performed by: NUCLEAR MEDICINE

## 2025-07-23 PROCEDURE — 1036F TOBACCO NON-USER: CPT | Performed by: NUCLEAR MEDICINE

## 2025-07-23 PROCEDURE — 1090F PRES/ABSN URINE INCON ASSESS: CPT | Performed by: NUCLEAR MEDICINE

## 2025-07-23 PROCEDURE — 1123F ACP DISCUSS/DSCN MKR DOCD: CPT | Performed by: NUCLEAR MEDICINE

## 2025-07-23 NOTE — PROGRESS NOTES
[facility] [place] [facility]  [facility]  [address]  [address]  [place] [address]  Dept: [phone]  Dept Fax: [phone]  Loc: [phone]    Visit Date: 2025    Barbara Zelaya is a 80 y.o. female who presents todayfor:  Chief Complaint   Patient presents with    Follow-up    Hypertension    Atrial Fibrillation    Hyperlipidemia   Known pacer and A fib   A fib seems stable  No chest pain  Some baseline dyspnea  No changes in breathing  Bp is stable  No dizziness  No syncope  On statins for hyperlipidemia  No issues with the meds  Pacer is followed  Fall issues   Had a fall and had head trauma  Fall risk and concern       HPI:  HPI  Past Medical History:   Diagnosis Date    Aortic aneurysm     4.5 cm aorta    Arthritis     Atrial fibrillation, chronic (HCC) 2019    CAD (coronary artery disease)     CHF NYHA class II, unspecified failure chronicity, unspecified type ([facility]) 10/8/2018    Colon polyps 3/01    Diabetic peripheral neuropathy ([facility]) 2014      feet    Elbow fracture     Fluid overload 2021    Hyperlipidemia     Hypertension     Hypothyroidism     Pulmonary nodule, right 10/2017      repeat  ct of  chest      S/P cardiac pacemaker procedure: 10/21/2017: Medtronic Dual Chamber. 10/21/2017    10/21/2017: Medtronic Dual Chamber. Dr. Waters    Systolic CHF, acute on chronic ([facility]) 6/10/2021    Type II or unspecified type diabetes mellitus without mention of complication, not stated as uncontrolled       Past Surgical History:   Procedure Laterality Date    BREAST BIOPSY Right 2016     benign   [place]    BREAST SURGERY      CARDIOVERSION  2019    atrial fib to sinus  [facility]     SECTION  over 30 years ago     COLONOSCOPY      colon polyps   [place]    CORONARY ANGIOPLASTY      [facility]    CORONARY ANGIOPLASTY WITH STENT PLACEMENT      [facility]    CORONARY ANGIOPLASTY WITH STENT PLACEMENT  10/2018     [facility]    HEEL SPUR SURGERY

## 2025-07-31 ENCOUNTER — LAB (OUTPATIENT)
Dept: LAB | Age: 80
End: 2025-07-31

## 2025-07-31 DIAGNOSIS — E03.4 HYPOTHYROIDISM DUE TO ACQUIRED ATROPHY OF THYROID: ICD-10-CM

## 2025-07-31 DIAGNOSIS — E78.00 PURE HYPERCHOLESTEROLEMIA: ICD-10-CM

## 2025-07-31 DIAGNOSIS — N18.4 CKD (CHRONIC KIDNEY DISEASE) STAGE 4, GFR 15-29 ML/MIN (HCC): ICD-10-CM

## 2025-07-31 LAB
ANION GAP SERPL CALC-SCNC: 16 MEQ/L (ref 8–16)
BUN SERPL-MCNC: 24 MG/DL (ref 8–23)
CALCIUM SERPL-MCNC: 9.2 MG/DL (ref 8.8–10.2)
CHLORIDE SERPL-SCNC: 100 MEQ/L (ref 98–111)
CHOLEST SERPL-MCNC: 164 MG/DL (ref 100–199)
CO2 SERPL-SCNC: 25 MEQ/L (ref 22–29)
CREAT SERPL-MCNC: 2.1 MG/DL (ref 0.5–0.9)
GFR SERPL CREATININE-BSD FRML MDRD: 24 ML/MIN/1.73M2
GLUCOSE SERPL-MCNC: 162 MG/DL (ref 74–109)
POTASSIUM SERPL-SCNC: 3.9 MEQ/L (ref 3.5–5.2)
PTH-INTACT SERPL-MCNC: 120 PG/ML (ref 15–65)
SODIUM SERPL-SCNC: 141 MEQ/L (ref 135–145)
T4 FREE SERPL-MCNC: 2.3 NG/DL (ref 0.92–1.68)
TSH SERPL DL<=0.05 MIU/L-ACNC: 1.66 UIU/ML (ref 0.27–4.2)

## 2025-08-01 ENCOUNTER — RESULTS FOLLOW-UP (OUTPATIENT)
Dept: FAMILY MEDICINE CLINIC | Age: 80
End: 2025-08-01

## 2025-08-01 DIAGNOSIS — E03.4 HYPOTHYROIDISM DUE TO ACQUIRED ATROPHY OF THYROID: ICD-10-CM

## 2025-08-01 RX ORDER — LEVOTHYROXINE SODIUM 200 UG/1
TABLET ORAL
Qty: 90 TABLET | Refills: 1
Start: 2025-08-01

## 2025-08-01 RX ORDER — LEVOTHYROXINE SODIUM 175 UG/1
TABLET ORAL
Qty: 90 TABLET | Refills: 0
Start: 2025-08-01

## 2025-08-01 NOTE — TELEPHONE ENCOUNTER
----- Message from Dr. Xavier Last MD sent at 8/1/2025  5:42 AM EDT -----  Chol  better at 164  so stay   on lipitor 40mg    Kidney function is staying stable but creatinine is up a little higher at 2.0 and has appointment with renal    Thyroid is now elevated but the TSH component is normal  So now is getting too much thyroid and change since still has the 175 take Synthroid 175 Monday through Friday and take 200 mg only on Saturday and Sunday and obtain   new level October 1    This is the amiodarone  so next time in seeing cardiology please inform

## 2025-08-05 ENCOUNTER — OFFICE VISIT (OUTPATIENT)
Dept: NEPHROLOGY | Age: 80
End: 2025-08-05
Payer: MEDICARE

## 2025-08-05 VITALS
SYSTOLIC BLOOD PRESSURE: 124 MMHG | HEART RATE: 86 BPM | BODY MASS INDEX: 36.41 KG/M2 | DIASTOLIC BLOOD PRESSURE: 68 MMHG | OXYGEN SATURATION: 98 % | WEIGHT: 229 LBS

## 2025-08-05 DIAGNOSIS — N18.4 CKD (CHRONIC KIDNEY DISEASE) STAGE 4, GFR 15-29 ML/MIN (HCC): Primary | ICD-10-CM

## 2025-08-05 PROCEDURE — G8400 PT W/DXA NO RESULTS DOC: HCPCS | Performed by: INTERNAL MEDICINE

## 2025-08-05 PROCEDURE — 99214 OFFICE O/P EST MOD 30 MIN: CPT | Performed by: INTERNAL MEDICINE

## 2025-08-05 PROCEDURE — 1160F RVW MEDS BY RX/DR IN RCRD: CPT | Performed by: INTERNAL MEDICINE

## 2025-08-05 PROCEDURE — G8427 DOCREV CUR MEDS BY ELIG CLIN: HCPCS | Performed by: INTERNAL MEDICINE

## 2025-08-05 PROCEDURE — 1159F MED LIST DOCD IN RCRD: CPT | Performed by: INTERNAL MEDICINE

## 2025-08-05 PROCEDURE — G8417 CALC BMI ABV UP PARAM F/U: HCPCS | Performed by: INTERNAL MEDICINE

## 2025-08-05 PROCEDURE — 1090F PRES/ABSN URINE INCON ASSESS: CPT | Performed by: INTERNAL MEDICINE

## 2025-08-05 PROCEDURE — 1036F TOBACCO NON-USER: CPT | Performed by: INTERNAL MEDICINE

## 2025-08-05 PROCEDURE — 1123F ACP DISCUSS/DSCN MKR DOCD: CPT | Performed by: INTERNAL MEDICINE

## 2025-08-05 PROCEDURE — 3074F SYST BP LT 130 MM HG: CPT | Performed by: INTERNAL MEDICINE

## 2025-08-05 PROCEDURE — 3078F DIAST BP <80 MM HG: CPT | Performed by: INTERNAL MEDICINE

## 2025-08-15 ENCOUNTER — TELEPHONE (OUTPATIENT)
Dept: CARDIOLOGY CLINIC | Age: 80
End: 2025-08-15

## 2025-08-27 RX ORDER — CALCITRIOL 0.25 UG/1
CAPSULE, LIQUID FILLED ORAL
Qty: 52 CAPSULE | Refills: 3 | Status: SHIPPED | OUTPATIENT
Start: 2025-08-27